# Patient Record
Sex: MALE | Race: WHITE | Employment: OTHER | URBAN - METROPOLITAN AREA
[De-identification: names, ages, dates, MRNs, and addresses within clinical notes are randomized per-mention and may not be internally consistent; named-entity substitution may affect disease eponyms.]

---

## 2017-03-07 ENCOUNTER — HOSPITAL ENCOUNTER (OUTPATIENT)
Dept: PHYSICAL THERAPY | Age: 67
Discharge: HOME | End: 2017-03-07
Payer: MEDICARE

## 2017-03-07 PROCEDURE — 97012 MECHANICAL TRACTION THERAPY: CPT

## 2017-03-07 PROCEDURE — 97162 PT EVAL MOD COMPLEX 30 MIN: CPT

## 2017-03-07 PROCEDURE — 97140 MANUAL THERAPY 1/> REGIONS: CPT

## 2017-03-07 PROCEDURE — G8984 CARRY CURRENT STATUS: HCPCS

## 2017-03-07 PROCEDURE — G8985 CARRY GOAL STATUS: HCPCS

## 2017-03-07 NOTE — PROGRESS NOTES
Ambulatory/Rehab Services H2 Model Falls Risk Assessment    Risk Factor Pts. ·   Confusion/Disorientation/Impulsivity  []    4 ·   Symptomatic Depression  []   2 ·   Altered Elimination  []   1 ·   Dizziness/Vertigo  []   1 ·   Gender (Male)  [x]   1 ·   Any administered antiepileptics (anticonvulsants):  []   2 ·   Any administered benzodiazepines:  []   1 ·   Visual Impairment (specify):  []   1 ·   Portable Oxygen Use  []   1 ·   Orthostatic ? BP  []   1 ·   History of Recent Falls (within 3 mos.)  []   5     Ability to Rise from Chair (choose one) Pts. ·   Ability to rise in a single movement  [x]   0 ·   Pushes up, successful in one attempt  []   1 ·   Multiple attempts, but successful  []   3 ·   Unable to rise without assistance  []   4   Total: (5 or greater = High Risk) 1     Falls Prevention Plan:   []                Physical Limitations to Exercise (specify):   []                Mobility Assistance Device (type):   []                Exercise/Equipment Adaptation (specify):    ©2010 [employer] Patent #[id number].  Federal Law prohibits the replication, distribution or use without written permission from [employer]

## 2017-03-07 NOTE — PROGRESS NOTES
Debby Powell  : 1950 [facility]  [address], [address] [address],[address], [facility] [address]  [phone]   [phone]           OUTPATIENT PHYSICAL THERAPY:Initial Assessment 3/7/2017    ICD-10: Treatment Diagnosis: Cervicalgia (M54.2); Impingement syndrome of right shoulder (M75.41)  Precautions/Allergies:   Levaquin [levofloxacin]   Fall Risk Score: 1 (? 5 = High Risk)  MD Orders: Evaluate and Treat MEDICAL/REFERRING DIAGNOSIS:  Cervicalgia [M54.2]  Impingement syndrome of right shoulder [M75.41]   DATE OF ONSET: 2016  REFERRING PHYSICIAN: Debbie Randle MD  RETURN PHYSICIAN APPOINTMENT: 17     INITIAL ASSESSMENT:  Mr. Harman Bowling presents with decreased cervical ROM, R UE strength and increased pain leading to decreased functional status. Pt would benefit from skilled physical therapy services to address the above deficits and help patient return to prior level of function. PROBLEM LIST (Impacting functional limitations):  1. Decreased Strength  2. Decreased ADL/Functional Activities  3. Increased Pain  4. Decreased Flexibility/Joint Mobility  5. Decreased Barber with Home Exercise Program INTERVENTIONS PLANNED:  1. Cold  2. Cryotherapy  3. Electrical Stimulation  4. Heat  5. Home Exercise Program (HEP)  6. Manual Therapy  7. Range of Motion (ROM)  8. Therapeutic Exercise/Strengthening  9. Ultrasound (US)   TREATMENT PLAN:  Effective Dates: 17 TO 17. Frequency/Duration: 2 times a week for 3 months  GOALS: (Goals have been discussed and agreed upon with patient.)  Short-Term Functional Goals: Time Frame: 4 weeks  1. Pt will increase cervical ROM flexion = 45 degrees, side bending R = 35 degrees, and rotation R = 65 degrees to assist with household and lawn activities  2. Pt will increase strength R shoulder 4/5 to 4+/5 to assist with household and lawn activities  Discharge Goals: Time Frame: 12 weeks  1.  Pt will increase strength R shoulder 5/5 to assist with household and lawn activities  2. Pt will be independent with HEP  3. Pt will perform 20 minutes lawn activities independently with min to no c/o neck or R UE pain  Rehabilitation Potential For Stated Goals: Good  Regarding Laisha Renner's therapy, I certify that the treatment plan above will be carried out by a therapist or under their direction. Thank you for this referral,  Radha Mclean PT     Referring Physician Signature: Jb Alexander MD              Date                    The information in this section was collected on 03-07-17 (except where otherwise noted). HISTORY:   History of Present Injury/Illness (Reason for Referral):  Pt reports insidious onset R shoulder pain that started approximately 12-03-16. He states he had been doing some push-ups which may have contributed to his R shoulder pain. He had some x-rays which revealed a compressed disc at C4-5. He was given a steroid dose pack which helped the pain. His MD sent him for 2 visits of PT for some neck and shoulder exercises. He states when he went off the steroid dose pack his pain returned. He saw MD for follow up and MD has referred him back for continued PT. Pt is retired. Pt has history of lumbar laminectomy by Dr Eddi Troy 08-22-16. He states his lower back is doing well overall-he states his back gets fatigued at times. Pt had an MRI of his neck this morning and sees MD for follow up 03-17-17 to get MRI results. Pt is currently getting pain in his R neck, upper trap, scapular region, shoulder and down to the elbow. He denies any UE numbness or tingling. Pt rates his current pain 2-3/10 sitting at rest, increasing to 6/10 at worst.  Aggravating factors include inactivity. Relieving factors include activity. He is taking Naproxen 500 mg as needed-he has been taking it twice daily. Pt is retired. Pt reports difficulties with sleeping at night secondary neck and shoulder pain.   Past Medical History/Comorbidities:   Mr. Brenda Spicer  has a past medical history of Arthritis; Chronic pain; GERD (gastroesophageal reflux disease); Hypercholesterolemia; Hypertension; Malignant neoplasm of prostate ([address] Stress incontinence, male; and Thyroid disease. Mr. Brenda Spicer  has a past surgical history that includes endoscopy, colon, diagnostic; urological (2007); urological (2008); orthopaedic (Right, 1998); and lumbar laminectomy (08/22/2016). Social History/Living Environment:     Pt lives with spouse who assists with ADL's as needed. He reports increased pain and difficulties with all household/lawn activities due to his neck and R shoulder pain. Prior Level of Function/Work/Activity:  Independent with all household and lawn activities  Dominant Side:         RIGHT  Other Clinical Tests:          X-rays, MRI (has not gotten results yet)  Previous Treatment Approaches:          Physical Therapy, Medications  Personal Factors:          Age:  78 y.o. Past/Current Experience:  Pt has had some prior physical therapy  Current Medications:       Current Outpatient Prescriptions:     levothyroxine (SYNTHROID) 137 mcg tablet, Take 137 mcg by mouth Daily (before breakfast). , Disp: 90 Tab, Rfl: 3    sildenafil citrate (VIAGRA) 25 mg tablet, Take 1 Tab by mouth as needed. , Disp: 18 Tab, Rfl: 3    simvastatin (ZOCOR) 40 mg tablet, Take 1 Tab by mouth nightly., Disp: 90 Tab, Rfl: 3    amLODIPine (NORVASC) 5 mg tablet, Take 1 Tab by mouth daily. , Disp: 90 Tab, Rfl: 3    telmisartan-hydrochlorothiazide (MICARDIS HCT) 80-12.5 mg per tablet, Take 1 Tab by mouth daily. , Disp: 90 Tab, Rfl: 3    fexofenadine (ALLEGRA) 180 mg tablet, Take 180 mg by mouth daily. , Disp: , Rfl:     esomeprazole (NEXIUM) 40 mg capsule, Take 1 Cap by mouth daily.  (Patient taking differently: Take 40 mg by mouth every morning.), Disp: 90 Cap, Rfl: 3    naproxen (NAPROSYN) 500 mg tablet, Take 1 Tab by mouth two (2) times daily (with meals). (Patient taking differently: Take 500 mg by mouth two (2) times daily (with meals). Last dose to be 8/1/2016), Disp: 180 Tab, Rfl: 3    omega-3 fatty acids-vitamin e (FISH OIL) 1,000 mg cap, Take 1 Cap by mouth. Holding for surgery, Disp: , Rfl:     multivitamin (ONE A DAY) tablet, Take 1 Tab by mouth daily. Last dose 8/1/2016, Disp: , Rfl:     clotrimazole-betamethasone (LOTRISONE) topical cream, Apply  to affected area two (2) times a day. (Patient taking differently: Apply  to affected area as needed.), Disp: 45 g, Rfl: 5    azelaic acid (FINACEA) 15 % topical gel, Apply  to affected area two (2) times a day. (Patient taking differently: Apply  to affected area as needed.), Disp: 50 g, Rfl: 3    aspirin delayed-release 81 mg tablet, Take 162 mg by mouth daily. Last dose to be 8/1/2016, Disp: , Rfl:    Date Last Reviewed:  03-07-17   Number of Personal Factors/Comorbidities that affect the Plan of Care: 1-2: MODERATE COMPLEXITY   EXAMINATION:   Observation/Orthostatic Postural Assessment:           Forward head, rounded shoulders  Palpation:          Tenderness R upper trap and levator scap  ROM:    Cervical flexion = 35 degrees  Cervical extension = 29 degrees (radiating pain into R scapular region)  Cervical side bending R = 25 degrees (pain in R cervical region)  Cervical side bending L = 35 degrees  Cervical rotation R = 55 degrees (pain in R cervical region)  Cervical rotation L = 65 degrees    Strength:    R shoulder flexion = 4-/5  R shoulder abduction = 4/5  R shoulder internal rotation = 4+/5  R shoulder external rotation = 4+/5  R elbow flexion = 5/5  R elbow extension = 5/5  R wrist flexion = 5/5  R wrist extension = 5/5    L shoulder flexion = 5/5  L shoulder abduction = 5/5  L shoulder internal rotation = 5/5  L shoulder external rotation = 5/5  L elbow flexion = 5/5  L elbow extension = 5/5  L wrist flexion = 5/5  L wrist extension = 5/5    Special Tests:          Cervical compression and distraction negative; Negative impingement sign R shoulder  Neurological Screen:        Myotomes:  Weakness R C5        Dermatomes:  Sensation intact to light touch bilateral UE's        Reflexes:  DTR's 2+ to bilateral brachioradialis and triceps, 2+ to L biceps, 1+ to R biceps  Functional Mobility:         Pt able to transition sit to supine and supine to sit independently       Body Structures Involved:  1. Nerves  2. Joints  3. Muscles Body Functions Affected:  1. Sensory/Pain  2. Neuromusculoskeletal Activities and Participation Affected:  1. Domestic Life   Number of elements (examined above) that affect the Plan of Care: 3: MODERATE COMPLEXITY   CLINICAL PRESENTATION:   Presentation: Evolving clinical presentation with changing clinical characteristics: MODERATE COMPLEXITY   CLINICAL DECISION MAKING:   Outcome Measure: Tool Used: Neck Disability Index (NDI)  Score:  Initial: 7/50  Most Recent: X/50 (Date: -- )   Interpretation of Score: The Neck Disability Index is a revised form of the Oswestry Low Back Pain Index and is designed to measure the activities of daily living in person's with neck pain. Each section is scored on a 0-5 scale, 5 representing the greatest disability. The scores of each section are added together for a total score of 50. Score 0 1-10 11-20 21-30 31-40 41-49 50   Modifier CH CI CJ CK CL CM CN     ? Carrying, Moving, and Handling Objects:     - CURRENT STATUS: CI - 1%-19% impaired, limited or restricted    - GOAL STATUS: CI - 1%-19% impaired, limited or restricted    - D/C STATUS:  ---------------To be determined---------------      Medical Necessity:   · Patient is expected to demonstrate progress in strength, range of motion and functional technique to increase independence with household/lawn activities. · Patient demonstrates good rehab potential due to higher previous functional level.   Reason for Services/Other Comments:  · Patient continues to require skilled intervention due to decreased cervical ROM, UE strength and increased pain leading to decreased functional status. Use of outcome tool(s) and clinical judgement create a POC that gives a: Questionable prediction of patient's progress: MODERATE COMPLEXITY            TREATMENT:   (In addition to Assessment/Re-Assessment sessions the following treatments were rendered)  Pre-treatment Symptoms/Complaints:  Neck and R shoulder pain  Pain: Initial:     2-3/10 Post Session:  2/10     THERAPEUTIC EXERCISE: (0 minutes):  Exercises per grid below to improve mobility and strength. Required minimal verbal cues to promote proper body alignment and promote proper body posture. Progressed resistance and repetitions as indicated. MANUAL THERAPY: (10 minutes): STM to bilateral cervical paraspinals, upper traps and levator scap R>L was utilized and necessary because of the patient's restricted motion of soft tissue. MECHANICAL TRACTION: (15 minutes): Traction was used due to the patient's cervical radiculopathy in order to relieve pain in or originating from his spine. Date:   Date:   Date:     Activity/Exercise Parameters Parameters Parameters                                                   Treatment/Session Assessment:    · Response to Treatment:  Pt tolerated evaluation and treatments well today with . He stated traction felt food. · Compliance with Program/Exercises: Will assess as treatment progresses. · Recommendations/Intent for next treatment session: \"Next visit will focus on cervical traction, manual therapy, begin and progress therex as tolerable\".   Total Treatment Duration:  25 minutes  PT Patient Time In/Time Out  Time In: 6955  Time Out: [address], PT

## 2017-03-09 ENCOUNTER — HOSPITAL ENCOUNTER (OUTPATIENT)
Dept: PHYSICAL THERAPY | Age: 67
Discharge: HOME | End: 2017-03-09
Payer: MEDICARE

## 2017-03-09 PROCEDURE — 97110 THERAPEUTIC EXERCISES: CPT

## 2017-03-09 PROCEDURE — 97012 MECHANICAL TRACTION THERAPY: CPT

## 2017-03-09 PROCEDURE — 97140 MANUAL THERAPY 1/> REGIONS: CPT

## 2017-03-09 NOTE — PROGRESS NOTES
Bertha Toussainterer  : 1950 [facility] [place] [address], [place] [address].  [phone]   [phone]           OUTPATIENT PHYSICAL THERAPY:Daily Note 3/9/2017    ICD-10: Treatment Diagnosis: Cervicalgia (M54.2); Impingement syndrome of right shoulder (M75.41)  Precautions/Allergies:   Levaquin [levofloxacin]   Fall Risk Score: 1 (? 5 = High Risk)  MD Orders: Evaluate and Treat MEDICAL/REFERRING DIAGNOSIS:  Cervicalgia [M54.2]  Impingement syndrome of right shoulder [M75.41]   DATE OF ONSET: 2016  REFERRING PHYSICIAN: Nam Nevarez MD  RETURN PHYSICIAN APPOINTMENT: 17     INITIAL ASSESSMENT:  Mr. Balbina Bedoya presents with decreased cervical ROM, R UE strength and increased pain leading to decreased functional status. Pt would benefit from skilled physical therapy services to address the above deficits and help patient return to prior level of function. PROBLEM LIST (Impacting functional limitations):  1. Decreased Strength  2. Decreased ADL/Functional Activities  3. Increased Pain  4. Decreased Flexibility/Joint Mobility  5. Decreased [place] with Home Exercise Program INTERVENTIONS PLANNED:  1. Cold  2. Cryotherapy  3. Electrical Stimulation  4. Heat  5. Home Exercise Program (HEP)  6. Manual Therapy  7. Range of Motion (ROM)  8. Therapeutic Exercise/Strengthening  9. Ultrasound (US)   TREATMENT PLAN:  Effective Dates: 17 TO 17. Frequency/Duration: 2 times a week for 3 months  GOALS: (Goals have been discussed and agreed upon with patient.)  Short-Term Functional Goals: Time Frame: 4 weeks  1. Pt will increase cervical ROM flexion = 45 degrees, side bending R = 35 degrees, and rotation R = 65 degrees to assist with household and lawn activities  2. Pt will increase strength R shoulder 4/5 to 4+/5 to assist with household and lawn activities  Discharge Goals: Time Frame: 12 weeks  1.  Pt will increase strength R shoulder 5/5 to assist with household and lawn activities  2. Pt will be independent with HEP  3. Pt will perform 20 minutes lawn activities independently with min to no c/o neck or R UE pain  Rehabilitation Potential For Stated Goals: Good  Regarding Audie Renner's therapy, I certify that the treatment plan above will be carried out by a therapist or under their direction. Thank you for this referral,  Luz Chao PTA     Referring Physician Signature: Brien Zhong MD              Date                    The information in this section was collected on 03-07-17 (except where otherwise noted). HISTORY:   Subjective: \"That traction was great, I think that is the key to it all\"    History of Present Injury/Illness (Reason for Referral):   Pt reports insidious onset R shoulder pain that started approximately 12-03-16. He states he had been doing some push-ups which may have contributed to his R shoulder pain. He had some x-rays which revealed a compressed disc at C4-5. He was given a steroid dose pack which helped the pain. His MD sent him for 2 visits of PT for some neck and shoulder exercises. He states when he went off the steroid dose pack his pain returned. He saw MD for follow up and MD has referred him back for continued PT. Pt is retired. Pt has history of lumbar laminectomy by Dr Blayne Bliss 08-22-16. He states his lower back is doing well overall-he states his back gets fatigued at times. Pt had an MRI of his neck this morning and sees MD for follow up 03-17-17 to get MRI results. Pt is currently getting pain in his R neck, upper trap, scapular region, shoulder and down to the elbow. He denies any UE numbness or tingling. Pt rates his current pain 2-3/10 sitting at rest, increasing to 6/10 at worst.  Aggravating factors include inactivity. Relieving factors include activity. He is taking Naproxen 500 mg as needed-he has been taking it twice daily. Pt is retired.   Pt reports difficulties with sleeping at night secondary neck and shoulder pain. Past Medical History/Comorbidities:   Mr. Valorie Sanders  has a past medical history of Arthritis; Chronic pain; GERD (gastroesophageal reflux disease); Hypercholesterolemia; Hypertension; Malignant neoplasm of prostate ([address] Stress incontinence, male; and Thyroid disease. Mr. Valorie Sanders  has a past surgical history that includes endoscopy, colon, diagnostic; urological (2007); urological (2008); orthopaedic (Right, 1998); and lumbar laminectomy (08/22/2016). Social History/Living Environment:     Pt lives with spouse who assists with ADL's as needed. He reports increased pain and difficulties with all household/lawn activities due to his neck and R shoulder pain. Prior Level of Function/Work/Activity:  Independent with all household and lawn activities  Dominant Side:         RIGHT  Other Clinical Tests:          X-rays, MRI (has not gotten results yet)  Previous Treatment Approaches:          Physical Therapy, Medications  Personal Factors:          Age:  78 y.o. Past/Current Experience:  Pt has had some prior physical therapy  Current Medications:       Current Outpatient Prescriptions:     levothyroxine (SYNTHROID) 137 mcg tablet, Take 137 mcg by mouth Daily (before breakfast). , Disp: 90 Tab, Rfl: 3    sildenafil citrate (VIAGRA) 25 mg tablet, Take 1 Tab by mouth as needed. , Disp: 18 Tab, Rfl: 3    simvastatin (ZOCOR) 40 mg tablet, Take 1 Tab by mouth nightly., Disp: 90 Tab, Rfl: 3    amLODIPine (NORVASC) 5 mg tablet, Take 1 Tab by mouth daily. , Disp: 90 Tab, Rfl: 3    telmisartan-hydrochlorothiazide (MICARDIS HCT) 80-12.5 mg per tablet, Take 1 Tab by mouth daily. , Disp: 90 Tab, Rfl: 3    fexofenadine (ALLEGRA) 180 mg tablet, Take 180 mg by mouth daily. , Disp: , Rfl:     esomeprazole (NEXIUM) 40 mg capsule, Take 1 Cap by mouth daily.  (Patient taking differently: Take 40 mg by mouth every morning.), Disp: 90 Cap, Rfl: 3    naproxen (NAPROSYN) 500 mg tablet, Take 1 Tab by mouth two (2) times daily (with meals). (Patient taking differently: Take 500 mg by mouth two (2) times daily (with meals). Last dose to be 8/1/2016), Disp: 180 Tab, Rfl: 3    omega-3 fatty acids-vitamin e (FISH OIL) 1,000 mg cap, Take 1 Cap by mouth. Holding for surgery, Disp: , Rfl:     multivitamin (ONE A DAY) tablet, Take 1 Tab by mouth daily. Last dose 8/1/2016, Disp: , Rfl:     clotrimazole-betamethasone (LOTRISONE) topical cream, Apply  to affected area two (2) times a day. (Patient taking differently: Apply  to affected area as needed.), Disp: 45 g, Rfl: 5    azelaic acid (FINACEA) 15 % topical gel, Apply  to affected area two (2) times a day. (Patient taking differently: Apply  to affected area as needed.), Disp: 50 g, Rfl: 3    aspirin delayed-release 81 mg tablet, Take 162 mg by mouth daily. Last dose to be 8/1/2016, Disp: , Rfl:    Date Last Reviewed:  03-07-17   Number of Personal Factors/Comorbidities that affect the Plan of Care: 1-2: MODERATE COMPLEXITY   EXAMINATION:   Observation/Orthostatic Postural Assessment:           Forward head, rounded shoulders  Palpation:          Tenderness R upper trap and levator scap  ROM:    Cervical flexion = 35 degrees  Cervical extension = 29 degrees (radiating pain into R scapular region)  Cervical side bending R = 25 degrees (pain in R cervical region)  Cervical side bending L = 35 degrees  Cervical rotation R = 55 degrees (pain in R cervical region)  Cervical rotation L = 65 degrees    Strength:    R shoulder flexion = 4-/5  R shoulder abduction = 4/5  R shoulder internal rotation = 4+/5  R shoulder external rotation = 4+/5  R elbow flexion = 5/5  R elbow extension = 5/5  R wrist flexion = 5/5  R wrist extension = 5/5    L shoulder flexion = 5/5  L shoulder abduction = 5/5  L shoulder internal rotation = 5/5  L shoulder external rotation = 5/5  L elbow flexion = 5/5  L elbow extension = 5/5  L wrist flexion = 5/5  L wrist extension = 5/5    Special Tests:          Cervical compression and distraction negative; Negative impingement sign R shoulder  Neurological Screen:        Myotomes:  Weakness R C5        Dermatomes:  Sensation intact to light touch bilateral UE's        Reflexes:  DTR's 2+ to bilateral brachioradialis and triceps, 2+ to L biceps, 1+ to R biceps  Functional Mobility:         Pt able to transition sit to supine and supine to sit independently       Body Structures Involved:  1. Nerves  2. Joints  3. Muscles Body Functions Affected:  1. Sensory/Pain  2. Neuromusculoskeletal Activities and Participation Affected:  1. Domestic Life   Number of elements (examined above) that affect the Plan of Care: 3: MODERATE COMPLEXITY   CLINICAL PRESENTATION:   Presentation: Evolving clinical presentation with changing clinical characteristics: MODERATE COMPLEXITY   CLINICAL DECISION MAKING:   Outcome Measure: Tool Used: Neck Disability Index (NDI)  Score:  Initial: 7/50  Most Recent: X/50 (Date: -- )   Interpretation of Score: The Neck Disability Index is a revised form of the Oswestry Low Back Pain Index and is designed to measure the activities of daily living in person's with neck pain. Each section is scored on a 0-5 scale, 5 representing the greatest disability. The scores of each section are added together for a total score of 50. Score 0 1-10 11-20 21-30 31-40 41-49 50   Modifier CH CI CJ CK CL CM CN     ? Carrying, Moving, and Handling Objects:     - CURRENT STATUS: CI - 1%-19% impaired, limited or restricted    - GOAL STATUS: CI - 1%-19% impaired, limited or restricted    - D/C STATUS:  ---------------To be determined---------------      Medical Necessity:   · Patient is expected to demonstrate progress in strength, range of motion and functional technique to increase independence with household/lawn activities. · Patient demonstrates good rehab potential due to higher previous functional level.   Reason for Services/Other Comments:  · Patient continues to require skilled intervention due to decreased cervical ROM, UE strength and increased pain leading to decreased functional status. Use of outcome tool(s) and clinical judgement create a POC that gives a: Questionable prediction of patient's progress: MODERATE COMPLEXITY            TREATMENT:   (In addition to Assessment/Re-Assessment sessions the following treatments were rendered)  Pre-treatment Symptoms/Complaints:  Neck and R shoulder pain  Pain: Initial:   Pain Intensity 1: 3 (\"Traction helped alot\")  Post Session:  2/10     THERAPEUTIC EXERCISE: (15 minutes):  Exercises per grid below to improve mobility and strength. Required minimal verbal cues to promote proper body alignment and promote proper body posture. Progressed resistance and repetitions as indicated. MANUAL THERAPY: (10 minutes): STM to bilateral cervical paraspinals, upper traps and levator scap R>L was utilized and necessary because of the patient's restricted motion of soft tissue. MECHANICAL TRACTION: (15 minutes): Traction was used due to the patient's cervical radiculopathy and 60 sec hold 20 sec off with 15# pull in order to relieve pain in or originating from his spine. Date:  3-9-17 Date:   Date:     Activity/Exercise Parameters Parameters Parameters   UBE Next visit     Tband Row-Extension Next visit     Chin Tucks X 10 reps 5 sec holds     Deep cervical flexion X 10 hold 5 seconds     Upper Trap -Levator stretch 3 x 10 sec holds bilaterally                       Treatment/Session Assessment:  Patient continue's to do his HEP. · Response to Treatment:  Patient responding well to traction, getting some relieve longer periods after traction. · Compliance with Program/Exercises: Will assess as treatment progresses. · Recommendations/Intent for next treatment session: \"Next visit will focus on cervical traction, manual therapy, begin and progress therex as tolerable\".   Total Treatment Duration: 40 minutes  PT Patient Time In/Time Out  Time In: 5565  Time Out: 700 [address], [facility]

## 2017-03-13 ENCOUNTER — HOSPITAL ENCOUNTER (OUTPATIENT)
Dept: PHYSICAL THERAPY | Age: 67
Discharge: HOME | End: 2017-03-13
Payer: MEDICARE

## 2017-03-13 PROCEDURE — 97012 MECHANICAL TRACTION THERAPY: CPT

## 2017-03-13 PROCEDURE — 97110 THERAPEUTIC EXERCISES: CPT

## 2017-03-13 PROCEDURE — 97140 MANUAL THERAPY 1/> REGIONS: CPT

## 2017-03-13 NOTE — PROGRESS NOTES
Yady Pacific  : 1950 [facility]  [address], [address] [place] [address] [facility]  [phone]   [phone]           OUTPATIENT PHYSICAL THERAPY:Daily Note 3/13/2017    ICD-10: Treatment Diagnosis: Cervicalgia (M54.2); Impingement syndrome of right shoulder (M75.41)  Precautions/Allergies:   Levaquin [levofloxacin]   Fall Risk Score: 1 (? 5 = High Risk)  MD Orders: Evaluate and Treat MEDICAL/REFERRING DIAGNOSIS:  Cervicalgia [M54.2]  Impingement syndrome of right shoulder [M75.41]   DATE OF ONSET: 2016  REFERRING PHYSICIAN: Eric Limon MD  RETURN PHYSICIAN APPOINTMENT: 17     INITIAL ASSESSMENT:  Mr. Nuno Treadwell presents with decreased cervical ROM, R UE strength and increased pain leading to decreased functional status. Pt would benefit from skilled physical therapy services to address the above deficits and help patient return to prior level of function. PROBLEM LIST (Impacting functional limitations):  1. Decreased Strength  2. Decreased ADL/Functional Activities  3. Increased Pain  4. Decreased Flexibility/Joint Mobility  5. Decreased [place] with Home Exercise Program INTERVENTIONS PLANNED:  1. Cold  2. Cryotherapy  3. Electrical Stimulation  4. Heat  5. Home Exercise Program (HEP)  6. Manual Therapy  7. Range of Motion (ROM)  8. Therapeutic Exercise/Strengthening  9. Ultrasound (US)   TREATMENT PLAN:  Effective Dates: 17 TO 17. Frequency/Duration: 2 times a week for 3 months  GOALS: (Goals have been discussed and agreed upon with patient.)  Short-Term Functional Goals: Time Frame: 4 weeks  1. Pt will increase cervical ROM flexion = 45 degrees, side bending R = 35 degrees, and rotation R = 65 degrees to assist with household and lawn activities  2. Pt will increase strength R shoulder 4/5 to 4+/5 to assist with household and lawn activities  Discharge Goals: Time Frame: 12 weeks  1.  Pt will increase strength R shoulder 5/5 to assist with household and lawn activities  2. Pt will be independent with HEP  3. Pt will perform 20 minutes lawn activities independently with min to no c/o neck or R UE pain  Rehabilitation Potential For Stated Goals: Good  Regarding Mane Renner's therapy, I certify that the treatment plan above will be carried out by a therapist or under their direction. Thank you for this referral,  Lorenzo Russ PT     Referring Physician Signature: Karen Bauer MD              Date                    The information in this section was collected on 03-07-17 (except where otherwise noted). HISTORY:   Subjective:   Pt reports continued improvements in his neck. He states the traction continues to help. History of Present Injury/Illness (Reason for Referral):   Pt reports insidious onset R shoulder pain that started approximately 12-03-16. He states he had been doing some push-ups which may have contributed to his R shoulder pain. He had some x-rays which revealed a compressed disc at C4-5. He was given a steroid dose pack which helped the pain. His MD sent him for 2 visits of PT for some neck and shoulder exercises. He states when he went off the steroid dose pack his pain returned. He saw MD for follow up and MD has referred him back for continued PT. Pt is retired. Pt has history of lumbar laminectomy by Dr Beny Ge 08-22-16. He states his lower back is doing well overall-he states his back gets fatigued at times. Pt had an MRI of his neck this morning and sees MD for follow up 03-17-17 to get MRI results. Pt is currently getting pain in his R neck, upper trap, scapular region, shoulder and down to the elbow. He denies any UE numbness or tingling. Pt rates his current pain 2-3/10 sitting at rest, increasing to 6/10 at worst.  Aggravating factors include inactivity. Relieving factors include activity. He is taking Naproxen 500 mg as needed-he has been taking it twice daily.   Pt is retired. Pt reports difficulties with sleeping at night secondary neck and shoulder pain. Past Medical History/Comorbidities:   Mr. Nuno Treadwell  has a past medical history of Arthritis; Chronic pain; GERD (gastroesophageal reflux disease); Hypercholesterolemia; Hypertension; Malignant neoplasm of prostate ([address] Stress incontinence, male; and Thyroid disease. Mr. Nuno Treadwell  has a past surgical history that includes endoscopy, colon, diagnostic; urological (2007); urological (2008); orthopaedic (Right, 1998); and lumbar laminectomy (08/22/2016). Social History/Living Environment:     Pt lives with spouse who assists with ADL's as needed. He reports increased pain and difficulties with all household/lawn activities due to his neck and R shoulder pain. Prior Level of Function/Work/Activity:  Independent with all household and lawn activities  Dominant Side:         RIGHT  Other Clinical Tests:          X-rays, MRI (has not gotten results yet)  Previous Treatment Approaches:          Physical Therapy, Medications  Personal Factors:          Age:  78 y.o. Past/Current Experience:  Pt has had some prior physical therapy  Current Medications:       Current Outpatient Prescriptions:     levothyroxine (SYNTHROID) 137 mcg tablet, Take 137 mcg by mouth Daily (before breakfast). , Disp: 90 Tab, Rfl: 3    sildenafil citrate (VIAGRA) 25 mg tablet, Take 1 Tab by mouth as needed. , Disp: 18 Tab, Rfl: 3    simvastatin (ZOCOR) 40 mg tablet, Take 1 Tab by mouth nightly., Disp: 90 Tab, Rfl: 3    amLODIPine (NORVASC) 5 mg tablet, Take 1 Tab by mouth daily. , Disp: 90 Tab, Rfl: 3    telmisartan-hydrochlorothiazide (MICARDIS HCT) 80-12.5 mg per tablet, Take 1 Tab by mouth daily. , Disp: 90 Tab, Rfl: 3    fexofenadine (ALLEGRA) 180 mg tablet, Take 180 mg by mouth daily. , Disp: , Rfl:     esomeprazole (NEXIUM) 40 mg capsule, Take 1 Cap by mouth daily.  (Patient taking differently: Take 40 mg by mouth every morning.), Disp: 90 Cap, Rfl: 3    naproxen (NAPROSYN) 500 mg tablet, Take 1 Tab by mouth two (2) times daily (with meals). (Patient taking differently: Take 500 mg by mouth two (2) times daily (with meals). Last dose to be 8/1/2016), Disp: 180 Tab, Rfl: 3    omega-3 fatty acids-vitamin e (FISH OIL) 1,000 mg cap, Take 1 Cap by mouth. Holding for surgery, Disp: , Rfl:     multivitamin (ONE A DAY) tablet, Take 1 Tab by mouth daily. Last dose 8/1/2016, Disp: , Rfl:     clotrimazole-betamethasone (LOTRISONE) topical cream, Apply  to affected area two (2) times a day. (Patient taking differently: Apply  to affected area as needed.), Disp: 45 g, Rfl: 5    azelaic acid (FINACEA) 15 % topical gel, Apply  to affected area two (2) times a day. (Patient taking differently: Apply  to affected area as needed.), Disp: 50 g, Rfl: 3    aspirin delayed-release 81 mg tablet, Take 162 mg by mouth daily. Last dose to be 8/1/2016, Disp: , Rfl:    Date Last Reviewed:  03-07-17   Number of Personal Factors/Comorbidities that affect the Plan of Care: 1-2: MODERATE COMPLEXITY   EXAMINATION:   Observation/Orthostatic Postural Assessment:           Forward head, rounded shoulders  Palpation:          Tenderness R upper trap and levator scap  ROM:    Cervical flexion = 35 degrees  Cervical extension = 29 degrees (radiating pain into R scapular region)  Cervical side bending R = 25 degrees (pain in R cervical region)  Cervical side bending L = 35 degrees  Cervical rotation R = 55 degrees (pain in R cervical region)  Cervical rotation L = 65 degrees    Strength:    R shoulder flexion = 4-/5  R shoulder abduction = 4/5  R shoulder internal rotation = 4+/5  R shoulder external rotation = 4+/5  R elbow flexion = 5/5  R elbow extension = 5/5  R wrist flexion = 5/5  R wrist extension = 5/5    L shoulder flexion = 5/5  L shoulder abduction = 5/5  L shoulder internal rotation = 5/5  L shoulder external rotation = 5/5  L elbow flexion = 5/5  L elbow extension = 5/5  L wrist flexion = 5/5  L wrist extension = 5/5    Special Tests:          Cervical compression and distraction negative; Negative impingement sign R shoulder  Neurological Screen:        Myotomes:  Weakness R C5        Dermatomes:  Sensation intact to light touch bilateral UE's        Reflexes:  DTR's 2+ to bilateral brachioradialis and triceps, 2+ to L biceps, 1+ to R biceps  Functional Mobility:         Pt able to transition sit to supine and supine to sit independently       Body Structures Involved:  1. Nerves  2. Joints  3. Muscles Body Functions Affected:  1. Sensory/Pain  2. Neuromusculoskeletal Activities and Participation Affected:  1. Domestic Life   Number of elements (examined above) that affect the Plan of Care: 3: MODERATE COMPLEXITY   CLINICAL PRESENTATION:   Presentation: Evolving clinical presentation with changing clinical characteristics: MODERATE COMPLEXITY   CLINICAL DECISION MAKING:   Outcome Measure: Tool Used: Neck Disability Index (NDI)  Score:  Initial: 7/50  Most Recent: X/50 (Date: -- )   Interpretation of Score: The Neck Disability Index is a revised form of the Oswestry Low Back Pain Index and is designed to measure the activities of daily living in person's with neck pain. Each section is scored on a 0-5 scale, 5 representing the greatest disability. The scores of each section are added together for a total score of 50. Score 0 1-10 11-20 21-30 31-40 41-49 50   Modifier CH CI CJ CK CL CM CN     ? Carrying, Moving, and Handling Objects:     - CURRENT STATUS: CI - 1%-19% impaired, limited or restricted    - GOAL STATUS: CI - 1%-19% impaired, limited or restricted    - D/C STATUS:  ---------------To be determined---------------      Medical Necessity:   · Patient is expected to demonstrate progress in strength, range of motion and functional technique to increase independence with household/lawn activities.   · Patient demonstrates good rehab potential due to higher previous functional level. Reason for Services/Other Comments:  · Patient continues to require skilled intervention due to decreased cervical ROM, UE strength and increased pain leading to decreased functional status. Use of outcome tool(s) and clinical judgement create a POC that gives a: Questionable prediction of patient's progress: MODERATE COMPLEXITY            TREATMENT:   (In addition to Assessment/Re-Assessment sessions the following treatments were rendered)  Pre-treatment Symptoms/Complaints:  Neck and R shoulder pain  Pain: Initial: 1/10     Post Session:  1/10     THERAPEUTIC EXERCISE: (10 minutes):  Exercises per grid below to improve mobility and strength. Required minimal verbal cues to promote proper body alignment and promote proper body posture. Progressed resistance and repetitions as indicated. MANUAL THERAPY: (15 minutes): STM to bilateral cervical paraspinals, upper traps and levator scap R>L was utilized and necessary because of the patient's restricted motion of soft tissue. MECHANICAL TRACTION: (15 minutes): Traction was used due to the patient's cervical radiculopathy and 60 sec hold 20 sec off with 18# pull in order to relieve pain in or originating from his spine. Date:  3-9-17 Date:  03-13-17 Date:     Activity/Exercise Parameters Parameters Parameters   UBE Next visit Manual L3  6 minutes    Tband Row-Extension Next visit Green  20 reps each    Chin Tucks X 10 reps 5 sec holds     Deep cervical flexion X 10 hold 5 seconds     Upper Trap -Levator stretch 3 x 10 sec holds bilaterally                       Treatment/Session Assessment:  Patient tolerated treatments well today with no c/o. He continues to have subjective improvement in symptoms. · Response to Treatment:  Patient responding well to traction, getting some relieve longer periods after traction. · Compliance with Program/Exercises: Will assess as treatment progresses.   · Recommendations/Intent for next treatment session: \"Next visit will focus on cervical traction, manual therapy, begin and progress therex as tolerable\".   Total Treatment Duration: 40 minutes  PT Patient Time In/Time Out  Time In: 6237  Time Out: [address] Kaylee Pantoja PT

## 2017-03-15 ENCOUNTER — HOSPITAL ENCOUNTER (OUTPATIENT)
Dept: PHYSICAL THERAPY | Age: 67
Discharge: HOME | End: 2017-03-15
Payer: MEDICARE

## 2017-03-15 PROCEDURE — 97012 MECHANICAL TRACTION THERAPY: CPT

## 2017-03-15 PROCEDURE — 97140 MANUAL THERAPY 1/> REGIONS: CPT

## 2017-03-15 PROCEDURE — 97110 THERAPEUTIC EXERCISES: CPT

## 2017-03-15 NOTE — PROGRESS NOTES
Jonathan Mazariegos  : 1950 [facility] at [address], [address] [place] [address],[address], [facility] [address].  [phone]   [phone]           OUTPATIENT PHYSICAL THERAPY:Daily Note 3/15/2017    ICD-10: Treatment Diagnosis: Cervicalgia (M54.2); Impingement syndrome of right shoulder (M75.41)  Precautions/Allergies:   Levaquin [levofloxacin]   Fall Risk Score: 1 (? 5 = High Risk)  MD Orders: Evaluate and Treat MEDICAL/REFERRING DIAGNOSIS:  Cervicalgia [M54.2]  Impingement syndrome of right shoulder [M75.41]   DATE OF ONSET: 2016  REFERRING PHYSICIAN: Katherine Gutierrez MD  RETURN PHYSICIAN APPOINTMENT: 17     INITIAL ASSESSMENT:  Mr. Annamaria Saleh presents with decreased cervical ROM, R UE strength and increased pain leading to decreased functional status. Pt would benefit from skilled physical therapy services to address the above deficits and help patient return to prior level of function. PROBLEM LIST (Impacting functional limitations):  1. Decreased Strength  2. Decreased ADL/Functional Activities  3. Increased Pain  4. Decreased Flexibility/Joint Mobility  5. Decreased [place] with Home Exercise Program INTERVENTIONS PLANNED:  1. Cold  2. Cryotherapy  3. Electrical Stimulation  4. Heat  5. Home Exercise Program (HEP)  6. Manual Therapy  7. Range of Motion (ROM)  8. Therapeutic Exercise/Strengthening  9. Ultrasound (US)   TREATMENT PLAN:  Effective Dates: 17 TO 17. Frequency/Duration: 2 times a week for 3 months  GOALS: (Goals have been discussed and agreed upon with patient.)  Short-Term Functional Goals: Time Frame: 4 weeks  1. Pt will increase cervical ROM flexion = 45 degrees, side bending R = 35 degrees, and rotation R = 65 degrees to assist with household and lawn activities  2. Pt will increase strength R shoulder 4/5 to 4+/5 to assist with household and lawn activities  Discharge Goals: Time Frame: 12 weeks  1.  Pt will increase strength R shoulder 5/5 to assist with household and lawn activities  2. Pt will be independent with HEP  3. Pt will perform 20 minutes lawn activities independently with min to no c/o neck or R UE pain  Rehabilitation Potential For Stated Goals: Good  Regarding Emely Renner's therapy, I certify that the treatment plan above will be carried out by a therapist or under their direction. Thank you for this referral,  Cecille Christopher PTA     Referring Physician Signature: Gil Rosas MD              Date                    The information in this section was collected on 03-07-17 (except where otherwise noted). HISTORY:   Subjective:  \"I am doing better\"    History of Present Injury/Illness (Reason for Referral):   Pt reports insidious onset R shoulder pain that started approximately 12-03-16. He states he had been doing some push-ups which may have contributed to his R shoulder pain. He had some x-rays which revealed a compressed disc at C4-5. He was given a steroid dose pack which helped the pain. His MD sent him for 2 visits of PT for some neck and shoulder exercises. He states when he went off the steroid dose pack his pain returned. He saw MD for follow up and MD has referred him back for continued PT. Pt is retired. Pt has history of lumbar laminectomy by Dr Jada Pierre 08-22-16. He states his lower back is doing well overall-he states his back gets fatigued at times. Pt had an MRI of his neck this morning and sees MD for follow up 03-17-17 to get MRI results. Pt is currently getting pain in his R neck, upper trap, scapular region, shoulder and down to the elbow. He denies any UE numbness or tingling. Pt rates his current pain 2-3/10 sitting at rest, increasing to 6/10 at worst.  Aggravating factors include inactivity. Relieving factors include activity. He is taking Naproxen 500 mg as needed-he has been taking it twice daily. Pt is retired.   Pt reports difficulties with sleeping at night secondary neck and shoulder pain. Past Medical History/Comorbidities:   Mr. Stephany Vargas  has a past medical history of Arthritis; Chronic pain; GERD (gastroesophageal reflux disease); Hypercholesterolemia; Hypertension; Malignant neoplasm of prostate ([address] Stress incontinence, male; and Thyroid disease. Mr. Stephany Vargas  has a past surgical history that includes endoscopy, colon, diagnostic; urological (2007); urological (2008); orthopaedic (Right, 1998); and lumbar laminectomy (08/22/2016). Social History/Living Environment:     Pt lives with spouse who assists with ADL's as needed. He reports increased pain and difficulties with all household/lawn activities due to his neck and R shoulder pain. Prior Level of Function/Work/Activity:  Independent with all household and lawn activities  Dominant Side:         RIGHT  Other Clinical Tests:          X-rays, MRI (has not gotten results yet)  Previous Treatment Approaches:          Physical Therapy, Medications  Personal Factors:          Age:  78 y.o. Past/Current Experience:  Pt has had some prior physical therapy  Current Medications:       Current Outpatient Prescriptions:     levothyroxine (SYNTHROID) 137 mcg tablet, Take 137 mcg by mouth Daily (before breakfast). , Disp: 90 Tab, Rfl: 3    sildenafil citrate (VIAGRA) 25 mg tablet, Take 1 Tab by mouth as needed. , Disp: 18 Tab, Rfl: 3    simvastatin (ZOCOR) 40 mg tablet, Take 1 Tab by mouth nightly., Disp: 90 Tab, Rfl: 3    amLODIPine (NORVASC) 5 mg tablet, Take 1 Tab by mouth daily. , Disp: 90 Tab, Rfl: 3    telmisartan-hydrochlorothiazide (MICARDIS HCT) 80-12.5 mg per tablet, Take 1 Tab by mouth daily. , Disp: 90 Tab, Rfl: 3    fexofenadine (ALLEGRA) 180 mg tablet, Take 180 mg by mouth daily. , Disp: , Rfl:     esomeprazole (NEXIUM) 40 mg capsule, Take 1 Cap by mouth daily.  (Patient taking differently: Take 40 mg by mouth every morning.), Disp: 90 Cap, Rfl: 3    naproxen (NAPROSYN) 500 mg tablet, Take 1 Tab by mouth two (2) times daily (with meals). (Patient taking differently: Take 500 mg by mouth two (2) times daily (with meals). Last dose to be 8/1/2016), Disp: 180 Tab, Rfl: 3    omega-3 fatty acids-vitamin e (FISH OIL) 1,000 mg cap, Take 1 Cap by mouth. Holding for surgery, Disp: , Rfl:     multivitamin (ONE A DAY) tablet, Take 1 Tab by mouth daily. Last dose 8/1/2016, Disp: , Rfl:     clotrimazole-betamethasone (LOTRISONE) topical cream, Apply  to affected area two (2) times a day. (Patient taking differently: Apply  to affected area as needed.), Disp: 45 g, Rfl: 5    azelaic acid (FINACEA) 15 % topical gel, Apply  to affected area two (2) times a day. (Patient taking differently: Apply  to affected area as needed.), Disp: 50 g, Rfl: 3    aspirin delayed-release 81 mg tablet, Take 162 mg by mouth daily. Last dose to be 8/1/2016, Disp: , Rfl:    Date Last Reviewed:  03-07-17   Number of Personal Factors/Comorbidities that affect the Plan of Care: 1-2: MODERATE COMPLEXITY   EXAMINATION:   Observation/Orthostatic Postural Assessment:           Forward head, rounded shoulders  Palpation:          Tenderness R upper trap and levator scap  ROM:    Cervical flexion = 35 degrees  Cervical extension = 29 degrees (radiating pain into R scapular region)  Cervical side bending R = 25 degrees (pain in R cervical region)  Cervical side bending L = 35 degrees  Cervical rotation R = 55 degrees (pain in R cervical region)  Cervical rotation L = 65 degrees    Strength:    R shoulder flexion = 4-/5  R shoulder abduction = 4/5  R shoulder internal rotation = 4+/5  R shoulder external rotation = 4+/5  R elbow flexion = 5/5  R elbow extension = 5/5  R wrist flexion = 5/5  R wrist extension = 5/5    L shoulder flexion = 5/5  L shoulder abduction = 5/5  L shoulder internal rotation = 5/5  L shoulder external rotation = 5/5  L elbow flexion = 5/5  L elbow extension = 5/5  L wrist flexion = 5/5  L wrist extension = 5/5    Special Tests: Cervical compression and distraction negative; Negative impingement sign R shoulder  Neurological Screen:        Myotomes:  Weakness R C5        Dermatomes:  Sensation intact to light touch bilateral UE's        Reflexes:  DTR's 2+ to bilateral brachioradialis and triceps, 2+ to L biceps, 1+ to R biceps  Functional Mobility:         Pt able to transition sit to supine and supine to sit independently       Body Structures Involved:  1. Nerves  2. Joints  3. Muscles Body Functions Affected:  1. Sensory/Pain  2. Neuromusculoskeletal Activities and Participation Affected:  1. Domestic Life   Number of elements (examined above) that affect the Plan of Care: 3: MODERATE COMPLEXITY   CLINICAL PRESENTATION:   Presentation: Evolving clinical presentation with changing clinical characteristics: MODERATE COMPLEXITY   CLINICAL DECISION MAKING:   Outcome Measure: Tool Used: Neck Disability Index (NDI)  Score:  Initial: 7/50  Most Recent: X/50 (Date: -- )   Interpretation of Score: The Neck Disability Index is a revised form of the Oswestry Low Back Pain Index and is designed to measure the activities of daily living in person's with neck pain. Each section is scored on a 0-5 scale, 5 representing the greatest disability. The scores of each section are added together for a total score of 50. Score 0 1-10 11-20 21-30 31-40 41-49 50   Modifier CH CI CJ CK CL CM CN     ? Carrying, Moving, and Handling Objects:     - CURRENT STATUS: CI - 1%-19% impaired, limited or restricted    - GOAL STATUS: CI - 1%-19% impaired, limited or restricted    - D/C STATUS:  ---------------To be determined---------------      Medical Necessity:   · Patient is expected to demonstrate progress in strength, range of motion and functional technique to increase independence with household/lawn activities. · Patient demonstrates good rehab potential due to higher previous functional level.   Reason for Services/Other Comments:  · Patient continues to require skilled intervention due to decreased cervical ROM, UE strength and increased pain leading to decreased functional status. Use of outcome tool(s) and clinical judgement create a POC that gives a: Questionable prediction of patient's progress: MODERATE COMPLEXITY            TREATMENT:   (In addition to Assessment/Re-Assessment sessions the following treatments were rendered)  Pre-treatment Symptoms/Complaints:  Neck and R shoulder pain  Pain: Initial: 1/10 Feeling better     Post Session:  1/10     THERAPEUTIC EXERCISE: (10 minutes):  Exercises per grid below to improve mobility and strength. Required minimal verbal cues to promote proper body alignment and promote proper body posture. Progressed resistance and repetitions as indicated. MANUAL THERAPY: (15 minutes): STM to bilateral cervical paraspinals, upper traps and levator scap R>L was utilized and necessary because of the patient's restricted motion of soft tissue. MECHANICAL TRACTION: (20 minutes): Traction was used due to the patient's cervical radiculopathy and 60 sec hold 20 sec off with 18# pull in order to relieve pain in or originating from his spine. Date:  3-9-17 Date:  03-13-17 Date:     Activity/Exercise Parameters Parameters Parameters   UBE Next visit Manual L3  8 minutes    Tband Row-Extension Next visit Green  20 reps each    Chin Tucks X 10 reps 5 sec holds X 10 reps hold 5 seconds    Deep cervical flexion X 10 hold 5 seconds HEP    Upper Trap -Levator stretch 3 x 10 sec holds bilaterally HEP                      Treatment/Session Assessment:  Patient tolerated treatments well today with no c/o. Patient improving overall with symptoms. · Response to Treatment:  Patient responding well to traction, getting some relieve longer periods after traction. · Compliance with Program/Exercises: Will assess as treatment progresses. · Recommendations/Intent for next treatment session:  \"Next visit will focus on cervical traction, manual therapy, begin and progress therex as tolerable\".   Total Treatment Duration: 45 minutes  PT Patient Time In/Time Out  Time In: 1300  Time Out: [place] 55, PTA

## 2017-03-20 ENCOUNTER — HOSPITAL ENCOUNTER (OUTPATIENT)
Dept: PHYSICAL THERAPY | Age: 67
Discharge: HOME | End: 2017-03-20
Payer: MEDICARE

## 2017-03-20 PROCEDURE — 97110 THERAPEUTIC EXERCISES: CPT

## 2017-03-20 PROCEDURE — 97012 MECHANICAL TRACTION THERAPY: CPT

## 2017-03-20 NOTE — PROGRESS NOTES
Issac Ferro  : 1950 [facility] at [place] [address],[address], [address] [facility]  [phone]   [phone]           OUTPATIENT PHYSICAL THERAPY:Daily Note 3/20/2017    ICD-10: Treatment Diagnosis: Cervicalgia (M54.2); Impingement syndrome of right shoulder (M75.41)  Precautions/Allergies:   Levaquin [levofloxacin]   Fall Risk Score: 1 (? 5 = High Risk)  MD Orders: Evaluate and Treat MEDICAL/REFERRING DIAGNOSIS:  Cervicalgia [M54.2]  Impingement syndrome of right shoulder [M75.41]   DATE OF ONSET: 2016  REFERRING PHYSICIAN: Lupillo Williamson MD  RETURN PHYSICIAN APPOINTMENT: 17     INITIAL ASSESSMENT:  Mr. Angel Luis Christine presents with decreased cervical ROM, R UE strength and increased pain leading to decreased functional status. Pt would benefit from skilled physical therapy services to address the above deficits and help patient return to prior level of function. PROBLEM LIST (Impacting functional limitations):  1. Decreased Strength  2. Decreased ADL/Functional Activities  3. Increased Pain  4. Decreased Flexibility/Joint Mobility  5. Decreased [place] with Home Exercise Program INTERVENTIONS PLANNED:  1. Cold  2. Cryotherapy  3. Electrical Stimulation  4. Heat  5. Home Exercise Program (HEP)  6. Manual Therapy  7. Range of Motion (ROM)  8. Therapeutic Exercise/Strengthening  9. Ultrasound (US)   TREATMENT PLAN:  Effective Dates: 17 TO 17. Frequency/Duration: 2 times a week for 3 months  GOALS: (Goals have been discussed and agreed upon with patient.)  Short-Term Functional Goals: Time Frame: 4 weeks  1. Pt will increase cervical ROM flexion = 45 degrees, side bending R = 35 degrees, and rotation R = 65 degrees to assist with household and lawn activities  2. Pt will increase strength R shoulder 4/5 to 4+/5 to assist with household and lawn activities  Discharge Goals: Time Frame: 12 weeks  1.  Pt will increase strength R shoulder 5/5 to assist with household and lawn activities  2. Pt will be independent with HEP  3. Pt will perform 20 minutes lawn activities independently with min to no c/o neck or R UE pain  Rehabilitation Potential For Stated Goals: Good  Regarding Mere Renner's therapy, I certify that the treatment plan above will be carried out by a therapist or under their direction. Thank you for this referral,  Danya Fuchs PTA     Referring Physician Signature: Katherine Gutierrez MD              Date                    The information in this section was collected on 03-07-17 (except where otherwise noted). HISTORY:   Subjective:  \"I am doing better\"    History of Present Injury/Illness (Reason for Referral):   Pt reports insidious onset R shoulder pain that started approximately 12-03-16. He states he had been doing some push-ups which may have contributed to his R shoulder pain. He had some x-rays which revealed a compressed disc at C4-5. He was given a steroid dose pack which helped the pain. His MD sent him for 2 visits of PT for some neck and shoulder exercises. He states when he went off the steroid dose pack his pain returned. He saw MD for follow up and MD has referred him back for continued PT. Pt is retired. Pt has history of lumbar laminectomy by Dr Mari Gutiérrez 08-22-16. He states his lower back is doing well overall-he states his back gets fatigued at times. Pt had an MRI of his neck this morning and sees MD for follow up 03-17-17 to get MRI results. Pt is currently getting pain in his R neck, upper trap, scapular region, shoulder and down to the elbow. He denies any UE numbness or tingling. Pt rates his current pain 2-3/10 sitting at rest, increasing to 6/10 at worst.  Aggravating factors include inactivity. Relieving factors include activity. He is taking Naproxen 500 mg as needed-he has been taking it twice daily. Pt is retired.   Pt reports difficulties with sleeping at night secondary neck and shoulder pain. Past Medical History/Comorbidities:   Mr. Galilea Manley  has a past medical history of Arthritis; Chronic pain; GERD (gastroesophageal reflux disease); Hypercholesterolemia; Hypertension; Malignant neoplasm of prostate ([address] Stress incontinence, male; and Thyroid disease. Mr. Galilea Manley  has a past surgical history that includes endoscopy, colon, diagnostic; urological (2007); urological (2008); orthopaedic (Right, 1998); and lumbar laminectomy (08/22/2016). Social History/Living Environment:     Pt lives with spouse who assists with ADL's as needed. He reports increased pain and difficulties with all household/lawn activities due to his neck and R shoulder pain. Prior Level of Function/Work/Activity:  Independent with all household and lawn activities  Dominant Side:         RIGHT  Other Clinical Tests:          X-rays, MRI (has not gotten results yet)  Previous Treatment Approaches:          Physical Therapy, Medications  Personal Factors:          Age:  78 y.o. Past/Current Experience:  Pt has had some prior physical therapy  Current Medications:       Current Outpatient Prescriptions:     levothyroxine (SYNTHROID) 137 mcg tablet, Take 137 mcg by mouth Daily (before breakfast). , Disp: 90 Tab, Rfl: 3    sildenafil citrate (VIAGRA) 25 mg tablet, Take 1 Tab by mouth as needed. , Disp: 18 Tab, Rfl: 3    simvastatin (ZOCOR) 40 mg tablet, Take 1 Tab by mouth nightly., Disp: 90 Tab, Rfl: 3    amLODIPine (NORVASC) 5 mg tablet, Take 1 Tab by mouth daily. , Disp: 90 Tab, Rfl: 3    telmisartan-hydrochlorothiazide (MICARDIS HCT) 80-12.5 mg per tablet, Take 1 Tab by mouth daily. , Disp: 90 Tab, Rfl: 3    fexofenadine (ALLEGRA) 180 mg tablet, Take 180 mg by mouth daily. , Disp: , Rfl:     esomeprazole (NEXIUM) 40 mg capsule, Take 1 Cap by mouth daily.  (Patient taking differently: Take 40 mg by mouth every morning.), Disp: 90 Cap, Rfl: 3    naproxen (NAPROSYN) 500 mg tablet, Take 1 Tab by mouth two (2) times daily (with meals). (Patient taking differently: Take 500 mg by mouth two (2) times daily (with meals). Last dose to be 8/1/2016), Disp: 180 Tab, Rfl: 3    omega-3 fatty acids-vitamin e (FISH OIL) 1,000 mg cap, Take 1 Cap by mouth. Holding for surgery, Disp: , Rfl:     multivitamin (ONE A DAY) tablet, Take 1 Tab by mouth daily. Last dose 8/1/2016, Disp: , Rfl:     clotrimazole-betamethasone (LOTRISONE) topical cream, Apply  to affected area two (2) times a day. (Patient taking differently: Apply  to affected area as needed.), Disp: 45 g, Rfl: 5    azelaic acid (FINACEA) 15 % topical gel, Apply  to affected area two (2) times a day. (Patient taking differently: Apply  to affected area as needed.), Disp: 50 g, Rfl: 3    aspirin delayed-release 81 mg tablet, Take 162 mg by mouth daily. Last dose to be 8/1/2016, Disp: , Rfl:    Date Last Reviewed:  03-07-17   Number of Personal Factors/Comorbidities that affect the Plan of Care: 1-2: MODERATE COMPLEXITY   EXAMINATION:   Observation/Orthostatic Postural Assessment:           Forward head, rounded shoulders  Palpation:          Tenderness R upper trap and levator scap  ROM:    Cervical flexion = 35 degrees  Cervical extension = 29 degrees (radiating pain into R scapular region)  Cervical side bending R = 25 degrees (pain in R cervical region)  Cervical side bending L = 35 degrees  Cervical rotation R = 55 degrees (pain in R cervical region)  Cervical rotation L = 65 degrees    Strength:    R shoulder flexion = 4-/5  R shoulder abduction = 4/5  R shoulder internal rotation = 4+/5  R shoulder external rotation = 4+/5  R elbow flexion = 5/5  R elbow extension = 5/5  R wrist flexion = 5/5  R wrist extension = 5/5    L shoulder flexion = 5/5  L shoulder abduction = 5/5  L shoulder internal rotation = 5/5  L shoulder external rotation = 5/5  L elbow flexion = 5/5  L elbow extension = 5/5  L wrist flexion = 5/5  L wrist extension = 5/5    Special Tests: Cervical compression and distraction negative; Negative impingement sign R shoulder  Neurological Screen:        Myotomes:  Weakness R C5        Dermatomes:  Sensation intact to light touch bilateral UE's        Reflexes:  DTR's 2+ to bilateral brachioradialis and triceps, 2+ to L biceps, 1+ to R biceps  Functional Mobility:         Pt able to transition sit to supine and supine to sit independently       Body Structures Involved:  1. Nerves  2. Joints  3. Muscles Body Functions Affected:  1. Sensory/Pain  2. Neuromusculoskeletal Activities and Participation Affected:  1. Domestic Life   Number of elements (examined above) that affect the Plan of Care: 3: MODERATE COMPLEXITY   CLINICAL PRESENTATION:   Presentation: Evolving clinical presentation with changing clinical characteristics: MODERATE COMPLEXITY   CLINICAL DECISION MAKING:   Outcome Measure: Tool Used: Neck Disability Index (NDI)  Score:  Initial: 7/50  Most Recent: X/50 (Date: -- )   Interpretation of Score: The Neck Disability Index is a revised form of the Oswestry Low Back Pain Index and is designed to measure the activities of daily living in person's with neck pain. Each section is scored on a 0-5 scale, 5 representing the greatest disability. The scores of each section are added together for a total score of 50. Score 0 1-10 11-20 21-30 31-40 41-49 50   Modifier CH CI CJ CK CL CM CN     ? Carrying, Moving, and Handling Objects:     - CURRENT STATUS: CI - 1%-19% impaired, limited or restricted    - GOAL STATUS: CI - 1%-19% impaired, limited or restricted    - D/C STATUS:  ---------------To be determined---------------      Medical Necessity:   · Patient is expected to demonstrate progress in strength, range of motion and functional technique to increase independence with household/lawn activities. · Patient demonstrates good rehab potential due to higher previous functional level.   Reason for Services/Other Comments:  · Patient continues to require skilled intervention due to decreased cervical ROM, UE strength and increased pain leading to decreased functional status. Use of outcome tool(s) and clinical judgement create a POC that gives a: Questionable prediction of patient's progress: MODERATE COMPLEXITY            TREATMENT:   (In addition to Assessment/Re-Assessment sessions the following treatments were rendered)  Pre-treatment Symptoms/Complaints:  Neck and R shoulder pain  Pain: Initial: 1/10 Feeling better     Post Session:  1/10     THERAPEUTIC EXERCISE: (25 minutes):  Exercises per grid below to improve mobility and strength. Required minimal visual and verbal cues to promote proper body alignment and promote proper body posture. Progressed resistance and repetitions as indicated. MANUAL THERAPY: (0 minutes): STM to bilateral cervical paraspinals, upper traps and levator scap R>L was utilized and necessary because of the patient's restricted motion of soft tissue. MECHANICAL TRACTION: (20 minutes): Traction was used due to the patient's cervical radiculopathy in order to relieve pain in or originating from his spine. Date:  3-9-17 Date:  03-20-17 Date:     Activity/Exercise Parameters Parameters Parameters   UBE Next visit Manual L3  8 minutes    Tband Row-Extension Next visit Green  20 reps each    Chin Tucks X 10 reps 5 sec holds X 10 reps hold 5 seconds    Deep cervical flexion X 10 hold 5 seconds X 10 hold 5 sec    Upper Trap -Levator stretch 3 x 10 sec holds bilaterally 3 x 10 sec holds bilaterally    Prone Flexion-Scaption                  Treatment/Session Assessment:  Patient tolerated treatments well today with no c/o. Patient improving overall with symptoms. · Response to Treatment:  Patient improving overall with symptoms. Patient mentioned getting an order from MD to get home traction unit, he will bring for next visit. · Compliance with Program/Exercises:  Will assess as treatment progresses. · Recommendations/Intent for next treatment session: \"Next visit will focus on cervical traction, manual therapy, begin and progress therex as tolerable\".   Total Treatment Duration: 45 minutes  PT Patient Time In/Time Out  Time In: 1345  Time Out: Ste[place] 12, PTA

## 2017-03-23 ENCOUNTER — HOSPITAL ENCOUNTER (OUTPATIENT)
Dept: PHYSICAL THERAPY | Age: 67
Discharge: HOME | End: 2017-03-23
Payer: MEDICARE

## 2017-03-23 PROCEDURE — 97012 MECHANICAL TRACTION THERAPY: CPT

## 2017-03-23 PROCEDURE — 97140 MANUAL THERAPY 1/> REGIONS: CPT

## 2017-03-23 PROCEDURE — 97110 THERAPEUTIC EXERCISES: CPT

## 2017-03-23 NOTE — PROGRESS NOTES
Debby Powell  : 1950 [facility] at [address], [address] [place] [address],[address], [facility] [address]  [phone]   [phone]           OUTPATIENT PHYSICAL THERAPY:Daily Note 3/23/2017    ICD-10: Treatment Diagnosis: Cervicalgia (M54.2); Impingement syndrome of right shoulder (M75.41)  Precautions/Allergies:   Levaquin [levofloxacin]   Fall Risk Score: 1 (? 5 = High Risk)  MD Orders: Evaluate and Treat MEDICAL/REFERRING DIAGNOSIS:  Cervicalgia [M54.2]  Impingement syndrome of right shoulder [M75.41]   DATE OF ONSET: 2016  REFERRING PHYSICIAN: Debbie Randle MD  RETURN PHYSICIAN APPOINTMENT: 17     INITIAL ASSESSMENT:  Mr. Harman Bowling presents with decreased cervical ROM, R UE strength and increased pain leading to decreased functional status. Pt would benefit from skilled physical therapy services to address the above deficits and help patient return to prior level of function. PROBLEM LIST (Impacting functional limitations):  1. Decreased Strength  2. Decreased ADL/Functional Activities  3. Increased Pain  4. Decreased Flexibility/Joint Mobility  5. Decreased [place] with Home Exercise Program INTERVENTIONS PLANNED:  1. Cold  2. Cryotherapy  3. Electrical Stimulation  4. Heat  5. Home Exercise Program (HEP)  6. Manual Therapy  7. Range of Motion (ROM)  8. Therapeutic Exercise/Strengthening  9. Ultrasound (US)   TREATMENT PLAN:  Effective Dates: 17 TO 17. Frequency/Duration: 2 times a week for 3 months  GOALS: (Goals have been discussed and agreed upon with patient.)  Short-Term Functional Goals: Time Frame: 4 weeks  1. Pt will increase cervical ROM flexion = 45 degrees, side bending R = 35 degrees, and rotation R = 65 degrees to assist with household and lawn activities  2. Pt will increase strength R shoulder 4/5 to 4+/5 to assist with household and lawn activities  Discharge Goals: Time Frame: 12 weeks  1.  Pt will increase strength R shoulder 5/5 to assist with household and lawn activities  2. Pt will be independent with HEP  3. Pt will perform 20 minutes lawn activities independently with min to no c/o neck or R UE pain  Rehabilitation Potential For Stated Goals: Good  Regarding Zahra Renner's therapy, I certify that the treatment plan above will be carried out by a therapist or under their direction. Thank you for this referral,  Rosendo Stevens PT     Referring Physician Signature: Donnie Laboy MD              Date                    The information in this section was collected on 03-07-17 (except where otherwise noted). HISTORY:   Subjective:  Pt states his neck is doing much better. He contacted MD for a script for a home cervical traction unit. History of Present Injury/Illness (Reason for Referral):   Pt reports insidious onset R shoulder pain that started approximately 12-03-16. He states he had been doing some push-ups which may have contributed to his R shoulder pain. He had some x-rays which revealed a compressed disc at C4-5. He was given a steroid dose pack which helped the pain. His MD sent him for 2 visits of PT for some neck and shoulder exercises. He states when he went off the steroid dose pack his pain returned. He saw MD for follow up and MD has referred him back for continued PT. Pt is retired. Pt has history of lumbar laminectomy by Dr Powell 08-22-16. He states his lower back is doing well overall-he states his back gets fatigued at times. Pt had an MRI of his neck this morning and sees MD for follow up 03-17-17 to get MRI results. Pt is currently getting pain in his R neck, upper trap, scapular region, shoulder and down to the elbow. He denies any UE numbness or tingling. Pt rates his current pain 2-3/10 sitting at rest, increasing to 6/10 at worst.  Aggravating factors include inactivity. Relieving factors include activity.   He is taking Naproxen 500 mg as needed-he has been taking it twice daily. Pt is retired. Pt reports difficulties with sleeping at night secondary neck and shoulder pain. Past Medical History/Comorbidities:   Mr. Robert Jack  has a past medical history of Arthritis; Chronic pain; GERD (gastroesophageal reflux disease); Hypercholesterolemia; Hypertension; Malignant neoplasm of prostate ([address] Stress incontinence, male; and Thyroid disease. Mr. Robert Jack  has a past surgical history that includes endoscopy, colon, diagnostic; urological (2007); urological (2008); orthopaedic (Right, 1998); and lumbar laminectomy (08/22/2016). Social History/Living Environment:     Pt lives with spouse who assists with ADL's as needed. He reports increased pain and difficulties with all household/lawn activities due to his neck and R shoulder pain. Prior Level of Function/Work/Activity:  Independent with all household and lawn activities  Dominant Side:         RIGHT  Other Clinical Tests:          X-rays, MRI (has not gotten results yet)  Previous Treatment Approaches:          Physical Therapy, Medications  Personal Factors:          Age:  78 y.o. Past/Current Experience:  Pt has had some prior physical therapy  Current Medications:       Current Outpatient Prescriptions:     levothyroxine (SYNTHROID) 137 mcg tablet, Take 137 mcg by mouth Daily (before breakfast). , Disp: 90 Tab, Rfl: 3    sildenafil citrate (VIAGRA) 25 mg tablet, Take 1 Tab by mouth as needed. , Disp: 18 Tab, Rfl: 3    simvastatin (ZOCOR) 40 mg tablet, Take 1 Tab by mouth nightly., Disp: 90 Tab, Rfl: 3    amLODIPine (NORVASC) 5 mg tablet, Take 1 Tab by mouth daily. , Disp: 90 Tab, Rfl: 3    telmisartan-hydrochlorothiazide (MICARDIS HCT) 80-12.5 mg per tablet, Take 1 Tab by mouth daily. , Disp: 90 Tab, Rfl: 3    fexofenadine (ALLEGRA) 180 mg tablet, Take 180 mg by mouth daily. , Disp: , Rfl:     esomeprazole (NEXIUM) 40 mg capsule, Take 1 Cap by mouth daily.  (Patient taking differently: Take 40 mg by mouth every morning.), Disp: 90 Cap, Rfl: 3    naproxen (NAPROSYN) 500 mg tablet, Take 1 Tab by mouth two (2) times daily (with meals). (Patient taking differently: Take 500 mg by mouth two (2) times daily (with meals). Last dose to be 8/1/2016), Disp: 180 Tab, Rfl: 3    omega-3 fatty acids-vitamin e (FISH OIL) 1,000 mg cap, Take 1 Cap by mouth. Holding for surgery, Disp: , Rfl:     multivitamin (ONE A DAY) tablet, Take 1 Tab by mouth daily. Last dose 8/1/2016, Disp: , Rfl:     clotrimazole-betamethasone (LOTRISONE) topical cream, Apply  to affected area two (2) times a day. (Patient taking differently: Apply  to affected area as needed.), Disp: 45 g, Rfl: 5    azelaic acid (FINACEA) 15 % topical gel, Apply  to affected area two (2) times a day. (Patient taking differently: Apply  to affected area as needed.), Disp: 50 g, Rfl: 3    aspirin delayed-release 81 mg tablet, Take 162 mg by mouth daily. Last dose to be 8/1/2016, Disp: , Rfl:    Date Last Reviewed:  03-07-17   Number of Personal Factors/Comorbidities that affect the Plan of Care: 1-2: MODERATE COMPLEXITY   EXAMINATION:   Observation/Orthostatic Postural Assessment:           Forward head, rounded shoulders  Palpation:          Tenderness R upper trap and levator scap  ROM:    Cervical flexion = 35 degrees  Cervical extension = 29 degrees (radiating pain into R scapular region)  Cervical side bending R = 25 degrees (pain in R cervical region)  Cervical side bending L = 35 degrees  Cervical rotation R = 55 degrees (pain in R cervical region)  Cervical rotation L = 65 degrees    Strength:    R shoulder flexion = 4-/5  R shoulder abduction = 4/5  R shoulder internal rotation = 4+/5  R shoulder external rotation = 4+/5  R elbow flexion = 5/5  R elbow extension = 5/5  R wrist flexion = 5/5  R wrist extension = 5/5    L shoulder flexion = 5/5  L shoulder abduction = 5/5  L shoulder internal rotation = 5/5  L shoulder external rotation = 5/5  L elbow flexion = 5/5  L elbow extension = 5/5  L wrist flexion = 5/5  L wrist extension = 5/5    Special Tests:          Cervical compression and distraction negative; Negative impingement sign R shoulder  Neurological Screen:        Myotomes:  Weakness R C5        Dermatomes:  Sensation intact to light touch bilateral UE's        Reflexes:  DTR's 2+ to bilateral brachioradialis and triceps, 2+ to L biceps, 1+ to R biceps  Functional Mobility:         Pt able to transition sit to supine and supine to sit independently       Body Structures Involved:  1. Nerves  2. Joints  3. Muscles Body Functions Affected:  1. Sensory/Pain  2. Neuromusculoskeletal Activities and Participation Affected:  1. Domestic Life   Number of elements (examined above) that affect the Plan of Care: 3: MODERATE COMPLEXITY   CLINICAL PRESENTATION:   Presentation: Evolving clinical presentation with changing clinical characteristics: MODERATE COMPLEXITY   CLINICAL DECISION MAKING:   Outcome Measure: Tool Used: Neck Disability Index (NDI)  Score:  Initial: 7/50  Most Recent: X/50 (Date: -- )   Interpretation of Score: The Neck Disability Index is a revised form of the Oswestry Low Back Pain Index and is designed to measure the activities of daily living in person's with neck pain. Each section is scored on a 0-5 scale, 5 representing the greatest disability. The scores of each section are added together for a total score of 50. Score 0 1-10 11-20 21-30 31-40 41-49 50   Modifier CH CI CJ CK CL CM CN     ? Carrying, Moving, and Handling Objects:     - CURRENT STATUS: CI - 1%-19% impaired, limited or restricted    - GOAL STATUS: CI - 1%-19% impaired, limited or restricted    - D/C STATUS:  ---------------To be determined---------------      Medical Necessity:   · Patient is expected to demonstrate progress in strength, range of motion and functional technique to increase independence with household/lawn activities.   · Patient demonstrates good rehab potential due to higher previous functional level. Reason for Services/Other Comments:  · Patient continues to require skilled intervention due to decreased cervical ROM, UE strength and increased pain leading to decreased functional status. Use of outcome tool(s) and clinical judgement create a POC that gives a: Questionable prediction of patient's progress: MODERATE COMPLEXITY            TREATMENT:   (In addition to Assessment/Re-Assessment sessions the following treatments were rendered)  Pre-treatment Symptoms/Complaints:  Neck and R shoulder pain  Pain: Initial: 1/10 Feeling better     Post Session:  1/10     THERAPEUTIC EXERCISE: (10 minutes):  Exercises per grid below to improve mobility and strength. Required minimal verbal cues to promote proper body alignment and promote proper body posture. Progressed resistance and repetitions as indicated. MANUAL THERAPY: (15 minutes): STM to bilateral cervical paraspinals, upper traps and levator scap R>L was utilized and necessary because of the patient's restricted motion of soft tissue. MECHANICAL TRACTION: (20 minutes): Traction was used due to the patient's cervical radiculopathy and 60 sec hold 20 sec off with 18# pull in order to relieve pain in or originating from his spine. Date:  3-9-17 Date:  03-13-17 Date:  03-23-17   Activity/Exercise Parameters Parameters Parameters   UBE Next visit Manual L3  8 minutes Manual L4  8 minutes   Tband Row-Extension Next visit Green  20 reps each Green  20 reps each   Chin Tucks X 10 reps 5 sec holds X 10 reps hold 5 seconds 10 reps  5 sec holds   Deep cervical flexion X 10 hold 5 seconds HEP    Upper Trap -Levator stretch 3 x 10 sec holds bilaterally HEP                      Treatment/Session Assessment:  Did not receive script for the traction unit here to clinic. I called and left message on Alyssa's voicemail at [facility] to get a script for [facility] [place] cervical traction unit. Pt will be out of town next 2 weeks in [place].   Will continue skilled rehab when he returns and give pt script for traction unit once we get it (pt states he is going to order the unit from a Medicare approved supply company himself.)  · Response to Treatment:  Patient responding well to traction, getting some relieve longer periods after traction. · Compliance with Program/Exercises: Will assess as treatment progresses. · Recommendations/Intent for next treatment session: \"Next visit will focus on cervical traction, manual therapy, begin and progress therex as tolerable\".   Total Treatment Duration: 45 minutes  PT Patient Time In/Time Out  Time In: 0900  Time Out: [address] [facility] Dago Sep, PT

## 2017-03-28 ENCOUNTER — APPOINTMENT (OUTPATIENT)
Dept: PHYSICAL THERAPY | Age: 67
End: 2017-03-28
Payer: MEDICARE

## 2017-03-30 ENCOUNTER — APPOINTMENT (OUTPATIENT)
Dept: PHYSICAL THERAPY | Age: 67
End: 2017-03-30
Payer: MEDICARE

## 2017-04-06 ENCOUNTER — HOSPITAL ENCOUNTER (OUTPATIENT)
Dept: PHYSICAL THERAPY | Age: 67
Discharge: HOME | End: 2017-04-06
Payer: MEDICARE

## 2017-04-06 PROCEDURE — 97012 MECHANICAL TRACTION THERAPY: CPT

## 2017-04-06 NOTE — PROGRESS NOTES
Bertha Ortega  : 1950 [facility] at [facility]  [address], [address], [employer] [facility] [address] [facility]  [phone]   [phone]           OUTPATIENT PHYSICAL THERAPY:Daily Note 2017    ICD-10: Treatment Diagnosis: Cervicalgia (M54.2); Impingement syndrome of right shoulder (M75.41)  Precautions/Allergies:   Levaquin [levofloxacin]   Fall Risk Score: 1 (? 5 = High Risk)  MD Orders: Evaluate and Treat MEDICAL/REFERRING DIAGNOSIS:  Cervicalgia [M54.2]  Impingement syndrome of right shoulder [M75.41]   DATE OF ONSET: 2016  REFERRING PHYSICIAN: Kimo Garcias MD  RETURN PHYSICIAN APPOINTMENT: 17     INITIAL ASSESSMENT:  Mr. Juan Rincon presents with decreased cervical ROM, R UE strength and increased pain leading to decreased functional status. Pt would benefit from skilled physical therapy services to address the above deficits and help patient return to prior level of function. PROBLEM LIST (Impacting functional limitations):  1. Decreased Strength  2. Decreased ADL/Functional Activities  3. Increased Pain  4. Decreased Flexibility/Joint Mobility  5. Decreased [place] with Home Exercise Program INTERVENTIONS PLANNED:  1. Cold  2. Cryotherapy  3. Electrical Stimulation  4. Heat  5. Home Exercise Program (HEP)  6. Manual Therapy  7. Range of Motion (ROM)  8. Therapeutic Exercise/Strengthening  9. Ultrasound (US)   TREATMENT PLAN:  Effective Dates: 17 TO 17. Frequency/Duration: 2 times a week for 3 months  GOALS: (Goals have been discussed and agreed upon with patient.)  Short-Term Functional Goals: Time Frame: 4 weeks  1. Pt will increase cervical ROM flexion = 45 degrees, side bending R = 35 degrees, and rotation R = 65 degrees to assist with household and lawn activities  2. Pt will increase strength R shoulder 4/5 to 4+/5 to assist with household and lawn activities  Discharge Goals: Time Frame: 12 weeks  1.  Pt will increase strength R shoulder 5/5 to assist with household and lawn activities  2. Pt will be independent with HEP  3. Pt will perform 20 minutes lawn activities independently with min to no c/o neck or R UE pain  Rehabilitation Potential For Stated Goals: Good  Regarding Zeke Renner's therapy, I certify that the treatment plan above will be carried out by a therapist or under their direction. Thank you for this referral,  Kalin Long PTA     Referring Physician Signature: Ana Santana MD              Date                    The information in this section was collected on 03-07-17 (except where otherwise noted). HISTORY:   Subjective:  Pt states his neck is doing much better. He contacted MD for a script for a home cervical traction unit. History of Present Injury/Illness (Reason for Referral):   Pt reports insidious onset R shoulder pain that started approximately 12-03-16. He states he had been doing some push-ups which may have contributed to his R shoulder pain. He had some x-rays which revealed a compressed disc at C4-5. He was given a steroid dose pack which helped the pain. His MD sent him for 2 visits of PT for some neck and shoulder exercises. He states when he went off the steroid dose pack his pain returned. He saw MD for follow up and MD has referred him back for continued PT. Pt is retired. Pt has history of lumbar laminectomy by Dr Shanti Ghotra 08-22-16. He states his lower back is doing well overall-he states his back gets fatigued at times. Pt had an MRI of his neck this morning and sees MD for follow up 03-17-17 to get MRI results. Pt is currently getting pain in his R neck, upper trap, scapular region, shoulder and down to the elbow. He denies any UE numbness or tingling. Pt rates his current pain 2-3/10 sitting at rest, increasing to 6/10 at worst.  Aggravating factors include inactivity. Relieving factors include activity.   He is taking Naproxen 500 mg as needed-he has been taking it twice daily. Pt is retired. Pt reports difficulties with sleeping at night secondary neck and shoulder pain. Past Medical History/Comorbidities:   Mr. Yan Carballo  has a past medical history of Arthritis; Chronic pain; GERD (gastroesophageal reflux disease); Hypercholesterolemia; Hypertension; Malignant neoplasm of prostate ([address] Stress incontinence, male; and Thyroid disease. Mr. Yan Carballo  has a past surgical history that includes endoscopy, colon, diagnostic; urological (2007); urological (2008); orthopaedic (Right, 1998); and lumbar laminectomy (08/22/2016). Social History/Living Environment:     Pt lives with spouse who assists with ADL's as needed. He reports increased pain and difficulties with all household/lawn activities due to his neck and R shoulder pain. Prior Level of Function/Work/Activity:  Independent with all household and lawn activities  Dominant Side:         RIGHT  Other Clinical Tests:          X-rays, MRI (has not gotten results yet)  Previous Treatment Approaches:          Physical Therapy, Medications  Personal Factors:          Age:  78 y.o. Past/Current Experience:  Pt has had some prior physical therapy  Current Medications:       Current Outpatient Prescriptions:     levothyroxine (SYNTHROID) 137 mcg tablet, Take 137 mcg by mouth Daily (before breakfast). , Disp: 90 Tab, Rfl: 3    sildenafil citrate (VIAGRA) 25 mg tablet, Take 1 Tab by mouth as needed. , Disp: 18 Tab, Rfl: 3    simvastatin (ZOCOR) 40 mg tablet, Take 1 Tab by mouth nightly., Disp: 90 Tab, Rfl: 3    amLODIPine (NORVASC) 5 mg tablet, Take 1 Tab by mouth daily. , Disp: 90 Tab, Rfl: 3    telmisartan-hydrochlorothiazide (MICARDIS HCT) 80-12.5 mg per tablet, Take 1 Tab by mouth daily. , Disp: 90 Tab, Rfl: 3    fexofenadine (ALLEGRA) 180 mg tablet, Take 180 mg by mouth daily. , Disp: , Rfl:     esomeprazole (NEXIUM) 40 mg capsule, Take 1 Cap by mouth daily.  (Patient taking differently: Take 40 mg by mouth every morning.), Disp: 90 Cap, Rfl: 3    naproxen (NAPROSYN) 500 mg tablet, Take 1 Tab by mouth two (2) times daily (with meals). (Patient taking differently: Take 500 mg by mouth two (2) times daily (with meals). Last dose to be 8/1/2016), Disp: 180 Tab, Rfl: 3    omega-3 fatty acids-vitamin e (FISH OIL) 1,000 mg cap, Take 1 Cap by mouth. Holding for surgery, Disp: , Rfl:     multivitamin (ONE A DAY) tablet, Take 1 Tab by mouth daily. Last dose 8/1/2016, Disp: , Rfl:     clotrimazole-betamethasone (LOTRISONE) topical cream, Apply  to affected area two (2) times a day. (Patient taking differently: Apply  to affected area as needed.), Disp: 45 g, Rfl: 5    azelaic acid (FINACEA) 15 % topical gel, Apply  to affected area two (2) times a day. (Patient taking differently: Apply  to affected area as needed.), Disp: 50 g, Rfl: 3    aspirin delayed-release 81 mg tablet, Take 162 mg by mouth daily. Last dose to be 8/1/2016, Disp: , Rfl:    Date Last Reviewed:  03-07-17   Number of Personal Factors/Comorbidities that affect the Plan of Care: 1-2: MODERATE COMPLEXITY   EXAMINATION:   Observation/Orthostatic Postural Assessment:           Forward head, rounded shoulders  Palpation:          Tenderness R upper trap and levator scap  ROM:    Cervical flexion = 35 degrees  Cervical extension = 29 degrees (radiating pain into R scapular region)  Cervical side bending R = 25 degrees (pain in R cervical region)  Cervical side bending L = 35 degrees  Cervical rotation R = 55 degrees (pain in R cervical region)  Cervical rotation L = 65 degrees    Strength:    R shoulder flexion = 4-/5  R shoulder abduction = 4/5  R shoulder internal rotation = 4+/5  R shoulder external rotation = 4+/5  R elbow flexion = 5/5  R elbow extension = 5/5  R wrist flexion = 5/5  R wrist extension = 5/5    L shoulder flexion = 5/5  L shoulder abduction = 5/5  L shoulder internal rotation = 5/5  L shoulder external rotation = 5/5  L elbow flexion = 5/5  L elbow extension = 5/5  L wrist flexion = 5/5  L wrist extension = 5/5    Special Tests:          Cervical compression and distraction negative; Negative impingement sign R shoulder  Neurological Screen:        Myotomes:  Weakness R C5        Dermatomes:  Sensation intact to light touch bilateral UE's        Reflexes:  DTR's 2+ to bilateral brachioradialis and triceps, 2+ to L biceps, 1+ to R biceps  Functional Mobility:         Pt able to transition sit to supine and supine to sit independently       Body Structures Involved:  1. Nerves  2. Joints  3. Muscles Body Functions Affected:  1. Sensory/Pain  2. Neuromusculoskeletal Activities and Participation Affected:  1. Domestic Life   Number of elements (examined above) that affect the Plan of Care: 3: MODERATE COMPLEXITY   CLINICAL PRESENTATION:   Presentation: Evolving clinical presentation with changing clinical characteristics: MODERATE COMPLEXITY   CLINICAL DECISION MAKING:   Outcome Measure: Tool Used: Neck Disability Index (NDI)  Score:  Initial: 7/50  Most Recent: X/50 (Date: -- )   Interpretation of Score: The Neck Disability Index is a revised form of the Oswestry Low Back Pain Index and is designed to measure the activities of daily living in person's with neck pain. Each section is scored on a 0-5 scale, 5 representing the greatest disability. The scores of each section are added together for a total score of 50. Score 0 1-10 11-20 21-30 31-40 41-49 50   Modifier CH CI CJ CK CL CM CN     ? Carrying, Moving, and Handling Objects:     - CURRENT STATUS: CI - 1%-19% impaired, limited or restricted    - GOAL STATUS: CI - 1%-19% impaired, limited or restricted    - D/C STATUS:  ---------------To be determined---------------      Medical Necessity:   · Patient is expected to demonstrate progress in strength, range of motion and functional technique to increase independence with household/lawn activities.   · Patient demonstrates good rehab potential due to higher previous functional level. Reason for Services/Other Comments:  · Patient continues to require skilled intervention due to decreased cervical ROM, UE strength and increased pain leading to decreased functional status. Use of outcome tool(s) and clinical judgement create a POC that gives a: Questionable prediction of patient's progress: MODERATE COMPLEXITY            TREATMENT:   (In addition to Assessment/Re-Assessment sessions the following treatments were rendered)  Pre-treatment Symptoms/Complaints: Actually my neck has been feeling good. Pain: Initial: 1/10 Feeling better     Post Session:  1/10     THERAPEUTIC EXERCISE: (0 minutes):  Exercises per grid below to improve mobility and strength. Required minimal verbal cues to promote proper body alignment and promote proper body posture. Progressed resistance and repetitions as indicated. MANUAL THERAPY: (0 minutes): STM to bilateral cervical paraspinals, upper traps and levator scap R>L was utilized and necessary because of the patient's restricted motion of soft tissue. MECHANICAL TRACTION: (25 minutes): Traction was used due to the patient's cervical radiculopathy and 60 sec hold 20 sec off with 18# pull in order to relieve pain in or originating from his spine. Date:  3-9-17 Date:  03-13-17 Date:  0   Activity/Exercise Parameters Parameters Parameters   UBE Next visit Manual L3  8 minutes Manual L4  8 minutes   Tband Row-Extension Next visit Green  20 reps each Green  20 reps each   Chin Tucks X 10 reps 5 sec holds X 10 reps hold 5 seconds 10 reps  5 sec holds   Deep cervical flexion X 10 hold 5 seconds HEP    Upper Trap -Levator stretch 3 x 10 sec holds bilaterally HEP                      · Treatment/Session Assessment:  Patient has order for Home traction unit, gave him copy of the order and he is going to modality place to order unit. Good progress with mechanical Traction.   · Response to Treatment:  Patient responding well to traction, decrease pain overall and more motion. · Compliance with Program/Exercises: Will assess as treatment progresses. · Recommendations/Intent for next treatment session: \"Next visit will focus on cervical traction, manual therapy, begin and progress therex as tolerable\".   Total Treatment Duration: 30 minutes  PT Patient Time In/Time Out  Time In: 1030  Time Out: Daphne Mullins [address] [place] [address]

## 2017-04-11 ENCOUNTER — HOSPITAL ENCOUNTER (OUTPATIENT)
Dept: PHYSICAL THERAPY | Age: 67
Discharge: HOME | End: 2017-04-11
Payer: MEDICARE

## 2017-04-11 PROCEDURE — 97012 MECHANICAL TRACTION THERAPY: CPT

## 2017-04-11 NOTE — PROGRESS NOTES
Bertha Ortega  : 1950 [facility] at [place] [address], [place] [address].  [phone]   [phone]           OUTPATIENT PHYSICAL THERAPY:Daily Note and Progress Report 2017    ICD-10: Treatment Diagnosis: Cervicalgia (M54.2); Impingement syndrome of right shoulder (M75.41)  Precautions/Allergies:   Levaquin [levofloxacin]   Fall Risk Score: 1 (? 5 = High Risk)  MD Orders: Evaluate and Treat MEDICAL/REFERRING DIAGNOSIS:  Cervicalgia [M54.2]  Impingement syndrome of right shoulder [M75.41]   DATE OF ONSET: 2016  REFERRING PHYSICIAN: Kimo Garcias MD  RETURN PHYSICIAN APPOINTMENT: 17     INITIAL ASSESSMENT:  Mr. Juan Rincon presents with decreased cervical ROM, R UE strength and increased pain leading to decreased functional status. Pt would benefit from skilled physical therapy services to address the above deficits and help patient return to prior level of function. PROBLEM LIST (Impacting functional limitations):  1. Decreased Strength  2. Decreased ADL/Functional Activities  3. Increased Pain  4. Decreased Flexibility/Joint Mobility  5. Decreased [place] with Home Exercise Program INTERVENTIONS PLANNED:  1. Cold  2. Cryotherapy  3. Electrical Stimulation  4. Heat  5. Home Exercise Program (HEP)  6. Manual Therapy  7. Range of Motion (ROM)  8. Therapeutic Exercise/Strengthening  9. Ultrasound (US)   TREATMENT PLAN:  Effective Dates: 17 TO 17. Frequency/Duration: 2 times a week for 3 months  GOALS: (Goals have been discussed and agreed upon with patient.)  Short-Term Functional Goals: Time Frame: 4 weeks  1. Pt will increase cervical ROM flexion = 45 degrees, side bending R = 35 degrees, and rotation R = 65 degrees to assist with household and lawn activities  2. Pt will increase strength R shoulder 4/5 to 4+/5 to assist with household and lawn activities  Discharge Goals: Time Frame: 12 weeks  1.  Pt will increase strength R shoulder 5/5 to assist with household and lawn activities  2. Pt will be independent with HEP  3. Pt will perform 20 minutes lawn activities independently with min to no c/o neck or R UE pain  Rehabilitation Potential For Stated Goals: Good  Regarding Yang Renner's therapy, I certify that the treatment plan above will be carried out by a therapist or under their direction. Thank you for this referral,  Bernard Arauz PT     Referring Physician Signature: Melyssa Estrada MD              Date                    The information in this section was collected on 03-07-17 (except where otherwise noted). HISTORY:   Subjective:  Pt denies neck pain. He states he has been busy but is going to order his cervical traction unit today. History of Present Injury/Illness (Reason for Referral):   Pt reports insidious onset R shoulder pain that started approximately 12-03-16. He states he had been doing some push-ups which may have contributed to his R shoulder pain. He had some x-rays which revealed a compressed disc at C4-5. He was given a steroid dose pack which helped the pain. His MD sent him for 2 visits of PT for some neck and shoulder exercises. He states when he went off the steroid dose pack his pain returned. He saw MD for follow up and MD has referred him back for continued PT. Pt is retired. Pt has history of lumbar laminectomy by Dr Sammi Fortune 08-22-16. He states his lower back is doing well overall-he states his back gets fatigued at times. Pt had an MRI of his neck this morning and sees MD for follow up 03-17-17 to get MRI results. Pt is currently getting pain in his R neck, upper trap, scapular region, shoulder and down to the elbow. He denies any UE numbness or tingling. Pt rates his current pain 2-3/10 sitting at rest, increasing to 6/10 at worst.  Aggravating factors include inactivity. Relieving factors include activity.   He is taking Naproxen 500 mg as needed-he has been taking it twice daily. Pt is retired. Pt reports difficulties with sleeping at night secondary neck and shoulder pain. Past Medical History/Comorbidities:   Mr. Noy Barnes  has a past medical history of Arthritis; Chronic pain; GERD (gastroesophageal reflux disease); Hypercholesterolemia; Hypertension; Malignant neoplasm of prostate ([address] Stress incontinence, male; and Thyroid disease. Mr. Noy Barnes  has a past surgical history that includes endoscopy, colon, diagnostic; urological (2007); urological (2008); orthopaedic (Right, 1998); and lumbar laminectomy (08/22/2016). Social History/Living Environment:     Pt lives with spouse who assists with ADL's as needed. He reports increased pain and difficulties with all household/lawn activities due to his neck and R shoulder pain. Prior Level of Function/Work/Activity:  Independent with all household and lawn activities  Dominant Side:         RIGHT  Other Clinical Tests:          X-rays, MRI (has not gotten results yet)  Previous Treatment Approaches:          Physical Therapy, Medications  Personal Factors:          Age:  78 y.o. Past/Current Experience:  Pt has had some prior physical therapy  Current Medications:       Current Outpatient Prescriptions:     levothyroxine (SYNTHROID) 137 mcg tablet, Take 137 mcg by mouth Daily (before breakfast). , Disp: 90 Tab, Rfl: 3    sildenafil citrate (VIAGRA) 25 mg tablet, Take 1 Tab by mouth as needed. , Disp: 18 Tab, Rfl: 3    simvastatin (ZOCOR) 40 mg tablet, Take 1 Tab by mouth nightly., Disp: 90 Tab, Rfl: 3    amLODIPine (NORVASC) 5 mg tablet, Take 1 Tab by mouth daily. , Disp: 90 Tab, Rfl: 3    telmisartan-hydrochlorothiazide (MICARDIS HCT) 80-12.5 mg per tablet, Take 1 Tab by mouth daily. , Disp: 90 Tab, Rfl: 3    fexofenadine (ALLEGRA) 180 mg tablet, Take 180 mg by mouth daily. , Disp: , Rfl:     esomeprazole (NEXIUM) 40 mg capsule, Take 1 Cap by mouth daily.  (Patient taking differently: Take 40 mg by mouth every morning.), Disp: 90 Cap, Rfl: 3    naproxen (NAPROSYN) 500 mg tablet, Take 1 Tab by mouth two (2) times daily (with meals). (Patient taking differently: Take 500 mg by mouth two (2) times daily (with meals). Last dose to be 8/1/2016), Disp: 180 Tab, Rfl: 3    omega-3 fatty acids-vitamin e (FISH OIL) 1,000 mg cap, Take 1 Cap by mouth. Holding for surgery, Disp: , Rfl:     multivitamin (ONE A DAY) tablet, Take 1 Tab by mouth daily. Last dose 8/1/2016, Disp: , Rfl:     clotrimazole-betamethasone (LOTRISONE) topical cream, Apply  to affected area two (2) times a day. (Patient taking differently: Apply  to affected area as needed.), Disp: 45 g, Rfl: 5    azelaic acid (FINACEA) 15 % topical gel, Apply  to affected area two (2) times a day. (Patient taking differently: Apply  to affected area as needed.), Disp: 50 g, Rfl: 3    aspirin delayed-release 81 mg tablet, Take 162 mg by mouth daily. Last dose to be 8/1/2016, Disp: , Rfl:    Date Last Reviewed:  03-07-17   Number of Personal Factors/Comorbidities that affect the Plan of Care: 1-2: MODERATE COMPLEXITY   EXAMINATION:   Observation/Orthostatic Postural Assessment:           Forward head, rounded shoulders  Palpation:          Tenderness R upper trap and levator scap  ROM:    Cervical flexion = 35 degrees  Cervical extension = 29 degrees (radiating pain into R scapular region)  Cervical side bending R = 25 degrees (pain in R cervical region)  Cervical side bending L = 35 degrees  Cervical rotation R = 55 degrees (pain in R cervical region)  Cervical rotation L = 65 degrees    Strength:    R shoulder flexion = 4-/5  R shoulder abduction = 4/5  R shoulder internal rotation = 4+/5  R shoulder external rotation = 4+/5  R elbow flexion = 5/5  R elbow extension = 5/5  R wrist flexion = 5/5  R wrist extension = 5/5    L shoulder flexion = 5/5  L shoulder abduction = 5/5  L shoulder internal rotation = 5/5  L shoulder external rotation = 5/5  L elbow flexion = 5/5  L elbow extension = 5/5  L wrist flexion = 5/5  L wrist extension = 5/5    Special Tests:          Cervical compression and distraction negative; Negative impingement sign R shoulder  Neurological Screen:        Myotomes:  Weakness R C5        Dermatomes:  Sensation intact to light touch bilateral UE's        Reflexes:  DTR's 2+ to bilateral brachioradialis and triceps, 2+ to L biceps, 1+ to R biceps  Functional Mobility:         Pt able to transition sit to supine and supine to sit independently       Body Structures Involved:  1. Nerves  2. Joints  3. Muscles Body Functions Affected:  1. Sensory/Pain  2. Neuromusculoskeletal Activities and Participation Affected:  1. Domestic Life   Number of elements (examined above) that affect the Plan of Care: 3: MODERATE COMPLEXITY   CLINICAL PRESENTATION:   Presentation: Evolving clinical presentation with changing clinical characteristics: MODERATE COMPLEXITY   CLINICAL DECISION MAKING:   Outcome Measure: Tool Used: Neck Disability Index (NDI)  Score:  Initial: 7/50  Most Recent: X/50 (Date: -- )   Interpretation of Score: The Neck Disability Index is a revised form of the Oswestry Low Back Pain Index and is designed to measure the activities of daily living in person's with neck pain. Each section is scored on a 0-5 scale, 5 representing the greatest disability. The scores of each section are added together for a total score of 50. Score 0 1-10 11-20 21-30 31-40 41-49 50   Modifier CH CI CJ CK CL CM CN     ? Carrying, Moving, and Handling Objects:     - CURRENT STATUS: CI - 1%-19% impaired, limited or restricted    - GOAL STATUS: CI - 1%-19% impaired, limited or restricted    - D/C STATUS:  ---------------To be determined---------------      Medical Necessity:   · Patient is expected to demonstrate progress in strength, range of motion and functional technique to increase independence with household/lawn activities.   · Patient demonstrates good rehab potential due to higher previous functional level. Reason for Services/Other Comments:  · Patient continues to require skilled intervention due to decreased cervical ROM, UE strength and increased pain leading to decreased functional status. Use of outcome tool(s) and clinical judgement create a POC that gives a: Questionable prediction of patient's progress: MODERATE COMPLEXITY            TREATMENT:   (In addition to Assessment/Re-Assessment sessions the following treatments were rendered)  Pre-treatment Symptoms/Complaints: Actually my neck has been feeling good. Pain: Initial: 1/10 Feeling better     Post Session:  1/10     MECHANICAL TRACTION: (20 minutes): Traction was used due to the patient's cervical radiculopathy and 60 sec hold 20 sec off with 18# pull, 20 minutes in order to relieve pain in or originating from his spine. Date:  3-9-17 Date:  03-13-17 Date:  0 Date     Activity/Exercise Parameters Parameters Parameters    UBE Next visit Manual L3  8 minutes Manual L4  8 minutes    Tband Row-Extension Next visit Green  20 reps each Green  20 reps each    Chin Tucks X 10 reps 5 sec holds X 10 reps hold 5 seconds 10 reps  5 sec holds    Deep cervical flexion X 10 hold 5 seconds HEP     Upper Trap -Levator stretch 3 x 10 sec holds bilaterally HEP                         · Treatment/Session Assessment:  Pt will be out of town next week. He will call once his home cervical traction unit comes in to come in for one more visit to show him how to use it. If his symptoms return in the mean time he will call back to schedule more appointments. · Response to Treatment:  Patient responding well to traction, decrease pain overall and more motion. · Compliance with Program/Exercises: Will assess as treatment progresses. · Recommendations/Intent for next treatment session: \"Next visit will focus on cervical traction, manual therapy, begin and progress therex as tolerable\".   Total Treatment Duration: 20 minutes  PT Patient Time In/Time Out  Time In: 1030  Time Out: [address], PT

## 2017-05-02 ENCOUNTER — HOSPITAL ENCOUNTER (OUTPATIENT)
Dept: PHYSICAL THERAPY | Age: 67
Discharge: HOME | End: 2017-05-02
Payer: MEDICARE

## 2017-05-02 PROCEDURE — 97012 MECHANICAL TRACTION THERAPY: CPT

## 2017-05-02 NOTE — PROGRESS NOTES
Watson Sender  : 1950 [facility] [place] [address],[address], [address] [facility]  [phone]   [phone]           OUTPATIENT PHYSICAL THERAPY:Daily Note 2017    ICD-10: Treatment Diagnosis: Cervicalgia (M54.2); Impingement syndrome of right shoulder (M75.41)  Precautions/Allergies:   Levaquin [levofloxacin]   Fall Risk Score: 1 (? 5 = High Risk)  MD Orders: Evaluate and Treat MEDICAL/REFERRING DIAGNOSIS:  Cervicalgia [M54.2]  Impingement syndrome of right shoulder [M75.41]   DATE OF ONSET: 2016  REFERRING PHYSICIAN: Manfred Paget, MD  RETURN PHYSICIAN APPOINTMENT: 17     INITIAL ASSESSMENT:  Mr. Yan Carballo presents with decreased cervical ROM, R UE strength and increased pain leading to decreased functional status. Pt would benefit from skilled physical therapy services to address the above deficits and help patient return to prior level of function. PROBLEM LIST (Impacting functional limitations):  1. Decreased Strength  2. Decreased ADL/Functional Activities  3. Increased Pain  4. Decreased Flexibility/Joint Mobility  5. Decreased [place] with Home Exercise Program INTERVENTIONS PLANNED:  1. Cold  2. Cryotherapy  3. Electrical Stimulation  4. Heat  5. Home Exercise Program (HEP)  6. Manual Therapy  7. Range of Motion (ROM)  8. Therapeutic Exercise/Strengthening  9. Ultrasound (US)   TREATMENT PLAN:  Effective Dates: 17 TO 17. Frequency/Duration: 2 times a week for 3 months  GOALS: (Goals have been discussed and agreed upon with patient.)  Short-Term Functional Goals: Time Frame: 4 weeks  1. Pt will increase cervical ROM flexion = 45 degrees, side bending R = 35 degrees, and rotation R = 65 degrees to assist with household and lawn activities  2. Pt will increase strength R shoulder 4/5 to 4+/5 to assist with household and lawn activities  Discharge Goals: Time Frame: 12 weeks  1.  Pt will increase strength R shoulder 5/5 to assist with household and lawn activities  2. Pt will be independent with HEP  3. Pt will perform 20 minutes lawn activities independently with min to no c/o neck or R UE pain  Rehabilitation Potential For Stated Goals: Good  Regarding Gibson Renner's therapy, I certify that the treatment plan above will be carried out by a therapist or under their direction. Thank you for this referral,  Denver Brothers, PT     Referring Physician Signature: Sharath Kim MD              Date                    The information in this section was collected on 03-07-17 (except where otherwise noted). HISTORY:   Subjective:  Pt denies neck pain. He states he has been busy but is going to order his cervical traction unit today. History of Present Injury/Illness (Reason for Referral):   Pt reports insidious onset R shoulder pain that started approximately 12-03-16. He states he had been doing some push-ups which may have contributed to his R shoulder pain. He had some x-rays which revealed a compressed disc at C4-5. He was given a steroid dose pack which helped the pain. His MD sent him for 2 visits of PT for some neck and shoulder exercises. He states when he went off the steroid dose pack his pain returned. He saw MD for follow up and MD has referred him back for continued PT. Pt is retired. Pt has history of lumbar laminectomy by Dr Omar Napoles 08-22-16. He states his lower back is doing well overall-he states his back gets fatigued at times. Pt had an MRI of his neck this morning and sees MD for follow up 03-17-17 to get MRI results. Pt is currently getting pain in his R neck, upper trap, scapular region, shoulder and down to the elbow. He denies any UE numbness or tingling. Pt rates his current pain 2-3/10 sitting at rest, increasing to 6/10 at worst.  Aggravating factors include inactivity. Relieving factors include activity. He is taking Naproxen 500 mg as needed-he has been taking it twice daily. Pt is retired. Pt reports difficulties with sleeping at night secondary neck and shoulder pain. Past Medical History/Comorbidities:   Mr. Rohit Bui  has a past medical history of Arthritis; Chronic pain; GERD (gastroesophageal reflux disease); Hypercholesterolemia; Hypertension; Malignant neoplasm of prostate ([address] Stress incontinence, male; and Thyroid disease. Mr. Rohit Bui  has a past surgical history that includes endoscopy, colon, diagnostic; urological (2007); urological (2008); orthopaedic (Right, 1998); and lumbar laminectomy (08/22/2016). Social History/Living Environment:     Pt lives with spouse who assists with ADL's as needed. He reports increased pain and difficulties with all household/lawn activities due to his neck and R shoulder pain. Prior Level of Function/Work/Activity:  Independent with all household and lawn activities  Dominant Side:         RIGHT  Other Clinical Tests:          X-rays, MRI (has not gotten results yet)  Previous Treatment Approaches:          Physical Therapy, Medications  Personal Factors:          Age:  78 y.o. Past/Current Experience:  Pt has had some prior physical therapy  Current Medications:       Current Outpatient Prescriptions:     levothyroxine (SYNTHROID) 137 mcg tablet, Take 137 mcg by mouth Daily (before breakfast). , Disp: 90 Tab, Rfl: 3    sildenafil citrate (VIAGRA) 25 mg tablet, Take 1 Tab by mouth as needed. , Disp: 18 Tab, Rfl: 3    simvastatin (ZOCOR) 40 mg tablet, Take 1 Tab by mouth nightly., Disp: 90 Tab, Rfl: 3    amLODIPine (NORVASC) 5 mg tablet, Take 1 Tab by mouth daily. , Disp: 90 Tab, Rfl: 3    telmisartan-hydrochlorothiazide (MICARDIS HCT) 80-12.5 mg per tablet, Take 1 Tab by mouth daily. , Disp: 90 Tab, Rfl: 3    fexofenadine (ALLEGRA) 180 mg tablet, Take 180 mg by mouth daily. , Disp: , Rfl:     esomeprazole (NEXIUM) 40 mg capsule, Take 1 Cap by mouth daily.  (Patient taking differently: Take 40 mg by mouth every morning.), Disp: 90 Cap, Rfl: 3    naproxen (NAPROSYN) 500 mg tablet, Take 1 Tab by mouth two (2) times daily (with meals). (Patient taking differently: Take 500 mg by mouth two (2) times daily (with meals). Last dose to be 8/1/2016), Disp: 180 Tab, Rfl: 3    omega-3 fatty acids-vitamin e (FISH OIL) 1,000 mg cap, Take 1 Cap by mouth. Holding for surgery, Disp: , Rfl:     multivitamin (ONE A DAY) tablet, Take 1 Tab by mouth daily. Last dose 8/1/2016, Disp: , Rfl:     clotrimazole-betamethasone (LOTRISONE) topical cream, Apply  to affected area two (2) times a day. (Patient taking differently: Apply  to affected area as needed.), Disp: 45 g, Rfl: 5    azelaic acid (FINACEA) 15 % topical gel, Apply  to affected area two (2) times a day. (Patient taking differently: Apply  to affected area as needed.), Disp: 50 g, Rfl: 3    aspirin delayed-release 81 mg tablet, Take 162 mg by mouth daily. Last dose to be 8/1/2016, Disp: , Rfl:    Date Last Reviewed:  03-07-17   Number of Personal Factors/Comorbidities that affect the Plan of Care: 1-2: MODERATE COMPLEXITY   EXAMINATION:   Observation/Orthostatic Postural Assessment:           Forward head, rounded shoulders  Palpation:          Tenderness R upper trap and levator scap  ROM:    Cervical flexion = 35 degrees  Cervical extension = 29 degrees (radiating pain into R scapular region)  Cervical side bending R = 25 degrees (pain in R cervical region)  Cervical side bending L = 35 degrees  Cervical rotation R = 55 degrees (pain in R cervical region)  Cervical rotation L = 65 degrees    Strength:    R shoulder flexion = 4-/5  R shoulder abduction = 4/5  R shoulder internal rotation = 4+/5  R shoulder external rotation = 4+/5  R elbow flexion = 5/5  R elbow extension = 5/5  R wrist flexion = 5/5  R wrist extension = 5/5    L shoulder flexion = 5/5  L shoulder abduction = 5/5  L shoulder internal rotation = 5/5  L shoulder external rotation = 5/5  L elbow flexion = 5/5  L elbow extension = 5/5  L wrist flexion = 5/5  L wrist extension = 5/5    Special Tests:          Cervical compression and distraction negative; Negative impingement sign R shoulder  Neurological Screen:        Myotomes:  Weakness R C5        Dermatomes:  Sensation intact to light touch bilateral UE's        Reflexes:  DTR's 2+ to bilateral brachioradialis and triceps, 2+ to L biceps, 1+ to R biceps  Functional Mobility:         Pt able to transition sit to supine and supine to sit independently       Body Structures Involved:  1. Nerves  2. Joints  3. Muscles Body Functions Affected:  1. Sensory/Pain  2. Neuromusculoskeletal Activities and Participation Affected:  1. Domestic Life   Number of elements (examined above) that affect the Plan of Care: 3: MODERATE COMPLEXITY   CLINICAL PRESENTATION:   Presentation: Evolving clinical presentation with changing clinical characteristics: MODERATE COMPLEXITY   CLINICAL DECISION MAKING:   Outcome Measure: Tool Used: Neck Disability Index (NDI)  Score:  Initial: 7/50  Most Recent: X/50 (Date: -- )   Interpretation of Score: The Neck Disability Index is a revised form of the Oswestry Low Back Pain Index and is designed to measure the activities of daily living in person's with neck pain. Each section is scored on a 0-5 scale, 5 representing the greatest disability. The scores of each section are added together for a total score of 50. Score 0 1-10 11-20 21-30 31-40 41-49 50   Modifier CH CI CJ CK CL CM CN     ? Carrying, Moving, and Handling Objects:     - CURRENT STATUS: CI - 1%-19% impaired, limited or restricted    - GOAL STATUS: CI - 1%-19% impaired, limited or restricted    - D/C STATUS:  ---------------To be determined---------------      Medical Necessity:   · Patient is expected to demonstrate progress in strength, range of motion and functional technique to increase independence with household/lawn activities.   · Patient demonstrates good rehab potential due to higher previous functional level. Reason for Services/Other Comments:  · Patient continues to require skilled intervention due to decreased cervical ROM, UE strength and increased pain leading to decreased functional status. Use of outcome tool(s) and clinical judgement create a POC that gives a: Questionable prediction of patient's progress: MODERATE COMPLEXITY            TREATMENT:   (In addition to Assessment/Re-Assessment sessions the following treatments were rendered)  Pre-treatment Symptoms/Complaints: Actually my neck has been feeling good. Pain: Initial: 1/10 Feeling better     Post Session:  1/10     THERAPEUTIC EXERCISE: (6 minutes):  Exercises per grid below to improve mobility and strength. Required minimal verbal cues to promote proper body alignment and promote proper body posture. Progressed resistance as indicated. MECHANICAL TRACTION: (20 minutes): Traction was used due to the patient's cervical radiculopathy and 60 sec hold 20 sec off with 18# pull, 20 minutes in order to relieve pain in or originating from his spine. Date:  3-9-17 Date:  03-13-17 Date:  0 Date  05-02-17   Activity/Exercise Parameters Parameters Parameters    UBE Next visit Manual L3  8 minutes Manual L4  8 minutes Manual L3  6 minutes   Tband Row-Extension Next visit Green  20 reps each Green  20 reps each    Chin Tucks X 10 reps 5 sec holds X 10 reps hold 5 seconds 10 reps  5 sec holds    Deep cervical flexion X 10 hold 5 seconds HEP     Upper Trap -Levator stretch 3 x 10 sec holds bilaterally HEP                         · Treatment/Session Assessment:  Pt tolerated treatments well today with no c/o. He has ordered his home traction unit, but he is still waiting for it to arrive so he can bring it in for us to show him how to use it. · Response to Treatment:  Patient responding well to traction, decrease pain overall and more motion. · Compliance with Program/Exercises:  Will assess as treatment progresses. · Recommendations/Intent for next treatment session: \"Next visit will focus on cervical traction, manual therapy, begin and progress therex as tolerable\".   Total Treatment Duration: 26 minutes  PT Patient Time In/Time Out  Time In: 1445  Time Out: Kamran Saul [id number], PT

## 2017-05-08 ENCOUNTER — HOSPITAL ENCOUNTER (OUTPATIENT)
Dept: PHYSICAL THERAPY | Age: 67
End: 2017-05-08
Payer: MEDICARE

## 2017-05-17 ENCOUNTER — APPOINTMENT (OUTPATIENT)
Dept: PHYSICAL THERAPY | Age: 67
End: 2017-05-17
Payer: MEDICARE

## 2017-05-22 PROBLEM — L71.9 ROSACEA: Status: ACTIVE | Noted: 2017-05-22

## 2017-05-22 PROBLEM — E78.5 HYPERLIPIDEMIA: Status: ACTIVE | Noted: 2017-05-22

## 2017-05-23 ENCOUNTER — HOSPITAL ENCOUNTER (OUTPATIENT)
Dept: PHYSICAL THERAPY | Age: 67
Discharge: HOME | End: 2017-05-23
Payer: MEDICARE

## 2017-05-23 PROCEDURE — G8986 CARRY D/C STATUS: HCPCS

## 2017-05-23 PROCEDURE — G8985 CARRY GOAL STATUS: HCPCS

## 2017-05-23 PROCEDURE — 97012 MECHANICAL TRACTION THERAPY: CPT

## 2017-05-23 NOTE — PROGRESS NOTES
Susana Henry  : 1950 [facility] [address], [address] [place] [address],[address], [facility] [address]  [phone]   [phone]           OUTPATIENT PHYSICAL THERAPY:Daily Note and Discharge 2017    ICD-10: Treatment Diagnosis: Cervicalgia (M54.2); Impingement syndrome of right shoulder (M75.41)  Precautions/Allergies:   Levaquin [levofloxacin]   Fall Risk Score: 1 (? 5 = High Risk)  MD Orders: Evaluate and Treat MEDICAL/REFERRING DIAGNOSIS:  Cervicalgia [M54.2]  Impingement syndrome of right shoulder [M75.41]   DATE OF ONSET: 2016  REFERRING PHYSICIAN: Megan Navas MD  RETURN PHYSICIAN APPOINTMENT: 17     INITIAL ASSESSMENT:  Mr. Lazara Macario presents with decreased cervical ROM, R UE strength and increased pain leading to decreased functional status. Pt would benefit from skilled physical therapy services to address the above deficits and help patient return to prior level of function. PROBLEM LIST (Impacting functional limitations):  1. Decreased Strength  2. Decreased ADL/Functional Activities  3. Increased Pain  4. Decreased Flexibility/Joint Mobility  5. Decreased [place] with Home Exercise Program INTERVENTIONS PLANNED:  1. Cold  2. Cryotherapy  3. Electrical Stimulation  4. Heat  5. Home Exercise Program (HEP)  6. Manual Therapy  7. Range of Motion (ROM)  8. Therapeutic Exercise/Strengthening  9. Ultrasound (US)   TREATMENT PLAN:  Effective Dates: 17 TO 17. Frequency/Duration: 2 times a week for 3 months  GOALS: (Goals have been discussed and agreed upon with patient.)  Short-Term Functional Goals: Time Frame: 4 weeks  1. Pt will increase cervical ROM flexion = 45 degrees, side bending R = 35 degrees, and rotation R = 65 degrees to assist with household and lawn activities (Goal Met)  2.  Pt will increase strength R shoulder 4/5 to 4+/5 to assist with household and lawn activities (Goal Met)  Discharge Goals: Time Frame: 12 weeks  1. Pt will increase strength R shoulder 5/5 to assist with household and lawn activities (Goal Met)  2. Pt will be independent with HEP (Goal Met)  3. Pt will perform 20 minutes lawn activities independently with min to no c/o neck or R UE pain (Goal Met)  Rehabilitation Potential For Stated Goals: Good  Regarding Ursula Renner's therapy, I certify that the treatment plan above will be carried out by a therapist or under their direction. Thank you for this referral,  Sundeep Patricia PT     Referring Physician Signature: Ama Resendez MD              Date                    The information in this section was collected on 03-07-17 (except where otherwise noted). HISTORY:   Subjective:  Pt denies neck pain. History of Present Injury/Illness (Reason for Referral):   Pt reports insidious onset R shoulder pain that started approximately 12-03-16. He states he had been doing some push-ups which may have contributed to his R shoulder pain. He had some x-rays which revealed a compressed disc at C4-5. He was given a steroid dose pack which helped the pain. His MD sent him for 2 visits of PT for some neck and shoulder exercises. He states when he went off the steroid dose pack his pain returned. He saw MD for follow up and MD has referred him back for continued PT. Pt is retired. Pt has history of lumbar laminectomy by Dr Ирина Crowe 08-22-16. He states his lower back is doing well overall-he states his back gets fatigued at times. Pt had an MRI of his neck this morning and sees MD for follow up 03-17-17 to get MRI results. Pt is currently getting pain in his R neck, upper trap, scapular region, shoulder and down to the elbow. He denies any UE numbness or tingling. Pt rates his current pain 2-3/10 sitting at rest, increasing to 6/10 at worst.  Aggravating factors include inactivity. Relieving factors include activity. He is taking Naproxen 500 mg as needed-he has been taking it twice daily.   Pt is retired. Pt reports difficulties with sleeping at night secondary neck and shoulder pain. Past Medical History/Comorbidities:   Mr. 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 Mr. Nathalia Becker  has a past surgical history that includes endoscopy, colon, diagnostic; urological (2008); orthopaedic (Right, 1998); lumbar laminectomy (08/22/2016); lumbar laminectomy (08/2016); and radical prostatectomy (2007). Social History/Living Environment:     Pt lives with spouse who assists with ADL's as needed. He reports increased pain and difficulties with all household/lawn activities due to his neck and R shoulder pain. Prior Level of Function/Work/Activity:  Independent with all household and lawn activities  Dominant Side:         RIGHT  Other Clinical Tests:          X-rays, MRI (has not gotten results yet)  Previous Treatment Approaches:          Physical Therapy, Medications  Personal Factors:          Age:  75 y.o. Past/Current Experience:  Pt has had some prior physical therapy  Current Medications:       Current Outpatient Prescriptions:     atorvastatin (LIPITOR) 20 mg tablet, Take 1 Tab by mouth daily. Replaces simvastatin, Disp: 90 Tab, Rfl: 3    pantoprazole (PROTONIX) 40 mg tablet, Take 1 Tab by mouth daily. Replaces esomeprazole, Disp: 90 Tab, Rfl: 3    levothyroxine (SYNTHROID) 137 mcg tablet, Take 137 mcg by mouth Daily (before breakfast). , Disp: 90 Tab, Rfl: 3    sildenafil citrate (VIAGRA) 25 mg tablet, Take 1 Tab by mouth as needed. , Disp: 18 Tab, Rfl: 3    amLODIPine (NORVASC) 5 mg tablet, Take 1 Tab by mouth daily. , Disp: 90 Tab, Rfl: 3    telmisartan-hydrochlorothiazide (MICARDIS HCT) 80-12.5 mg per tablet, Take 1 Tab by mouth daily. , Disp: 90 Tab, Rfl: 3    fexofenadine (ALLEGRA) 180 mg tablet, Take 180 mg by mouth daily. , Disp: , Rfl:     naproxen (NAPROSYN) 500 mg tablet, Take 1 Tab by mouth two (2) times daily (with meals). (Patient taking differently: Take 500 mg by mouth two (2) times daily (with meals). Last dose to be 8/1/2016), Disp: 180 Tab, Rfl: 3    omega-3 fatty acids-vitamin e (FISH OIL) 1,000 mg cap, Take 1 Cap by mouth. Holding for surgery, Disp: , Rfl:     multivitamin (ONE A DAY) tablet, Take 1 Tab by mouth daily. Last dose 8/1/2016, Disp: , Rfl:     clotrimazole-betamethasone (LOTRISONE) topical cream, Apply  to affected area two (2) times a day. (Patient taking differently: Apply  to affected area as needed.), Disp: 45 g, Rfl: 5    azelaic acid (FINACEA) 15 % topical gel, Apply  to affected area two (2) times a day. (Patient taking differently: Apply  to affected area as needed.), Disp: 50 g, Rfl: 3    aspirin delayed-release 81 mg tablet, Take 162 mg by mouth daily. Last dose to be 8/1/2016, Disp: , Rfl:    Date Last Reviewed:  03-07-17   Number of Personal Factors/Comorbidities that affect the Plan of Care: 1-2: MODERATE COMPLEXITY   EXAMINATION:   Observation/Orthostatic Postural Assessment:           Forward head, rounded shoulders  Palpation:         ROM:    Cervical flexion = 45 degrees  Cervical extension = 35 degrees  Cervical side bending R = 35 degrees   Cervical side bending L = 35 degrees  Cervical rotation R = 65 degrees   Cervical rotation L = 65 degrees    Strength:    R shoulder flexion = 5/5  R shoulder abduction = 5/5  R shoulder internal rotation = 5/5  R shoulder external rotation = 5/5  R elbow flexion = 5/5  R elbow extension = 5/5  R wrist flexion = 5/5  R wrist extension = 5/5    L shoulder flexion = 5/5  L shoulder abduction = 5/5  L shoulder internal rotation = 5/5  L shoulder external rotation = 5/5  L elbow flexion = 5/5  L elbow extension = 5/5  L wrist flexion = 5/5  L wrist extension = 5/5    Special Tests:          Cervical compression and distraction negative; Negative impingement sign R shoulder  Neurological Screen:        Myotomes:          Dermatomes:  Sensation intact to light touch bilateral UE's        Reflexes:  DTR's 2+ to bilateral brachioradialis and triceps, 2+ to L biceps, 1+ to R biceps  Functional Mobility:         Pt able to transition sit to supine and supine to sit independently       Body Structures Involved:  1. Nerves  2. Joints  3. Muscles Body Functions Affected:  1. Sensory/Pain  2. Neuromusculoskeletal Activities and Participation Affected:  1. Domestic Life   Number of elements (examined above) that affect the Plan of Care: 3: MODERATE COMPLEXITY   CLINICAL PRESENTATION:   Presentation: Evolving clinical presentation with changing clinical characteristics: MODERATE COMPLEXITY   CLINICAL DECISION MAKING:   Outcome Measure: Tool Used: Neck Disability Index (NDI)  Score:  Initial: 7/50  Most Recent: 0/50 (Date: 05-23-17)   Interpretation of Score: The Neck Disability Index is a revised form of the Oswestry Low Back Pain Index and is designed to measure the activities of daily living in person's with neck pain. Each section is scored on a 0-5 scale, 5 representing the greatest disability. The scores of each section are added together for a total score of 50. Score 0 1-10 11-20 21-30 31-40 41-49 50   Modifier CH CI CJ CK CL CM CN     ? Carrying, Moving, and Handling Objects:     - CURRENT STATUS: CH - 0% impaired, limited or restricted    - GOAL STATUS: CI - 1%-19% impaired, limited or restricted    - D/C STATUS:  [address] [place] [address] - 0% impaired, limited or restricted      Medical Necessity:   · Patient is expected to demonstrate progress in strength, range of motion and functional technique to increase independence with household/lawn activities. · Patient demonstrates good rehab potential due to higher previous functional level.   Reason for Services/Other Comments:  · Patient continues to require skilled intervention due to decreased cervical ROM, UE strength and increased pain leading to decreased functional status. Use of outcome tool(s) and clinical judgement create a POC that gives a: Questionable prediction of patient's progress: MODERATE COMPLEXITY            TREATMENT:   (In addition to Assessment/Re-Assessment sessions the following treatments were rendered)  Pre-treatment Symptoms/Complaints: Actually my neck has been feeling good. Pain: Initial: 1/10 Feeling better     Post Session:  1/10     MECHANICAL TRACTION: (20 minutes): Traction was used due to the patient's cervical radiculopathy and 60 sec hold 20 sec off with 18# pull, 20 minutes in order to relieve pain in or originating from his spine.-Pt education on [facility] Cervical Traction Unit   Date:  3-9-17 Date:  03-13-17 Date:  0 Date  05-02-17 Date  05-23-17   Activity/Exercise Parameters Parameters Parameters     UBE Next visit Manual L3  8 minutes Manual L4  8 minutes Manual L3  6 minutes    Tband Row-Extension Next visit Green  20 reps each Green  20 reps each     Chin Tucks X 10 reps 5 sec holds X 10 reps hold 5 seconds 10 reps  5 sec holds     Deep cervical flexion X 10 hold 5 seconds HEP      Upper Trap -Levator stretch 3 x 10 sec holds bilaterally HEP                            · Treatment/Session Assessment:  Pt tolerated treatments well today with no c/o. Pt received his home cervical traction unit. I educated him on proper use today. He demonstrated good understanding. He is to use 18 pounds, 60 sec hold time, 20 sec off time for 20 minutes twice a week. Discharge from PT to independent [facility]. · Response to Treatment:  Patient responding well to traction, decrease pain overall and more motion. · Compliance with Program/Exercises: Will assess as treatment progresses. · Recommendations/Intent for next treatment session:  \"Next visit will focus on cervical traction, manual therapy, begin and progress therex as tolerable\".   Total Treatment Duration: 20 minutes  PT Patient Time In/Time Out  Time In: 1600  Time Out: [address] Christopher Alatorre, JOSE

## 2017-08-03 ENCOUNTER — APPOINTMENT (RX ONLY)
Dept: URBAN - METROPOLITAN AREA CLINIC 349 | Facility: CLINIC | Age: 67
Setting detail: DERMATOLOGY
End: 2017-08-03

## 2017-08-03 DIAGNOSIS — L70.8 OTHER ACNE: ICD-10-CM

## 2017-08-03 DIAGNOSIS — L73.8 OTHER SPECIFIED FOLLICULAR DISORDERS: ICD-10-CM

## 2017-08-03 DIAGNOSIS — D18.0 HEMANGIOMA: ICD-10-CM

## 2017-08-03 DIAGNOSIS — H02.6 XANTHELASMA OF EYELID: ICD-10-CM

## 2017-08-03 DIAGNOSIS — D22 MELANOCYTIC NEVI: ICD-10-CM

## 2017-08-03 DIAGNOSIS — L82.1 OTHER SEBORRHEIC KERATOSIS: ICD-10-CM

## 2017-08-03 DIAGNOSIS — L81.2 FRECKLES: ICD-10-CM

## 2017-08-03 PROBLEM — H02.66 XANTHELASMA OF LEFT EYE, UNSPECIFIED EYELID: Status: ACTIVE | Noted: 2017-08-03

## 2017-08-03 PROBLEM — M12.9 ARTHROPATHY, UNSPECIFIED: Status: ACTIVE | Noted: 2017-08-03

## 2017-08-03 PROBLEM — D22.5 MELANOCYTIC NEVI OF TRUNK: Status: ACTIVE | Noted: 2017-08-03

## 2017-08-03 PROBLEM — I10 ESSENTIAL (PRIMARY) HYPERTENSION: Status: ACTIVE | Noted: 2017-08-03

## 2017-08-03 PROBLEM — L20.84 INTRINSIC (ALLERGIC) ECZEMA: Status: ACTIVE | Noted: 2017-08-03

## 2017-08-03 PROBLEM — K21.9 GASTRO-ESOPHAGEAL REFLUX DISEASE WITHOUT ESOPHAGITIS: Status: ACTIVE | Noted: 2017-08-03

## 2017-08-03 PROBLEM — D18.01 HEMANGIOMA OF SKIN AND SUBCUTANEOUS TISSUE: Status: ACTIVE | Noted: 2017-08-03

## 2017-08-03 PROBLEM — E78.5 HYPERLIPIDEMIA, UNSPECIFIED: Status: ACTIVE | Noted: 2017-08-03

## 2017-08-03 PROBLEM — Z85.46 PERSONAL HISTORY OF MALIGNANT NEOPLASM OF PROSTATE: Status: ACTIVE | Noted: 2017-08-03

## 2017-08-03 PROCEDURE — ? COUNSELING

## 2017-08-03 PROCEDURE — 99213 OFFICE O/P EST LOW 20 MIN: CPT

## 2017-08-03 ASSESSMENT — LOCATION SIMPLE DESCRIPTION DERM
LOCATION SIMPLE: LEFT SHOULDER
LOCATION SIMPLE: RIGHT UPPER BACK
LOCATION SIMPLE: LEFT CHEEK
LOCATION SIMPLE: RIGHT TEMPLE
LOCATION SIMPLE: CHEST
LOCATION SIMPLE: LEFT CALF
LOCATION SIMPLE: ABDOMEN
LOCATION SIMPLE: RIGHT SHOULDER
LOCATION SIMPLE: LEFT EYELID
LOCATION SIMPLE: RIGHT FOREHEAD
LOCATION SIMPLE: LEFT UPPER BACK

## 2017-08-03 ASSESSMENT — LOCATION DETAILED DESCRIPTION DERM
LOCATION DETAILED: RIGHT INFERIOR MEDIAL UPPER BACK
LOCATION DETAILED: RIGHT INFERIOR FOREHEAD
LOCATION DETAILED: EPIGASTRIC SKIN
LOCATION DETAILED: LEFT DISTAL CALF
LOCATION DETAILED: RIGHT SUPERIOR MEDIAL UPPER BACK
LOCATION DETAILED: RIGHT SUPERIOR UPPER BACK
LOCATION DETAILED: STERNUM
LOCATION DETAILED: RIGHT LATERAL UPPER BACK
LOCATION DETAILED: LEFT INFERIOR CENTRAL MALAR CHEEK
LOCATION DETAILED: LEFT MEDIAL CANTHUS
LOCATION DETAILED: LEFT MID-UPPER BACK
LOCATION DETAILED: LEFT SUPERIOR LATERAL UPPER BACK
LOCATION DETAILED: RIGHT CENTRAL TEMPLE
LOCATION DETAILED: RIGHT POSTERIOR SHOULDER
LOCATION DETAILED: LEFT POSTERIOR SHOULDER

## 2017-08-03 ASSESSMENT — LOCATION ZONE DERM
LOCATION ZONE: FACE
LOCATION ZONE: EYELID
LOCATION ZONE: ARM
LOCATION ZONE: TRUNK
LOCATION ZONE: LEG

## 2017-12-04 ENCOUNTER — HOSPITAL ENCOUNTER (OUTPATIENT)
Dept: MAMMOGRAPHY | Age: 67
Discharge: HOME | End: 2017-12-04
Attending: INTERNAL MEDICINE
Payer: MEDICARE

## 2017-12-04 DIAGNOSIS — M89.9 DISORDER OF BONE: ICD-10-CM

## 2017-12-04 DIAGNOSIS — Z13.820 SCREENING FOR OSTEOPOROSIS: ICD-10-CM

## 2017-12-04 DIAGNOSIS — Z85.46 H/O PROSTATE CANCER: ICD-10-CM

## 2017-12-04 PROCEDURE — 77080 DXA BONE DENSITY AXIAL: CPT

## 2017-12-11 PROBLEM — M47.812 SPONDYLOSIS OF CERVICAL REGION WITHOUT MYELOPATHY OR RADICULOPATHY: Status: ACTIVE | Noted: 2017-12-11

## 2017-12-21 ENCOUNTER — HOSPITAL ENCOUNTER (OUTPATIENT)
Dept: PHYSICAL THERAPY | Age: 67
Discharge: HOME | End: 2017-12-21
Payer: MEDICARE

## 2017-12-21 PROCEDURE — G8979 MOBILITY GOAL STATUS: HCPCS

## 2017-12-21 PROCEDURE — 97110 THERAPEUTIC EXERCISES: CPT

## 2017-12-21 PROCEDURE — G8978 MOBILITY CURRENT STATUS: HCPCS

## 2017-12-21 PROCEDURE — 97162 PT EVAL MOD COMPLEX 30 MIN: CPT

## 2017-12-21 NOTE — PROGRESS NOTES
Ambulatory/Rehab Services H2 Model Falls Risk Assessment    Risk Factor Pts. ·   Confusion/Disorientation/Impulsivity  []    4 ·   Symptomatic Depression  []   2 ·   Altered Elimination  []   1 ·   Dizziness/Vertigo  []   1 ·   Gender (Male)  [x]   1 ·   Any administered antiepileptics (anticonvulsants):  []   2 ·   Any administered benzodiazepines:  []   1 ·   Visual Impairment (specify):  []   1 ·   Portable Oxygen Use  []   1 ·   Orthostatic ? BP  []   1 ·   History of Recent Falls (within 3 mos.)  []   5     Ability to Rise from Chair (choose one) Pts. ·   Ability to rise in a single movement  []   0 ·   Pushes up, successful in one attempt  [x]   1 ·   Multiple attempts, but successful  []   3 ·   Unable to rise without assistance  []   4   Total: (5 or greater = High Risk) 2     Falls Prevention Plan:   []                Physical Limitations to Exercise (specify):   []                Mobility Assistance Device (type):   []                Exercise/Equipment Adaptation (specify):    ©2010 [facility] Patent #[id number].  Federal Law prohibits the replication, distribution or use without written permission from [facility]

## 2017-12-21 NOTE — THERAPY EVALUATION
Leopoldo Moose  : 1950  [place]  Secondary: [address] [facility]  [address] [facility], [address] [place] [address],[address], [address]  [phone]   [phone]           OUTPATIENT PHYSICAL THERAPY:Initial Assessment 2017    ICD-10: Treatment Diagnosis: Low back pain (M54.5)  Precautions/Allergies:   Levaquin [levofloxacin]   Fall Risk Score: 2 (? 5 = High Risk)  MD Orders: Evaluate and Treat MEDICAL/REFERRING DIAGNOSIS:  back   DATE OF ONSET: Chronic LBP  REFERRING PHYSICIAN: Ramiro Palafox MD  RETURN PHYSICIAN APPOINTMENT: Pt has no return appt with Dr Celi Allison at this time     INITIAL ASSESSMENT:  Mr. Gilbert Cavazos presents with decreased lumbar ROM, LE strength/flexibility and increased pain leading to decreased functional status. Pt would benefit from skilled physical therapy services to address the above deficits and help patient return to prior level of function. PROBLEM LIST (Impacting functional limitations):  1. Decreased Strength  2. Decreased ADL/Functional Activities  3. Increased Pain  4. Decreased Flexibility/Joint Mobility  5. Decreased [place] with Home Exercise Program INTERVENTIONS PLANNED:  1. Cold  2. Cryotherapy  3. Electrical Stimulation  4. Heat  5. Home Exercise Program (HEP)  6. Manual Therapy  7. Range of Motion (ROM)  8. Therapeutic Exercise/Strengthening  9. Ultrasound (US)   TREATMENT PLAN:  Effective Dates: 17 TO 18. Frequency/Duration: 2 times a week for 2 months  GOALS: (Goals have been discussed and agreed upon with patient.)  Short-Term Functional Goals: Time Frame: 4 weeks  1. Pt will increase lumbar ROM flexion = 60 degrees, side bending = 35 degrees bilaterally to assist with household ADL's  2. Pt will increase SLR 50 degrees bilaterally to assist with household ADL's  Discharge Goals: Time Frame: 8 weeks  1.  Pt will increase strength bilateral LE's 5/5 to assist with household ADL's  2. Pt will increase SLR 60 degrees bilaterally to assist with household ADL's  3. Pt will be independent with HEP  4. Pt will perform 20 minutes household cleaning activities independently with min to no c/o LBP  Rehabilitation Potential For Stated Goals: Good  Regarding Lindsey Renner's therapy, I certify that the treatment plan above will be carried out by a therapist or under their direction. Thank you for this referral,  Darwin Holland, PT     Referring Physician Signature: Carolyn Delatorre MD              Date                    The information in this section was collected on 12-21-17 (except where otherwise noted). HISTORY:   History of Present Injury/Illness (Reason for Referral):  Pt reports insidious onset LBP of greater than 20 years duration. He had lumbar surgery August 2016 for laminectomy. He was diagnosed with DDD and stenosis. He states it helped with the constant pain that he was getting, but he still gets pain with activity. He has had chiropractics as well as 2 injections with temporary relief of pain. He states he gets no pain at night. He had an ablation which gave him temporary relief of pain also. He states the pain diminishes with sitting and lying down and is worse with activity. He states MD feels his pain is muscular. He states he feels the muscles in his back and core getting weaker and weaker. Pt rates his current low back pain 0-1/10, increasing to 7/10 at worst (L>R low back). Aggravating factors include vacuuming and all household ADL's. Relieving factors include lying down and sleeping. He is taking Naprosyn prn. He has a back brace which he states he uses prn. Past Medical History/Comorbidities:   Mr. Galilea Manley  has a past medical history of Arthritis; Chronic pain; Compression deformity of vertebra (02/2016); GERD (gastroesophageal reflux disease); Hypercholesterolemia; Hyperlipidemia (5/22/2017);  Hypertension; Malignant neoplasm of prostate ([facility] [address] Stress incontinence, male; and Thyroid disease. Mr. Camden Bojorquez  has a past surgical history that includes endoscopy, colon, diagnostic; hx urological (2008); hx orthopaedic (Right, 1998); hx lumbar laminectomy (08/22/2016); hx lumbar laminectomy (08/2016); and hx radical prostatectomy (2007). Social History/Living Environment:     Pt lives in a one story house with 4 steps to enter. He has occasional ascending/descending stairs at times due to his back pain, especially when carrying items. He lives with spouse who assists with ADL's as needed  Prior Level of Function/Work/Activity:  Pt is retired. Dominant Side:         RIGHT  Other Clinical Tests:          None recently  Personal Factors:          Sex:  male        Age:  75 y.o. Profession:  Pt is retired  Current Medications:       Current Outpatient Prescriptions:     amoxicillin-clavulanate (AUGMENTIN) 875-125 mg per tablet, Take 1 Tab by mouth every twelve (12) hours. , Disp: 20 Tab, Rfl: 0    naproxen (NAPROSYN) 500 mg tablet, Take 1 Tab by mouth two (2) times daily (with meals). As needed, Disp: 180 Tab, Rfl: 3    psyllium (METAMUCIL) powd, Take  by mouth., Disp: , Rfl:     hyoscyamine SL (LEVSIN/SL) 0.125 mg SL tablet, 1 Tab by SubLINGual route every six (6) hours as needed for Cramping., Disp: 90 Tab, Rfl: 1    B.infantis-B.ani-B.long-B.bifi (PROBIOTIC 4X) 10-15 mg TbEC, Take  by mouth., Disp: , Rfl:     hydrocortisone (ANUSOL-HC) 25 mg supp, Insert 25 mg into rectum every twelve (12) hours. , Disp: , Rfl:     levothyroxine (SYNTHROID) 150 mcg tablet, Take 1 Tab by mouth Daily (before breakfast). , Disp: 90 Tab, Rfl: 3    amLODIPine (NORVASC) 5 mg tablet, Take 1 Tab by mouth daily. , Disp: 90 Tab, Rfl: 3    telmisartan-hydroCHLOROthiazide (MICARDIS HCT) 80-12.5 mg per tablet, Take 1 Tab by mouth daily. , Disp: 90 Tab, Rfl: 3    atorvastatin (LIPITOR) 20 mg tablet, Take 1 Tab by mouth daily.  Replaces simvastatin, Disp: 90 Tab, Rfl: 3   pantoprazole (PROTONIX) 40 mg tablet, Take 1 Tab by mouth daily. Replaces esomeprazole, Disp: 90 Tab, Rfl: 3    sildenafil citrate (VIAGRA) 25 mg tablet, Take 1 Tab by mouth as needed. , Disp: 18 Tab, Rfl: 3    fexofenadine (ALLEGRA) 180 mg tablet, Take 180 mg by mouth daily. , Disp: , Rfl:     omega-3 fatty acids-vitamin e (FISH OIL) 1,000 mg cap, Take 1 Cap by mouth. Holding for surgery, Disp: , Rfl:     multivitamin (ONE A DAY) tablet, Take 1 Tab by mouth daily. Last dose 8/1/2016, Disp: , Rfl:     clotrimazole-betamethasone (LOTRISONE) topical cream, Apply  to affected area two (2) times a day. (Patient taking differently: Apply  to affected area as needed.), Disp: 45 g, Rfl: 5    aspirin delayed-release 81 mg tablet, Take 162 mg by mouth daily. Last dose to be 8/1/2016, Disp: , Rfl:    Date Last Reviewed:  12-21-17   Number of Personal Factors/Comorbidities that affect the Plan of Care: 1-2: MODERATE COMPLEXITY   EXAMINATION:   Observation/Orthostatic Postural Assessment:           Forward head, rounded shoulders  Palpation:          No tenderness noted to palpation  ROM:    Lumbar flexion = 45 degrees (pulling in bilateral hamstrings)  Lumbar extension = 10 degrees  Lumbar side bending R = 25 degrees (pulling in L low back)  Lumbar side bending L = 25 degrees    Strength:    R hip flexion = 5/5  R hip abduction = 5/5  R hip adduction = 5/5  R knee extension = 5/5  R knee flexion = 5/5  R ankle dorsiflexion = 5/5  R ankle plantarflexion = 5/5    L hip flexion = 4/5  L hip abduction = 4/5  L hip adduction = 4+/5  L knee extension = 5/5  L knee flexion = 5/5  L ankle dorsiflexion = 5/5  L ankle plantarflexion = 5/5    Special Tests:          SLR 40 degrees wit hamstring tightness noted bilaterally  Neurological Screen:        Myotomes:  Weakness L hip        Dermatomes:  Intact to light touch bilateral LE's        Reflexes:  DTR's 1+ to bilateral achilles, 2+ to bilateral patellar  Functional Mobility: Gait/Ambulation:  No significant deficits noted        Transfers:  Pt able to transition sit to supine and supine to sit independently    Body Structures Involved:  1. Bones  2. Joints  3. Muscles Body Functions Affected:  1. Sensory/Pain  2. Neuromusculoskeletal Activities and Participation Affected:  1. Mobility  2. Self Care  3. Domestic Life   Number of elements (examined above) that affect the Plan of Care: 3: MODERATE COMPLEXITY   CLINICAL PRESENTATION:   Presentation: Evolving clinical presentation with changing clinical characteristics: MODERATE COMPLEXITY   CLINICAL DECISION MAKING:   Outcome Measure: Tool Used: Modified Oswestry Low Back Pain Questionnaire  Score:  Initial: 17/50  Most Recent: X/50 (Date: -- )   Interpretation of Score: Each section is scored on a 0-5 scale, 5 representing the greatest disability. The scores of each section are added together for a total score of 50. Score 0 1-10 11-20 21-30 31-40 41-49 50   Modifier CH CI CJ CK CL CM CN     ? Mobility - Walking and Moving Around:     - CURRENT STATUS: CJ - 20%-39% impaired, limited or restricted    - GOAL STATUS: CI - 1%-19% impaired, limited or restricted    - D/C STATUS:  ---------------To be determined---------------    Medical Necessity:   · Patient is expected to demonstrate progress in strength, range of motion and functional technique to increase independence with household ADL's. · Patient demonstrates good rehab potential due to higher previous functional level. Reason for Services/Other Comments:  · Patient continues to require skilled intervention due to decreased lumbar ROM, LE strength/flexibility and increased pain leading to decreased functional status.    Use of outcome tool(s) and clinical judgement create a POC that gives a: Questionable prediction of patient's progress: MODERATE COMPLEXITY            TREATMENT:   (In addition to Assessment/Re-Assessment sessions the following treatments were rendered)  Pre-treatment Symptoms/Complaints:  Low Back Pain  Pain: Initial:     0-1/10 Post Session:  0-1/10     THERAPEUTIC EXERCISE: (15 minutes):  Exercises per grid below to improve mobility and strength. Required minimal verbal cues to promote proper body alignment and promote proper body posture. Progressed resistance and repetitions as indicated. Date:  12-21-17 Date:   Date:     Activity/Exercise Parameters Parameters Parameters   SKTC 3 reps  15 sec holds     Active Hamstring Stretch 3 reps  15 sec holds     Piriformis Stretch 3 reps  15 sec holds     Pelvic Tilts 10 reps  5 sec holds     Patient Education-Sleeping Postures, Lifting Mechanics, Body Mechanics for Vacuuming 5 minutes                     [employer] Portal  Treatment/Session Assessment:    · Response to Treatment:  Pt tolerated evaluation and treatments well today with no c/o increased pain. · Compliance with Program/Exercises: Will assess as treatment progresses. · Recommendations/Intent for next treatment session: \"Next visit will focus on advancements to more challenging activities\".   Total Treatment Duration:  15 minutes  PT Patient Time In/Time Out  Time In: 1430  Time Out: [address] Nik Rivera PT

## 2017-12-28 ENCOUNTER — HOSPITAL ENCOUNTER (OUTPATIENT)
Dept: PHYSICAL THERAPY | Age: 67
Discharge: HOME | End: 2017-12-28
Payer: MEDICARE

## 2017-12-28 PROCEDURE — 97110 THERAPEUTIC EXERCISES: CPT

## 2017-12-28 PROCEDURE — 97140 MANUAL THERAPY 1/> REGIONS: CPT

## 2017-12-28 NOTE — PROGRESS NOTES
Rudolph Fong  : 1950  [place]  Secondary: [facility] [address] [facility] [address],[address], [address] [facility]  [phone]   [phone]           OUTPATIENT PHYSICAL THERAPY:Daily Note 2017    ICD-10: Treatment Diagnosis: Low back pain (M54.5)  Precautions/Allergies:   Levaquin [levofloxacin]   Fall Risk Score: 2 (? 5 = High Risk)  MD Orders: Evaluate and Treat MEDICAL/REFERRING DIAGNOSIS:  back   DATE OF ONSET: Chronic LBP  REFERRING PHYSICIAN: Olamide Faustin MD  RETURN PHYSICIAN APPOINTMENT: Pt has no return appt with Dr Fiona Fuentes at this time     INITIAL ASSESSMENT:  Mr. Avery Salmon presents with decreased lumbar ROM, LE strength/flexibility and increased pain leading to decreased functional status. Pt would benefit from skilled physical therapy services to address the above deficits and help patient return to prior level of function. PROBLEM LIST (Impacting functional limitations):  1. Decreased Strength  2. Decreased ADL/Functional Activities  3. Increased Pain  4. Decreased Flexibility/Joint Mobility  5. Decreased [place] with Home Exercise Program INTERVENTIONS PLANNED:  1. Cold  2. Cryotherapy  3. Electrical Stimulation  4. Heat  5. Home Exercise Program (HEP)  6. Manual Therapy  7. Range of Motion (ROM)  8. Therapeutic Exercise/Strengthening  9. Ultrasound (US)   TREATMENT PLAN:  Effective Dates: 17 TO 18. Frequency/Duration: 2 times a week for 2 months  GOALS: (Goals have been discussed and agreed upon with patient.)  Short-Term Functional Goals: Time Frame: 4 weeks  1. Pt will increase lumbar ROM flexion = 60 degrees, side bending = 35 degrees bilaterally to assist with household ADL's  2. Pt will increase SLR 50 degrees bilaterally to assist with household ADL's  Discharge Goals: Time Frame: 8 weeks  1.  Pt will increase strength bilateral LE's 5/5 to assist with household ADL's  2. Pt will increase SLR 60 degrees bilaterally to assist with household ADL's  3. Pt will be independent with HEP  4. Pt will perform 20 minutes household cleaning activities independently with min to no c/o LBP  Rehabilitation Potential For Stated Goals: Good  Regarding Michael Renner's therapy, I certify that the treatment plan above will be carried out by a therapist or under their direction. Thank you for this referral,  Freddie Downs, PT     Referring Physician Signature: Jean Paul Vasques MD              Date                    The information in this section was collected on 12-21-17 (except where otherwise noted). HISTORY:   History of Present Injury/Illness (Reason for Referral):  Pt reports insidious onset LBP of greater than 20 years duration. He had lumbar surgery August 2016 for laminectomy. He was diagnosed with DDD and stenosis. He states it helped with the constant pain that he was getting, but he still gets pain with activity. He has had chiropractics as well as 2 injections with temporary relief of pain. He states he gets no pain at night. He had an ablation which gave him temporary relief of pain also. He states the pain diminishes with sitting and lying down and is worse with activity. He states MD feels his pain is muscular. He states he feels the muscles in his back and core getting weaker and weaker. Pt rates his current low back pain 0-1/10, increasing to 7/10 at worst (L>R low back). Aggravating factors include vacuuming and all household ADL's. Relieving factors include lying down and sleeping. He is taking Naprosyn prn. He has a back brace which he states he uses prn. Past Medical History/Comorbidities:   Mr. Yvette Williamson  has a past medical history of Arthritis; Chronic pain; Compression deformity of vertebra (02/2016); GERD (gastroesophageal reflux disease); Hypercholesterolemia; Hyperlipidemia (5/22/2017);  Hypertension; Malignant neoplasm of prostate ([facility] [address] Stress incontinence, male; and Thyroid disease. Mr. Steve Vee  has a past surgical history that includes endoscopy, colon, diagnostic; hx urological (2008); hx orthopaedic (Right, 1998); hx lumbar laminectomy (08/22/2016); hx lumbar laminectomy (08/2016); and hx radical prostatectomy (2007). Social History/Living Environment:     Pt lives in a one story house with 4 steps to enter. He has occasional ascending/descending stairs at times due to his back pain, especially when carrying items. He lives with spouse who assists with ADL's as needed  Prior Level of Function/Work/Activity:  Pt is retired. Dominant Side:         RIGHT  Other Clinical Tests:          None recently  Personal Factors:          Sex:  male        Age:  75 y.o. Profession:  Pt is retired  Current Medications:       Current Outpatient Prescriptions:     amoxicillin-clavulanate (AUGMENTIN) 875-125 mg per tablet, Take 1 Tab by mouth every twelve (12) hours. , Disp: 20 Tab, Rfl: 0    naproxen (NAPROSYN) 500 mg tablet, Take 1 Tab by mouth two (2) times daily (with meals). As needed, Disp: 180 Tab, Rfl: 3    psyllium (METAMUCIL) powd, Take  by mouth., Disp: , Rfl:     hyoscyamine SL (LEVSIN/SL) 0.125 mg SL tablet, 1 Tab by SubLINGual route every six (6) hours as needed for Cramping., Disp: 90 Tab, Rfl: 1    B.infantis-B.ani-B.long-B.bifi (PROBIOTIC 4X) 10-15 mg TbEC, Take  by mouth., Disp: , Rfl:     hydrocortisone (ANUSOL-HC) 25 mg supp, Insert 25 mg into rectum every twelve (12) hours. , Disp: , Rfl:     levothyroxine (SYNTHROID) 150 mcg tablet, Take 1 Tab by mouth Daily (before breakfast). , Disp: 90 Tab, Rfl: 3    amLODIPine (NORVASC) 5 mg tablet, Take 1 Tab by mouth daily. , Disp: 90 Tab, Rfl: 3    telmisartan-hydroCHLOROthiazide (MICARDIS HCT) 80-12.5 mg per tablet, Take 1 Tab by mouth daily. , Disp: 90 Tab, Rfl: 3    atorvastatin (LIPITOR) 20 mg tablet, Take 1 Tab by mouth daily.  Replaces simvastatin, Disp: 90 Tab, Rfl: 3   pantoprazole (PROTONIX) 40 mg tablet, Take 1 Tab by mouth daily. Replaces esomeprazole, Disp: 90 Tab, Rfl: 3    sildenafil citrate (VIAGRA) 25 mg tablet, Take 1 Tab by mouth as needed. , Disp: 18 Tab, Rfl: 3    fexofenadine (ALLEGRA) 180 mg tablet, Take 180 mg by mouth daily. , Disp: , Rfl:     omega-3 fatty acids-vitamin e (FISH OIL) 1,000 mg cap, Take 1 Cap by mouth. Holding for surgery, Disp: , Rfl:     multivitamin (ONE A DAY) tablet, Take 1 Tab by mouth daily. Last dose 8/1/2016, Disp: , Rfl:     clotrimazole-betamethasone (LOTRISONE) topical cream, Apply  to affected area two (2) times a day. (Patient taking differently: Apply  to affected area as needed.), Disp: 45 g, Rfl: 5    aspirin delayed-release 81 mg tablet, Take 162 mg by mouth daily. Last dose to be 8/1/2016, Disp: , Rfl:    Date Last Reviewed:  12-21-17   Number of Personal Factors/Comorbidities that affect the Plan of Care: 1-2: MODERATE COMPLEXITY   EXAMINATION:   Observation/Orthostatic Postural Assessment:           Forward head, rounded shoulders  Palpation:          No tenderness noted to palpation  ROM:    Lumbar flexion = 45 degrees (pulling in bilateral hamstrings)  Lumbar extension = 10 degrees  Lumbar side bending R = 25 degrees (pulling in L low back)  Lumbar side bending L = 25 degrees    Strength:    R hip flexion = 5/5  R hip abduction = 5/5  R hip adduction = 5/5  R knee extension = 5/5  R knee flexion = 5/5  R ankle dorsiflexion = 5/5  R ankle plantarflexion = 5/5    L hip flexion = 4/5  L hip abduction = 4/5  L hip adduction = 4+/5  L knee extension = 5/5  L knee flexion = 5/5  L ankle dorsiflexion = 5/5  L ankle plantarflexion = 5/5    Special Tests:          SLR 40 degrees wit hamstring tightness noted bilaterally  Neurological Screen:        Myotomes:  Weakness L hip        Dermatomes:  Intact to light touch bilateral LE's        Reflexes:  DTR's 1+ to bilateral achilles, 2+ to bilateral patellar  Functional Mobility: Gait/Ambulation:  No significant deficits noted        Transfers:  Pt able to transition sit to supine and supine to sit independently    Body Structures Involved:  1. Bones  2. Joints  3. Muscles Body Functions Affected:  1. Sensory/Pain  2. Neuromusculoskeletal Activities and Participation Affected:  1. Mobility  2. Self Care  3. Domestic Life   Number of elements (examined above) that affect the Plan of Care: 3: MODERATE COMPLEXITY   CLINICAL PRESENTATION:   Presentation: Evolving clinical presentation with changing clinical characteristics: MODERATE COMPLEXITY   CLINICAL DECISION MAKING:   Outcome Measure: Tool Used: Modified Oswestry Low Back Pain Questionnaire  Score:  Initial: 17/50  Most Recent: X/50 (Date: -- )   Interpretation of Score: Each section is scored on a 0-5 scale, 5 representing the greatest disability. The scores of each section are added together for a total score of 50. Score 0 1-10 11-20 21-30 31-40 41-49 50   Modifier CH CI CJ CK CL CM CN     ? Mobility - Walking and Moving Around:     - CURRENT STATUS: CJ - 20%-39% impaired, limited or restricted    - GOAL STATUS: CI - 1%-19% impaired, limited or restricted    - D/C STATUS:  ---------------To be determined---------------    Medical Necessity:   · Patient is expected to demonstrate progress in strength, range of motion and functional technique to increase independence with household ADL's. · Patient demonstrates good rehab potential due to higher previous functional level. Reason for Services/Other Comments:  · Patient continues to require skilled intervention due to decreased lumbar ROM, LE strength/flexibility and increased pain leading to decreased functional status.    Use of outcome tool(s) and clinical judgement create a POC that gives a: Questionable prediction of patient's progress: MODERATE COMPLEXITY            TREATMENT:   (In addition to Assessment/Re-Assessment sessions the following treatments were rendered)  Pre-treatment Symptoms/Complaints:  Pt states he has been busy and has not been able to do his exercises much, but he is feeling a little better. Pain: Initial:     5/10 at worst Post Session:  4/10/10     THERAPEUTIC EXERCISE: (25 minutes):  Exercises per grid below to improve mobility and strength. Required minimal verbal cues to promote proper body alignment and promote proper body posture. Progressed resistance and repetitions as indicated. MANUAL THERAPY: (15 minutes): STM to bilateral lumbar paraspinals was utilized and necessary because of the patient's restricted motion of soft tissue. MODALITIES: (10 minutes): Moist heat to low back in sitting x10 minutes to decrease pain and stiffness. Skin clear afterwards. Date:  12-21-17 Date:  12-28-17 Date:     Activity/Exercise Parameters Parameters Parameters   SKTC 3 reps  15 sec holds 3 reps  15 sec holds    Active Hamstring Stretch 3 reps  15 sec holds 3 reps  15 sec holds    Piriformis Stretch 3 reps  15 sec holds 3 reps  15 sec holds    Pelvic Tilts 10 reps  5 sec holds 15 reps  5 sec holds    Patient Education-Sleeping Postures, Lifting Mechanics, Body Mechanics for Vacuuming 5 minutes     NuStep  Level 3  5 minutes    Isometric Unilateral Hip Flexion  5 reps  10 sec holds    SLR Flexion with TA  10 reps  5 sec holds    T-band Hip Abduction  Blue  20 reps        MedBridge Portal  Treatment/Session Assessment:    · Response to Treatment:  Pt tolerated new exercises well today. Progressing well. · Compliance with Program/Exercises: Will assess as treatment progresses. · Recommendations/Intent for next treatment session: \"Next visit will focus on advancements to more challenging activities\".   Total Treatment Duration:  50 minutes  PT Patient Time In/Time Out  Time In: 1430  Time Out: 1061 David Pacheco, PT

## 2018-01-02 ENCOUNTER — HOSPITAL ENCOUNTER (OUTPATIENT)
Dept: PHYSICAL THERAPY | Age: 68
Discharge: HOME | End: 2018-01-02
Payer: MEDICARE

## 2018-01-02 PROCEDURE — 97140 MANUAL THERAPY 1/> REGIONS: CPT

## 2018-01-02 PROCEDURE — 97110 THERAPEUTIC EXERCISES: CPT

## 2018-01-02 NOTE — PROGRESS NOTES
Mercedes Mae  : 1950  [place]  Secondary: [address] [facility]  [address] [address],[address]  [phone]   [phone]           OUTPATIENT PHYSICAL THERAPY:Daily Note 2018    ICD-10: Treatment Diagnosis: Low back pain (M54.5)  Precautions/Allergies:   Levaquin [levofloxacin]   Fall Risk Score: 2 (? 5 = High Risk)  MD Orders: Evaluate and Treat MEDICAL/REFERRING DIAGNOSIS:  back   DATE OF ONSET: Chronic LBP  REFERRING PHYSICIAN: Gwen Colon MD  RETURN PHYSICIAN APPOINTMENT: Pt has no return appt with Dr Jagdeep Thacker at this time     INITIAL ASSESSMENT:  Mr. Cory Walker presents with decreased lumbar ROM, LE strength/flexibility and increased pain leading to decreased functional status. Pt would benefit from skilled physical therapy services to address the above deficits and help patient return to prior level of function. PROBLEM LIST (Impacting functional limitations):  1. Decreased Strength  2. Decreased ADL/Functional Activities  3. Increased Pain  4. Decreased Flexibility/Joint Mobility  5. Decreased [place] with Home Exercise Program INTERVENTIONS PLANNED:  1. Cold  2. Cryotherapy  3. Electrical Stimulation  4. Heat  5. Home Exercise Program (HEP)  6. Manual Therapy  7. Range of Motion (ROM)  8. Therapeutic Exercise/Strengthening  9. Ultrasound (US)   TREATMENT PLAN:  Effective Dates: 17 TO 18. Frequency/Duration: 2 times a week for 2 months  GOALS: (Goals have been discussed and agreed upon with patient.)  Short-Term Functional Goals: Time Frame: 4 weeks  1. Pt will increase lumbar ROM flexion = 60 degrees, side bending = 35 degrees bilaterally to assist with household ADL's  2. Pt will increase SLR 50 degrees bilaterally to assist with household ADL's  Discharge Goals: Time Frame: 8 weeks  1.  Pt will increase strength bilateral LE's 5/5 to assist with household ADL's  2. Pt will increase SLR 60 degrees bilaterally to assist with household ADL's  3. Pt will be independent with HEP  4. Pt will perform 20 minutes household cleaning activities independently with min to no c/o LBP  Rehabilitation Potential For Stated Goals: Good  Regarding Rosario Renner's therapy, I certify that the treatment plan above will be carried out by a therapist or under their direction. Thank you for this referral,  Rony Tabares PT     Referring Physician Signature: Gisela Estrella MD              Date                    The information in this section was collected on 12-21-17 (except where otherwise noted). HISTORY:   History of Present Injury/Illness (Reason for Referral):  Pt reports insidious onset LBP of greater than 20 years duration. He had lumbar surgery August 2016 for laminectomy. He was diagnosed with DDD and stenosis. He states it helped with the constant pain that he was getting, but he still gets pain with activity. He has had chiropractics as well as 2 injections with temporary relief of pain. He states he gets no pain at night. He had an ablation which gave him temporary relief of pain also. He states the pain diminishes with sitting and lying down and is worse with activity. He states MD feels his pain is muscular. He states he feels the muscles in his back and core getting weaker and weaker. Pt rates his current low back pain 0-1/10, increasing to 7/10 at worst (L>R low back). Aggravating factors include vacuuming and all household ADL's. Relieving factors include lying down and sleeping. He is taking Naprosyn prn. He has a back brace which he states he uses prn. Past Medical History/Comorbidities:   Mr. Juan C Dean  has a past medical history of Arthritis; Chronic pain; Compression deformity of vertebra (02/2016); GERD (gastroesophageal reflux disease); Hypercholesterolemia; Hyperlipidemia (5/22/2017);  Hypertension; Malignant neoplasm of prostate ([address] Stress incontinence, male; and Thyroid disease. Mr. Dominga Angel  has a past surgical history that includes endoscopy, colon, diagnostic; hx urological (2008); hx orthopaedic (Right, 1998); hx lumbar laminectomy (08/22/2016); hx lumbar laminectomy (08/2016); and hx radical prostatectomy (2007). Social History/Living Environment:     Pt lives in a one story house with 4 steps to enter. He has occasional ascending/descending stairs at times due to his back pain, especially when carrying items. He lives with spouse who assists with ADL's as needed  Prior Level of Function/Work/Activity:  Pt is retired. Dominant Side:         RIGHT  Other Clinical Tests:          None recently  Personal Factors:          Sex:  male        Age:  75 y.o. Profession:  Pt is retired  Current Medications:       Current Outpatient Prescriptions:     amoxicillin-clavulanate (AUGMENTIN) 875-125 mg per tablet, Take 1 Tab by mouth every twelve (12) hours. , Disp: 20 Tab, Rfl: 0    naproxen (NAPROSYN) 500 mg tablet, Take 1 Tab by mouth two (2) times daily (with meals). As needed, Disp: 180 Tab, Rfl: 3    psyllium (METAMUCIL) powd, Take  by mouth., Disp: , Rfl:     hyoscyamine SL (LEVSIN/SL) 0.125 mg SL tablet, 1 Tab by SubLINGual route every six (6) hours as needed for Cramping., Disp: 90 Tab, Rfl: 1    B.infantis-B.ani-B.long-B.bifi (PROBIOTIC 4X) 10-15 mg TbEC, Take  by mouth., Disp: , Rfl:     hydrocortisone (ANUSOL-HC) 25 mg supp, Insert 25 mg into rectum every twelve (12) hours. , Disp: , Rfl:     levothyroxine (SYNTHROID) 150 mcg tablet, Take 1 Tab by mouth Daily (before breakfast). , Disp: 90 Tab, Rfl: 3    amLODIPine (NORVASC) 5 mg tablet, Take 1 Tab by mouth daily. , Disp: 90 Tab, Rfl: 3    telmisartan-hydroCHLOROthiazide (MICARDIS HCT) 80-12.5 mg per tablet, Take 1 Tab by mouth daily. , Disp: 90 Tab, Rfl: 3    atorvastatin (LIPITOR) 20 mg tablet, Take 1 Tab by mouth daily.  Replaces simvastatin, Disp: 90 Tab, Rfl: 3   pantoprazole (PROTONIX) 40 mg tablet, Take 1 Tab by mouth daily. Replaces esomeprazole, Disp: 90 Tab, Rfl: 3    sildenafil citrate (VIAGRA) 25 mg tablet, Take 1 Tab by mouth as needed. , Disp: 18 Tab, Rfl: 3    fexofenadine (ALLEGRA) 180 mg tablet, Take 180 mg by mouth daily. , Disp: , Rfl:     omega-3 fatty acids-vitamin e (FISH OIL) 1,000 mg cap, Take 1 Cap by mouth. Holding for surgery, Disp: , Rfl:     multivitamin (ONE A DAY) tablet, Take 1 Tab by mouth daily. Last dose 8/1/2016, Disp: , Rfl:     clotrimazole-betamethasone (LOTRISONE) topical cream, Apply  to affected area two (2) times a day. (Patient taking differently: Apply  to affected area as needed.), Disp: 45 g, Rfl: 5    aspirin delayed-release 81 mg tablet, Take 162 mg by mouth daily. Last dose to be 8/1/2016, Disp: , Rfl:    Date Last Reviewed:  12-21-17   Number of Personal Factors/Comorbidities that affect the Plan of Care: 1-2: MODERATE COMPLEXITY   EXAMINATION:   Observation/Orthostatic Postural Assessment:           Forward head, rounded shoulders  Palpation:          No tenderness noted to palpation  ROM:    Lumbar flexion = 45 degrees (pulling in bilateral hamstrings)  Lumbar extension = 10 degrees  Lumbar side bending R = 25 degrees (pulling in L low back)  Lumbar side bending L = 25 degrees    Strength:    R hip flexion = 5/5  R hip abduction = 5/5  R hip adduction = 5/5  R knee extension = 5/5  R knee flexion = 5/5  R ankle dorsiflexion = 5/5  R ankle plantarflexion = 5/5    L hip flexion = 4/5  L hip abduction = 4/5  L hip adduction = 4+/5  L knee extension = 5/5  L knee flexion = 5/5  L ankle dorsiflexion = 5/5  L ankle plantarflexion = 5/5    Special Tests:          SLR 40 degrees wit hamstring tightness noted bilaterally  Neurological Screen:        Myotomes:  Weakness L hip        Dermatomes:  Intact to light touch bilateral LE's        Reflexes:  DTR's 1+ to bilateral achilles, 2+ to bilateral patellar  Functional Mobility: Gait/Ambulation:  No significant deficits noted        Transfers:  Pt able to transition sit to supine and supine to sit independently    Body Structures Involved:  1. Bones  2. Joints  3. Muscles Body Functions Affected:  1. Sensory/Pain  2. Neuromusculoskeletal Activities and Participation Affected:  1. Mobility  2. Self Care  3. Domestic Life   Number of elements (examined above) that affect the Plan of Care: 3: MODERATE COMPLEXITY   CLINICAL PRESENTATION:   Presentation: Evolving clinical presentation with changing clinical characteristics: MODERATE COMPLEXITY   CLINICAL DECISION MAKING:   Outcome Measure: Tool Used: Modified Oswestry Low Back Pain Questionnaire  Score:  Initial: 17/50  Most Recent: X/50 (Date: -- )   Interpretation of Score: Each section is scored on a 0-5 scale, 5 representing the greatest disability. The scores of each section are added together for a total score of 50. Score 0 1-10 11-20 21-30 31-40 41-49 50   Modifier CH CI CJ CK CL CM CN     ? Mobility - Walking and Moving Around:     - CURRENT STATUS: CJ - 20%-39% impaired, limited or restricted    - GOAL STATUS: CI - 1%-19% impaired, limited or restricted    - D/C STATUS:  ---------------To be determined---------------    Medical Necessity:   · Patient is expected to demonstrate progress in strength, range of motion and functional technique to increase independence with household ADL's. · Patient demonstrates good rehab potential due to higher previous functional level. Reason for Services/Other Comments:  · Patient continues to require skilled intervention due to decreased lumbar ROM, LE strength/flexibility and increased pain leading to decreased functional status.    Use of outcome tool(s) and clinical judgement create a POC that gives a: Questionable prediction of patient's progress: MODERATE COMPLEXITY            TREATMENT:   (In addition to Assessment/Re-Assessment sessions the following treatments were rendered)  Pre-treatment Symptoms/Complaints:  Pt states he has been performing his HEP as directed and is feeling a little better overall. He reports continued pain with unloading the  and other household ADL's. Pain: Initial:     3/10 Post Session:  2/10     THERAPEUTIC EXERCISE: (25 minutes):  Exercises per grid below to improve mobility and strength. Required minimal verbal cues to promote proper body alignment and promote proper body posture. Progressed resistance and repetitions as indicated. MANUAL THERAPY: (15 minutes): STM to bilateral lumbar paraspinals was utilized and necessary because of the patient's restricted motion of soft tissue. MODALITIES: (10 minutes): Moist heat to low back in sitting x10 minutes to decrease pain and stiffness. Skin clear afterwards. Date:  12-21-17 Date:  12-28-17 Date:  01-02-18   Activity/Exercise Parameters Parameters Parameters   SKTC 3 reps  15 sec holds 3 reps  15 sec holds 3 reps  15 sec holds   Active Hamstring Stretch 3 reps  15 sec holds 3 reps  15 sec holds 3 reps  15 sec holds   Piriformis Stretch 3 reps  15 sec holds 3 reps  15 sec holds 3 reps  15 sec holds   Pelvic Tilts 10 reps  5 sec holds 15 reps  5 sec holds 15 reps  5 sec holds   Patient Education-Sleeping Postures, Lifting Mechanics, Body Mechanics for Vacuuming 5 minutes     NuStep  Level 3  5 minutes Level 4  6 minutes   Isometric Unilateral Hip Flexion  5 reps  10 sec holds 5 reps  10 sec holds   SLR Flexion with TA  10 reps  5 sec holds 10 reps  5 sec holds   T-band Hip Abduction  Blue  20 reps Black  20 reps   Supine Marching   20 reps   T-band Straight Arm Pulldowns   Blue  20 reps       MedBridge Portal  Treatment/Session Assessment:    · Response to Treatment:  Pt tolerated new exercises well today. Pain improving overall. · Compliance with Program/Exercises: Will assess as treatment progresses. · Recommendations/Intent for next treatment session:  \"Next visit will focus on advancements to more challenging activities\".   Total Treatment Duration:  50 minutes  PT Patient Time In/Time Out  Time In: 1530  Time Out: C/ [address] Molly Ruffino, PT

## 2018-01-04 ENCOUNTER — HOSPITAL ENCOUNTER (OUTPATIENT)
Dept: PHYSICAL THERAPY | Age: 68
Discharge: HOME | End: 2018-01-04
Payer: MEDICARE

## 2018-01-08 ENCOUNTER — HOSPITAL ENCOUNTER (OUTPATIENT)
Dept: CT IMAGING | Age: 68
Discharge: HOME | End: 2018-01-08
Attending: INTERNAL MEDICINE
Payer: MEDICARE

## 2018-01-08 VITALS — HEIGHT: 68 IN | WEIGHT: 193 LBS | BODY MASS INDEX: 29.25 KG/M2

## 2018-01-08 DIAGNOSIS — R31.9 URINARY TRACT INFECTION WITH HEMATURIA, SITE UNSPECIFIED: ICD-10-CM

## 2018-01-08 DIAGNOSIS — N39.0 URINARY TRACT INFECTION WITH HEMATURIA, SITE UNSPECIFIED: ICD-10-CM

## 2018-01-08 DIAGNOSIS — R82.90 BAD ODOR OF URINE: ICD-10-CM

## 2018-01-08 DIAGNOSIS — R82.90 ABNORMAL URINALYSIS: ICD-10-CM

## 2018-01-08 DIAGNOSIS — T83.9XXS: ICD-10-CM

## 2018-01-08 DIAGNOSIS — R50.9 FEVER, UNSPECIFIED FEVER CAUSE: ICD-10-CM

## 2018-01-08 PROCEDURE — 74011000258 HC RX REV CODE- 258: Performed by: INTERNAL MEDICINE

## 2018-01-08 PROCEDURE — 74011636320 HC RX REV CODE- 636/320: Performed by: INTERNAL MEDICINE

## 2018-01-08 PROCEDURE — 74178 CT ABD&PLV WO CNTR FLWD CNTR: CPT

## 2018-01-08 RX ORDER — SODIUM CHLORIDE 0.9 % (FLUSH) 0.9 %
10 SYRINGE (ML) INJECTION
Status: COMPLETED | OUTPATIENT
Start: 2018-01-08 | End: 2018-01-08

## 2018-01-08 RX ADMIN — Medication 10 ML: at 14:52

## 2018-01-08 RX ADMIN — IOPAMIDOL 100 ML: 755 INJECTION, SOLUTION INTRAVENOUS at 14:52

## 2018-01-08 RX ADMIN — SODIUM CHLORIDE 100 ML: 900 INJECTION, SOLUTION INTRAVENOUS at 14:52

## 2018-01-09 ENCOUNTER — HOSPITAL ENCOUNTER (OUTPATIENT)
Dept: PHYSICAL THERAPY | Age: 68
Discharge: HOME | End: 2018-01-09
Payer: MEDICARE

## 2018-01-09 PROCEDURE — 97140 MANUAL THERAPY 1/> REGIONS: CPT

## 2018-01-09 PROCEDURE — 97110 THERAPEUTIC EXERCISES: CPT

## 2018-01-09 NOTE — PROGRESS NOTES
Merle Barba  : 1950  [place]  Secondary: [address] [facility]  [address] [facility], [address] [place] [address],[address], [address] [facility]  [phone]   [phone]           OUTPATIENT PHYSICAL THERAPY:Daily Note 2018    ICD-10: Treatment Diagnosis: Low back pain (M54.5)  Precautions/Allergies:   Levaquin [levofloxacin]   Fall Risk Score: 2 (? 5 = High Risk)  MD Orders: Evaluate and Treat MEDICAL/REFERRING DIAGNOSIS:  back   DATE OF ONSET: Chronic LBP  REFERRING PHYSICIAN: Tiesha Eduardo MD  RETURN PHYSICIAN APPOINTMENT: Pt has no return appt with Dr Smuan Snyder at this time     INITIAL ASSESSMENT:  Mr. Dominga Angel presents with decreased lumbar ROM, LE strength/flexibility and increased pain leading to decreased functional status. Pt would benefit from skilled physical therapy services to address the above deficits and help patient return to prior level of function. PROBLEM LIST (Impacting functional limitations):  1. Decreased Strength  2. Decreased ADL/Functional Activities  3. Increased Pain  4. Decreased Flexibility/Joint Mobility  5. Decreased [place] with Home Exercise Program INTERVENTIONS PLANNED:  1. Cold  2. Cryotherapy  3. Electrical Stimulation  4. Heat  5. Home Exercise Program (HEP)  6. Manual Therapy  7. Range of Motion (ROM)  8. Therapeutic Exercise/Strengthening  9. Ultrasound (US)   TREATMENT PLAN:  Effective Dates: 17 TO 18. Frequency/Duration: 2 times a week for 2 months  GOALS: (Goals have been discussed and agreed upon with patient.)  Short-Term Functional Goals: Time Frame: 4 weeks  1. Pt will increase lumbar ROM flexion = 60 degrees, side bending = 35 degrees bilaterally to assist with household ADL's  2. Pt will increase SLR 50 degrees bilaterally to assist with household ADL's  Discharge Goals: Time Frame: 8 weeks  1.  Pt will increase strength bilateral LE's 5/5 to assist with household ADL's  2. Pt will increase SLR 60 degrees bilaterally to assist with household ADL's  3. Pt will be independent with HEP  4. Pt will perform 20 minutes household cleaning activities independently with min to no c/o LBP  Rehabilitation Potential For Stated Goals: Good  Regarding Kelly Renner's therapy, I certify that the treatment plan above will be carried out by a therapist or under their direction. Thank you for this referral,  Fabiano Crandall PTA     Referring Physician Signature: Gwen Colon MD              Date                    The information in this section was collected on 12-21-17 (except where otherwise noted). HISTORY:   History of Present Injury/Illness (Reason for Referral):  Pt reports insidious onset LBP of greater than 20 years duration. He had lumbar surgery August 2016 for laminectomy. He was diagnosed with DDD and stenosis. He states it helped with the constant pain that he was getting, but he still gets pain with activity. He has had chiropractics as well as 2 injections with temporary relief of pain. He states he gets no pain at night. He had an ablation which gave him temporary relief of pain also. He states the pain diminishes with sitting and lying down and is worse with activity. He states MD feels his pain is muscular. He states he feels the muscles in his back and core getting weaker and weaker. Pt rates his current low back pain 0-1/10, increasing to 7/10 at worst (L>R low back). Aggravating factors include vacuuming and all household ADL's. Relieving factors include lying down and sleeping. He is taking Naprosyn prn. He has a back brace which he states he uses prn. Past Medical History/Comorbidities:   Mr. Cory Walker  has a past medical history of Arthritis; Chronic pain; Compression deformity of vertebra (02/2016); GERD (gastroesophageal reflux disease); Hypercholesterolemia; Hyperlipidemia (5/22/2017);  Hypertension; Malignant neoplasm of prostate ([facility] [address] Stress incontinence, male; and Thyroid disease. Mr. Alfonso Alberts  has a past surgical history that includes endoscopy, colon, diagnostic; hx urological (2008); hx orthopaedic (Right, 1998); hx lumbar laminectomy (08/22/2016); hx lumbar laminectomy (08/2016); and hx radical prostatectomy (2007). Social History/Living Environment:     Pt lives in a one story house with 4 steps to enter. He has occasional ascending/descending stairs at times due to his back pain, especially when carrying items. He lives with spouse who assists with ADL's as needed  Prior Level of Function/Work/Activity:  Pt is retired. Dominant Side:         RIGHT  Other Clinical Tests:          None recently  Personal Factors:          Sex:  male        Age:  75 y.o. Profession:  Pt is retired  Current Medications:       Current Outpatient Prescriptions:     DIGESTIVE ENZYMES, PLANT, (FIBERZYME CONCENTRATE-HP PO), Take  by mouth., Disp: , Rfl:     nitrofurantoin, macrocrystal-monohydrate, (MACROBID) 100 mg capsule, Take 1 Cap by mouth two (2) times a day., Disp: 20 Cap, Rfl: 0    naproxen (NAPROSYN) 500 mg tablet, Take 1 Tab by mouth two (2) times daily (with meals). As needed, Disp: 180 Tab, Rfl: 3    psyllium (METAMUCIL) powd, Take  by mouth., Disp: , Rfl:     hyoscyamine SL (LEVSIN/SL) 0.125 mg SL tablet, 1 Tab by SubLINGual route every six (6) hours as needed for Cramping., Disp: 90 Tab, Rfl: 1    B.infantis-B.ani-B.long-B.bifi (PROBIOTIC 4X) 10-15 mg TbEC, Take  by mouth., Disp: , Rfl:     levothyroxine (SYNTHROID) 150 mcg tablet, Take 1 Tab by mouth Daily (before breakfast). , Disp: 90 Tab, Rfl: 3    amLODIPine (NORVASC) 5 mg tablet, Take 1 Tab by mouth daily. , Disp: 90 Tab, Rfl: 3    telmisartan-hydroCHLOROthiazide (MICARDIS HCT) 80-12.5 mg per tablet, Take 1 Tab by mouth daily. , Disp: 90 Tab, Rfl: 3    atorvastatin (LIPITOR) 20 mg tablet, Take 1 Tab by mouth daily.  Replaces simvastatin, Disp: 90 Tab, Rfl: 3    pantoprazole (PROTONIX) 40 mg tablet, Take 1 Tab by mouth daily. Replaces esomeprazole, Disp: 90 Tab, Rfl: 3    fexofenadine (ALLEGRA) 180 mg tablet, Take 180 mg by mouth daily. , Disp: , Rfl:     omega-3 fatty acids-vitamin e (FISH OIL) 1,000 mg cap, Take 1 Cap by mouth. Holding for surgery, Disp: , Rfl:     multivitamin (ONE A DAY) tablet, Take 1 Tab by mouth daily. Last dose 8/1/2016, Disp: , Rfl:     clotrimazole-betamethasone (LOTRISONE) topical cream, Apply  to affected area two (2) times a day. (Patient taking differently: Apply  to affected area as needed.), Disp: 45 g, Rfl: 5    aspirin delayed-release 81 mg tablet, Take 162 mg by mouth daily. Last dose to be 8/1/2016, Disp: , Rfl:    Date Last Reviewed:  12-21-17   Number of Personal Factors/Comorbidities that affect the Plan of Care: 1-2: MODERATE COMPLEXITY   EXAMINATION:   Observation/Orthostatic Postural Assessment:           Forward head, rounded shoulders  Palpation:          No tenderness noted to palpation  ROM:    Lumbar flexion = 45 degrees (pulling in bilateral hamstrings)  Lumbar extension = 10 degrees  Lumbar side bending R = 25 degrees (pulling in L low back)  Lumbar side bending L = 25 degrees    Strength:    R hip flexion = 5/5  R hip abduction = 5/5  R hip adduction = 5/5  R knee extension = 5/5  R knee flexion = 5/5  R ankle dorsiflexion = 5/5  R ankle plantarflexion = 5/5    L hip flexion = 4/5  L hip abduction = 4/5  L hip adduction = 4+/5  L knee extension = 5/5  L knee flexion = 5/5  L ankle dorsiflexion = 5/5  L ankle plantarflexion = 5/5    Special Tests:          SLR 40 degrees wit hamstring tightness noted bilaterally  Neurological Screen:        Myotomes:  Weakness L hip        Dermatomes:  Intact to light touch bilateral LE's        Reflexes:  DTR's 1+ to bilateral achilles, 2+ to bilateral patellar  Functional Mobility:         Gait/Ambulation:  No significant deficits noted        Transfers:  Pt able to transition sit to supine and supine to sit independently    Body Structures Involved:  1. Bones  2. Joints  3. Muscles Body Functions Affected:  1. Sensory/Pain  2. Neuromusculoskeletal Activities and Participation Affected:  1. Mobility  2. Self Care  3. Domestic Life   Number of elements (examined above) that affect the Plan of Care: 3: MODERATE COMPLEXITY   CLINICAL PRESENTATION:   Presentation: Evolving clinical presentation with changing clinical characteristics: MODERATE COMPLEXITY   CLINICAL DECISION MAKING:   Outcome Measure: Tool Used: Modified Oswestry Low Back Pain Questionnaire  Score:  Initial: 17/50  Most Recent: X/50 (Date: -- )   Interpretation of Score: Each section is scored on a 0-5 scale, 5 representing the greatest disability. The scores of each section are added together for a total score of 50. Score 0 1-10 11-20 21-30 31-40 41-49 50   Modifier CH CI CJ CK CL CM CN     ? Mobility - Walking and Moving Around:     - CURRENT STATUS: CJ - 20%-39% impaired, limited or restricted    - GOAL STATUS: CI - 1%-19% impaired, limited or restricted    - D/C STATUS:  ---------------To be determined---------------    Medical Necessity:   · Patient is expected to demonstrate progress in strength, range of motion and functional technique to increase independence with household ADL's. · Patient demonstrates good rehab potential due to higher previous functional level. Reason for Services/Other Comments:  · Patient continues to require skilled intervention due to decreased lumbar ROM, LE strength/flexibility and increased pain leading to decreased functional status. Use of outcome tool(s) and clinical judgement create a POC that gives a: Questionable prediction of patient's progress: MODERATE COMPLEXITY            TREATMENT:   (In addition to Assessment/Re-Assessment sessions the following treatments were rendered)  Pre-treatment Symptoms/Complaints: I am doing ok.   Pain: Initial:     3/10 Post Session:  2/10 THERAPEUTIC EXERCISE: (25 minutes):  Exercises per grid below to improve mobility and strength. Required minimal verbal cues to promote proper body alignment and promote proper body posture. Progressed resistance and repetitions as indicated. MANUAL THERAPY: (15 minutes): STM to bilateral lumbar paraspinals was utilized and necessary because of the patient's restricted motion of soft tissue. MODALITIES: (10 minutes): Moist heat to low back in sitting x10 minutes to decrease pain and stiffness. Skin clear afterwards. Date:  01-9-18   Activity/Exercise Parameters   SKTC 3 reps  15 sec holds   Active Hamstring Stretch 3 reps  15 sec holds   Piriformis Stretch 3 reps  15 sec holds   Pelvic Tilts 15 reps  5 sec holds   Patient Education-Sleeping Postures, Lifting Mechanics, Body Mechanics for Vacuuming    NuStep Level 5  6 minutes   Isometric Unilateral Hip Flexion 5 reps  10 sec holds   SLR Flexion with TA 10 reps  5 sec holds   T-band Hip Abduction Black  20 reps   Supine Marching 20 reps   T-band Straight Arm Pulldowns Blue  20 reps        MedBridge Portal  Treatment/Session Assessment:    · Response to Treatment:  Pt tolerated new exercises well today. More on the Left then Right. Tightness through Left side paraspinals. Grabs him in the left low back at time's. Pain improving overall. Exercises helping per patient. · Compliance with Program/Exercises: Compliant  · Recommendations/Intent for next treatment session: \"Next visit will focus on advancements to more challenging activities\".   Total Treatment Duration:  50 minutes  PT Patient Time In/Time Out  Time In: 1600  Time Out: [address], [facility]

## 2018-01-11 ENCOUNTER — HOSPITAL ENCOUNTER (OUTPATIENT)
Dept: PHYSICAL THERAPY | Age: 68
Discharge: HOME | End: 2018-01-11
Payer: MEDICARE

## 2018-01-11 PROCEDURE — 97140 MANUAL THERAPY 1/> REGIONS: CPT

## 2018-01-11 PROCEDURE — 97110 THERAPEUTIC EXERCISES: CPT

## 2018-01-11 NOTE — PROGRESS NOTES
Sharon Miller  : 1950  [place]  Secondary: [facility] at [address] [facility], [address] [address], [facility] [address].  [phone]   [phone]           OUTPATIENT PHYSICAL THERAPY:Daily Note 2018    ICD-10: Treatment Diagnosis: Low back pain (M54.5)  Precautions/Allergies:   Levaquin [levofloxacin]   Fall Risk Score: 2 (? 5 = High Risk)  MD Orders: Evaluate and Treat MEDICAL/REFERRING DIAGNOSIS:  back   DATE OF ONSET: Chronic LBP  REFERRING PHYSICIAN: Vanessa Leyva MD  RETURN PHYSICIAN APPOINTMENT: Pt has no return appt with Dr Cinyd Arroyo at this time     INITIAL ASSESSMENT:  Mr. Bubba Bhakta presents with decreased lumbar ROM, LE strength/flexibility and increased pain leading to decreased functional status. Pt would benefit from skilled physical therapy services to address the above deficits and help patient return to prior level of function. PROBLEM LIST (Impacting functional limitations):  1. Decreased Strength  2. Decreased ADL/Functional Activities  3. Increased Pain  4. Decreased Flexibility/Joint Mobility  5. Decreased [place] with Home Exercise Program INTERVENTIONS PLANNED:  1. Cold  2. Cryotherapy  3. Electrical Stimulation  4. Heat  5. Home Exercise Program (HEP)  6. Manual Therapy  7. Range of Motion (ROM)  8. Therapeutic Exercise/Strengthening  9. Ultrasound (US)   TREATMENT PLAN:  Effective Dates: 17 TO 18. Frequency/Duration: 2 times a week for 2 months  GOALS: (Goals have been discussed and agreed upon with patient.)  Short-Term Functional Goals: Time Frame: 4 weeks  1. Pt will increase lumbar ROM flexion = 60 degrees, side bending = 35 degrees bilaterally to assist with household ADL's  2. Pt will increase SLR 50 degrees bilaterally to assist with household ADL's  Discharge Goals: Time Frame: 8 weeks  1.  Pt will increase strength bilateral LE's 5/5 to assist with household ADL's  2. Pt will increase SLR 60 degrees bilaterally to assist with household ADL's  3. Pt will be independent with HEP  4. Pt will perform 20 minutes household cleaning activities independently with min to no c/o LBP  Rehabilitation Potential For Stated Goals: Good  Regarding Rosario Renner's therapy, I certify that the treatment plan above will be carried out by a therapist or under their direction. Thank you for this referral,  Farooq Vergara PTA     Referring Physician Signature: Gisela Estrella MD              Date                    The information in this section was collected on 12-21-17 (except where otherwise noted). HISTORY:   History of Present Injury/Illness (Reason for Referral):  Pt reports insidious onset LBP of greater than 20 years duration. He had lumbar surgery August 2016 for laminectomy. He was diagnosed with DDD and stenosis. He states it helped with the constant pain that he was getting, but he still gets pain with activity. He has had chiropractics as well as 2 injections with temporary relief of pain. He states he gets no pain at night. He had an ablation which gave him temporary relief of pain also. He states the pain diminishes with sitting and lying down and is worse with activity. He states MD feels his pain is muscular. He states he feels the muscles in his back and core getting weaker and weaker. Pt rates his current low back pain 0-1/10, increasing to 7/10 at worst (L>R low back). Aggravating factors include vacuuming and all household ADL's. Relieving factors include lying down and sleeping. He is taking Naprosyn prn. He has a back brace which he states he uses prn. Past Medical History/Comorbidities:   Mr. Juan C Dean  has a past medical history of Arthritis; Chronic pain; Compression deformity of vertebra (02/2016); GERD (gastroesophageal reflux disease); Hypercholesterolemia; Hyperlipidemia (5/22/2017);  Hypertension; Malignant neoplasm of prostate ([address] Stress incontinence, male; and Thyroid disease. Mr. Alfonso Alberts  has a past surgical history that includes endoscopy, colon, diagnostic; hx urological (2008); hx orthopaedic (Right, 1998); hx lumbar laminectomy (08/22/2016); hx lumbar laminectomy (08/2016); and hx radical prostatectomy (2007). Social History/Living Environment:     Pt lives in a one story house with 4 steps to enter. He has occasional ascending/descending stairs at times due to his back pain, especially when carrying items. He lives with spouse who assists with ADL's as needed  Prior Level of Function/Work/Activity:  Pt is retired. Dominant Side:         RIGHT  Other Clinical Tests:          None recently  Personal Factors:          Sex:  male        Age:  75 y.o. Profession:  Pt is retired  Current Medications:       Current Outpatient Prescriptions:     DIGESTIVE ENZYMES, PLANT, (FIBERZYME CONCENTRATE-HP PO), Take  by mouth., Disp: , Rfl:     nitrofurantoin, macrocrystal-monohydrate, (MACROBID) 100 mg capsule, Take 1 Cap by mouth two (2) times a day., Disp: 20 Cap, Rfl: 0    naproxen (NAPROSYN) 500 mg tablet, Take 1 Tab by mouth two (2) times daily (with meals). As needed, Disp: 180 Tab, Rfl: 3    psyllium (METAMUCIL) powd, Take  by mouth., Disp: , Rfl:     hyoscyamine SL (LEVSIN/SL) 0.125 mg SL tablet, 1 Tab by SubLINGual route every six (6) hours as needed for Cramping., Disp: 90 Tab, Rfl: 1    B.infantis-B.ani-B.long-B.bifi (PROBIOTIC 4X) 10-15 mg TbEC, Take  by mouth., Disp: , Rfl:     levothyroxine (SYNTHROID) 150 mcg tablet, Take 1 Tab by mouth Daily (before breakfast). , Disp: 90 Tab, Rfl: 3    amLODIPine (NORVASC) 5 mg tablet, Take 1 Tab by mouth daily. , Disp: 90 Tab, Rfl: 3    telmisartan-hydroCHLOROthiazide (MICARDIS HCT) 80-12.5 mg per tablet, Take 1 Tab by mouth daily. , Disp: 90 Tab, Rfl: 3    atorvastatin (LIPITOR) 20 mg tablet, Take 1 Tab by mouth daily.  Replaces simvastatin, Disp: 90 Tab, Rfl: 3    pantoprazole (PROTONIX) 40 mg tablet, Take 1 Tab by mouth daily. Replaces esomeprazole, Disp: 90 Tab, Rfl: 3    fexofenadine (ALLEGRA) 180 mg tablet, Take 180 mg by mouth daily. , Disp: , Rfl:     omega-3 fatty acids-vitamin e (FISH OIL) 1,000 mg cap, Take 1 Cap by mouth. Holding for surgery, Disp: , Rfl:     multivitamin (ONE A DAY) tablet, Take 1 Tab by mouth daily. Last dose 8/1/2016, Disp: , Rfl:     clotrimazole-betamethasone (LOTRISONE) topical cream, Apply  to affected area two (2) times a day. (Patient taking differently: Apply  to affected area as needed.), Disp: 45 g, Rfl: 5    aspirin delayed-release 81 mg tablet, Take 162 mg by mouth daily. Last dose to be 8/1/2016, Disp: , Rfl:    Date Last Reviewed:  12-21-17   Number of Personal Factors/Comorbidities that affect the Plan of Care: 1-2: MODERATE COMPLEXITY   EXAMINATION:   Observation/Orthostatic Postural Assessment:           Forward head, rounded shoulders  Palpation:          No tenderness noted to palpation  ROM:    Lumbar flexion = 45 degrees (pulling in bilateral hamstrings)  Lumbar extension = 10 degrees  Lumbar side bending R = 25 degrees (pulling in L low back)  Lumbar side bending L = 25 degrees    Strength:    R hip flexion = 5/5  R hip abduction = 5/5  R hip adduction = 5/5  R knee extension = 5/5  R knee flexion = 5/5  R ankle dorsiflexion = 5/5  R ankle plantarflexion = 5/5    L hip flexion = 4/5  L hip abduction = 4/5  L hip adduction = 4+/5  L knee extension = 5/5  L knee flexion = 5/5  L ankle dorsiflexion = 5/5  L ankle plantarflexion = 5/5    Special Tests:          SLR 40 degrees wit hamstring tightness noted bilaterally  Neurological Screen:        Myotomes:  Weakness L hip        Dermatomes:  Intact to light touch bilateral LE's        Reflexes:  DTR's 1+ to bilateral achilles, 2+ to bilateral patellar  Functional Mobility:         Gait/Ambulation:  No significant deficits noted        Transfers:  Pt able to transition sit to supine and supine to sit independently    Body Structures Involved:  1. Bones  2. Joints  3. Muscles Body Functions Affected:  1. Sensory/Pain  2. Neuromusculoskeletal Activities and Participation Affected:  1. Mobility  2. Self Care  3. Domestic Life   Number of elements (examined above) that affect the Plan of Care: 3: MODERATE COMPLEXITY   CLINICAL PRESENTATION:   Presentation: Evolving clinical presentation with changing clinical characteristics: MODERATE COMPLEXITY   CLINICAL DECISION MAKING:   Outcome Measure: Tool Used: Modified Oswestry Low Back Pain Questionnaire  Score:  Initial: 17/50  Most Recent: X/50 (Date: -- )   Interpretation of Score: Each section is scored on a 0-5 scale, 5 representing the greatest disability. The scores of each section are added together for a total score of 50. Score 0 1-10 11-20 21-30 31-40 41-49 50   Modifier CH CI CJ CK CL CM CN     ? Mobility - Walking and Moving Around:     - CURRENT STATUS: CJ - 20%-39% impaired, limited or restricted    - GOAL STATUS: CI - 1%-19% impaired, limited or restricted    - D/C STATUS:  ---------------To be determined---------------    Medical Necessity:   · Patient is expected to demonstrate progress in strength, range of motion and functional technique to increase independence with household ADL's. · Patient demonstrates good rehab potential due to higher previous functional level. Reason for Services/Other Comments:  · Patient continues to require skilled intervention due to decreased lumbar ROM, LE strength/flexibility and increased pain leading to decreased functional status.    Use of outcome tool(s) and clinical judgement create a POC that gives a: Questionable prediction of patient's progress: MODERATE COMPLEXITY            TREATMENT:   (In addition to Assessment/Re-Assessment sessions the following treatments were rendered)  Pre-treatment Symptoms/Complaints: I am doing ok- it can get to an 8 when I am having to do stuff around the house  Pain: Initial:     3/10 Post Session:  2/10     THERAPEUTIC EXERCISE: (25 minutes):  Exercises per grid below to improve mobility and strength. Required minimal verbal cues to promote proper body alignment and promote proper body posture. Progressed resistance and repetitions as indicated. MANUAL THERAPY: (15 minutes): STM to bilateral lumbar paraspinals was utilized and necessary because of the patient's restricted motion of soft tissue. MODALITIES: (10 minutes): Moist heat to low back in sitting x10 minutes to decrease pain and stiffness. Skin clear afterwards. Date:  01-11-18   Activity/Exercise Parameters   SKTC 3 reps  15 sec holds   Active Hamstring Stretch 3 reps  15 sec holds   Piriformis Stretch 3 reps  15 sec holds   Pelvic Tilts 15 reps  5 sec holds   D1 D2 Blue x 10 reps   NuStep Level 5  6 minutes   Isometric Unilateral Hip Flexion 5 reps  10 sec holds   SLR Flexion with TA 10 reps  5 sec holds   T-band Hip Abduction Black  20 reps   Supine Marching 20 reps   T-band Straight Arm Pulldowns Blue  20 reps        MedBridge Portal  Treatment/Session Assessment:    · Response to Treatment:  Patient continue's to have issue's with back. Work more manual next visit. · Compliance with Program/Exercises: Compliant  · Recommendations/Intent for next treatment session: \"Next visit will focus on advancements to more challenging activities\".   Total Treatment Duration:  50 minutes  PT Patient Time In/Time Out  Time In: 0120  Time Out: [address] [facility]

## 2018-01-15 ENCOUNTER — HOSPITAL ENCOUNTER (OUTPATIENT)
Dept: PHYSICAL THERAPY | Age: 68
Discharge: HOME | End: 2018-01-15
Payer: MEDICARE

## 2018-01-15 PROCEDURE — 97140 MANUAL THERAPY 1/> REGIONS: CPT

## 2018-01-15 PROCEDURE — 97110 THERAPEUTIC EXERCISES: CPT

## 2018-01-15 NOTE — PROGRESS NOTES
Sara Miranda  : 1950  [place]  Secondary: [address] [facility]  [address] [facility], [address] [place] [address],[address], [facility] [address]  [phone]   [phone]           OUTPATIENT PHYSICAL THERAPY:Daily Note 1/15/2018    ICD-10: Treatment Diagnosis: Low back pain (M54.5)  Precautions/Allergies:   Levaquin [levofloxacin]   Fall Risk Score: 2 (? 5 = High Risk)  MD Orders: Evaluate and Treat MEDICAL/REFERRING DIAGNOSIS:  back   DATE OF ONSET: Chronic LBP  REFERRING PHYSICIAN: Fernandez Cobb MD  RETURN PHYSICIAN APPOINTMENT: Pt has no return appt with Dr Lamberto Huynh at this time     INITIAL ASSESSMENT:  Mr. Sandy Bentley presents with decreased lumbar ROM, LE strength/flexibility and increased pain leading to decreased functional status. Pt would benefit from skilled physical therapy services to address the above deficits and help patient return to prior level of function. PROBLEM LIST (Impacting functional limitations):  1. Decreased Strength  2. Decreased ADL/Functional Activities  3. Increased Pain  4. Decreased Flexibility/Joint Mobility  5. Decreased [place] with Home Exercise Program INTERVENTIONS PLANNED:  1. Cold  2. Cryotherapy  3. Electrical Stimulation  4. Heat  5. Home Exercise Program (HEP)  6. Manual Therapy  7. Range of Motion (ROM)  8. Therapeutic Exercise/Strengthening  9. Ultrasound (US)   TREATMENT PLAN:  Effective Dates: 17 TO 18. Frequency/Duration: 2 times a week for 2 months  GOALS: (Goals have been discussed and agreed upon with patient.)  Short-Term Functional Goals: Time Frame: 4 weeks  1. Pt will increase lumbar ROM flexion = 60 degrees, side bending = 35 degrees bilaterally to assist with household ADL's  2. Pt will increase SLR 50 degrees bilaterally to assist with household ADL's  Discharge Goals: Time Frame: 8 weeks  1.  Pt will increase strength bilateral LE's 5/5 to assist with household ADL's  2. Pt will increase SLR 60 degrees bilaterally to assist with household ADL's  3. Pt will be independent with HEP  MET   4. Pt will perform 20 minutes household cleaning activities independently with min to no c/o LBP  Rehabilitation Potential For Stated Goals: Good  Regarding Garth Siemens Harrell's therapy, I certify that the treatment plan above will be carried out by a therapist or under their direction. Thank you for this referral,  Jazmín Mercado PTA     Referring Physician Signature: Danay Cintron MD              Date                    The information in this section was collected on 12-21-17 (except where otherwise noted). HISTORY:   History of Present Injury/Illness (Reason for Referral):  Pt reports insidious onset LBP of greater than 20 years duration. He had lumbar surgery August 2016 for laminectomy. He was diagnosed with DDD and stenosis. He states it helped with the constant pain that he was getting, but he still gets pain with activity. He has had chiropractics as well as 2 injections with temporary relief of pain. He states he gets no pain at night. He had an ablation which gave him temporary relief of pain also. He states the pain diminishes with sitting and lying down and is worse with activity. He states MD feels his pain is muscular. He states he feels the muscles in his back and core getting weaker and weaker. Pt rates his current low back pain 0-1/10, increasing to 7/10 at worst (L>R low back). Aggravating factors include vacuuming and all household ADL's. Relieving factors include lying down and sleeping. He is taking Naprosyn prn. He has a back brace which he states he uses prn. Past Medical History/Comorbidities:   Mr. Nishi Shankar  has a past medical history of Arthritis; Chronic pain; Compression deformity of vertebra (02/2016); GERD (gastroesophageal reflux disease); Hypercholesterolemia; Hyperlipidemia (5/22/2017);  Hypertension; Malignant neoplasm of prostate ([address] Stress incontinence, male; and Thyroid disease. Mr. Ami Magana  has a past surgical history that includes endoscopy, colon, diagnostic; hx urological (2008); hx orthopaedic (Right, 1998); hx lumbar laminectomy (08/22/2016); hx lumbar laminectomy (08/2016); and hx radical prostatectomy (2007). Social History/Living Environment:     Pt lives in a one story house with 4 steps to enter. He has occasional ascending/descending stairs at times due to his back pain, especially when carrying items. He lives with spouse who assists with ADL's as needed  Prior Level of Function/Work/Activity:  Pt is retired. Dominant Side:         RIGHT  Other Clinical Tests:          None recently  Personal Factors:          Sex:  male        Age:  75 y.o. Profession:  Pt is retired  Current Medications:       Current Outpatient Prescriptions:     DIGESTIVE ENZYMES, PLANT, (FIBERZYME CONCENTRATE-HP PO), Take  by mouth., Disp: , Rfl:     nitrofurantoin, macrocrystal-monohydrate, (MACROBID) 100 mg capsule, Take 1 Cap by mouth two (2) times a day., Disp: 20 Cap, Rfl: 0    naproxen (NAPROSYN) 500 mg tablet, Take 1 Tab by mouth two (2) times daily (with meals). As needed, Disp: 180 Tab, Rfl: 3    psyllium (METAMUCIL) powd, Take  by mouth., Disp: , Rfl:     hyoscyamine SL (LEVSIN/SL) 0.125 mg SL tablet, 1 Tab by SubLINGual route every six (6) hours as needed for Cramping., Disp: 90 Tab, Rfl: 1    B.infantis-B.ani-B.long-B.bifi (PROBIOTIC 4X) 10-15 mg TbEC, Take  by mouth., Disp: , Rfl:     levothyroxine (SYNTHROID) 150 mcg tablet, Take 1 Tab by mouth Daily (before breakfast). , Disp: 90 Tab, Rfl: 3    amLODIPine (NORVASC) 5 mg tablet, Take 1 Tab by mouth daily. , Disp: 90 Tab, Rfl: 3    telmisartan-hydroCHLOROthiazide (MICARDIS HCT) 80-12.5 mg per tablet, Take 1 Tab by mouth daily. , Disp: 90 Tab, Rfl: 3    atorvastatin (LIPITOR) 20 mg tablet, Take 1 Tab by mouth daily.  Replaces simvastatin, Disp: 90 Tab, Rfl: 3   pantoprazole (PROTONIX) 40 mg tablet, Take 1 Tab by mouth daily. Replaces esomeprazole, Disp: 90 Tab, Rfl: 3    fexofenadine (ALLEGRA) 180 mg tablet, Take 180 mg by mouth daily. , Disp: , Rfl:     omega-3 fatty acids-vitamin e (FISH OIL) 1,000 mg cap, Take 1 Cap by mouth. Holding for surgery, Disp: , Rfl:     multivitamin (ONE A DAY) tablet, Take 1 Tab by mouth daily. Last dose 8/1/2016, Disp: , Rfl:     clotrimazole-betamethasone (LOTRISONE) topical cream, Apply  to affected area two (2) times a day. (Patient taking differently: Apply  to affected area as needed.), Disp: 45 g, Rfl: 5    aspirin delayed-release 81 mg tablet, Take 162 mg by mouth daily. Last dose to be 8/1/2016, Disp: , Rfl:    Date Last Reviewed:  12-21-17   Number of Personal Factors/Comorbidities that affect the Plan of Care: 1-2: MODERATE COMPLEXITY   EXAMINATION:   Observation/Orthostatic Postural Assessment:           Forward head, rounded shoulders  Palpation:          No tenderness noted to palpation  ROM:    Lumbar flexion = 45 degrees (pulling in bilateral hamstrings)  Lumbar extension = 10 degrees  Lumbar side bending R = 25 degrees (pulling in L low back)  Lumbar side bending L = 25 degrees    Strength:    R hip flexion = 5/5  R hip abduction = 5/5  R hip adduction = 5/5  R knee extension = 5/5  R knee flexion = 5/5  R ankle dorsiflexion = 5/5  R ankle plantarflexion = 5/5    L hip flexion = 4/5  L hip abduction = 4/5  L hip adduction = 4+/5  L knee extension = 5/5  L knee flexion = 5/5  L ankle dorsiflexion = 5/5  L ankle plantarflexion = 5/5    Special Tests:          SLR 40 degrees wit hamstring tightness noted bilaterally  Neurological Screen:        Myotomes:  Weakness L hip        Dermatomes:  Intact to light touch bilateral LE's        Reflexes:  DTR's 1+ to bilateral achilles, 2+ to bilateral patellar  Functional Mobility:         Gait/Ambulation:  No significant deficits noted        Transfers:  Pt able to transition sit to supine and supine to sit independently    Body Structures Involved:  1. Bones  2. Joints  3. Muscles Body Functions Affected:  1. Sensory/Pain  2. Neuromusculoskeletal Activities and Participation Affected:  1. Mobility  2. Self Care  3. Domestic Life   Number of elements (examined above) that affect the Plan of Care: 3: MODERATE COMPLEXITY   CLINICAL PRESENTATION:   Presentation: Evolving clinical presentation with changing clinical characteristics: MODERATE COMPLEXITY   CLINICAL DECISION MAKING:   Outcome Measure: Tool Used: Modified Oswestry Low Back Pain Questionnaire  Score:  Initial: 17/50  Most Recent: X/50 (Date: -- )   Interpretation of Score: Each section is scored on a 0-5 scale, 5 representing the greatest disability. The scores of each section are added together for a total score of 50. Score 0 1-10 11-20 21-30 31-40 41-49 50   Modifier CH CI CJ CK CL CM CN     ? Mobility - Walking and Moving Around:     - CURRENT STATUS: CJ - 20%-39% impaired, limited or restricted    - GOAL STATUS: CI - 1%-19% impaired, limited or restricted    - D/C STATUS:  ---------------To be determined---------------    Medical Necessity:   · Patient is expected to demonstrate progress in strength, range of motion and functional technique to increase independence with household ADL's. · Patient demonstrates good rehab potential due to higher previous functional level. Reason for Services/Other Comments:  · Patient continues to require skilled intervention due to decreased lumbar ROM, LE strength/flexibility and increased pain leading to decreased functional status.    Use of outcome tool(s) and clinical judgement create a POC that gives a: Questionable prediction of patient's progress: MODERATE COMPLEXITY            TREATMENT:   (In addition to Assessment/Re-Assessment sessions the following treatments were rendered)  Pre-treatment Symptoms/Complaints: I am doing ok- it can get to an 8 when I am having to do stuff around the house  Pain: Initial:     3/10 Post Session:  2/10     THERAPEUTIC EXERCISE: (10 minutes):  Exercises per grid below to improve mobility and strength. Required minimal verbal cues to promote proper body alignment and promote proper body posture. Progressed resistance and repetitions as indicated. MANUAL THERAPY: (25 minutes): STM to bilateral lumbar paraspinals-greater left side tightness was utilized and necessary because of the patient's restricted motion of soft tissue. MODALITIES: (10 minutes): Moist heat to low back in sitting x10 minutes to decrease pain and stiffness. Skin clear afterwards. Date:  01-15-18   Activity/Exercise Parameters   SKTC 3 reps  15 sec holds   Active Hamstring Stretch    Piriformis Stretch 3 reps  15 sec holds   Pelvic Tilts    D1 D2 Blue x 10 reps   NuStep Level 5  6 minutes   Isometric Unilateral Hip Flexion    SLR Flexion with TA    T-band Hip Abduction    Supine Marching    T-band Straight Arm Pulldowns Blue  20 reps        MedBridge Portal  Treatment/Session Assessment:    · Response to Treatment:  Patient continue's to have issue's with back, decreased tightness after manual.  Patient continue's to do his exercises at home with good results. Patient going to get a roller for his back for use at home. Continue plan of care. · Compliance with Program/Exercises: Compliant  · Recommendations/Intent for next treatment session: \"Next visit will focus on advancements to more challenging activities\".   Total Treatment Duration:  45 minutes  PT Patient Time In/Time Out  Time In: 1430  Time Out: Mallorie 141, PTA

## 2018-01-16 ENCOUNTER — APPOINTMENT (RX ONLY)
Dept: URBAN - METROPOLITAN AREA CLINIC 349 | Facility: CLINIC | Age: 68
Setting detail: DERMATOLOGY
End: 2018-01-16

## 2018-01-16 DIAGNOSIS — H02.6 XANTHELASMA OF EYELID: ICD-10-CM

## 2018-01-16 PROBLEM — H02.60 XANTHELASMA OF UNSPECIFIED EYE, UNSPECIFIED EYELID: Status: ACTIVE | Noted: 2018-01-16

## 2018-01-16 PROCEDURE — ? BENIGN DESTRUCTION

## 2018-01-16 PROCEDURE — 17110 DESTRUCTION B9 LES UP TO 14: CPT

## 2018-01-16 PROCEDURE — ? SHAVE REMOVAL (SELF PAY)

## 2018-01-16 PROCEDURE — ? COUNSELING

## 2018-01-16 ASSESSMENT — LOCATION DETAILED DESCRIPTION DERM
LOCATION DETAILED: LEFT MEDIAL SUPERIOR EYELID
LOCATION DETAILED: LEFT MEDIAL EYEBROW

## 2018-01-16 ASSESSMENT — LOCATION SIMPLE DESCRIPTION DERM
LOCATION SIMPLE: LEFT SUPERIOR EYELID
LOCATION SIMPLE: LEFT EYEBROW

## 2018-01-16 ASSESSMENT — LOCATION ZONE DERM
LOCATION ZONE: FACE
LOCATION ZONE: EYELID

## 2018-01-16 NOTE — PROCEDURE: SHAVE REMOVAL (SELF PAY)
X Size Of Lesion In Cm (Optional): 0
Billing Type: Third-Party Bill
Detail Level: Detailed
Anesthesia Type: 1% lidocaine with epinephrine
Medical Necessity Clause: This procedure was medically necessary because the lesion that was treated was:
Size Of Lesion In Cm (Required): 1
Hemostasis: Drysol
Size Of Margin In Cm (Margins Are Not Added To Billing Dimensions): -
Consent was obtained from the patient. The risks and benefits to therapy were discussed in detail. Specifically, the risks of infection, scarring, bleeding, prolonged wound healing, incomplete removal, allergy to anesthesia, nerve injury and recurrence were addressed. Prior to the procedure, the treatment site was clearly identified and confirmed by the patient. All components of Universal Protocol/PAUSE Rule completed.
Price (Use Numbers Only, No Special Characters Or $): 100
Biopsy Method: curette
Post-Care Instructions: I reviewed with the patient in detail post-care instructions. Patient is to keep the biopsy site dry overnight, and then apply bacitracin twice daily until healed. Patient may apply hydrogen peroxide soaks to remove any crusting.
Notification Instructions: Patient will be notified of biopsy results. However, patient instructed to call the office if not contacted within 2 weeks.
Render Post-Care Instructions In Note?: no
Wound Care: Petrolatum
Path Notes (To The Dermatopathologist): Please check margins.

## 2018-01-16 NOTE — PROCEDURE: BENIGN DESTRUCTION
Add 52 Modifier (Optional): no
Render Post-Care Instructions In Note?: yes
Detail Level: Detailed
Anesthesia Volume In Cc: 0.3
Consent: The patient's consent was obtained including but not limited to risks of crusting, scabbing, blistering, scarring, darker or lighter pigmentary change, recurrence, incomplete removal and infection.  After the procedure, the patient was observed for 5-10 minutes and was oriented to,person, place and time and denied feeling dizzy, queasy and stated that they were not going to faint
Treatment Number (Will Not Render If 0): 0
Post-Care Instructions: I reviewed with the patient in detail post-care instructions. Patient is to wear sunprotection, and avoid picking at any of the treated lesions. Pt may apply Vaseline to crusted or scabbing areas.  After the procedure, the patient was observed for 5-10 minutes and was oriented to,person, place and time and denied feeling dizzy, queasy and stated that they were not going to faint.   After the procedure, the patient was observed for 5-10 minutes and was oriented to person, place, and time and denied feeling dizzy, queasy and stated that they were not going to faint.  , place,
Medical Necessity Information: It is in your best interest to select a reason for this procedure from the list below. All of these items fulfill various CMS LCD requirements except the new and changing color options.
Medical Necessity Clause: This procedure was medically necessary because the lesions that were treated were:  irritated by daily duties
Anesthesia Type: 1% lidocaine without epinephrine

## 2018-01-17 ENCOUNTER — HOSPITAL ENCOUNTER (OUTPATIENT)
Dept: PHYSICAL THERAPY | Age: 68
End: 2018-01-17
Payer: MEDICARE

## 2018-01-18 ENCOUNTER — HOSPITAL ENCOUNTER (OUTPATIENT)
Dept: PHYSICAL THERAPY | Age: 68
Discharge: HOME | End: 2018-01-18
Payer: MEDICARE

## 2018-01-18 PROCEDURE — 97140 MANUAL THERAPY 1/> REGIONS: CPT

## 2018-01-18 PROCEDURE — 97110 THERAPEUTIC EXERCISES: CPT

## 2018-01-18 NOTE — PROGRESS NOTES
Natali Bauer  : 1950  [place]  Secondary: [address] [facility] [address], [address] [place] [address],[address], [address] [facility]  [phone]   [phone]           OUTPATIENT PHYSICAL THERAPY:Daily Note 2018    ICD-10: Treatment Diagnosis: Low back pain (M54.5)  Precautions/Allergies:   Levaquin [levofloxacin]   Fall Risk Score: 2 (? 5 = High Risk)  MD Orders: Evaluate and Treat MEDICAL/REFERRING DIAGNOSIS:  back   DATE OF ONSET: Chronic LBP  REFERRING PHYSICIAN: Shyla Rocha MD  RETURN PHYSICIAN APPOINTMENT: Pt has no return appt with Dr Jared Quijano at this time     INITIAL ASSESSMENT:  Mr. Adis Carrera presents with decreased lumbar ROM, LE strength/flexibility and increased pain leading to decreased functional status. Pt would benefit from skilled physical therapy services to address the above deficits and help patient return to prior level of function. PROBLEM LIST (Impacting functional limitations):  1. Decreased Strength  2. Decreased ADL/Functional Activities  3. Increased Pain  4. Decreased Flexibility/Joint Mobility  5. Decreased Cadet with Home Exercise Program INTERVENTIONS PLANNED:  1. Cold  2. Cryotherapy  3. Electrical Stimulation  4. Heat  5. Home Exercise Program (HEP)  6. Manual Therapy  7. Range of Motion (ROM)  8. Therapeutic Exercise/Strengthening  9. Ultrasound (US)   TREATMENT PLAN:  Effective Dates: 17 TO 18. Frequency/Duration: 2 times a week for 2 months  GOALS: (Goals have been discussed and agreed upon with patient.)  Short-Term Functional Goals: Time Frame: 4 weeks  1. Pt will increase lumbar ROM flexion = 60 degrees, side bending = 35 degrees bilaterally to assist with household ADL's  2. Pt will increase SLR 50 degrees bilaterally to assist with household ADL's  Discharge Goals: Time Frame: 8 weeks  1.  Pt will increase strength bilateral LE's 5/5 to assist with household ADL's  2. Pt will increase SLR 60 degrees bilaterally to assist with household ADL's  3. Pt will be independent with HEP  MET   4. Pt will perform 20 minutes household cleaning activities independently with min to no c/o LBP  Rehabilitation Potential For Stated Goals: Good  Regarding Claudette Farrow Harrell's therapy, I certify that the treatment plan above will be carried out by a therapist or under their direction. Thank you for this referral,  Rogelio Kumar PTA     Referring Physician Signature: Socorro Jj MD              Date                    The information in this section was collected on 12-21-17 (except where otherwise noted). HISTORY:   History of Present Injury/Illness (Reason for Referral):  Pt reports insidious onset LBP of greater than 20 years duration. He had lumbar surgery August 2016 for laminectomy. He was diagnosed with DDD and stenosis. He states it helped with the constant pain that he was getting, but he still gets pain with activity. He has had chiropractics as well as 2 injections with temporary relief of pain. He states he gets no pain at night. He had an ablation which gave him temporary relief of pain also. He states the pain diminishes with sitting and lying down and is worse with activity. He states MD feels his pain is muscular. He states he feels the muscles in his back and core getting weaker and weaker. Pt rates his current low back pain 0-1/10, increasing to 7/10 at worst (L>R low back). Aggravating factors include vacuuming and all household ADL's. Relieving factors include lying down and sleeping. He is taking Naprosyn prn. He has a back brace which he states he uses prn. Past Medical History/Comorbidities:   Mr. Vielka Clark  has a past medical history of Arthritis; Chronic pain; Compression deformity of vertebra (02/2016); GERD (gastroesophageal reflux disease); Hypercholesterolemia; Hyperlipidemia (5/22/2017);  Hypertension; Malignant neoplasm of prostate ([facility] [address] Stress incontinence, male; and Thyroid disease. Mr. Hannah Cardoza  has a past surgical history that includes endoscopy, colon, diagnostic; hx urological (2008); hx orthopaedic (Right, 1998); hx lumbar laminectomy (08/22/2016); hx lumbar laminectomy (08/2016); and hx radical prostatectomy (2007). Social History/Living Environment:     Pt lives in a one story house with 4 steps to enter. He has occasional ascending/descending stairs at times due to his back pain, especially when carrying items. He lives with spouse who assists with ADL's as needed  Prior Level of Function/Work/Activity:  Pt is retired. Dominant Side:         RIGHT  Other Clinical Tests:          None recently  Personal Factors:          Sex:  male        Age:  75 y.o. Profession:  Pt is retired  Current Medications:       Current Outpatient Prescriptions:     DIGESTIVE ENZYMES, PLANT, (FIBERZYME CONCENTRATE-HP PO), Take  by mouth., Disp: , Rfl:     nitrofurantoin, macrocrystal-monohydrate, (MACROBID) 100 mg capsule, Take 1 Cap by mouth two (2) times a day., Disp: 20 Cap, Rfl: 0    naproxen (NAPROSYN) 500 mg tablet, Take 1 Tab by mouth two (2) times daily (with meals). As needed, Disp: 180 Tab, Rfl: 3    psyllium (METAMUCIL) powd, Take  by mouth., Disp: , Rfl:     hyoscyamine SL (LEVSIN/SL) 0.125 mg SL tablet, 1 Tab by SubLINGual route every six (6) hours as needed for Cramping., Disp: 90 Tab, Rfl: 1    B.infantis-B.ani-B.long-B.bifi (PROBIOTIC 4X) 10-15 mg TbEC, Take  by mouth., Disp: , Rfl:     levothyroxine (SYNTHROID) 150 mcg tablet, Take 1 Tab by mouth Daily (before breakfast). , Disp: 90 Tab, Rfl: 3    amLODIPine (NORVASC) 5 mg tablet, Take 1 Tab by mouth daily. , Disp: 90 Tab, Rfl: 3    telmisartan-hydroCHLOROthiazide (MICARDIS HCT) 80-12.5 mg per tablet, Take 1 Tab by mouth daily. , Disp: 90 Tab, Rfl: 3    atorvastatin (LIPITOR) 20 mg tablet, Take 1 Tab by mouth daily.  Replaces simvastatin, Disp: 90 Tab, Rfl: 3   pantoprazole (PROTONIX) 40 mg tablet, Take 1 Tab by mouth daily. Replaces esomeprazole, Disp: 90 Tab, Rfl: 3    fexofenadine (ALLEGRA) 180 mg tablet, Take 180 mg by mouth daily. , Disp: , Rfl:     omega-3 fatty acids-vitamin e (FISH OIL) 1,000 mg cap, Take 1 Cap by mouth. Holding for surgery, Disp: , Rfl:     multivitamin (ONE A DAY) tablet, Take 1 Tab by mouth daily. Last dose 8/1/2016, Disp: , Rfl:     clotrimazole-betamethasone (LOTRISONE) topical cream, Apply  to affected area two (2) times a day. (Patient taking differently: Apply  to affected area as needed.), Disp: 45 g, Rfl: 5    aspirin delayed-release 81 mg tablet, Take 162 mg by mouth daily. Last dose to be 8/1/2016, Disp: , Rfl:    Date Last Reviewed:  12-21-17   Number of Personal Factors/Comorbidities that affect the Plan of Care: 1-2: MODERATE COMPLEXITY   EXAMINATION:   Observation/Orthostatic Postural Assessment:           Forward head, rounded shoulders  Palpation:          No tenderness noted to palpation  ROM:    Lumbar flexion = 45 degrees (pulling in bilateral hamstrings)  Lumbar extension = 10 degrees  Lumbar side bending R = 25 degrees (pulling in L low back)  Lumbar side bending L = 25 degrees    Strength:    R hip flexion = 5/5  R hip abduction = 5/5  R hip adduction = 5/5  R knee extension = 5/5  R knee flexion = 5/5  R ankle dorsiflexion = 5/5  R ankle plantarflexion = 5/5    L hip flexion = 4/5  L hip abduction = 4/5  L hip adduction = 4+/5  L knee extension = 5/5  L knee flexion = 5/5  L ankle dorsiflexion = 5/5  L ankle plantarflexion = 5/5    Special Tests:          SLR 40 degrees wit hamstring tightness noted bilaterally  Neurological Screen:        Myotomes:  Weakness L hip        Dermatomes:  Intact to light touch bilateral LE's        Reflexes:  DTR's 1+ to bilateral achilles, 2+ to bilateral patellar  Functional Mobility:         Gait/Ambulation:  No significant deficits noted        Transfers:  Pt able to transition sit to supine and supine to sit independently    Body Structures Involved:  1. Bones  2. Joints  3. Muscles Body Functions Affected:  1. Sensory/Pain  2. Neuromusculoskeletal Activities and Participation Affected:  1. Mobility  2. Self Care  3. Domestic Life   Number of elements (examined above) that affect the Plan of Care: 3: MODERATE COMPLEXITY   CLINICAL PRESENTATION:   Presentation: Evolving clinical presentation with changing clinical characteristics: MODERATE COMPLEXITY   CLINICAL DECISION MAKING:   Outcome Measure: Tool Used: Modified Oswestry Low Back Pain Questionnaire  Score:  Initial: 17/50  Most Recent: X/50 (Date: -- )   Interpretation of Score: Each section is scored on a 0-5 scale, 5 representing the greatest disability. The scores of each section are added together for a total score of 50. Score 0 1-10 11-20 21-30 31-40 41-49 50   Modifier CH CI CJ CK CL CM CN     ? Mobility - Walking and Moving Around:     - CURRENT STATUS: CJ - 20%-39% impaired, limited or restricted    - GOAL STATUS: CI - 1%-19% impaired, limited or restricted    - D/C STATUS:  ---------------To be determined---------------    Medical Necessity:   · Patient is expected to demonstrate progress in strength, range of motion and functional technique to increase independence with household ADL's. · Patient demonstrates good rehab potential due to higher previous functional level. Reason for Services/Other Comments:  · Patient continues to require skilled intervention due to decreased lumbar ROM, LE strength/flexibility and increased pain leading to decreased functional status.    Use of outcome tool(s) and clinical judgement create a POC that gives a: Questionable prediction of patient's progress: MODERATE COMPLEXITY            TREATMENT:   (In addition to Assessment/Re-Assessment sessions the following treatments were rendered)  Pre-treatment Symptoms/Complaints: I am doing ok- it come's and goes-  Pain: Initial: 3/10 Post Session:  2/10     THERAPEUTIC EXERCISE: (10 minutes):  Exercises per grid below to improve mobility and strength. Required minimal verbal cues to promote proper body alignment and promote proper body posture. Progressed resistance and repetitions as indicated. MANUAL THERAPY: (30 minutes): STM to bilateral lumbar paraspinals-greater left side tightness was utilized and necessary because of the patient's restricted motion of soft tissue. MODALITIES: (10 minutes): Moist heat to low back in sitting x10 minutes to decrease pain and stiffness. Skin clear afterwards. Date:  01-18-18   Activity/Exercise Parameters   SKTC 3 reps  15 sec holds   Active Hamstring Stretch    Piriformis Stretch 3 reps  15 sec holds   Pelvic Tilts    D1 D2 Blue x 10 reps   NuStep Level 5  6 minutes   Isometric Unilateral Hip Flexion x   SLR Flexion with TA x   T-band Hip Abduction x   Supine Marching x   T-band Straight Arm Pulldowns Blue  20 reps        Med[employer] Portal  Treatment/Session Assessment:    · Response to Treatment:  Patient continue's to have issue's with back, decreased tightness after manual.  Patient continue's to do his exercises at home with good results. Patient going to get a roller for his back for use at home. Patient finding relieve after manual.   Continue plan of care. · Compliance with Program/Exercises: Compliant  · Recommendations/Intent for next treatment session: \"Next visit will focus on advancements to more challenging activities\".   Total Treatment Duration: 50 minutes  PT Patient Time In/Time Out  Time In: 9211  Time Out: SAMIA Kendall

## 2018-01-22 ENCOUNTER — HOSPITAL ENCOUNTER (OUTPATIENT)
Dept: PHYSICAL THERAPY | Age: 68
Discharge: HOME | End: 2018-01-22
Payer: MEDICARE

## 2018-01-22 PROCEDURE — 97014 ELECTRIC STIMULATION THERAPY: CPT

## 2018-01-22 NOTE — PROGRESS NOTES
Pretty Arroyo  : 1950  [place]  Secondary: [address] at [facility]  [address] [facility], [address] [place] [address],[address]  [phone]   [phone]           OUTPATIENT PHYSICAL THERAPY:Daily Note 2018    ICD-10: Treatment Diagnosis: Low back pain (M54.5)  Precautions/Allergies:   Levaquin [levofloxacin]   Fall Risk Score: 2 (? 5 = High Risk)  MD Orders: Evaluate and Treat MEDICAL/REFERRING DIAGNOSIS:  back   DATE OF ONSET: Chronic LBP  REFERRING PHYSICIAN: Didier Chacon MD  RETURN PHYSICIAN APPOINTMENT: Pt has no return appt with Dr Madelyn Rob at this time     INITIAL ASSESSMENT:  Mr. Dorota Nino presents with decreased lumbar ROM, LE strength/flexibility and increased pain leading to decreased functional status. Pt would benefit from skilled physical therapy services to address the above deficits and help patient return to prior level of function. PROBLEM LIST (Impacting functional limitations):  1. Decreased Strength  2. Decreased ADL/Functional Activities  3. Increased Pain  4. Decreased Flexibility/Joint Mobility  5. Decreased [place] with Home Exercise Program INTERVENTIONS PLANNED:  1. Cold  2. Cryotherapy  3. Electrical Stimulation  4. Heat  5. Home Exercise Program (HEP)  6. Manual Therapy  7. Range of Motion (ROM)  8. Therapeutic Exercise/Strengthening  9. Ultrasound (US)   TREATMENT PLAN:  Effective Dates: 17 TO 18. Frequency/Duration: 2 times a week for 2 months  GOALS: (Goals have been discussed and agreed upon with patient.)  Short-Term Functional Goals: Time Frame: 4 weeks  1. Pt will increase lumbar ROM flexion = 60 degrees, side bending = 35 degrees bilaterally to assist with household ADL's  2. Pt will increase SLR 50 degrees bilaterally to assist with household ADL's  Discharge Goals: Time Frame: 8 weeks  1.  Pt will increase strength bilateral LE's 5/5 to assist with household ADL's  2. Pt will increase SLR 60 degrees bilaterally to assist with household ADL's  3. Pt will be independent with HEP  MET   4. Pt will perform 20 minutes household cleaning activities independently with min to no c/o LBP  Rehabilitation Potential For Stated Goals: Good  Regarding Estefani Renner's therapy, I certify that the treatment plan above will be carried out by a therapist or under their direction. Thank you for this referral,  Leta Leavitt PTA     Referring Physician Signature: Eldon Painting MD              Date                    The information in this section was collected on 12-21-17 (except where otherwise noted). HISTORY:   History of Present Injury/Illness (Reason for Referral):  Pt reports insidious onset LBP of greater than 20 years duration. He had lumbar surgery August 2016 for laminectomy. He was diagnosed with DDD and stenosis. He states it helped with the constant pain that he was getting, but he still gets pain with activity. He has had chiropractics as well as 2 injections with temporary relief of pain. He states he gets no pain at night. He had an ablation which gave him temporary relief of pain also. He states the pain diminishes with sitting and lying down and is worse with activity. He states MD feels his pain is muscular. He states he feels the muscles in his back and core getting weaker and weaker. Pt rates his current low back pain 0-1/10, increasing to 7/10 at worst (L>R low back). Aggravating factors include vacuuming and all household ADL's. Relieving factors include lying down and sleeping. He is taking Naprosyn prn. He has a back brace which he states he uses prn. Past Medical History/Comorbidities:   Mr. Parker Castelan  has a past medical history of Arthritis; Chronic pain; Compression deformity of vertebra (02/2016); GERD (gastroesophageal reflux disease); Hypercholesterolemia; Hyperlipidemia (5/22/2017);  Hypertension; Malignant neoplasm of prostate ([facility] [address] Stress incontinence, male; and Thyroid disease. Mr. Palomo Nguyen  has a past surgical history that includes endoscopy, colon, diagnostic; hx urological (2008); hx orthopaedic (Right, 1998); hx lumbar laminectomy (08/22/2016); hx lumbar laminectomy (08/2016); and hx radical prostatectomy (2007). Social History/Living Environment:     Pt lives in a one story house with 4 steps to enter. He has occasional ascending/descending stairs at times due to his back pain, especially when carrying items. He lives with spouse who assists with ADL's as needed  Prior Level of Function/Work/Activity:  Pt is retired. Dominant Side:         RIGHT  Other Clinical Tests:          None recently  Personal Factors:          Sex:  male        Age:  75 y.o. Profession:  Pt is retired  Current Medications:       Current Outpatient Prescriptions:     DIGESTIVE ENZYMES, PLANT, (FIBERZYME CONCENTRATE-HP PO), Take  by mouth., Disp: , Rfl:     nitrofurantoin, macrocrystal-monohydrate, (MACROBID) 100 mg capsule, Take 1 Cap by mouth two (2) times a day., Disp: 20 Cap, Rfl: 0    naproxen (NAPROSYN) 500 mg tablet, Take 1 Tab by mouth two (2) times daily (with meals). As needed, Disp: 180 Tab, Rfl: 3    psyllium (METAMUCIL) powd, Take  by mouth., Disp: , Rfl:     hyoscyamine SL (LEVSIN/SL) 0.125 mg SL tablet, 1 Tab by SubLINGual route every six (6) hours as needed for Cramping., Disp: 90 Tab, Rfl: 1    B.infantis-B.ani-B.long-B.bifi (PROBIOTIC 4X) 10-15 mg TbEC, Take  by mouth., Disp: , Rfl:     levothyroxine (SYNTHROID) 150 mcg tablet, Take 1 Tab by mouth Daily (before breakfast). , Disp: 90 Tab, Rfl: 3    amLODIPine (NORVASC) 5 mg tablet, Take 1 Tab by mouth daily. , Disp: 90 Tab, Rfl: 3    telmisartan-hydroCHLOROthiazide (MICARDIS HCT) 80-12.5 mg per tablet, Take 1 Tab by mouth daily. , Disp: 90 Tab, Rfl: 3    atorvastatin (LIPITOR) 20 mg tablet, Take 1 Tab by mouth daily.  Replaces simvastatin, Disp: 90 Tab, Rfl: 3   pantoprazole (PROTONIX) 40 mg tablet, Take 1 Tab by mouth daily. Replaces esomeprazole, Disp: 90 Tab, Rfl: 3    fexofenadine (ALLEGRA) 180 mg tablet, Take 180 mg by mouth daily. , Disp: , Rfl:     omega-3 fatty acids-vitamin e (FISH OIL) 1,000 mg cap, Take 1 Cap by mouth. Holding for surgery, Disp: , Rfl:     multivitamin (ONE A DAY) tablet, Take 1 Tab by mouth daily. Last dose 8/1/2016, Disp: , Rfl:     clotrimazole-betamethasone (LOTRISONE) topical cream, Apply  to affected area two (2) times a day. (Patient taking differently: Apply  to affected area as needed.), Disp: 45 g, Rfl: 5    aspirin delayed-release 81 mg tablet, Take 162 mg by mouth daily. Last dose to be 8/1/2016, Disp: , Rfl:    Date Last Reviewed:  12-21-17   Number of Personal Factors/Comorbidities that affect the Plan of Care: 1-2: MODERATE COMPLEXITY   EXAMINATION:   Observation/Orthostatic Postural Assessment:           Forward head, rounded shoulders  Palpation:          No tenderness noted to palpation  ROM:    Lumbar flexion = 45 degrees (pulling in bilateral hamstrings)  Lumbar extension = 10 degrees  Lumbar side bending R = 25 degrees (pulling in L low back)  Lumbar side bending L = 25 degrees    Strength:    R hip flexion = 5/5  R hip abduction = 5/5  R hip adduction = 5/5  R knee extension = 5/5  R knee flexion = 5/5  R ankle dorsiflexion = 5/5  R ankle plantarflexion = 5/5    L hip flexion = 4/5  L hip abduction = 4/5  L hip adduction = 4+/5  L knee extension = 5/5  L knee flexion = 5/5  L ankle dorsiflexion = 5/5  L ankle plantarflexion = 5/5    Special Tests:          SLR 40 degrees wit hamstring tightness noted bilaterally  Neurological Screen:        Myotomes:  Weakness L hip        Dermatomes:  Intact to light touch bilateral LE's        Reflexes:  DTR's 1+ to bilateral achilles, 2+ to bilateral patellar  Functional Mobility:         Gait/Ambulation:  No significant deficits noted        Transfers:  Pt able to transition sit to supine and supine to sit independently    Body Structures Involved:  1. Bones  2. Joints  3. Muscles Body Functions Affected:  1. Sensory/Pain  2. Neuromusculoskeletal Activities and Participation Affected:  1. Mobility  2. Self Care  3. Domestic Life   Number of elements (examined above) that affect the Plan of Care: 3: MODERATE COMPLEXITY   CLINICAL PRESENTATION:   Presentation: Evolving clinical presentation with changing clinical characteristics: MODERATE COMPLEXITY   CLINICAL DECISION MAKING:   Outcome Measure: Tool Used: Modified Oswestry Low Back Pain Questionnaire  Score:  Initial: 17/50  Most Recent: X/50 (Date: -- )   Interpretation of Score: Each section is scored on a 0-5 scale, 5 representing the greatest disability. The scores of each section are added together for a total score of 50. Score 0 1-10 11-20 21-30 31-40 41-49 50   Modifier CH CI CJ CK CL CM CN     ? Mobility - Walking and Moving Around:     - CURRENT STATUS: CJ - 20%-39% impaired, limited or restricted    - GOAL STATUS: CI - 1%-19% impaired, limited or restricted    - D/C STATUS:  ---------------To be determined---------------    Medical Necessity:   · Patient is expected to demonstrate progress in strength, range of motion and functional technique to increase independence with household ADL's. · Patient demonstrates good rehab potential due to higher previous functional level. Reason for Services/Other Comments:  · Patient continues to require skilled intervention due to decreased lumbar ROM, LE strength/flexibility and increased pain leading to decreased functional status.    Use of outcome tool(s) and clinical judgement create a POC that gives a: Questionable prediction of patient's progress: MODERATE COMPLEXITY            TREATMENT:   (In addition to Assessment/Re-Assessment sessions the following treatments were rendered)  Pre-treatment Symptoms/Complaints:  Patient called this morning, having extreme spasm in back, difficulty being upright-going to call Dr. Brooklynn Murdock  Pain: Initial:     6/10 Post Session:  6/10     THERAPEUTIC EXERCISE: (0 minutes):  Exercises per grid below to improve mobility and strength. Required minimal verbal cues to promote proper body alignment and promote proper body posture. Progressed resistance and repetitions as indicated. MANUAL THERAPY: (0 minutes): STM to bilateral lumbar paraspinals-greater left side tightness was utilized and necessary because of the patient's restricted motion of soft tissue. MODALITIES: (20 minutes): *  Electrical Stimulation Therapy (20) was provided with intensity adjusted throughout treatment to patient tolerance. for spasm and increased pain today       Moist heat to low back in sitting x10 minutes to decrease pain and stiffness. Skin clear afterwards. Date:  01-22-18   Activity/Exercise Parameters   SKTC 3 reps  15 sec holds   Active Hamstring Stretch    Piriformis Stretch 3 reps  15 sec holds   Pelvic Tilts    D1 D2 Blue x 10 reps   NuStep Level 5  6 minutes   Isometric Unilateral Hip Flexion x   SLR Flexion with TA x   T-band Hip Abduction x   Supine Marching x   T-band Straight Arm Pulldowns Blue  20 reps        [employer] Portal  Treatment/Session Assessment:    · Response to Treatment:  Used modalities for pain control secondary to increased spasm today. He did follow up with phone call to Dr Brooklynn Murdock and he subscribed muscle relaxer for him. Told him to gently do exercises at home but listen to the pain. Patient has been using hot pack at home-noted that he has some redness on the right side from sitting with it on too long at home. Moist heat was layered xtra today. · Compliance with Program/Exercises: Compliant  · Recommendations/Intent for next treatment session: \"Next visit will focus on advancements to more challenging activities\".   Total Treatment Duration: 30 minutes  PT Patient Time In/Time Out  Time In: 1530  Time Out: 1176    KIMBERLY MUHMAMAD [facility] Nicole Diss, PTA

## 2018-01-23 ENCOUNTER — HOSPITAL ENCOUNTER (OUTPATIENT)
Dept: PHYSICAL THERAPY | Age: 68
Discharge: HOME | End: 2018-01-23
Payer: MEDICARE

## 2018-01-23 PROCEDURE — 97014 ELECTRIC STIMULATION THERAPY: CPT

## 2018-01-23 PROCEDURE — 97140 MANUAL THERAPY 1/> REGIONS: CPT

## 2018-01-23 NOTE — PROGRESS NOTES
Polly Boxer  : 1950  [place]  Secondary: [address] at [facility]  [address] [facility], [address] [place] [address],[address]  [phone]   [phone]           OUTPATIENT PHYSICAL THERAPY:Daily Note and Progress Report 2018    ICD-10: Treatment Diagnosis: Low back pain (M54.5)  Precautions/Allergies:   Levaquin [levofloxacin]   Fall Risk Score: 2 (? 5 = High Risk)  MD Orders: Evaluate and Treat MEDICAL/REFERRING DIAGNOSIS:  back   DATE OF ONSET: Chronic LBP  REFERRING PHYSICIAN: Alexis Garcia MD  RETURN PHYSICIAN APPOINTMENT: Pt has no return appt with Dr Brooklynn Murdock at this time     INITIAL ASSESSMENT:  Mr. Graciela Hodges presents with decreased lumbar ROM, LE strength/flexibility and increased pain leading to decreased functional status. Pt would benefit from skilled physical therapy services to address the above deficits and help patient return to prior level of function. PROBLEM LIST (Impacting functional limitations):  1. Decreased Strength  2. Decreased ADL/Functional Activities  3. Increased Pain  4. Decreased Flexibility/Joint Mobility  5. Decreased [place] with Home Exercise Program INTERVENTIONS PLANNED:  1. Cold  2. Cryotherapy  3. Electrical Stimulation  4. Heat  5. Home Exercise Program (HEP)  6. Manual Therapy  7. Range of Motion (ROM)  8. Therapeutic Exercise/Strengthening  9. Ultrasound (US)   TREATMENT PLAN:  Effective Dates: 17 TO 18. Frequency/Duration: 2 times a week for 2 months  GOALS: (Goals have been discussed and agreed upon with patient.)  Short-Term Functional Goals: Time Frame: 4 weeks  1. Pt will increase lumbar ROM flexion = 60 degrees, side bending = 35 degrees bilaterally to assist with household ADL's  2. Pt will increase SLR 50 degrees bilaterally to assist with household ADL's  Discharge Goals: Time Frame: 8 weeks  1.  Pt will increase strength bilateral LE's 5/5 to assist with household ADL's  2. Pt will increase SLR 60 degrees bilaterally to assist with household ADL's  3. Pt will be independent with HEP  MET   4. Pt will perform 20 minutes household cleaning activities independently with min to no c/o LBP  Rehabilitation Potential For Stated Goals: Good  Regarding Ian Renner's therapy, I certify that the treatment plan above will be carried out by a therapist or under their direction. Thank you for this referral,  Latoya Rain, PT     Referring Physician Signature: Adiel Fernandes MD              Date                    The information in this section was collected on 12-21-17 (except where otherwise noted). HISTORY:   History of Present Injury/Illness (Reason for Referral):  Pt reports insidious onset LBP of greater than 20 years duration. He had lumbar surgery August 2016 for laminectomy. He was diagnosed with DDD and stenosis. He states it helped with the constant pain that he was getting, but he still gets pain with activity. He has had chiropractics as well as 2 injections with temporary relief of pain. He states he gets no pain at night. He had an ablation which gave him temporary relief of pain also. He states the pain diminishes with sitting and lying down and is worse with activity. He states MD feels his pain is muscular. He states he feels the muscles in his back and core getting weaker and weaker. Pt rates his current low back pain 0-1/10, increasing to 7/10 at worst (L>R low back). Aggravating factors include vacuuming and all household ADL's. Relieving factors include lying down and sleeping. He is taking Naprosyn prn. He has a back brace which he states he uses prn. Past Medical History/Comorbidities:   Mr. Koki Bowers  has a past medical history of Arthritis; Chronic pain; Compression deformity of vertebra (02/2016); GERD (gastroesophageal reflux disease); Hypercholesterolemia; Hyperlipidemia (5/22/2017);  Hypertension; Malignant neoplasm of prostate ([facility] [address] Stress incontinence, male; and Thyroid disease. Mr. Steve Vee  has a past surgical history that includes endoscopy, colon, diagnostic; hx urological (2008); hx orthopaedic (Right, 1998); hx lumbar laminectomy (08/22/2016); hx lumbar laminectomy (08/2016); and hx radical prostatectomy (2007). Social History/Living Environment:     Pt lives in a one story house with 4 steps to enter. He has occasional ascending/descending stairs at times due to his back pain, especially when carrying items. He lives with spouse who assists with ADL's as needed  Prior Level of Function/Work/Activity:  Pt is retired. Dominant Side:         RIGHT  Other Clinical Tests:          None recently  Personal Factors:          Sex:  male        Age:  75 y.o. Profession:  Pt is retired  Current Medications:       Current Outpatient Prescriptions:     DIGESTIVE ENZYMES, PLANT, (FIBERZYME CONCENTRATE-HP PO), Take  by mouth., Disp: , Rfl:     nitrofurantoin, macrocrystal-monohydrate, (MACROBID) 100 mg capsule, Take 1 Cap by mouth two (2) times a day., Disp: 20 Cap, Rfl: 0    naproxen (NAPROSYN) 500 mg tablet, Take 1 Tab by mouth two (2) times daily (with meals). As needed, Disp: 180 Tab, Rfl: 3    psyllium (METAMUCIL) powd, Take  by mouth., Disp: , Rfl:     hyoscyamine SL (LEVSIN/SL) 0.125 mg SL tablet, 1 Tab by SubLINGual route every six (6) hours as needed for Cramping., Disp: 90 Tab, Rfl: 1    B.infantis-B.ani-B.long-B.bifi (PROBIOTIC 4X) 10-15 mg TbEC, Take  by mouth., Disp: , Rfl:     levothyroxine (SYNTHROID) 150 mcg tablet, Take 1 Tab by mouth Daily (before breakfast). , Disp: 90 Tab, Rfl: 3    amLODIPine (NORVASC) 5 mg tablet, Take 1 Tab by mouth daily. , Disp: 90 Tab, Rfl: 3    telmisartan-hydroCHLOROthiazide (MICARDIS HCT) 80-12.5 mg per tablet, Take 1 Tab by mouth daily. , Disp: 90 Tab, Rfl: 3    atorvastatin (LIPITOR) 20 mg tablet, Take 1 Tab by mouth daily.  Replaces simvastatin, Disp: 90 Tab, Rfl: 3   pantoprazole (PROTONIX) 40 mg tablet, Take 1 Tab by mouth daily. Replaces esomeprazole, Disp: 90 Tab, Rfl: 3    fexofenadine (ALLEGRA) 180 mg tablet, Take 180 mg by mouth daily. , Disp: , Rfl:     omega-3 fatty acids-vitamin e (FISH OIL) 1,000 mg cap, Take 1 Cap by mouth. Holding for surgery, Disp: , Rfl:     multivitamin (ONE A DAY) tablet, Take 1 Tab by mouth daily. Last dose 8/1/2016, Disp: , Rfl:     clotrimazole-betamethasone (LOTRISONE) topical cream, Apply  to affected area two (2) times a day. (Patient taking differently: Apply  to affected area as needed.), Disp: 45 g, Rfl: 5    aspirin delayed-release 81 mg tablet, Take 162 mg by mouth daily. Last dose to be 8/1/2016, Disp: , Rfl:    Date Last Reviewed:  12-21-17   Number of Personal Factors/Comorbidities that affect the Plan of Care: 1-2: MODERATE COMPLEXITY   EXAMINATION:   Observation/Orthostatic Postural Assessment:           Forward head, rounded shoulders  Palpation:          No tenderness noted to palpation  ROM:    Lumbar flexion = 45 degrees (pulling in bilateral hamstrings)  Lumbar extension = 10 degrees  Lumbar side bending R = 25 degrees (pulling in L low back)  Lumbar side bending L = 25 degrees    Strength:    R hip flexion = 5/5  R hip abduction = 5/5  R hip adduction = 5/5  R knee extension = 5/5  R knee flexion = 5/5  R ankle dorsiflexion = 5/5  R ankle plantarflexion = 5/5    L hip flexion = 4/5  L hip abduction = 4/5  L hip adduction = 4+/5  L knee extension = 5/5  L knee flexion = 5/5  L ankle dorsiflexion = 5/5  L ankle plantarflexion = 5/5    Special Tests:          SLR 40 degrees wit hamstring tightness noted bilaterally  Neurological Screen:        Myotomes:  Weakness L hip        Dermatomes:  Intact to light touch bilateral LE's        Reflexes:  DTR's 1+ to bilateral achilles, 2+ to bilateral patellar  Functional Mobility:         Gait/Ambulation:  No significant deficits noted        Transfers:  Pt able to transition sit to supine and supine to sit independently    Body Structures Involved:  1. Bones  2. Joints  3. Muscles Body Functions Affected:  1. Sensory/Pain  2. Neuromusculoskeletal Activities and Participation Affected:  1. Mobility  2. Self Care  3. Domestic Life   Number of elements (examined above) that affect the Plan of Care: 3: MODERATE COMPLEXITY   CLINICAL PRESENTATION:   Presentation: Evolving clinical presentation with changing clinical characteristics: MODERATE COMPLEXITY   CLINICAL DECISION MAKING:   Outcome Measure: Tool Used: Modified Oswestry Low Back Pain Questionnaire  Score:  Initial: 17/50  Most Recent: X/50 (Date: -- )   Interpretation of Score: Each section is scored on a 0-5 scale, 5 representing the greatest disability. The scores of each section are added together for a total score of 50. Score 0 1-10 11-20 21-30 31-40 41-49 50   Modifier CH CI CJ CK CL CM CN     ? Mobility - Walking and Moving Around:     - CURRENT STATUS: CJ - 20%-39% impaired, limited or restricted    - GOAL STATUS: CI - 1%-19% impaired, limited or restricted    - D/C STATUS:  ---------------To be determined---------------    Medical Necessity:   · Patient is expected to demonstrate progress in strength, range of motion and functional technique to increase independence with household ADL's. · Patient demonstrates good rehab potential due to higher previous functional level. Reason for Services/Other Comments:  · Patient continues to require skilled intervention due to decreased lumbar ROM, LE strength/flexibility and increased pain leading to decreased functional status. Use of outcome tool(s) and clinical judgement create a POC that gives a: Questionable prediction of patient's progress: MODERATE COMPLEXITY            TREATMENT:   (In addition to Assessment/Re-Assessment sessions the following treatments were rendered)  Pre-treatment Symptoms/Complaints:  Pt states he is feeling 40% better than yesterday.   He leaves on Saturday for a cruise. Pain: Initial:     6/10 Post Session:  6/10     THERAPEUTIC EXERCISE: (0 minutes):  Exercises per grid below to improve mobility and strength. Required minimal verbal cues to promote proper body alignment and promote proper body posture. Progressed resistance and repetitions as indicated. MANUAL THERAPY: (15 minutes): STM to bilateral lumbar paraspinals-greater left side tightness was utilized and necessary because of the patient's restricted motion of soft tissue. MODALITIES: (15 minutes): *  Electrical Stimulation Therapy (15) was provided with intensity adjusted throughout treatment to patient tolerance. to decrease pain and spasms       with moist heat. Skin clear afterwards. in prone   Date:  01-22-18 Date:     Activity/Exercise Parameters    SKTC 3 reps  15 sec holds    Active Hamstring Stretch     Piriformis Stretch 3 reps  15 sec holds    Pelvic Tilts     D1 D2 Blue x 10 reps    NuStep Level 5  6 minutes    Isometric Unilateral Hip Flexion x    SLR Flexion with TA x    T-band Hip Abduction x    Supine Marching x    T-band Straight Arm Pulldowns Blue  20 reps         [employer] Portal  Treatment/Session Assessment:    · Response to Treatment:  Pt tolerated treatments well. Decreased pain noted today. Pt will be leaving on Saturday for a cruise. · Compliance with Program/Exercises: Compliant  · Recommendations/Intent for next treatment session: \"Next visit will focus on advancements to more challenging activities\".   Total Treatment Duration: 30 minutes  PT Patient Time In/Time Out  Time In: 1300  Time Out: [address] [facility] Nora Garcia PT

## 2018-01-25 ENCOUNTER — HOSPITAL ENCOUNTER (OUTPATIENT)
Dept: PHYSICAL THERAPY | Age: 68
Discharge: HOME | End: 2018-01-25
Payer: MEDICARE

## 2018-01-25 PROCEDURE — 97014 ELECTRIC STIMULATION THERAPY: CPT

## 2018-01-25 PROCEDURE — 97140 MANUAL THERAPY 1/> REGIONS: CPT

## 2018-01-25 NOTE — PROGRESS NOTES
Radha Stefano  : 1950  [place]  Secondary: [address] at [facility]  [address] [facility], [address] [place] [address] [address], [address] [facility]  [phone]   [phone]           OUTPATIENT PHYSICAL THERAPY:Daily Note 2018    ICD-10: Treatment Diagnosis: Low back pain (M54.5)  Precautions/Allergies:   Levaquin [levofloxacin]   Fall Risk Score: 2 (? 5 = High Risk)  MD Orders: Evaluate and Treat MEDICAL/REFERRING DIAGNOSIS:  back   DATE OF ONSET: Chronic LBP  REFERRING PHYSICIAN: Gisela Estrella MD  RETURN PHYSICIAN APPOINTMENT: Pt has no return appt with Dr Syd Fontaine at this time     INITIAL ASSESSMENT:  Mr. Juan C Dean presents with decreased lumbar ROM, LE strength/flexibility and increased pain leading to decreased functional status. Pt would benefit from skilled physical therapy services to address the above deficits and help patient return to prior level of function. PROBLEM LIST (Impacting functional limitations):  1. Decreased Strength  2. Decreased ADL/Functional Activities  3. Increased Pain  4. Decreased Flexibility/Joint Mobility  5. Decreased [place] with Home Exercise Program INTERVENTIONS PLANNED:  1. Cold  2. Cryotherapy  3. Electrical Stimulation  4. Heat  5. Home Exercise Program (HEP)  6. Manual Therapy  7. Range of Motion (ROM)  8. Therapeutic Exercise/Strengthening  9. Ultrasound (US)   TREATMENT PLAN:  Effective Dates: 17 TO 18. Frequency/Duration: 2 times a week for 2 months  GOALS: (Goals have been discussed and agreed upon with patient.)  Short-Term Functional Goals: Time Frame: 4 weeks  1. Pt will increase lumbar ROM flexion = 60 degrees, side bending = 35 degrees bilaterally to assist with household ADL's  2. Pt will increase SLR 50 degrees bilaterally to assist with household ADL's  Discharge Goals: Time Frame: 8 weeks  1.  Pt will increase strength bilateral LE's 5/5 to assist with household ADL's  2. Pt will increase SLR 60 degrees bilaterally to assist with household ADL's  3. Pt will be independent with HEP  MET   4. Pt will perform 20 minutes household cleaning activities independently with min to no c/o LBP  Rehabilitation Potential For Stated Goals: Good  Regarding Lukas Renner's therapy, I certify that the treatment plan above will be carried out by a therapist or under their direction. Thank you for this referral,  Jeanette Arroyo PTA     Referring Physician Signature: Kayli Terrell MD              Date                    The information in this section was collected on 12-21-17 (except where otherwise noted). HISTORY:   History of Present Injury/Illness (Reason for Referral):  Pt reports insidious onset LBP of greater than 20 years duration. He had lumbar surgery August 2016 for laminectomy. He was diagnosed with DDD and stenosis. He states it helped with the constant pain that he was getting, but he still gets pain with activity. He has had chiropractics as well as 2 injections with temporary relief of pain. He states he gets no pain at night. He had an ablation which gave him temporary relief of pain also. He states the pain diminishes with sitting and lying down and is worse with activity. He states MD feels his pain is muscular. He states he feels the muscles in his back and core getting weaker and weaker. Pt rates his current low back pain 0-1/10, increasing to 7/10 at worst (L>R low back). Aggravating factors include vacuuming and all household ADL's. Relieving factors include lying down and sleeping. He is taking Naprosyn prn. He has a back brace which he states he uses prn. Past Medical History/Comorbidities:   Mr. Haskell Lombard  has a past medical history of Arthritis; Chronic pain; Compression deformity of vertebra (02/2016); GERD (gastroesophageal reflux disease); Hypercholesterolemia; Hyperlipidemia (5/22/2017);  Hypertension; Malignant neoplasm of prostate ([facility] [address] Stress incontinence, male; and Thyroid disease. Mr. Dominga Angel  has a past surgical history that includes endoscopy, colon, diagnostic; hx urological (2008); hx orthopaedic (Right, 1998); hx lumbar laminectomy (08/22/2016); hx lumbar laminectomy (08/2016); and hx radical prostatectomy (2007). Social History/Living Environment:     Pt lives in a one story house with 4 steps to enter. He has occasional ascending/descending stairs at times due to his back pain, especially when carrying items. He lives with spouse who assists with ADL's as needed  Prior Level of Function/Work/Activity:  Pt is retired. Dominant Side:         RIGHT  Other Clinical Tests:          None recently  Personal Factors:          Sex:  male        Age:  75 y.o. Profession:  Pt is retired  Current Medications:       Current Outpatient Prescriptions:     nitrofurantoin, macrocrystal-monohydrate, (MACROBID) 100 mg capsule, Take 1 Cap by mouth two (2) times a day for 7 days. , Disp: 14 Cap, Rfl: 0    DIGESTIVE ENZYMES, PLANT, (FIBERZYME CONCENTRATE-HP PO), Take  by mouth., Disp: , Rfl:     hyoscyamine SL (LEVSIN/SL) 0.125 mg SL tablet, 1 Tab by SubLINGual route every six (6) hours as needed for Cramping., Disp: 90 Tab, Rfl: 1    B.infantis-B.ani-B.long-B.bifi (PROBIOTIC 4X) 10-15 mg TbEC, Take  by mouth., Disp: , Rfl:     levothyroxine (SYNTHROID) 150 mcg tablet, Take 1 Tab by mouth Daily (before breakfast). , Disp: 90 Tab, Rfl: 3    amLODIPine (NORVASC) 5 mg tablet, Take 1 Tab by mouth daily. , Disp: 90 Tab, Rfl: 3    telmisartan-hydroCHLOROthiazide (MICARDIS HCT) 80-12.5 mg per tablet, Take 1 Tab by mouth daily. , Disp: 90 Tab, Rfl: 3    atorvastatin (LIPITOR) 20 mg tablet, Take 1 Tab by mouth daily. Replaces simvastatin, Disp: 90 Tab, Rfl: 3    pantoprazole (PROTONIX) 40 mg tablet, Take 1 Tab by mouth daily. Replaces esomeprazole, Disp: 90 Tab, Rfl: 3    fexofenadine (ALLEGRA) 180 mg tablet, Take 180 mg by mouth daily. , Disp: , Rfl:     omega-3 fatty acids-vitamin e (FISH OIL) 1,000 mg cap, Take 1 Cap by mouth. Holding for surgery, Disp: , Rfl:     multivitamin (ONE A DAY) tablet, Take 1 Tab by mouth daily. Last dose 8/1/2016, Disp: , Rfl:     clotrimazole-betamethasone (LOTRISONE) topical cream, Apply  to affected area two (2) times a day. (Patient taking differently: Apply  to affected area as needed.), Disp: 45 g, Rfl: 5    aspirin delayed-release 81 mg tablet, Take 162 mg by mouth daily. Last dose to be 8/1/2016, Disp: , Rfl:    Date Last Reviewed:  12-21-17   Number of Personal Factors/Comorbidities that affect the Plan of Care: 1-2: MODERATE COMPLEXITY   EXAMINATION:   Observation/Orthostatic Postural Assessment: Forward head, rounded shoulders  Palpation:          No tenderness noted to palpation  ROM:    Lumbar flexion = 45 degrees (pulling in bilateral hamstrings)  Lumbar extension = 10 degrees  Lumbar side bending R = 25 degrees (pulling in L low back)  Lumbar side bending L = 25 degrees    Strength:    R hip flexion = 5/5  R hip abduction = 5/5  R hip adduction = 5/5  R knee extension = 5/5  R knee flexion = 5/5  R ankle dorsiflexion = 5/5  R ankle plantarflexion = 5/5    L hip flexion = 4/5  L hip abduction = 4/5  L hip adduction = 4+/5  L knee extension = 5/5  L knee flexion = 5/5  L ankle dorsiflexion = 5/5  L ankle plantarflexion = 5/5    Special Tests:          SLR 40 degrees wit hamstring tightness noted bilaterally  Neurological Screen:        Myotomes:  Weakness L hip        Dermatomes:  Intact to light touch bilateral LE's        Reflexes:  DTR's 1+ to bilateral achilles, 2+ to bilateral patellar  Functional Mobility:         Gait/Ambulation:  No significant deficits noted        Transfers:  Pt able to transition sit to supine and supine to sit independently    Body Structures Involved:  1. Bones  2. Joints  3. Muscles Body Functions Affected:  1. Sensory/Pain  2.  Neuromusculoskeletal Activities and Participation Affected:  1. Mobility  2. Self Care  3. Domestic Life   Number of elements (examined above) that affect the Plan of Care: 3: MODERATE COMPLEXITY   CLINICAL PRESENTATION:   Presentation: Evolving clinical presentation with changing clinical characteristics: MODERATE COMPLEXITY   CLINICAL DECISION MAKING:   Outcome Measure: Tool Used: Modified Oswestry Low Back Pain Questionnaire  Score:  Initial: 17/50  Most Recent: X/50 (Date: -- )   Interpretation of Score: Each section is scored on a 0-5 scale, 5 representing the greatest disability. The scores of each section are added together for a total score of 50. Score 0 1-10 11-20 21-30 31-40 41-49 50   Modifier CH CI CJ CK CL CM CN     ? Mobility - Walking and Moving Around:     - CURRENT STATUS: CJ - 20%-39% impaired, limited or restricted    - GOAL STATUS: CI - 1%-19% impaired, limited or restricted    - D/C STATUS:  ---------------To be determined---------------    Medical Necessity:   · Patient is expected to demonstrate progress in strength, range of motion and functional technique to increase independence with household ADL's. · Patient demonstrates good rehab potential due to higher previous functional level. Reason for Services/Other Comments:  · Patient continues to require skilled intervention due to decreased lumbar ROM, LE strength/flexibility and increased pain leading to decreased functional status. Use of outcome tool(s) and clinical judgement create a POC that gives a: Questionable prediction of patient's progress: MODERATE COMPLEXITY            TREATMENT:   (In addition to Assessment/Re-Assessment sessions the following treatments were rendered)  Pre-treatment Symptoms/Complaints:   Pain: Initial:     6/10 Post Session:  6/10     THERAPEUTIC EXERCISE: (0 minutes):  Exercises per grid below to improve mobility and strength.   Required minimal verbal cues to promote proper body alignment and promote proper body posture. Progressed resistance and repetitions as indicated. MANUAL THERAPY: (15 minutes): STM to bilateral lumbar paraspinals-greater left side tightness was utilized and necessary because of the patient's restricted motion of soft tissue. MODALITIES: (15 minutes): *  Electrical Stimulation Therapy (15) was provided with intensity adjusted throughout treatment to patient tolerance. to decrease pain and spasms       with moist heat. Skin clear afterwards. in prone   Date:  01-25-18 Date:     Activity/Exercise Parameters    SKTC 3 reps  15 sec holds    Active Hamstring Stretch     Piriformis Stretch 3 reps  15 sec holds    Pelvic Tilts     D1 D2 Blue x 10 reps    NuStep Level 5  6 minutes    Isometric Unilateral Hip Flexion x    SLR Flexion with TA x    T-band Hip Abduction x    Supine Marching x    T-band Straight Arm Pulldowns Blue  20 reps         [employer] Portal  Treatment/Session Assessment:    · Response to Treatment:  Pt tolerated treatments well. Decreased pain noted today. Leaving Saturday for a cruise. · Compliance with Program/Exercises: Compliant  · Recommendations/Intent for next treatment session: \"Next visit will focus on advancements to more challenging activities\".   Total Treatment Duration: 30 minutes  PT Patient Time In/Time Out  Time In: 0330  Time Out: Yue Blackman 43, PTA

## 2018-02-06 ENCOUNTER — APPOINTMENT (RX ONLY)
Dept: URBAN - METROPOLITAN AREA CLINIC 349 | Facility: CLINIC | Age: 68
Setting detail: DERMATOLOGY
End: 2018-02-06

## 2018-02-06 DIAGNOSIS — H02.6 XANTHELASMA OF EYELID: ICD-10-CM | Status: RESOLVING

## 2018-02-06 DIAGNOSIS — L71.8 OTHER ROSACEA: ICD-10-CM

## 2018-02-06 PROBLEM — E78.5 HYPERLIPIDEMIA, UNSPECIFIED: Status: ACTIVE | Noted: 2018-02-06

## 2018-02-06 PROBLEM — Z85.46 PERSONAL HISTORY OF MALIGNANT NEOPLASM OF PROSTATE: Status: ACTIVE | Noted: 2018-02-06

## 2018-02-06 PROBLEM — K21.9 GASTRO-ESOPHAGEAL REFLUX DISEASE WITHOUT ESOPHAGITIS: Status: ACTIVE | Noted: 2018-02-06

## 2018-02-06 PROBLEM — H02.60 XANTHELASMA OF UNSPECIFIED EYE, UNSPECIFIED EYELID: Status: ACTIVE | Noted: 2018-02-06

## 2018-02-06 PROBLEM — I10 ESSENTIAL (PRIMARY) HYPERTENSION: Status: ACTIVE | Noted: 2018-02-06

## 2018-02-06 PROBLEM — H02.66 XANTHELASMA OF LEFT EYE, UNSPECIFIED EYELID: Status: ACTIVE | Noted: 2018-02-06

## 2018-02-06 PROBLEM — L20.84 INTRINSIC (ALLERGIC) ECZEMA: Status: ACTIVE | Noted: 2018-02-06

## 2018-02-06 PROCEDURE — ? OTHER

## 2018-02-06 PROCEDURE — 99213 OFFICE O/P EST LOW 20 MIN: CPT

## 2018-02-06 PROCEDURE — ? COUNSELING

## 2018-02-06 PROCEDURE — ? PHOTO-DOCUMENTATION

## 2018-02-06 ASSESSMENT — LOCATION SIMPLE DESCRIPTION DERM
LOCATION SIMPLE: NOSE
LOCATION SIMPLE: LEFT CHEEK
LOCATION SIMPLE: RIGHT CHEEK
LOCATION SIMPLE: LEFT EYEBROW
LOCATION SIMPLE: LEFT EYELID

## 2018-02-06 ASSESSMENT — LOCATION ZONE DERM
LOCATION ZONE: NOSE
LOCATION ZONE: FACE
LOCATION ZONE: EYELID

## 2018-02-06 ASSESSMENT — LOCATION DETAILED DESCRIPTION DERM
LOCATION DETAILED: LEFT SUPERIOR CENTRAL MALAR CHEEK
LOCATION DETAILED: NASAL SUPRATIP
LOCATION DETAILED: RIGHT CENTRAL MALAR CHEEK
LOCATION DETAILED: RIGHT INFERIOR CENTRAL MALAR CHEEK
LOCATION DETAILED: LEFT CENTRAL MALAR CHEEK
LOCATION DETAILED: LEFT MEDIAL EYEBROW
LOCATION DETAILED: LEFT MEDIAL CANTHUS

## 2018-02-06 NOTE — PROCEDURE: PHOTO-DOCUMENTATION
Detail Level: Zone
Photo Preface (Leave Blank If You Do Not Want): Photographs were obtained today of the left eyelid
Photo Preface (Leave Blank If You Do Not Want): Photographs were obtained today of the face

## 2018-02-06 NOTE — PROCEDURE: OTHER
Detail Level: Zone
Note Text (......Xxx Chief Complaint.): Photographs taken with pt permission
Other (Free Text): Patient to call us with name of medication that is on his formulary

## 2018-02-07 ENCOUNTER — RX ONLY (OUTPATIENT)
Age: 68
Setting detail: RX ONLY
End: 2018-02-07

## 2018-02-07 RX ORDER — METRONIDAZOLE 7.5 MG/G
CREAM TOPICAL
Qty: 3 | Refills: 3

## 2018-03-22 NOTE — PROGRESS NOTES
Cristy Mosley  : 1950  [place]  Secondary: [address] [facility]  [address] [facility] [address]  [phone]   [phone]           OUTPATIENT PHYSICAL [facility] 2018    ICD-10: Treatment Diagnosis: Low back pain (M54.5)  Precautions/Allergies:   Levaquin [levofloxacin]   Fall Risk Score: 2 (? 5 = High Risk)  MD Orders: Evaluate and Treat MEDICAL/REFERRING DIAGNOSIS:  back   DATE OF ONSET: Chronic LBP  REFERRING PHYSICIAN: Yu Russo MD  RETURN PHYSICIAN APPOINTMENT: Pt has no return appt with Dr Lavena Saint at this time     INITIAL ASSESSMENT:  Mr. Hannah Cardoza was last seen for PT on 18. He did not return for any further PT after that date. Discharge from PT to independent HEP. PROBLEM LIST (Impacting functional limitations):  1. Decreased Strength  2. Decreased ADL/Functional Activities  3. Increased Pain  4. Decreased Flexibility/Joint Mobility  5. Decreased [place] with Home Exercise Program INTERVENTIONS PLANNED:  1. Cold  2. Cryotherapy  3. Electrical Stimulation  4. Heat  5. Home Exercise Program (HEP)  6. Manual Therapy  7. Range of Motion (ROM)  8. Therapeutic Exercise/Strengthening  9. Ultrasound (US)   TREATMENT PLAN:  Effective Dates: 17 TO 18. Frequency/Duration: 2 times a week for 2 months  GOALS: (Goals have been discussed and agreed upon with patient.)  Short-Term Functional Goals: Time Frame: 4 weeks  1. Pt will increase lumbar ROM flexion = 60 degrees, side bending = 35 degrees bilaterally to assist with household ADL's  2. Pt will increase SLR 50 degrees bilaterally to assist with household ADL's  Discharge Goals: Time Frame: 8 weeks  1. Pt will increase strength bilateral LE's 5/5 to assist with household ADL's  2. Pt will increase SLR 60 degrees bilaterally to assist with household ADL's  3. Pt will be independent with HEP  MET   4.  Pt will perform 20 minutes household cleaning activities independently with min to no c/o LBP  Rehabilitation Potential For Stated Goals: Good  Regarding Ray Renner's therapy, I certify that the treatment plan above will be carried out by a therapist or under their direction. Thank you for this referral,  Gilford Glasgow, PT     Referring Physician Signature: Michaela Barnard MD              Date                    The information in this section was collected on 12-21-17 (except where otherwise noted). HISTORY:   History of Present Injury/Illness (Reason for Referral):  Pt reports insidious onset LBP of greater than 20 years duration. He had lumbar surgery August 2016 for laminectomy. He was diagnosed with DDD and stenosis. He states it helped with the constant pain that he was getting, but he still gets pain with activity. He has had chiropractics as well as 2 injections with temporary relief of pain. He states he gets no pain at night. He had an ablation which gave him temporary relief of pain also. He states the pain diminishes with sitting and lying down and is worse with activity. He states MD feels his pain is muscular. He states he feels the muscles in his back and core getting weaker and weaker. Pt rates his current low back pain 0-1/10, increasing to 7/10 at worst (L>R low back). Aggravating factors include vacuuming and all household ADL's. Relieving factors include lying down and sleeping. He is taking Naprosyn prn. He has a back brace which he states he uses prn. Past Medical History/Comorbidities:   Mr. Clementina Pfeiffer  has a past medical history of Arthritis; Chronic pain; Compression deformity of vertebra (02/2016); GERD (gastroesophageal reflux disease); Hypercholesterolemia; Hyperlipidemia (5/22/2017); Hypertension; Malignant neoplasm of prostate ([facility] [address] Stress incontinence, male; and Thyroid disease.   Mr. Clementina Pfeiffer  has a past surgical history that includes endoscopy, colon, diagnostic; hx urological (2008); hx orthopaedic (Right, 1998); hx lumbar laminectomy (08/22/2016); hx lumbar laminectomy (08/2016); and hx radical prostatectomy (2007). Social History/Living Environment:     Pt lives in a one story house with 4 steps to enter. He has occasional ascending/descending stairs at times due to his back pain, especially when carrying items. He lives with spouse who assists with ADL's as needed  Prior Level of Function/Work/Activity:  Pt is retired. Dominant Side:         RIGHT  Other Clinical Tests:          None recently  Personal Factors:          Sex:  male        Age:  75 y.o. Profession:  Pt is retired  Current Medications:       Current Outpatient Prescriptions:     trimethoprim-sulfamethoxazole (BACTRIM DS) 160-800 mg per tablet, Take 1 Tab by mouth two (2) times a day for 21 days. , Disp: 42 Tab, Rfl: 0    amLODIPine (NORVASC) 5 mg tablet, Take 1 Tab by mouth daily. , Disp: 90 Tab, Rfl: 3    pantoprazole (PROTONIX) 40 mg tablet, Take 1 Tab by mouth daily. Replaces esomeprazole, Disp: 90 Tab, Rfl: 3    atorvastatin (LIPITOR) 20 mg tablet, Take 1 Tab by mouth daily. Replaces simvastatin, Disp: 90 Tab, Rfl: 3    telmisartan-hydroCHLOROthiazide (MICARDIS HCT) 80-12.5 mg per tablet, Take 1 Tab by mouth daily. , Disp: 90 Tab, Rfl: 3    levothyroxine (SYNTHROID) 150 mcg tablet, Take 1 Tab by mouth Daily (before breakfast). , Disp: 90 Tab, Rfl: 3    naproxen (NAPROSYN) 500 mg tablet, Take 1 Tab by mouth two (2) times daily (with meals). As needed, Disp: 180 Tab, Rfl: 3    DIGESTIVE ENZYMES, PLANT, (FIBERZYME CONCENTRATE-HP PO), Take  by mouth., Disp: , Rfl:     B.infantis-B.ani-B.long-B.bifi (PROBIOTIC 4X) 10-15 mg TbEC, Take  by mouth., Disp: , Rfl:     fexofenadine (ALLEGRA) 180 mg tablet, Take 180 mg by mouth daily. , Disp: , Rfl:     omega-3 fatty acids-vitamin e (FISH OIL) 1,000 mg cap, Take 1 Cap by mouth.  Holding for surgery, Disp: , Rfl:     multivitamin (ONE A DAY) tablet, Take 1 Tab by mouth daily. Last dose 8/1/2016, Disp: , Rfl:     aspirin delayed-release 81 mg tablet, Take 162 mg by mouth daily. Last dose to be 8/1/2016, Disp: , Rfl:    Date Last Reviewed:  12-21-17   Number of Personal Factors/Comorbidities that affect the Plan of Care: 1-2: MODERATE COMPLEXITY   EXAMINATION:   Observation/Orthostatic Postural Assessment: Forward head, rounded shoulders  Palpation:          No tenderness noted to palpation  ROM:    Lumbar flexion = 45 degrees (pulling in bilateral hamstrings)  Lumbar extension = 10 degrees  Lumbar side bending R = 25 degrees (pulling in L low back)  Lumbar side bending L = 25 degrees    Strength:    R hip flexion = 5/5  R hip abduction = 5/5  R hip adduction = 5/5  R knee extension = 5/5  R knee flexion = 5/5  R ankle dorsiflexion = 5/5  R ankle plantarflexion = 5/5    L hip flexion = 4/5  L hip abduction = 4/5  L hip adduction = 4+/5  L knee extension = 5/5  L knee flexion = 5/5  L ankle dorsiflexion = 5/5  L ankle plantarflexion = 5/5    Special Tests:          SLR 40 degrees wit hamstring tightness noted bilaterally  Neurological Screen:        Myotomes:  Weakness L hip        Dermatomes:  Intact to light touch bilateral LE's        Reflexes:  DTR's 1+ to bilateral achilles, 2+ to bilateral patellar  Functional Mobility:         Gait/Ambulation:  No significant deficits noted        Transfers:  Pt able to transition sit to supine and supine to sit independently    Body Structures Involved:  1. Bones  2. Joints  3. Muscles Body Functions Affected:  1. Sensory/Pain  2. Neuromusculoskeletal Activities and Participation Affected:  1. Mobility  2. Self Care  3. Domestic Life   Number of elements (examined above) that affect the Plan of Care: 3: MODERATE COMPLEXITY   CLINICAL PRESENTATION:   Presentation: Evolving clinical presentation with changing clinical characteristics: MODERATE COMPLEXITY   CLINICAL DECISION MAKING:   Outcome Measure:    Tool Used: Modified Oswestry Low Back Pain Questionnaire  Score:  Initial: 17/50  Most Recent: X/50 (Date: -- )   Interpretation of Score: Each section is scored on a 0-5 scale, 5 representing the greatest disability. The scores of each section are added together for a total score of 50. Score 0 1-10 11-20 21-30 31-40 41-49 50   Modifier CH CI CJ CK CL CM CN     ? Mobility - Walking and Moving Around:     - CURRENT STATUS: CJ - 20%-39% impaired, limited or restricted    - GOAL STATUS: CI - 1%-19% impaired, limited or restricted    - D/C STATUS:  ---------------To be determined---------------    Medical Necessity:   · Patient is expected to demonstrate progress in strength, range of motion and functional technique to increase independence with household ADL's. · Patient demonstrates good rehab potential due to higher previous functional level. Reason for Services/Other Comments:  · Patient continues to require skilled intervention due to decreased lumbar ROM, LE strength/flexibility and increased pain leading to decreased functional status.    Use of outcome tool(s) and clinical judgement create a POC that gives a: Questionable prediction of patient's progress: Dilcia, PT

## 2018-08-02 ENCOUNTER — HOSPITAL ENCOUNTER (OUTPATIENT)
Dept: PHYSICAL THERAPY | Age: 68
Discharge: HOME | End: 2018-08-02
Attending: INTERNAL MEDICINE
Payer: MEDICARE

## 2018-08-09 ENCOUNTER — HOSPITAL ENCOUNTER (OUTPATIENT)
Dept: PHYSICAL THERAPY | Age: 68
Discharge: HOME | End: 2018-08-09
Attending: INTERNAL MEDICINE
Payer: MEDICARE

## 2018-08-09 PROCEDURE — G8987 SELF CARE CURRENT STATUS: HCPCS

## 2018-08-09 PROCEDURE — 97161 PT EVAL LOW COMPLEX 20 MIN: CPT

## 2018-08-09 PROCEDURE — G8988 SELF CARE GOAL STATUS: HCPCS

## 2018-08-09 PROCEDURE — 97110 THERAPEUTIC EXERCISES: CPT

## 2018-08-09 NOTE — THERAPY EVALUATION
Bertha Landers  : 1950  [place]  Secondary: [address] [facility]  [facility], [address] [address], [address]  [phone]   [phone]          OUTPATIENT PHYSICAL THERAPY:Initial Assessment 2018    ICD-10: Treatment Diagnosis: Muscle weakness (generalized) (M62.81),   Stress incontinence (female) (male  Precautions/Allergies:   Levaquin [levofloxacin]   Fall Risk Score: 1 (? 5 = High Risk)  MD Orders: Eval and treat MEDICAL/REFERRING DIAGNOSIS:  Full incontinence of feces [R15.9]   DATE OF ONSET: one year  REFERRING PHYSICIAN: Sanjuana Gaines MD  RETURN PHYSICIAN APPOINTMENT: TBD     INITIAL ASSESSMENT:  Mr. Mortensen Memory presents with decreased strength of external sphincter resulting in incontinence of feces and stress incontinence of urine. His external anal sphincter strength is  3/5 and his endurance = 8 seconds. He would benefit from therapy to address his strength and endurance to improve holding feces and gas in order to return to previous level of activity. He was educated on use of fiber, squatty potty, and kegels today in the clinic. He had good understanding. Pt is an excellent candidate for skilled PT. PROBLEM LIST (Impacting functional limitations):  1. Decreased strength of pelvic floor which limits bladder control and fecal control INTERVENTIONS PLANNED:  1. Neuromuscular re-education  2. Biofeedback as needed  3. HEP  4. Bladder retraining  5. Bladder education  6. Electrical Stimulation  7. Home Exercise Program (HEP)  8. Manual Therapy  9. Neuromuscular Re-education/Strengthening  10. Range of Motion (ROM)  11. Therapeutic Activites  12. Therapeutic Exercise/Strengthening     TREATMENT PLAN:  Effective Dates: 2018 TO 2018 (90 days).   Frequency/Duration: 1 time a week for 90 Days  GOALS: (Goals have been discussed and agreed upon with patient.)  Short-Term Functional Goals: Time Frame: in 3 weeks  1. Patient will demonstrate independence with home exercise program.  2. Patient will demonstrated good coordination of pelvic floor muscles to improve continence of urine and feces  Discharge Goals: Time Frame: in 8 weeks  1. Pt will report type 4 bowel movements 1 -3 x per day. 2. Pt will report 75% decreased in stress urinary incontinence  3. Pt will report 75% improvement in fecal incontinence   Rehabilitation Potential For Stated Goals: Good  Regarding David Renner's therapy, I certify that the treatment plan above will be carried out by a therapist or under their direction. Thank you for this referral,  Dorota Pond PT     Referring Physician Signature: Sanjuana Gaines MD              Date                    The information in this section was collected on 8/9/2018 (except where otherwise noted). HISTORY:   History of Present Injury/Illness (Reason for Referral): Had prostate CA 12 years ago. Had small sphincter and had an artificial urinary sphincter. Uses one to two pads per day. In the last year has had a number of UTI's. Did find bacteria in the urine. Was on a month of cipro, Had odor and burning in the bladder which made note an infection. Is currently on Bactrim. Had back surgery. Had laminectomy on L1,2, 3 a 2 years ago. Started doing therapy and it did help. Does have some low back pain with activities. October of 2017 started noticing fecal issues. Is now bulking stool and using a squatty potty. Will have one bowel movement in the am and then one in the afternoon. Uses butterfly pads when has need them. When he feels the need to evacuate he is unable to hold fecal matter back. Still having to wipe much more. No real dietary restrictions. Voiding Patterns: Patient voids every 2 hours during the day and 1 times during the night. Patient reports that he empties bladder. Patient uses pads for bladder protection; he changes pads 1-2 times per day.   Fluid Intake: Patient drinks 1-2 bottles of water per day. He consumes 1 cups of caffeinated beverages per day. Patient not limit fluid intake to control bladder. Bowel Habits: Patient= bowel habits 2 x a day. Mobility / Self Care: I   Personal / Social History:  · Sexually active? 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 Gay Sawant  has a past medical history of Arthritis; Chronic pain; Compression deformity of vertebra (02/2016); GERD (gastroesophageal reflux disease); Hypercholesterolemia; Hyperlipidemia (5/22/2017); Hypertension; Malignant neoplasm of prostate ([facility] [address] Stress incontinence, male; and Thyroid disease. Mr. aGy Sawant  has a past surgical history that includes endoscopy, colon, diagnostic; hx urological (2008); hx orthopaedic (Right, 1998); hx lumbar laminectomy (08/22/2016); hx lumbar laminectomy (08/2016); and hx radical prostatectomy (2007). Social History/Living Environment:    Lives with wife   Prior Level of Function/Work/Activity:I  Current Medications:    Current Outpatient Prescriptions:     trimethoprim-sulfamethoxazole (BACTRIM DS) 160-800 mg per tablet, Take 1 Tab by mouth two (2) times a day., Disp: 14 Tab, Rfl: 0    amLODIPine (NORVASC) 5 mg tablet, Take 1 Tab by mouth daily. , Disp: 90 Tab, Rfl: 3    pantoprazole (PROTONIX) 40 mg tablet, Take 1 Tab by mouth daily. Replaces esomeprazole, Disp: 90 Tab, Rfl: 3    atorvastatin (LIPITOR) 20 mg tablet, Take 1 Tab by mouth daily. Replaces simvastatin, Disp: 90 Tab, Rfl: 3    telmisartan-hydroCHLOROthiazide (MICARDIS HCT) 80-12.5 mg per tablet, Take 1 Tab by mouth daily. , Disp: 90 Tab, Rfl: 3    levothyroxine (SYNTHROID) 150 mcg tablet, Take 1 Tab by mouth Daily (before breakfast). , Disp: 90 Tab, Rfl: 3    naproxen (NAPROSYN) 500 mg tablet, Take 1 Tab by mouth two (2) times daily (with meals).  As needed, Disp: 180 Tab, Rfl: 3    DIGESTIVE ENZYMES, PLANT, (FIBERZYME CONCENTRATE-HP PO), Take  by mouth., Disp: , Rfl:     B.infantis-B.ani-B.long-B.bifi (PROBIOTIC 4X) 10-15 mg TbEC, Take  by mouth., Disp: , Rfl:     fexofenadine (ALLEGRA) 180 mg tablet, Take 180 mg by mouth daily. , Disp: , Rfl:     omega-3 fatty acids-vitamin e (FISH OIL) 1,000 mg cap, Take 1 Cap by mouth. Holding for surgery, Disp: , Rfl:     multivitamin (ONE A DAY) tablet, Take 1 Tab by mouth daily. Last dose 8/1/2016, Disp: , Rfl:     aspirin delayed-release 81 mg tablet, Take 162 mg by mouth daily. Last dose to be 8/1/2016, Disp: , Rfl:    Date Last Reviewed:  8/9/2018      Number of Personal Factors/Comorbidities that affect the Plan of Care: 3+: HIGH COMPLEXITY   EXAMINATION:   Internal Assessment via anal canal:   PERF = 3/8/8/31     Body Structures Involved:  1. Muscles Body Functions Affected:  1. Genitourinary  2. Neuromusculoskeletal Activities and Participation Affected:  1. Self Care  2. Community, Social and [place]   Number of elements that affect the Plan of Care: 3: MODERATE COMPLEXITY   CLINICAL PRESENTATION:   Presentation: Stable and uncomplicated: LOW COMPLEXITY   CLINICAL DECISION MAKING:   Outcome Measure: Tool Used: Pelvic Floor Impact Questionnaire--short form 7 (PFIQ-7)   Score:  Initial:   · Bladder or Urine: 4  · Bowel or Rectum: 4  · Vagina or Pelvis: 0 Most Recent: X (Date: -- )  · Bladder or Urine: X  · Bowel or Rectum: X  · Vagina or Pelvis: X   Interpretation of Score: Each of the 7 sections is scored on a scale from 0-3; 0 representing \"Not at all\", 3 representing \"Quite a bit\". The mean value is taken from all the answered items, then multiplied by 100 to obtain the scale score, ranging from 0-100. This process is repeated for each column representing bowel, bladder, and pelvic pain. ?  Self Care:     - CURRENT STATUS: CI - 1%-19% impaired, limited or restricted    - GOAL STATUS: CH - 0% impaired, limited or restricted    - D/C STATUS:  ---------------To be determined---------------     Medical Necessity:   · Patient is expected to demonstrate progress in strength and coordination to decrease assistance required with Kegels. · Patient demonstrates good rehab potential due to higher previous functional level. Reason for Services/Other Comments:  · Patient continues to require skilled intervention due to urinary and fecal incotinece. Use of outcome tool(s) and clinical judgement create a POC that gives a: Clear prediction of patient's progress: LOW COMPLEXITY   TREATMENT:   (In addition to Assessment/Re-Assessment sessions the following treatments were rendered)  Pre-treatment Symptoms/Complaints:  See above    Pain: Initial:     0 Post Session:  0     THERAPEUTIC EXERCISE: (15 minutes):  Exercises per grid below to improve strength and coordination. Required moderate verbal and tactile cues to promote proper body alignment, promote proper body posture and promote proper body breathing techniques. Progressed repetitions and complexity of movement as indicated. Exercises:  Patient instructed in pelvic floor exercises listed below:   Date:  8/10/2018   Activity/Exercise Parameters   Pt education  10 min , squatty potty, fiber, CALM,    Kegel in supine with tactile feedback      8on 10 off x 10  1on 1 off x 10                                [employer] Portal    The following educational topics were reviewed with patient:    Treatment/Session Assessment:    · Response to Treatment:   Pt reported good understanding of plan of care as well as exercises. All questions were answered and pt. Invited to call with any further questions or issues. · Compliance with Program/Exercises: Will assess as treatment progresses. · Recommendations/Intent for next treatment session: \"Next visit will focus on advancements to more challenging activities\".   Total Treatment Duration:     Total treatment time 60 min    Dorota Pond, PT

## 2018-08-10 NOTE — PROGRESS NOTES
Ambulatory/Rehab Services H2 Model Falls Risk Assessment    Risk Factor Pts. ·   Confusion/Disorientation/Impulsivity  []    4 ·   Symptomatic Depression  []   2 ·   Altered Elimination  []   1 ·   Dizziness/Vertigo  []   1 ·   Gender (Male)  [x]   1 ·   Any administered antiepileptics (anticonvulsants):  []   2 ·   Any administered benzodiazepines:  []   1 ·   Visual Impairment (specify):  []   1 ·   Portable Oxygen Use  []   1 ·   Orthostatic ? BP  []   1 ·   History of Recent Falls (within 3 mos.)  []   5     Ability to Rise from Chair (choose one) Pts. ·   Ability to rise in a single movement  [x]   0 ·   Pushes up, successful in one attempt  []   1 ·   Multiple attempts, but successful  []   3 ·   Unable to rise without assistance  []   4   Total: (5 or greater = High Risk) 1     Falls Prevention Plan:   []                Physical Limitations to Exercise (specify):   []                Mobility Assistance Device (type):   []                Exercise/Equipment Adaptation (specify):    ©2010 [facility] [address] Patent #[id number].  Federal Law prohibits the replication, distribution or use without written permission from [facility] 9+

## 2018-08-16 ENCOUNTER — HOSPITAL ENCOUNTER (OUTPATIENT)
Dept: PHYSICAL THERAPY | Age: 68
Discharge: HOME | End: 2018-08-16
Attending: INTERNAL MEDICINE
Payer: MEDICARE

## 2018-08-16 PROCEDURE — 97110 THERAPEUTIC EXERCISES: CPT

## 2018-08-16 NOTE — PROGRESS NOTES
Azalea Roque  : 1950  [place]  Secondary: [address] [facility]  [address] [facility], [address] [place] [address],[address], [address] [facility]  [phone]   [phone]          OUTPATIENT PHYSICAL THERAPY:Daily Note 2018    ICD-10: Treatment Diagnosis: Muscle weakness (generalized) (M62.81),   Stress incontinence (female) (male  Precautions/Allergies:   Levaquin [levofloxacin]   Fall Risk Score: 1 (? 5 = High Risk)  MD Orders: Eval and treat MEDICAL/REFERRING DIAGNOSIS:  Full incontinence of feces [R15.9]   DATE OF ONSET: one year  REFERRING PHYSICIAN: Edvin Limon MD  RETURN PHYSICIAN APPOINTMENT: TBD     INITIAL ASSESSMENT:  Mr. Crain Cousin presents with decreased strength of external sphincter resulting in incontinence of feces and stress incontinence of urine. His external anal sphincter strength is  3/5 and his endurance = 8 seconds. He would benefit from therapy to address his strength and endurance to improve holding feces and gas in order to return to previous level of activity. He was educated on use of fiber, squatty potty, and kegels today in the clinic. He had good understanding. Pt is an excellent candidate for skilled PT. PROBLEM LIST (Impacting functional limitations):  1. Decreased strength of pelvic floor which limits bladder control and fecal control INTERVENTIONS PLANNED:  1. Neuromuscular re-education  2. Biofeedback as needed  3. HEP  4. Bladder retraining  5. Bladder education  6. Electrical Stimulation  7. Home Exercise Program (HEP)  8. Manual Therapy  9. Neuromuscular Re-education/Strengthening  10. Range of Motion (ROM)  11. Therapeutic Activites  12. Therapeutic Exercise/Strengthening     TREATMENT PLAN:  Effective Dates: 2018 TO 2018 (90 days).   Frequency/Duration: 1 time a week for 90 Days  GOALS: (Goals have been discussed and agreed upon with patient.)  Short-Term Functional Goals: Time Frame: in 3 weeks  1. Patient will demonstrate independence with home exercise program.  2. Patient will demonstrated good coordination of pelvic floor muscles to improve continence of urine and feces  Discharge Goals: Time Frame: in 8 weeks  1. Pt will report type 4 bowel movements 1 -3 x per day. 2. Pt will report 75% decreased in stress urinary incontinence  3. Pt will report 75% improvement in fecal incontinence   Rehabilitation Potential For Stated Goals: Good  Regarding Daylin Renner's therapy, I certify that the treatment plan above will be carried out by a therapist or under their direction. Thank you for this referral,  Jose Maria Clark PT     Referring Physician Signature: Darryl Lagunas MD              Date                    The information in this section was collected on 8/9/2018 (except where otherwise noted). HISTORY:   History of Present Injury/Illness (Reason for Referral): Had prostate CA 12 years ago. Had small sphincter and had an artificial urinary sphincter. Uses one to two pads per day. In the last year has had a number of UTI's. Did find bacteria in the urine. Was on a month of cipro, Had odor and burning in the bladder which made note an infection. Is currently on Bactrim. Had back surgery. Had laminectomy on L1,2, 3 a 2 years ago. Started doing therapy and it did help. Does have some low back pain with activities. October of 2017 started noticing fecal issues. Is now bulking stool and using a squatty potty. Will have one bowel movement in the am and then one in the afternoon. Uses butterfly pads when has need them. When he feels the need to evacuate he is unable to hold fecal matter back. Still having to wipe much more. No real dietary restrictions. Voiding Patterns: Patient voids every 2 hours during the day and 1 times during the night. Patient reports that he empties bladder. Patient uses pads for bladder protection; he changes pads 1-2 times per day.   Fluid Intake: Patient drinks 1-2 bottles of water per day. He consumes 1 cups of caffeinated beverages per day. Patient not limit fluid intake to control bladder. Bowel Habits: Patient= bowel habits 2 x a day. Mobility / Self Care: I   Personal / Social History:  · Sexually active? 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 Stephany Vargas  has a past medical history of Arthritis; Chronic pain; Compression deformity of vertebra (02/2016); GERD (gastroesophageal reflux disease); Hypercholesterolemia; Hyperlipidemia (5/22/2017); Hypertension; Malignant neoplasm of prostate ([facility] [address] Stress incontinence, male; and Thyroid disease. Mr. Stephany Vargas  has a past surgical history that includes endoscopy, colon, diagnostic; hx urological (2008); hx orthopaedic (Right, 1998); hx lumbar laminectomy (08/22/2016); hx lumbar laminectomy (08/2016); and hx radical prostatectomy (2007). Social History/Living Environment:    Lives with wife   Prior Level of Function/Work/Activity:I  Current Medications:    Current Outpatient Prescriptions:     trimethoprim-sulfamethoxazole (BACTRIM DS) 160-800 mg per tablet, Take 1 Tab by mouth two (2) times a day., Disp: 14 Tab, Rfl: 0    amLODIPine (NORVASC) 5 mg tablet, Take 1 Tab by mouth daily. , Disp: 90 Tab, Rfl: 3    pantoprazole (PROTONIX) 40 mg tablet, Take 1 Tab by mouth daily. Replaces esomeprazole, Disp: 90 Tab, Rfl: 3    atorvastatin (LIPITOR) 20 mg tablet, Take 1 Tab by mouth daily. Replaces simvastatin, Disp: 90 Tab, Rfl: 3    telmisartan-hydroCHLOROthiazide (MICARDIS HCT) 80-12.5 mg per tablet, Take 1 Tab by mouth daily. , Disp: 90 Tab, Rfl: 3    levothyroxine (SYNTHROID) 150 mcg tablet, Take 1 Tab by mouth Daily (before breakfast). , Disp: 90 Tab, Rfl: 3    naproxen (NAPROSYN) 500 mg tablet, Take 1 Tab by mouth two (2) times daily (with meals).  As needed, Disp: 180 Tab, Rfl: 3    DIGESTIVE ENZYMES, PLANT, (FIBERZYME CONCENTRATE-HP PO), Take  by mouth., Disp: , Rfl:     B.infantis-B.ani-B.long-B.bifi (PROBIOTIC 4X) 10-15 mg TbEC, Take  by mouth., Disp: , Rfl:     fexofenadine (ALLEGRA) 180 mg tablet, Take 180 mg by mouth daily. , Disp: , Rfl:     omega-3 fatty acids-vitamin e (FISH OIL) 1,000 mg cap, Take 1 Cap by mouth. Holding for surgery, Disp: , Rfl:     multivitamin (ONE A DAY) tablet, Take 1 Tab by mouth daily. Last dose 8/1/2016, Disp: , Rfl:     aspirin delayed-release 81 mg tablet, Take 162 mg by mouth daily. Last dose to be 8/1/2016, Disp: , Rfl:    Date Last Reviewed:  8/16/2018      Number of Personal Factors/Comorbidities that affect the Plan of Care: 3+: HIGH COMPLEXITY   EXAMINATION:   Internal Assessment via anal canal:   PERF = 3/8/8/31     Body Structures Involved:  1. Muscles Body Functions Affected:  1. Genitourinary  2. Neuromusculoskeletal Activities and Participation Affected:  1. Self Care  2. Community, Social and [place]   Number of elements that affect the Plan of Care: 3: MODERATE COMPLEXITY   CLINICAL PRESENTATION:   Presentation: Stable and uncomplicated: LOW COMPLEXITY   CLINICAL DECISION MAKING:   Outcome Measure: Tool Used: Pelvic Floor Impact Questionnaire--short form 7 (PFIQ-7)   Score:  Initial:   · Bladder or Urine: 4  · Bowel or Rectum: 4  · Vagina or Pelvis: 0 Most Recent: X (Date: -- )  · Bladder or Urine: X  · Bowel or Rectum: X  · Vagina or Pelvis: X   Interpretation of Score: Each of the 7 sections is scored on a scale from 0-3; 0 representing \"Not at all\", 3 representing \"Quite a bit\". The mean value is taken from all the answered items, then multiplied by 100 to obtain the scale score, ranging from 0-100. This process is repeated for each column representing bowel, bladder, and pelvic pain. ?  Self Care:     - CURRENT STATUS: CI - 1%-19% impaired, limited or restricted    - GOAL STATUS: CH - 0% impaired, limited or restricted    - D/C STATUS:  ---------------To be determined---------------     Medical Necessity:   · Patient is expected to demonstrate progress in strength and coordination to decrease assistance required with Kegels. · Patient demonstrates good rehab potential due to higher previous functional level. Reason for Services/Other Comments:  · Patient continues to require skilled intervention due to urinary and fecal incotinece. Use of outcome tool(s) and clinical judgement create a POC that gives a: Clear prediction of patient's progress: LOW COMPLEXITY   TREATMENT:   (In addition to Assessment/Re-Assessment sessions the following treatments were rendered)  Pre-treatment Symptoms/Complaints:  Pt states that he helped his daughter move and had some fecal and urinary incontinence. Feels much better now that his is on the right routine. Pain: Initial:   Pain Intensity 1: 0 0 Post Session:  0     THERAPEUTIC EXERCISE: (38 minutes):  Exercises per grid below to improve strength and coordination. Required moderate verbal and tactile cues to promote proper body alignment, promote proper body posture and promote proper body breathing techniques. Progressed repetitions and complexity of movement as indicated. Exercises:  Patient instructed in pelvic floor exercises listed below:   Date:  8/10/2018   Activity/Exercise Parameters   Pt education  10 min , squatty potty, fiber, CALM,    Kegel in supine with internal rectal sensor  feedback      8on 10 off x 10  1on 1 off x 10   10 min focus on resting tone     NMES with Kegel with internal rectal sensor 10 min 10 on 10 off 12 Hz                         [employer] Portal    The following educational topics were reviewed with patient:    Treatment/Session Assessment:    · Response to Treatment:   Pt did excellent with PT.will continue as tolerated. with NMES and Kegels. Encouraged to focus on resting tone. · Compliance with Program/Exercises:  Will assess as treatment progresses. · Recommendations/Intent for next treatment session: \"Next visit will focus on advancements to more challenging activities\".   Total Treatment Duration:  PT Patient Time In/Time Out  Time In: 1400  Time Out: 1450     Yvette House PT

## 2018-08-23 ENCOUNTER — HOSPITAL ENCOUNTER (OUTPATIENT)
Dept: PHYSICAL THERAPY | Age: 68
Discharge: HOME | End: 2018-08-23
Attending: INTERNAL MEDICINE
Payer: MEDICARE

## 2018-08-23 PROCEDURE — 97110 THERAPEUTIC EXERCISES: CPT

## 2018-08-23 NOTE — PROGRESS NOTES
Gilberto Mead  : 1950  [place]  Secondary: [address] [facility]  [address] [facility], [address] [place] [address],[address], [address] [facility]  [phone]   [phone]          OUTPATIENT PHYSICAL THERAPY:Daily Note 2018    ICD-10: Treatment Diagnosis: Muscle weakness (generalized) (M62.81),   Stress incontinence (female) (male  Precautions/Allergies:   Levaquin [levofloxacin]   Fall Risk Score: 1 (? 5 = High Risk)  MD Orders: Eval and treat MEDICAL/REFERRING DIAGNOSIS:  Full incontinence of feces [R15.9]   DATE OF ONSET: one year  REFERRING PHYSICIAN: Yani Mac MD  RETURN PHYSICIAN APPOINTMENT: TBD     INITIAL ASSESSMENT:  Mr. Galilea Manley presents with decreased strength of external sphincter resulting in incontinence of feces and stress incontinence of urine. His external anal sphincter strength is  3/5 and his endurance = 8 seconds. He would benefit from therapy to address his strength and endurance to improve holding feces and gas in order to return to previous level of activity. He was educated on use of fiber, squatty potty, and kegels today in the clinic. He had good understanding. Pt is an excellent candidate for skilled PT. PROBLEM LIST (Impacting functional limitations):  1. Decreased strength of pelvic floor which limits bladder control and fecal control INTERVENTIONS PLANNED:  1. Neuromuscular re-education  2. Biofeedback as needed  3. HEP  4. Bladder retraining  5. Bladder education  6. Electrical Stimulation  7. Home Exercise Program (HEP)  8. Manual Therapy  9. Neuromuscular Re-education/Strengthening  10. Range of Motion (ROM)  11. Therapeutic Activites  12. Therapeutic Exercise/Strengthening     TREATMENT PLAN:  Effective Dates: 2018 TO 2018 (90 days).   Frequency/Duration: 1 time a week for 90 Days  GOALS: (Goals have been discussed and agreed upon with patient.)  Short-Term Functional Goals: Time Frame: in 3 weeks  1. Patient will demonstrate independence with home exercise program.  2. Patient will demonstrated good coordination of pelvic floor muscles to improve continence of urine and feces  Discharge Goals: Time Frame: in 8 weeks  1. Pt will report type 4 bowel movements 1 -3 x per day. 2. Pt will report 75% decreased in stress urinary incontinence  3. Pt will report 75% improvement in fecal incontinence   Rehabilitation Potential For Stated Goals: Good  Regarding Alee Renner's therapy, I certify that the treatment plan above will be carried out by a therapist or under their direction. Thank you for this referral,  Rosa Maria Berman PT     Referring Physician Signature: Shanta Unger MD              Date                    The information in this section was collected on 8/9/2018 (except where otherwise noted). HISTORY:   History of Present Injury/Illness (Reason for Referral): Had prostate CA 12 years ago. Had small sphincter and had an artificial urinary sphincter. Uses one to two pads per day. In the last year has had a number of UTI's. Did find bacteria in the urine. Was on a month of cipro, Had odor and burning in the bladder which made note an infection. Is currently on Bactrim. Had back surgery. Had laminectomy on L1,2, 3 a 2 years ago. Started doing therapy and it did help. Does have some low back pain with activities. October of 2017 started noticing fecal issues. Is now bulking stool and using a squatty potty. Will have one bowel movement in the am and then one in the afternoon. Uses butterfly pads when has need them. When he feels the need to evacuate he is unable to hold fecal matter back. Still having to wipe much more. No real dietary restrictions. Voiding Patterns: Patient voids every 2 hours during the day and 1 times during the night. Patient reports that he empties bladder. Patient uses pads for bladder protection; he changes pads 1-2 times per day.   Fluid Intake: Patient drinks 1-2 bottles of water per day. He consumes 1 cups of caffeinated beverages per day. Patient not limit fluid intake to control bladder. Bowel Habits: Patient= bowel habits 2 x a day. Mobility / Self Care: I   Personal / Social History:  · Sexually active? 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 Camden Bojorquez  has a past medical history of Arthritis; Chronic pain; Compression deformity of vertebra (02/2016); GERD (gastroesophageal reflux disease); Hypercholesterolemia; Hyperlipidemia (5/22/2017); Hypertension; Malignant neoplasm of prostate ([facility] [address] Stress incontinence, male; and Thyroid disease. Mr. Camden Bojorquez  has a past surgical history that includes endoscopy, colon, diagnostic; hx urological (2008); hx orthopaedic (Right, 1998); hx lumbar laminectomy (08/22/2016); hx lumbar laminectomy (08/2016); and hx radical prostatectomy (2007). Social History/Living Environment:    Lives with wife   Prior Level of Function/Work/Activity:I  Current Medications:    Current Outpatient Prescriptions:     trimethoprim-sulfamethoxazole (BACTRIM DS) 160-800 mg per tablet, Take 1 Tab by mouth two (2) times a day., Disp: 14 Tab, Rfl: 0    amLODIPine (NORVASC) 5 mg tablet, Take 1 Tab by mouth daily. , Disp: 90 Tab, Rfl: 3    pantoprazole (PROTONIX) 40 mg tablet, Take 1 Tab by mouth daily. Replaces esomeprazole, Disp: 90 Tab, Rfl: 3    atorvastatin (LIPITOR) 20 mg tablet, Take 1 Tab by mouth daily. Replaces simvastatin, Disp: 90 Tab, Rfl: 3    telmisartan-hydroCHLOROthiazide (MICARDIS HCT) 80-12.5 mg per tablet, Take 1 Tab by mouth daily. , Disp: 90 Tab, Rfl: 3    levothyroxine (SYNTHROID) 150 mcg tablet, Take 1 Tab by mouth Daily (before breakfast). , Disp: 90 Tab, Rfl: 3    naproxen (NAPROSYN) 500 mg tablet, Take 1 Tab by mouth two (2) times daily (with meals).  As needed, Disp: 180 Tab, Rfl: 3    DIGESTIVE ENZYMES, PLANT, (FIBERZYME CONCENTRATE-HP PO), Take  by mouth., Disp: , Rfl:     B.infantis-B.ani-B.long-B.bifi (PROBIOTIC 4X) 10-15 mg TbEC, Take  by mouth., Disp: , Rfl:     fexofenadine (ALLEGRA) 180 mg tablet, Take 180 mg by mouth daily. , Disp: , Rfl:     omega-3 fatty acids-vitamin e (FISH OIL) 1,000 mg cap, Take 1 Cap by mouth. Holding for surgery, Disp: , Rfl:     multivitamin (ONE A DAY) tablet, Take 1 Tab by mouth daily. Last dose 8/1/2016, Disp: , Rfl:     aspirin delayed-release 81 mg tablet, Take 162 mg by mouth daily. Last dose to be 8/1/2016, Disp: , Rfl:    Date Last Reviewed:  8/23/2018      Number of Personal Factors/Comorbidities that affect the Plan of Care: 3+: HIGH COMPLEXITY   EXAMINATION:   Internal Assessment via anal canal:   PERF = 3/8/8/31  SEMG evaluation:   Date: 8/23/2018   Resting Tone: 5 to 3 uV   Quality of Resting Tone:    5 Second Hold: 20 to 15 uV   10 Second Hold:  15 to 8 uV   Quality of Recruitment: Fair    Quality of Relaxation: Fair    Quality of Holding: Fair    Stability of Hold: Fair    Stability of Rest: Fair      Body Structures Involved:  1. Muscles Body Functions Affected:  1. Genitourinary  2. Neuromusculoskeletal Activities and Participation Affected:  1. Self Care  2. Community, Social and [place]   Number of elements that affect the Plan of Care: 3: MODERATE COMPLEXITY   CLINICAL PRESENTATION:   Presentation: Stable and uncomplicated: LOW COMPLEXITY   CLINICAL DECISION MAKING:   Outcome Measure: Tool Used: Pelvic Floor Impact Questionnaire--short form 7 (PFIQ-7)   Score:  Initial:   · Bladder or Urine: 4  · Bowel or Rectum: 4  · Vagina or Pelvis: 0 Most Recent: X (Date: -- )  · Bladder or Urine: X  · Bowel or Rectum: X  · Vagina or Pelvis: X   Interpretation of Score: Each of the 7 sections is scored on a scale from 0-3; 0 representing \"Not at all\", 3 representing \"Quite a bit\".  The mean value is taken from all the answered items, then multiplied by 100 to obtain the scale score, ranging from 0-100. This process is repeated for each column representing bowel, bladder, and pelvic pain. ? Self Care:     - CURRENT STATUS: CI - 1%-19% impaired, limited or restricted    - GOAL STATUS: CH - 0% impaired, limited or restricted    - D/C STATUS:  ---------------To be determined---------------     Medical Necessity:   · Patient is expected to demonstrate progress in strength and coordination to decrease assistance required with Kegels. · Patient demonstrates good rehab potential due to higher previous functional level. Reason for Services/Other Comments:  · Patient continues to require skilled intervention due to urinary and fecal incotinece. Use of outcome tool(s) and clinical judgement create a POC that gives a: Clear prediction of patient's progress: LOW COMPLEXITY   TREATMENT:   (In addition to Assessment/Re-Assessment sessions the following treatments were rendered)  Pre-treatment Symptoms/Complaints:  Had one bad day. Everything was looser. Had gone 4X that day and did have to wear butterfly. Does have more stress incontinence. Pain: Initial:   Pain Intensity 1: 0 0 Post Session:  0     THERAPEUTIC EXERCISE: (38 minutes):  Exercises per grid below to improve strength and coordination. Required moderate verbal and tactile cues to promote proper body alignment, promote proper body posture and promote proper body breathing techniques. Progressed repetitions and complexity of movement as indicated.     Exercises:  Patient instructed in pelvic floor exercises listed below:   Date:  8/10/2018   Activity/Exercise Parameters   Pt education  10 min  FODMAP diet   Kegel in supine with internal rectal sensor  feedback      8on 10 off x 10  1on 1 off x 10   10 min focus on resting toneb     NMES with Kegel with internal rectal sensor 10 min 10 on 10 off 12 Hz                         [employer] Portal    The following educational topics were reviewed with patient: Treatment/Session Assessment:    · Response to Treatment:   Pt had improved starting resting tone which was 5 uV. It did come down to 3 uV. Pt did well with all exercises and has improved resting tone. · Compliance with Program/Exercises: Will assess as treatment progresses. · Recommendations/Intent for next treatment session: \"Next visit will focus on advancements to more challenging activities\".   Total Treatment Duration:  PT Patient Time In/Time Out  Time In: 1355  Time Out: 9727 [address],

## 2018-08-30 ENCOUNTER — HOSPITAL ENCOUNTER (OUTPATIENT)
Dept: PHYSICAL THERAPY | Age: 68
Discharge: HOME | End: 2018-08-30
Attending: INTERNAL MEDICINE
Payer: MEDICARE

## 2018-08-30 PROCEDURE — 97110 THERAPEUTIC EXERCISES: CPT

## 2018-08-30 NOTE — PROGRESS NOTES
Debby Powell  : 1950  [place]  Secondary: [address] [facility]  [address] [facility], [address] [place] [address],[address], [address] [facility]  [phone]   [phone]          OUTPATIENT PHYSICAL THERAPY:Daily Note 2018    ICD-10: Treatment Diagnosis: Muscle weakness (generalized) (M62.81),   Stress incontinence (female) (male  Precautions/Allergies:   Levaquin [levofloxacin]   Fall Risk Score: 1 (? 5 = High Risk)  MD Orders: Eval and treat MEDICAL/REFERRING DIAGNOSIS:  Full incontinence of feces [R15.9]   DATE OF ONSET: one year  REFERRING PHYSICIAN: Juan José Duran MD  RETURN PHYSICIAN APPOINTMENT: TBD     INITIAL ASSESSMENT:  Mr. Harman Bowling presents with decreased strength of external sphincter resulting in incontinence of feces and stress incontinence of urine. His external anal sphincter strength is  3/5 and his endurance = 8 seconds. He would benefit from therapy to address his strength and endurance to improve holding feces and gas in order to return to previous level of activity. He was educated on use of fiber, squatty potty, and kegels today in the clinic. He had good understanding. Pt is an excellent candidate for skilled PT. PROBLEM LIST (Impacting functional limitations):  1. Decreased strength of pelvic floor which limits bladder control and fecal control INTERVENTIONS PLANNED:  1. Neuromuscular re-education  2. Biofeedback as needed  3. HEP  4. Bladder retraining  5. Bladder education  6. Electrical Stimulation  7. Home Exercise Program (HEP)  8. Manual Therapy  9. Neuromuscular Re-education/Strengthening  10. Range of Motion (ROM)  11. Therapeutic Activites  12. Therapeutic Exercise/Strengthening     TREATMENT PLAN:  Effective Dates: 2018 TO 2018 (90 days).   Frequency/Duration: 1 time a week for 90 Days  GOALS: (Goals have been discussed and agreed upon with patient.)  Short-Term Functional Goals: Time Frame: in 3 weeks  1. Patient will demonstrate independence with home exercise program.  2. Patient will demonstrated good coordination of pelvic floor muscles to improve continence of urine and feces  Discharge Goals: Time Frame: in 8 weeks  1. Pt will report type 4 bowel movements 1 -3 x per day. 2. Pt will report 75% decreased in stress urinary incontinence  3. Pt will report 75% improvement in fecal incontinence   Rehabilitation Potential For Stated Goals: Good  Regarding Lavern Renner's therapy, I certify that the treatment plan above will be carried out by a therapist or under their direction. Thank you for this referral,  Miguel Galindo PT     Referring Physician Signature: Crystal Moody MD              Date                    The information in this section was collected on 8/9/2018 (except where otherwise noted). HISTORY:   History of Present Injury/Illness (Reason for Referral): Had prostate CA 12 years ago. Had small sphincter and had an artificial urinary sphincter. Uses one to two pads per day. In the last year has had a number of UTI's. Did find bacteria in the urine. Was on a month of cipro, Had odor and burning in the bladder which made note an infection. Is currently on Bactrim. Had back surgery. Had laminectomy on L1,2, 3 a 2 years ago. Started doing therapy and it did help. Does have some low back pain with activities. October of 2017 started noticing fecal issues. Is now bulking stool and using a squatty potty. Will have one bowel movement in the am and then one in the afternoon. Uses butterfly pads when has need them. When he feels the need to evacuate he is unable to hold fecal matter back. Still having to wipe much more. No real dietary restrictions. Voiding Patterns: Patient voids every 2 hours during the day and 1 times during the night. Patient reports that he empties bladder. Patient uses pads for bladder protection; he changes pads 1-2 times per day.   Fluid Intake: Patient drinks 1-2 bottles of water per day. He consumes 1 cups of caffeinated beverages per day. Patient not limit fluid intake to control bladder. Bowel Habits: Patient= bowel habits 2 x a day. Mobility / Self Care: I   Personal / Social History:  · Sexually active? 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 Tricia Cabrera  has a past medical history of Arthritis; Chronic pain; Compression deformity of vertebra (02/2016); GERD (gastroesophageal reflux disease); Hypercholesterolemia; Hyperlipidemia (5/22/2017); Hypertension; Malignant neoplasm of prostate ([facility] [address] Stress incontinence, male; and Thyroid disease. Mr. Tricia Cabrera  has a past surgical history that includes endoscopy, colon, diagnostic; hx urological (2008); hx orthopaedic (Right, 1998); hx lumbar laminectomy (08/22/2016); hx lumbar laminectomy (08/2016); and hx radical prostatectomy (2007). Social History/Living Environment:    Lives with wife   Prior Level of Function/Work/Activity:I  Current Medications:    Current Outpatient Prescriptions:     trimethoprim-sulfamethoxazole (BACTRIM DS) 160-800 mg per tablet, Take 1 Tab by mouth two (2) times a day., Disp: 14 Tab, Rfl: 0    amLODIPine (NORVASC) 5 mg tablet, Take 1 Tab by mouth daily. , Disp: 90 Tab, Rfl: 3    pantoprazole (PROTONIX) 40 mg tablet, Take 1 Tab by mouth daily. Replaces esomeprazole, Disp: 90 Tab, Rfl: 3    atorvastatin (LIPITOR) 20 mg tablet, Take 1 Tab by mouth daily. Replaces simvastatin, Disp: 90 Tab, Rfl: 3    telmisartan-hydroCHLOROthiazide (MICARDIS HCT) 80-12.5 mg per tablet, Take 1 Tab by mouth daily. , Disp: 90 Tab, Rfl: 3    levothyroxine (SYNTHROID) 150 mcg tablet, Take 1 Tab by mouth Daily (before breakfast). , Disp: 90 Tab, Rfl: 3    naproxen (NAPROSYN) 500 mg tablet, Take 1 Tab by mouth two (2) times daily (with meals).  As needed, Disp: 180 Tab, Rfl: 3    DIGESTIVE ENZYMES, PLANT, (FIBERZYME CONCENTRATE-HP PO), Take  by mouth., Disp: , Rfl:     B.infantis-B.ani-B.long-B.bifi (PROBIOTIC 4X) 10-15 mg TbEC, Take  by mouth., Disp: , Rfl:     fexofenadine (ALLEGRA) 180 mg tablet, Take 180 mg by mouth daily. , Disp: , Rfl:     omega-3 fatty acids-vitamin e (FISH OIL) 1,000 mg cap, Take 1 Cap by mouth. Holding for surgery, Disp: , Rfl:     multivitamin (ONE A DAY) tablet, Take 1 Tab by mouth daily. Last dose 8/1/2016, Disp: , Rfl:     aspirin delayed-release 81 mg tablet, Take 162 mg by mouth daily. Last dose to be 8/1/2016, Disp: , Rfl:    Date Last Reviewed:  8/30/2018      Number of Personal Factors/Comorbidities that affect the Plan of Care: 3+: HIGH COMPLEXITY   EXAMINATION:   Internal Assessment via anal canal:   PERF = 3/8/8/31  SEMG evaluation:   Date: 8/23/2018   Resting Tone: 5 to 3 uV   Quality of Resting Tone:    5 Second Hold: 20 to 15 uV   10 Second Hold:  15 to 8 uV   Quality of Recruitment: Fair    Quality of Relaxation: Fair    Quality of Holding: Fair    Stability of Hold: Fair    Stability of Rest: Fair      Body Structures Involved:  1. Muscles Body Functions Affected:  1. Genitourinary  2. Neuromusculoskeletal Activities and Participation Affected:  1. Self Care  2. Community, Social and [place]   Number of elements that affect the Plan of Care: 3: MODERATE COMPLEXITY   CLINICAL PRESENTATION:   Presentation: Stable and uncomplicated: LOW COMPLEXITY   CLINICAL DECISION MAKING:   Outcome Measure: Tool Used: Pelvic Floor Impact Questionnaire--short form 7 (PFIQ-7)   Score:  Initial:   · Bladder or Urine: 4  · Bowel or Rectum: 4  · Vagina or Pelvis: 0 Most Recent: X (Date: -- )  · Bladder or Urine: X  · Bowel or Rectum: X  · Vagina or Pelvis: X   Interpretation of Score: Each of the 7 sections is scored on a scale from 0-3; 0 representing \"Not at all\", 3 representing \"Quite a bit\".  The mean value is taken from all the answered items, then multiplied by 100 to obtain the scale score, ranging from 0-100. This process is repeated for each column representing bowel, bladder, and pelvic pain. ? Self Care:     - CURRENT STATUS: CI - 1%-19% impaired, limited or restricted    - GOAL STATUS: CH - 0% impaired, limited or restricted    - D/C STATUS:  ---------------To be determined---------------     Medical Necessity:   · Patient is expected to demonstrate progress in strength and coordination to decrease assistance required with Kegels. · Patient demonstrates good rehab potential due to higher previous functional level. Reason for Services/Other Comments:  · Patient continues to require skilled intervention due to urinary and fecal incotinece. Use of outcome tool(s) and clinical judgement create a POC that gives a: Clear prediction of patient's progress: LOW COMPLEXITY   TREATMENT:   (In addition to Assessment/Re-Assessment sessions the following treatments were rendered)  Pre-treatment Symptoms/Complaints:  Had been having a few very bad days of FI. 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 Pain: Initial:   Pain Intensity 1: 0 0 Post Session:  0     THERAPEUTIC EXERCISE: (15 minutes):  Exercises per grid below to improve strength and coordination. Required moderate verbal and tactile cues to promote proper body alignment, promote proper body posture and promote proper body breathing techniques. Progressed repetitions and complexity of movement as indicated. Exercises:  Patient instructed in pelvic floor exercises listed below:   Date:  8/10/2018   Activity/Exercise Parameters   Pt education  15 min Fecal incontinence handout   Kegel in supine with internal rectal sensor  feedback         NMES with Kegel with internal rectal sensor                          [employer] Portal    The following educational topics were reviewed with patient:    Treatment/Session Assessment:    · Response to Treatment:   Pt was counseled on FI today.   He was educated on nutrition , stress, and bowel control. Will try NMES and biofeedback next visit when pt. Feels comfortable. · Compliance with Program/Exercises: Will assess as treatment progresses. · Recommendations/Intent for next treatment session: \"Next visit will focus on advancements to more challenging activities\".   Total Treatment Duration:  PT Patient Time In/Time Out  Time In: 1100  Time Out: 1120     Keith Leone, PT

## 2018-09-06 ENCOUNTER — HOSPITAL ENCOUNTER (OUTPATIENT)
Dept: PHYSICAL THERAPY | Age: 68
Discharge: HOME | End: 2018-09-06
Attending: INTERNAL MEDICINE
Payer: MEDICARE

## 2018-09-06 PROCEDURE — G8988 SELF CARE GOAL STATUS: HCPCS

## 2018-09-06 PROCEDURE — 97110 THERAPEUTIC EXERCISES: CPT

## 2018-09-06 PROCEDURE — G8989 SELF CARE D/C STATUS: HCPCS

## 2018-09-06 NOTE — THERAPY DISCHARGE
Karine Ríos  : 1950  [place]  Secondary: [facility]  [address] [facility] [address]  [phone]   [phone]          OUTPATIENT PHYSICAL THERAPY:Daily Note and Discharge 2018    ICD-10: Treatment Diagnosis: Muscle weakness (generalized) (M62.81),   Stress incontinence (female) (male  Precautions/Allergies:   Levaquin [levofloxacin]   Fall Risk Score: 1 (? 5 = High Risk)  MD Orders: Eval and treat MEDICAL/REFERRING DIAGNOSIS:  Full incontinence of feces [R15.9]   DATE OF ONSET: one year  REFERRING PHYSICIAN: Brit Jason MD  RETURN PHYSICIAN APPOINTMENT: TBD     DISCHARGE ASSESSMENT:  Mr. Camden Bojorquez has done excellent with physical therapy. He is not having any fecal incontinence. He has been able to determine particular foods that increase loose stools. He is aware of bulking his stools up. He reports that he is able to make it to the bathroom with less urgency. His external anal sphincter strength has made great improvements with both strength and endurance. He has met all of his short and long term goals. At this time will be D/C from PT. .    INITIAL ASSESSMENT:  Mr. Camden Bojorquez presents with decreased strength of external sphincter resulting in incontinence of feces and stress incontinence of urine. His external anal sphincter strength is  3/5 and his endurance = 8 seconds. He would benefit from therapy to address his strength and endurance to improve holding feces and gas in order to return to previous level of activity. He was educated on use of fiber, squatty potty, and kegels today in the clinic. He had good understanding. Pt is an excellent candidate for skilled PT. PROBLEM LIST (Impacting functional limitations):  1. Decreased strength of pelvic floor which limits bladder control and fecal control INTERVENTIONS PLANNED:  1. Neuromuscular re-education  2.  Biofeedback as needed  3. HEP  4. Bladder retraining  5. Bladder education  6. Electrical Stimulation  7. Home Exercise Program (HEP)  8. Manual Therapy  9. Neuromuscular Re-education/Strengthening  10. Range of Motion (ROM)  11. Therapeutic Activites  12. Therapeutic Exercise/Strengthening     TREATMENT PLAN:  Effective Dates: 8/9/2018 TO 11/8/2018 (90 days). Frequency/Duration: 1 time a week for 90 Days  GOALS: (Goals have been discussed and agreed upon with patient.)  Short-Term Functional Goals: Time Frame: in 3 weeks  1. Patient will demonstrate independence with home exercise program. (met)  2. Patient will demonstrated good coordination of pelvic floor muscles to improve continence of urine and feces (met)  Discharge Goals: Time Frame: in 8 weeks  1. Pt will report type 4 bowel movements 1 -3 x per day. (met)  2. Pt will report 75% decreased in stress urinary incontinence (met)  3. Pt will report 75% improvement in fecal incontinence  (met)  Rehabilitation Potential For Stated Goals: Good  Regarding Surya Therese Renner's therapy, I certify that the treatment plan above will be carried out by a therapist or under their direction. Thank you for this referral,  Madeleine Lawson, PT     Referring Physician Signature: Nicholas Fierro MD              Date                    The information in this section was collected on 8/9/2018 (except where otherwise noted). HISTORY:   History of Present Injury/Illness (Reason for Referral): Had prostate CA 12 years ago. Had small sphincter and had an artificial urinary sphincter. Uses one to two pads per day. In the last year has had a number of UTI's. Did find bacteria in the urine. Was on a month of cipro, Had odor and burning in the bladder which made note an infection. Is currently on Bactrim. Had back surgery. Had laminectomy on L1,2, 3 a 2 years ago. Started doing therapy and it did help. Does have some low back pain with activities. October of 2017 started noticing fecal issues. Is now bulking stool and using a squatty potty. Will have one bowel movement in the am and then one in the afternoon. Uses butterfly pads when has need them. When he feels the need to evacuate he is unable to hold fecal matter back. Still having to wipe much more. No real dietary restrictions. Voiding Patterns: Patient voids every 2 hours during the day and 1 times during the night. Patient reports that he empties bladder. Patient uses pads for bladder protection; he changes pads 1-2 times per day. Fluid Intake: Patient drinks 1-2 bottles of water per day. He consumes 1 cups of caffeinated beverages per day. Patient not limit fluid intake to control bladder. Bowel Habits: Patient= bowel habits 2 x a day. Mobility / Self Care: I   Personal / Social History:  · Sexually active? 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 Juan Carlos Art  has a past medical history of Arthritis; Chronic pain; Compression deformity of vertebra (02/2016); GERD (gastroesophageal reflux disease); Hypercholesterolemia; Hyperlipidemia (5/22/2017); Hypertension; [facility] Stress incontinence, male; and Thyroid disease. Mr. Juan Carlos Art  has a past surgical history that includes endoscopy, colon, diagnostic; hx urological (2008); hx orthopaedic (Right, 1998); hx lumbar laminectomy (08/22/2016); hx lumbar laminectomy (08/2016); and hx radical prostatectomy (2007). Social History/Living Environment:    Lives with wife   Prior Level of Function/Work/Activity:I  Current Medications:    Current Outpatient Prescriptions:     trimethoprim-sulfamethoxazole (BACTRIM DS) 160-800 mg per tablet, Take 1 Tab by mouth two (2) times a day., Disp: 14 Tab, Rfl: 0    amLODIPine (NORVASC) 5 mg tablet, Take 1 Tab by mouth daily. , Disp: 90 Tab, Rfl: 3    pantoprazole (PROTONIX) 40 mg tablet, Take 1 Tab by mouth daily.  Replaces esomeprazole, Disp: 90 Tab, Rfl: 3    atorvastatin (LIPITOR) 20 mg tablet, Take 1 Tab by mouth daily. Replaces simvastatin, Disp: 90 Tab, Rfl: 3    telmisartan-hydroCHLOROthiazide (MICARDIS HCT) 80-12.5 mg per tablet, Take 1 Tab by mouth daily. , Disp: 90 Tab, Rfl: 3    levothyroxine (SYNTHROID) 150 mcg tablet, Take 1 Tab by mouth Daily (before breakfast). , Disp: 90 Tab, Rfl: 3    naproxen (NAPROSYN) 500 mg tablet, Take 1 Tab by mouth two (2) times daily (with meals). As needed, Disp: 180 Tab, Rfl: 3    DIGESTIVE ENZYMES, PLANT, (FIBERZYME CONCENTRATE-HP PO), Take  by mouth., Disp: , Rfl:     B.infantis-B.ani-B.long-B.bifi (PROBIOTIC 4X) 10-15 mg TbEC, Take  by mouth., Disp: , Rfl:     fexofenadine (ALLEGRA) 180 mg tablet, Take 180 mg by mouth daily. , Disp: , Rfl:     omega-3 fatty acids-vitamin e (FISH OIL) 1,000 mg cap, Take 1 Cap by mouth. Holding for surgery, Disp: , Rfl:     multivitamin (ONE A DAY) tablet, Take 1 Tab by mouth daily. Last dose 8/1/2016, Disp: , Rfl:     aspirin delayed-release 81 mg tablet, Take 162 mg by mouth daily. Last dose to be 8/1/2016, Disp: , Rfl:    Date Last Reviewed:  9/7/2018      Number of Personal Factors/Comorbidities that affect the Plan of Care: 3+: HIGH COMPLEXITY   EXAMINATION:   Internal Assessment via anal canal:   PERF = 3/8/8/31    9/6/2018  PERF = 4/10/10/32      SEMG evaluation:   Date: 9/6/2018   Resting Tone: 3 uV   Quality of Resting Tone:    5 Second Hold: 20 to 15 uV   10 Second Hold:  15 to 8 uV   Quality of Recruitment: Fair    Quality of Relaxation: Fair    Quality of Holding: Fair    Stability of Hold: Fair    Stability of Rest: Fair      Body Structures Involved:  1. Muscles Body Functions Affected:  1. Genitourinary  2. Neuromusculoskeletal Activities and Participation Affected:  1. Self Care  2.  Community, Social and [facility]   Number of elements that affect the Plan of Care: 3: MODERATE COMPLEXITY   CLINICAL PRESENTATION:   Presentation: Stable and uncomplicated: LOW COMPLEXITY   CLINICAL DECISION MAKING:   Outcome Measure: Tool Used: Pelvic Floor Impact Questionnaire--short form 7 (PFIQ-7)   Score:  Initial:   · Bladder or Urine: 4  · Bowel or Rectum: 4  · Vagina or Pelvis: 0 Most Recent: X (Date: -- )  · Bladder or Urine: 0  · Bowel or Rectum: 6  · Vagina or Pelvis: 0   Interpretation of Score: Each of the 7 sections is scored on a scale from 0-3; 0 representing \"Not at all\", 3 representing \"Quite a bit\". The mean value is taken from all the answered items, then multiplied by 100 to obtain the scale score, ranging from 0-100. This process is repeated for each column representing bowel, bladder, and pelvic pain. ? Self Care:     - CURRENT STATUS: CI - 1%-19% impaired, limited or restricted    - GOAL STATUS: CH - 0% impaired, limited or restricted    - D/C STATUS:  CI - 1%-19% impaired, limited or restricted     Medical Necessity:   · Patient is expected to demonstrate progress in strength and coordination to decrease assistance required with Kegels. · Patient demonstrates good rehab potential due to higher previous functional level. Reason for Services/Other Comments:  · Patient continues to require skilled intervention due to urinary and fecal incotinece. Use of outcome tool(s) and clinical judgement create a POC that gives a: Clear prediction of patient's progress: LOW COMPLEXITY   TREATMENT:   (In addition to Assessment/Re-Assessment sessions the following treatments were rendered)  Pre-treatment Symptoms/Complaints: Pt has made some dietary changed and has excellent improvements. He has not needed any butterflies this week. Pain: Initial:   Pain Intensity 1: 0  Post Session:  0     THERAPEUTIC EXERCISE: (55 minutes):  Exercises per grid below to improve strength and coordination. Required moderate verbal and tactile cues to promote proper body alignment, promote proper body posture and promote proper body breathing techniques.   Progressed repetitions and complexity of movement as indicated. Exercises:  Patient instructed in pelvic floor exercises listed below:   Date:  8/10/2018   Activity/Exercise Parameters   Pt education  15 min Fecal incontinence handout   Kegel in supine with internal rectal sensor  feedback      15on 5 off x 10  1on 1 off x 10      NMES with Kegel with internal rectal sensor 10 on 10 off 10 min 12 HZ                         ReferralMD Portal    The following educational topics were reviewed with patient:    Treatment/Session Assessment:    · Response to Treatment:  Pt strength and endurnace continues to improve. He did excellent on biofeedback today. Is appropriate for DC  · Compliance with Program/Exercises: Will assess as treatment progresses. · Recommendations/Intent for next treatment session: \"Next visit will focus on advancements to more challenging activities\".   Total Treatment Duration:  PT Patient Time In/Time Out  Time In: 1300  Time Out: 1400     Moises Dwyer, PT

## 2019-03-01 PROBLEM — J30.9 ALLERGIC RHINITIS: Status: ACTIVE | Noted: 2019-03-01

## 2019-04-04 ENCOUNTER — APPOINTMENT (RX ONLY)
Dept: URBAN - METROPOLITAN AREA CLINIC 349 | Facility: CLINIC | Age: 69
Setting detail: DERMATOLOGY
End: 2019-04-04

## 2019-04-04 DIAGNOSIS — L663 OTHER SPECIFIED DISEASES OF HAIR AND HAIR FOLLICLES: ICD-10-CM

## 2019-04-04 DIAGNOSIS — L73.9 FOLLICULAR DISORDER, UNSPECIFIED: ICD-10-CM

## 2019-04-04 DIAGNOSIS — L82.0 INFLAMED SEBORRHEIC KERATOSIS: ICD-10-CM

## 2019-04-04 DIAGNOSIS — L82.1 OTHER SEBORRHEIC KERATOSIS: ICD-10-CM

## 2019-04-04 DIAGNOSIS — L738 OTHER SPECIFIED DISEASES OF HAIR AND HAIR FOLLICLES: ICD-10-CM

## 2019-04-04 PROBLEM — L30.9 DERMATITIS, UNSPECIFIED: Status: ACTIVE | Noted: 2019-04-04

## 2019-04-04 PROCEDURE — ? TREATMENT REGIMEN

## 2019-04-04 PROCEDURE — ? PRESCRIPTION

## 2019-04-04 PROCEDURE — 17110 DESTRUCTION B9 LES UP TO 14: CPT

## 2019-04-04 PROCEDURE — ? DEFER

## 2019-04-04 PROCEDURE — ? COUNSELING

## 2019-04-04 PROCEDURE — 99213 OFFICE O/P EST LOW 20 MIN: CPT | Mod: 25

## 2019-04-04 PROCEDURE — ? LIQUID NITROGEN

## 2019-04-04 RX ORDER — CLINDAMYCIN PHOSPHATE 10 MG/ML
SOLUTION TOPICAL
Qty: 1 | Refills: 3 | Status: ERX

## 2019-04-04 ASSESSMENT — LOCATION SIMPLE DESCRIPTION DERM
LOCATION SIMPLE: SCALP
LOCATION SIMPLE: LEFT PRETIBIAL REGION
LOCATION SIMPLE: LEFT HAND

## 2019-04-04 ASSESSMENT — LOCATION ZONE DERM
LOCATION ZONE: SCALP
LOCATION ZONE: HAND
LOCATION ZONE: LEG

## 2019-04-04 ASSESSMENT — LOCATION DETAILED DESCRIPTION DERM
LOCATION DETAILED: LEFT SUPERIOR PARIETAL SCALP
LOCATION DETAILED: LEFT ULNAR DORSAL HAND
LOCATION DETAILED: LEFT PROXIMAL PRETIBIAL REGION

## 2019-04-04 NOTE — PROCEDURE: DEFER
Detail Level: Zone
Other Procedure: LN2
Introduction Text (Please End With A Colon): The following procedure was deferred:

## 2019-04-04 NOTE — PROCEDURE: TREATMENT REGIMEN
Detail Level: Zone
Initiate Treatment: Apply clindamycin topical solution to scalp twice daily as needed for flares

## 2019-04-04 NOTE — HPI: PIMPLES (ACNE)
How Severe Is Your Acne?: mild
Is This A New Presentation, Or A Follow-Up?: Acne
Additional Comments (Use Complete Sentences): Pt stated that he has Rosacea and think he has pimples in his scalp caused by the Rosacea. Pt stated that lesions come and go. Some are tender and pt stated that white contents have come out of the lesions when he has picked at them.

## 2019-04-16 ENCOUNTER — HOSPITAL ENCOUNTER (OUTPATIENT)
Dept: ULTRASOUND IMAGING | Age: 69
Discharge: HOME | End: 2019-04-16
Attending: INTERNAL MEDICINE

## 2019-04-16 DIAGNOSIS — Z13.6 SCREENING FOR AAA (ABDOMINAL AORTIC ANEURYSM): ICD-10-CM

## 2019-07-03 ENCOUNTER — HOSPITAL ENCOUNTER (OUTPATIENT)
Dept: LAB | Age: 69
Discharge: HOME | End: 2019-07-03

## 2019-07-03 PROCEDURE — 88305 TISSUE EXAM BY PATHOLOGIST: CPT

## 2019-10-09 PROBLEM — R15.9 FECAL INCONTINENCE: Status: ACTIVE | Noted: 2019-10-09

## 2019-10-11 PROBLEM — K63.5 COLON POLYPS: Status: ACTIVE | Noted: 2019-07-01

## 2019-12-10 PROBLEM — M47.816 SPONDYLOSIS OF LUMBAR REGION WITHOUT MYELOPATHY OR RADICULOPATHY: Status: ACTIVE | Noted: 2019-12-10

## 2020-01-27 ENCOUNTER — HOSPITAL ENCOUNTER (OUTPATIENT)
Dept: INTERVENTIONAL RADIOLOGY/VASCULAR | Age: 70
Discharge: HOME | End: 2020-01-27
Attending: ORTHOPAEDIC SURGERY
Payer: MEDICARE

## 2020-01-27 ENCOUNTER — HOSPITAL ENCOUNTER (OUTPATIENT)
Dept: CT IMAGING | Age: 70
Discharge: HOME | End: 2020-01-27
Attending: ORTHOPAEDIC SURGERY
Payer: MEDICARE

## 2020-01-27 VITALS
RESPIRATION RATE: 18 BRPM | TEMPERATURE: 97.7 F | SYSTOLIC BLOOD PRESSURE: 156 MMHG | WEIGHT: 197 LBS | BODY MASS INDEX: 29.86 KG/M2 | HEIGHT: 68 IN | HEART RATE: 68 BPM | DIASTOLIC BLOOD PRESSURE: 83 MMHG | OXYGEN SATURATION: 100 %

## 2020-01-27 DIAGNOSIS — M54.50 LOW BACK PAIN: ICD-10-CM

## 2020-01-27 PROCEDURE — 72132 CT LUMBAR SPINE W/DYE: CPT

## 2020-01-27 PROCEDURE — 74011000250 HC RX REV CODE- 250: Performed by: RADIOLOGY

## 2020-01-27 PROCEDURE — 77030014143 HC TY PUNC LUMBR BD -A

## 2020-01-27 PROCEDURE — 77030003666 HC NDL SPINAL BD -A

## 2020-01-27 PROCEDURE — 74011636320 HC RX REV CODE- 636/320: Performed by: RADIOLOGY

## 2020-01-27 PROCEDURE — 62304 MYELOGRAPHY LUMBAR INJECTION: CPT

## 2020-01-27 RX ORDER — LIDOCAINE HYDROCHLORIDE 20 MG/ML
20-200 INJECTION, SOLUTION INFILTRATION; PERINEURAL ONCE
Status: COMPLETED | OUTPATIENT
Start: 2020-01-27 | End: 2020-01-27

## 2020-01-27 RX ADMIN — LIDOCAINE HYDROCHLORIDE 40 MG: 20 INJECTION, SOLUTION INFILTRATION; PERINEURAL at 10:04

## 2020-01-27 RX ADMIN — IOPAMIDOL 15 ML: 408 INJECTION, SOLUTION INTRATHECAL at 10:06

## 2020-01-27 NOTE — DISCHARGE INSTRUCTIONS
[facility]  Department of Interventional Radiology  [facility]  [phone] Office  [phone] Fax  POST LUMBAR PUNCTURE/MYELOGRAM/INTRATHECAL CHEMOTHERAPY DISCHARGE INSTRUCTIONS  General Information:  Lumbar Puncture: A LP is done to help diagnose several disorders, like pseudo tumor, migraines, meningitis, and multiple sclerosis. It involves a puncture (usually in the lower spine) into the sac that protects the spinal column. A sample of the fluid in that space is removed and tested in the lab. Myelogram:   A Myelogram involves a lumbar puncture, and instead of removing fluid, contrast will be injected into the sac surrounding the spinal column. It is done to visualize the spinal column, nerve roots, spinal canal, vertebral discs and disc space. It is usually done to diagnose back pain with unknown cause or in preparation for surgery. After the injection, a CT scan will be done, usually within two hours of the injection. Intrathecal Chemotherapy:   Chemotherapy can be given in many forms. Intrathecal chemo involves a lumbar puncture, and instead of removing fluid, the chemo will be injected into the space. After any of these procedures, you will be asked to lie flat on your back for 4-6 hours to prevent complications. You should also rest for 24 hours after you go home, and force fluids. If you have a headache, you should take Tylenol or acetaminophen. Call If:   You should call your Physician and/or the Radiology Nurse if you develop a headache that is not relieved by Tylenol, and worsens when you stand and eases when you lie down, you need to call. You may have developed what is referred to as a spinal headache. Our physicians will probably advise you to be on strict bed rest for 24 hours, to drink lots of fluids and caffeine. If this does not help the head pain, call again the next day.  You should call if you have bleeding other than a small spot on your bandage. You should call if you have any numbness, tingling, weakness, fever, chills, urinary retention, severe itching, rash, welts, swelling, or confusion. Follow-up Instructions: See the doctor who ordered your procedure as he/she has instructed. If you had a Lumbar Puncture or Myelogram, your results should be available to your ordering doctor in 3-5 business days. You can remove your dressing in 24 hours and shower regularly. Do not bathe or swim for 72 hours. To Reach Us: If you have any questions about your procedure, please call the Interventional Radiology department at [phone]. After business hours (5pm) and weekends, call the answering service at [phone] and ask for the Radiologist on call to be paged. Si tiene Preguntas acerca del procedimiento, por favor llame al departamento de Radiología Intervencional al [phone]. Después de horas de oficina (5 pm) y los fines de [place], llamar al [facility] al [phone] y pregunte por el Radiologo de [facility].      Patient Signature:  Date: 1/27/2020  Discharging Nurse: Wilner Cody RN

## 2020-01-27 NOTE — ROUTINE PROCESS
TRANSFER - OUT REPORT:    Verbal report given to Jesse ROSENTHAL RN on Karine Ríos  being transferred to IR recovery for routine post - op       Report consisted of patients Situation, Background, Assessment and   Recommendations(SBAR). Information from the following report(s) Procedure Summary was reviewed with the receiving nurse. Opportunity for questions and clarification was provided.       Patient transported with:   Registered Nurse

## 2020-01-27 NOTE — PROGRESS NOTES
IR Nurse Pre-Procedure Checklist Part 2      Patient in IR suite  Consent signed: Yes    H&P complete:  Not applicable    Antibiotics: Not applicable    Airway/Mallampati Done: Not applicable    Shaved: Yes    Pregnancy Form:Not applicable    Patient Position: Yes    MD Side: Yes     Biopsy Worksheet: Not applicable    Specimen Medium: Not applicable

## 2020-12-15 PROBLEM — K22.70 BARRETT'S ESOPHAGUS WITHOUT DYSPLASIA: Status: ACTIVE | Noted: 2020-12-15

## 2021-07-21 ENCOUNTER — HOSPITAL ENCOUNTER (OUTPATIENT)
Dept: LAB | Age: 71
Discharge: HOME | End: 2021-07-21

## 2021-07-21 PROCEDURE — 88305 TISSUE EXAM BY PATHOLOGIST: CPT

## 2021-09-03 ENCOUNTER — HOSPITAL ENCOUNTER (OUTPATIENT)
Dept: ULTRASOUND IMAGING | Age: 71
Discharge: HOME | End: 2021-09-03
Attending: NURSE PRACTITIONER

## 2021-09-03 DIAGNOSIS — R79.89 ELEVATED SERUM CREATININE: ICD-10-CM

## 2021-09-27 ENCOUNTER — HOSPITAL ENCOUNTER (OUTPATIENT)
Dept: ULTRASOUND IMAGING | Age: 71
Discharge: HOME | End: 2021-09-27
Attending: INTERNAL MEDICINE
Payer: MEDICARE

## 2021-09-27 DIAGNOSIS — M79.89 LEFT LEG SWELLING: ICD-10-CM

## 2021-09-27 PROCEDURE — 93971 EXTREMITY STUDY: CPT

## 2022-01-24 ENCOUNTER — HOSPITAL ENCOUNTER (OUTPATIENT)
Dept: LAB | Age: 72
Discharge: HOME | End: 2022-01-24
Payer: MEDICARE

## 2022-01-24 DIAGNOSIS — E87.6 HYPOKALEMIA: ICD-10-CM

## 2022-01-24 LAB — POTASSIUM SERPL-SCNC: 3.7 MMOL/L (ref 3.5–5.1)

## 2022-01-24 PROCEDURE — 84132 ASSAY OF SERUM POTASSIUM: CPT

## 2022-01-24 PROCEDURE — 36415 COLL VENOUS BLD VENIPUNCTURE: CPT

## 2022-03-18 PROBLEM — M47.812 SPONDYLOSIS OF CERVICAL REGION WITHOUT MYELOPATHY OR RADICULOPATHY: Status: ACTIVE | Noted: 2017-12-11

## 2022-03-18 PROBLEM — K22.70 BARRETT'S ESOPHAGUS WITHOUT DYSPLASIA: Status: ACTIVE | Noted: 2020-12-15

## 2022-03-19 PROBLEM — R15.9 FECAL INCONTINENCE: Status: ACTIVE | Noted: 2019-10-09

## 2022-03-19 PROBLEM — E78.5 HYPERLIPIDEMIA: Status: ACTIVE | Noted: 2017-05-22

## 2022-03-19 PROBLEM — K63.5 COLON POLYPS: Status: ACTIVE | Noted: 2019-07-01

## 2022-03-19 PROBLEM — J30.9 ALLERGIC RHINITIS: Status: ACTIVE | Noted: 2019-03-01

## 2022-03-19 PROBLEM — L71.9 ROSACEA: Status: ACTIVE | Noted: 2017-05-22

## 2022-03-20 PROBLEM — M47.816 SPONDYLOSIS OF LUMBAR REGION WITHOUT MYELOPATHY OR RADICULOPATHY: Status: ACTIVE | Noted: 2019-12-10

## 2022-04-14 PROBLEM — M18.11 PRIMARY OSTEOARTHRITIS OF FIRST CARPOMETACARPAL JOINT OF RIGHT HAND: Status: ACTIVE | Noted: 2022-04-14

## 2022-04-14 PROBLEM — M79.641 RIGHT HAND PAIN: Status: ACTIVE | Noted: 2022-04-14

## 2022-06-06 SDOH — HEALTH STABILITY: PHYSICAL HEALTH: ON AVERAGE, HOW MANY DAYS PER WEEK DO YOU ENGAGE IN MODERATE TO STRENUOUS EXERCISE (LIKE A BRISK WALK)?: 3 DAYS

## 2022-06-06 SDOH — HEALTH STABILITY: PHYSICAL HEALTH: ON AVERAGE, HOW MANY MINUTES DO YOU ENGAGE IN EXERCISE AT THIS LEVEL?: 40 MIN

## 2022-06-06 ASSESSMENT — LIFESTYLE VARIABLES
HOW MANY STANDARD DRINKS CONTAINING ALCOHOL DO YOU HAVE ON A TYPICAL DAY: 2
HOW OFTEN DURING THE LAST YEAR HAVE YOU FOUND THAT YOU WERE NOT ABLE TO STOP DRINKING ONCE YOU HAD STARTED: 0
HOW OFTEN DURING THE LAST YEAR HAVE YOU BEEN UNABLE TO REMEMBER WHAT HAPPENED THE NIGHT BEFORE BECAUSE YOU HAD BEEN DRINKING: NEVER
HOW OFTEN DO YOU HAVE A DRINK CONTAINING ALCOHOL: 4 OR MORE TIMES A WEEK
HAVE YOU OR SOMEONE ELSE BEEN INJURED AS A RESULT OF YOUR DRINKING: 0
HAS A RELATIVE, FRIEND, DOCTOR, OR ANOTHER HEALTH PROFESSIONAL EXPRESSED CONCERN ABOUT YOUR DRINKING OR SUGGESTED YOU CUT DOWN: NO
HOW OFTEN DURING THE LAST YEAR HAVE YOU FAILED TO DO WHAT WAS NORMALLY EXPECTED FROM YOU BECAUSE OF DRINKING: 0
HOW OFTEN DO YOU HAVE A DRINK CONTAINING ALCOHOL: 5
HOW MANY STANDARD DRINKS CONTAINING ALCOHOL DO YOU HAVE ON A TYPICAL DAY: 3 OR 4
HOW OFTEN DURING THE LAST YEAR HAVE YOU FOUND THAT YOU WERE NOT ABLE TO STOP DRINKING ONCE YOU HAD STARTED: NEVER
HOW OFTEN DO YOU HAVE SIX OR MORE DRINKS ON ONE OCCASION: 1
HOW OFTEN DURING THE LAST YEAR HAVE YOU FAILED TO DO WHAT WAS NORMALLY EXPECTED FROM YOU BECAUSE OF DRINKING: NEVER
HOW OFTEN DURING THE LAST YEAR HAVE YOU NEEDED AN ALCOHOLIC DRINK FIRST THING IN THE MORNING TO GET YOURSELF GOING AFTER A NIGHT OF HEAVY DRINKING: NEVER
HAVE YOU OR SOMEONE ELSE BEEN INJURED AS A RESULT OF YOUR DRINKING: NO
HAS A RELATIVE, FRIEND, DOCTOR, OR ANOTHER HEALTH PROFESSIONAL EXPRESSED CONCERN ABOUT YOUR DRINKING OR SUGGESTED YOU CUT DOWN: 0
HOW OFTEN DURING THE LAST YEAR HAVE YOU BEEN UNABLE TO REMEMBER WHAT HAPPENED THE NIGHT BEFORE BECAUSE YOU HAD BEEN DRINKING: 0
HOW OFTEN DURING THE LAST YEAR HAVE YOU NEEDED AN ALCOHOLIC DRINK FIRST THING IN THE MORNING TO GET YOURSELF GOING AFTER A NIGHT OF HEAVY DRINKING: 0
HOW OFTEN DURING THE LAST YEAR HAVE YOU HAD A FEELING OF GUILT OR REMORSE AFTER DRINKING: 0
HOW OFTEN DURING THE LAST YEAR HAVE YOU HAD A FEELING OF GUILT OR REMORSE AFTER DRINKING: NEVER

## 2022-06-06 ASSESSMENT — PATIENT HEALTH QUESTIONNAIRE - PHQ9
SUM OF ALL RESPONSES TO PHQ QUESTIONS 1-9: 1
2. FEELING DOWN, DEPRESSED OR HOPELESS: 1
1. LITTLE INTEREST OR PLEASURE IN DOING THINGS: 0
SUM OF ALL RESPONSES TO PHQ QUESTIONS 1-9: 1
SUM OF ALL RESPONSES TO PHQ9 QUESTIONS 1 & 2: 1
SUM OF ALL RESPONSES TO PHQ QUESTIONS 1-9: 1
SUM OF ALL RESPONSES TO PHQ QUESTIONS 1-9: 1

## 2022-06-13 ENCOUNTER — OFFICE VISIT (OUTPATIENT)
Dept: ORTHOPEDIC SURGERY | Age: 72
End: 2022-06-13
Payer: MEDICARE

## 2022-06-13 VITALS — WEIGHT: 205.6 LBS | HEIGHT: 68 IN | BODY MASS INDEX: 31.16 KG/M2

## 2022-06-13 DIAGNOSIS — M54.2 NECK PAIN: Primary | ICD-10-CM

## 2022-06-13 DIAGNOSIS — M47.816 LUMBAR FACET ARTHROPATHY: ICD-10-CM

## 2022-06-13 DIAGNOSIS — M50.30 DEGENERATIVE DISC DISEASE, CERVICAL: ICD-10-CM

## 2022-06-13 PROCEDURE — 1123F ACP DISCUSS/DSCN MKR DOCD: CPT | Performed by: NURSE PRACTITIONER

## 2022-06-13 PROCEDURE — 1036F TOBACCO NON-USER: CPT | Performed by: NURSE PRACTITIONER

## 2022-06-13 PROCEDURE — 3017F COLORECTAL CA SCREEN DOC REV: CPT | Performed by: NURSE PRACTITIONER

## 2022-06-13 PROCEDURE — G8427 DOCREV CUR MEDS BY ELIG CLIN: HCPCS | Performed by: NURSE PRACTITIONER

## 2022-06-13 PROCEDURE — 99214 OFFICE O/P EST MOD 30 MIN: CPT | Performed by: NURSE PRACTITIONER

## 2022-06-13 PROCEDURE — G8417 CALC BMI ABV UP PARAM F/U: HCPCS | Performed by: NURSE PRACTITIONER

## 2022-06-13 RX ORDER — SILDENAFIL 100 MG/1
100 TABLET, FILM COATED ORAL PRN
COMMUNITY
End: 2022-09-20 | Stop reason: ALTCHOICE

## 2022-06-13 RX ORDER — NICOTINE POLACRILEX 2 MG
2500 GUM BUCCAL DAILY
COMMUNITY
End: 2022-06-15 | Stop reason: ALTCHOICE

## 2022-06-13 RX ORDER — NAPROXEN 500 MG/1
500 TABLET ORAL 2 TIMES DAILY PRN
COMMUNITY
End: 2022-06-15 | Stop reason: ALTCHOICE

## 2022-06-13 RX ORDER — METHYLPREDNISOLONE 4 MG/1
TABLET ORAL
Qty: 1 KIT | Refills: 1 | Status: SHIPPED | OUTPATIENT
Start: 2022-06-13 | End: 2022-06-20 | Stop reason: ALTCHOICE

## 2022-06-13 NOTE — PROGRESS NOTES
Name: Flako Fernandez  YOB: 1950  Gender: male  MRN: [id number]    Chief complaint: Left-sided neck pain    History of present illness: This is a very pleasant 67 y.o. old male who presents with 2-month history of left-sided neck pain. I previously treated patient in the past for lumbar facet arthropathy and referred him to Dr. Thomas Welch for facet injections. He had underwent a multilevel lumbar laminectomy by  [facility]. I residual back pain was treated very well with facet injections. He does have some returning complaints of low back pain. But his primary concern today is left-sided neck pain. He denies arm pain. There have not been associated changes in fine motor skills such as handwriting and buttoning buttons. There have not been changes in gait since the onset of the symptoms. The patient has not had cervical surgery in the past.    Thus far, efforts to address the pain have included tylenol, physician directed home exercise program and massage therapy. Patient is unable to undergo MRIs due to a neurostimulator. AMB PAIN ASSESSMENT 6/13/2022   Location of Pain Neck   Severity of Pain 8   Frequency of Pain Intermittent   Limiting Behavior Yes   Result of Injury No   Work-Related Injury No   Are there other pain locations you wish to document? No          ROS/Meds/PSH/PMH/FH/SH: I personally reviewed the patient's collected intake data. Below are the pertinents:    Allergies   Allergen Reactions    Levofloxacin Rash         Current Outpatient Medications:     Calcium Polycarbophil (FIBERCON PO), Take by mouth, Disp: , Rfl:     ZINC PO, Take by mouth, Disp: , Rfl:     VITAMIN D PO, Take by mouth Takes as liquid 5 drops daily, Disp: , Rfl:     methylPREDNISolone (MEDROL DOSEPACK) 4 MG tablet, Take by mouth as directed.  Start in morning, Disp: 1 kit, Rfl: 1    amLODIPine (NORVASC) 10 MG tablet, Take 10 mg by mouth daily, Disp: , Rfl:     ascorbic acid (VITAMIN C) 250 MG tablet, Take 250 mg by mouth 2 times daily, Disp: , Rfl:     aspirin 81 MG EC tablet, Take 162 mg by mouth daily, Disp: , Rfl:     atorvastatin (LIPITOR) 80 MG tablet, Take 80 mg by mouth daily, Disp: , Rfl:     Biotin 2.5 MG CAPS, Take 2,500 mcg by mouth daily, Disp: , Rfl:     fexofenadine (ALLEGRA) 180 MG tablet, Take 180 mg by mouth daily, Disp: , Rfl:     fluticasone (FLONASE) 50 MCG/ACT nasal spray, 2 sprays by Nasal route daily, Disp: , Rfl:     levothyroxine (SYNTHROID) 150 MCG tablet, Take 150 mcg by mouth every morning (before breakfast), Disp: , Rfl:     pantoprazole (PROTONIX) 40 MG tablet, Take 40 mg by mouth daily, Disp: , Rfl:     telmisartan-hydroCHLOROthiazide (MICARDIS HCT) 80-25 MG per tablet, Take 1 tablet by mouth daily, Disp: , Rfl:     torsemide (DEMADEX) 10 MG tablet, Take 10 mg by mouth daily as needed, Disp: , Rfl:     naproxen (NAPROSYN) 500 MG tablet, Take 500 mg by mouth 2 times daily as needed, Disp: , Rfl:     sildenafil (VIAGRA) 100 MG tablet, Take 100 mg by mouth as needed, Disp: , Rfl:     Biotin 1 MG CAPS, Take 2,500 mcg by mouth, Disp: , Rfl:     Probiotic Product (PROBIOTIC-10 PO), Take 1 capsule by mouth daily, Disp: , Rfl:     meloxicam (MOBIC) 15 MG tablet, Take 15 mg by mouth daily, Disp: , Rfl:     potassium chloride (KLOR-CON) 10 MEQ extended release tablet, Take 10 mEq by mouth daily (Patient not taking: Reported on 6/13/2022), Disp: , Rfl:     Past Medical History:   Diagnosis Date    Arthritis     Chronic pain     back Left>right buttock    Colon polyps 07/2019 7-2019 pathology report tubulovillous adenoma    Compression deformity of vertebra 02/2016    Compression C5/6    Encounter for screening colonoscopy 07/03/2019    Fecal incontinence 10/9/2019    After prostate surgery - followed by colorectal surgery Dr. Yasir Sofia GERD (gastroesophageal reflux disease)     controlled by nexium    Hypercholesterolemia     Hyperlipidemia 5/22/2017    Hypertension     [facility] ([facility]     Spondylosis of lumbar region without myelopathy or radiculopathy 12/10/2019    S/p surgery 1-2016 with Dr. Leela guzman, male     now with artificial sphinctor    Thyroid disease     hypothyroidism       Tobacco:  reports that he quit smoking about 24 years ago. He smoked 1.00 pack per day. He has never used smokeless tobacco.  Alcohol:   Social History     Substance and Sexual Activity   Alcohol Use Yes    Alcohol/week: 21.0 standard drinks        Physical Exam:     GENERAL:  Adult in no acute distress, well developed, well nourished . Patient is appropriately conversant  CERVICAL SPINE:  Inspection of the neck reveals no evidence of rash or skin lesion. Examination of the cervical spine reveals decreased ROM and palpable tenderness  Spurling's sign painful but negative side for reproduction of radicular symptoms. Gait does not suggest myelopathy. Sensory testing reveals intact sensation to light touch in the distribution of the C5-T1 dermatomes bilaterally. Reflexes     Right Left   Biceps (C5) 2 2   Brachio radialis (C6) 2 2    Triceps (C7) 2 2     Martinez's is negative    Ankle jerk is negative   Finger escape test is negative  Inverted radial reflex is negative    Tinel's and Robbi testing over the cubital and carpal tunnels do not reproduce the symptoms. Shoulder examination is not consistent with adhesive capsulitis or acute rotator cuff tendinitis. The patient does not have difficulty with rapid alternating hand movements. Strength testing in the upper extremity reveals the following based on the 5 point grading scale:     Delt(C5) Bicep(C6) WE(C6) Tricep (C7) WF(C7) (C8) Int (T1)   Right 5 5 5 5 5 5 5   Left 5 5 5 5 5 5 5     Pulses are palpable over bilateral radial arteries.     Radiographic Studies:    Xrays including AP and lateral of the cervical spine independently reviewed:   Patient has moderate to space narrowing from C5-C7. No fractures or lesions noted. Impression: Cervical degenerative disc disease        Assessment/Plan:      ICD-10-CM    1. Neck pain  M54.2 XR CERVICAL SPINE (2-3 VIEWS)     Amb External Referral To Physical Therapy   2. Degenerative disc disease, cervical  M50.30 Amb External Referral To Physical Therapy   3. Lumbar facet arthropathy  M47.816 Amb External Referral To Physical Therapy        This patient's clinical history and physical exam is consistent with spondylotic neck pain. Patient has multilevel disc degeneration but no radiculopathy. I would recommend physical therapy and oral steroids. If he does not improve then we will look at proceeding with a CT myelogram of the cervical spine. While we are prescribing steroids we will hold off on any back injections at the current time. I discussed the natural history of this condition with the patient in that often these patients will experience diminishing of their symptoms within several weeks of conservative care. We also discussed that the typical conservative treatments available include rest, NSAIDs, muscle relaxants, and physical therapy as symptoms allow. Oral and/or injectable steroids are other options to consider. Some consideration could be given to a soft collar for a short period of time. However, I would avoid using this for extended periods because of further deconditioning of the paracervical musculature. -PT:  A course of physical therapy was prescribed in an attempt to reduce pain and inflammation, improve postural awareness, and increase cervical range of motion. Modalities such as dry needling, ultrasound, moist heat, TENS, and ice may be helpful for pain control. Soft tissue mobilization is often helpful for spasm. Other modalities such as cervical traction may be helpful in reducing radicular symptoms.  I have recommended 2-3 times per week for the next 4-6 weeks     -Steroids: A short oral

## 2022-06-15 ENCOUNTER — OFFICE VISIT (OUTPATIENT)
Dept: INTERNAL MEDICINE CLINIC | Facility: CLINIC | Age: 72
End: 2022-06-15
Payer: MEDICARE

## 2022-06-15 VITALS
WEIGHT: 203 LBS | DIASTOLIC BLOOD PRESSURE: 91 MMHG | SYSTOLIC BLOOD PRESSURE: 154 MMHG | HEIGHT: 68 IN | BODY MASS INDEX: 30.77 KG/M2

## 2022-06-15 DIAGNOSIS — Z00.00 MEDICARE ANNUAL WELLNESS VISIT, SUBSEQUENT: Primary | ICD-10-CM

## 2022-06-15 DIAGNOSIS — E78.5 HYPERLIPIDEMIA, UNSPECIFIED HYPERLIPIDEMIA TYPE: ICD-10-CM

## 2022-06-15 DIAGNOSIS — E03.9 HYPOTHYROIDISM, UNSPECIFIED TYPE: ICD-10-CM

## 2022-06-15 DIAGNOSIS — M47.812 SPONDYLOSIS OF CERVICAL REGION WITHOUT MYELOPATHY OR RADICULOPATHY: ICD-10-CM

## 2022-06-15 DIAGNOSIS — I10 ESSENTIAL HYPERTENSION: ICD-10-CM

## 2022-06-15 DIAGNOSIS — Z11.59 NEED FOR HEPATITIS C SCREENING TEST: ICD-10-CM

## 2022-06-15 DIAGNOSIS — Z72.89 OTHER PROBLEMS RELATED TO LIFESTYLE: ICD-10-CM

## 2022-06-15 LAB
ALBUMIN SERPL-MCNC: 4.8 G/DL (ref 3.2–4.6)
ALBUMIN/GLOB SERPL: 1.7 {RATIO} (ref 1.2–3.5)
ALP SERPL-CCNC: 68 U/L (ref 50–136)
ALT SERPL-CCNC: 29 U/L (ref 12–65)
ANION GAP SERPL CALC-SCNC: 11 MMOL/L (ref 7–16)
AST SERPL-CCNC: 26 U/L (ref 15–37)
BASOPHILS # BLD: 0 K/UL (ref 0–0.2)
BASOPHILS NFR BLD: 0 % (ref 0–2)
BILIRUB SERPL-MCNC: 1.8 MG/DL (ref 0.2–1.1)
BUN SERPL-MCNC: 26 MG/DL (ref 8–23)
CALCIUM SERPL-MCNC: 9.6 MG/DL (ref 8.3–10.4)
CHLORIDE SERPL-SCNC: 104 MMOL/L (ref 98–107)
CHOLEST SERPL-MCNC: 193 MG/DL
CO2 SERPL-SCNC: 25 MMOL/L (ref 21–32)
CREAT SERPL-MCNC: 1 MG/DL (ref 0.8–1.5)
DIFFERENTIAL METHOD BLD: ABNORMAL
EOSINOPHIL # BLD: 0 K/UL (ref 0–0.8)
EOSINOPHIL NFR BLD: 0 % (ref 0.5–7.8)
ERYTHROCYTE [DISTWIDTH] IN BLOOD BY AUTOMATED COUNT: 15.8 % (ref 11.9–14.6)
GLOBULIN SER CALC-MCNC: 2.9 G/DL (ref 2.3–3.5)
GLUCOSE SERPL-MCNC: 100 MG/DL (ref 65–100)
HCT VFR BLD AUTO: 43.4 % (ref 41.1–50.3)
HDLC SERPL-MCNC: 79 MG/DL (ref 40–60)
HDLC SERPL: 2.4 {RATIO}
HGB BLD-MCNC: 14.5 G/DL (ref 13.6–17.2)
IMM GRANULOCYTES # BLD AUTO: 0 K/UL (ref 0–0.5)
IMM GRANULOCYTES NFR BLD AUTO: 1 % (ref 0–5)
LDLC SERPL CALC-MCNC: 90.4 MG/DL
LYMPHOCYTES # BLD: 0.6 K/UL (ref 0.5–4.6)
LYMPHOCYTES NFR BLD: 11 % (ref 13–44)
MCH RBC QN AUTO: 30.8 PG (ref 26.1–32.9)
MCHC RBC AUTO-ENTMCNC: 33.4 G/DL (ref 31.4–35)
MCV RBC AUTO: 92.1 FL (ref 79.6–97.8)
MONOCYTES # BLD: 0.2 K/UL (ref 0.1–1.3)
MONOCYTES NFR BLD: 4 % (ref 4–12)
NEUTS SEG # BLD: 5.2 K/UL (ref 1.7–8.2)
NEUTS SEG NFR BLD: 84 % (ref 43–78)
NRBC # BLD: 0 K/UL (ref 0–0.2)
PLATELET # BLD AUTO: 214 K/UL (ref 150–450)
PMV BLD AUTO: 10.9 FL (ref 9.4–12.3)
POTASSIUM SERPL-SCNC: 4 MMOL/L (ref 3.5–5.1)
PROT SERPL-MCNC: 7.7 G/DL (ref 6.3–8.2)
RBC # BLD AUTO: 4.71 M/UL (ref 4.23–5.6)
SODIUM SERPL-SCNC: 140 MMOL/L (ref 136–145)
TRIGL SERPL-MCNC: 118 MG/DL (ref 35–150)
TSH, 3RD GENERATION: 0.26 UIU/ML (ref 0.36–3.74)
VLDLC SERPL CALC-MCNC: 23.6 MG/DL (ref 6–23)
WBC # BLD AUTO: 6.1 K/UL (ref 4.3–11.1)

## 2022-06-15 PROCEDURE — 3017F COLORECTAL CA SCREEN DOC REV: CPT | Performed by: INTERNAL MEDICINE

## 2022-06-15 PROCEDURE — 1123F ACP DISCUSS/DSCN MKR DOCD: CPT | Performed by: INTERNAL MEDICINE

## 2022-06-15 PROCEDURE — G0439 PPPS, SUBSEQ VISIT: HCPCS | Performed by: INTERNAL MEDICINE

## 2022-06-15 PROCEDURE — G8427 DOCREV CUR MEDS BY ELIG CLIN: HCPCS | Performed by: INTERNAL MEDICINE

## 2022-06-15 PROCEDURE — 1036F TOBACCO NON-USER: CPT | Performed by: INTERNAL MEDICINE

## 2022-06-15 PROCEDURE — 99213 OFFICE O/P EST LOW 20 MIN: CPT | Performed by: INTERNAL MEDICINE

## 2022-06-15 PROCEDURE — G8417 CALC BMI ABV UP PARAM F/U: HCPCS | Performed by: INTERNAL MEDICINE

## 2022-06-15 RX ORDER — LANOLIN ALCOHOL/MO/W.PET/CERES
3 CREAM (GRAM) TOPICAL NIGHTLY
COMMUNITY

## 2022-06-15 RX ORDER — HYDROCHLOROTHIAZIDE 12.5 MG/1
12.5 CAPSULE, GELATIN COATED ORAL EVERY MORNING
Qty: 90 CAPSULE | Refills: 3 | Status: SHIPPED | OUTPATIENT
Start: 2022-06-15 | End: 2022-06-20 | Stop reason: ALTCHOICE

## 2022-06-15 NOTE — PATIENT INSTRUCTIONS
Personalized Preventive Plan for Vikki Lopez - 6/15/2022  Medicare offers a range of preventive health benefits. Some of the tests and screenings are paid in full while other may be subject to a deductible, co-insurance, and/or copay. Some of these benefits include a comprehensive review of your medical history including lifestyle, illnesses that may run in your family, and various assessments and screenings as appropriate. After reviewing your medical record and screening and assessments performed today your provider may have ordered immunizations, labs, imaging, and/or referrals for you. A list of these orders (if applicable) as well as your Preventive Care list are included within your After Visit Summary for your review. Other Preventive Recommendations:    · A preventive eye exam performed by an eye specialist is recommended every 1-2 years to screen for glaucoma; cataracts, macular degeneration, and other eye disorders. · A preventive dental visit is recommended every 6 months. · Try to get at least 150 minutes of exercise per week or 10,000 steps per day on a pedometer . · Order or download the FREE \"Exercise & Physical Activity: Your Everyday Guide\" from [employer]. Call 0-546.352.8972 or search [employer] online. · You need 4133-6767 mg of calcium and 7389-6900 IU of vitamin D per day. It is possible to meet your calcium requirement with diet alone, but a vitamin D supplement is usually necessary to meet this goal.  · When exposed to the sun, use a sunscreen that protects against both UVA and UVB radiation with an SPF of 30 or greater. Reapply every 2 to 3 hours or after sweating, drying off with a towel, or swimming. · Always wear a seat belt when traveling in a car. Always wear a helmet when riding a bicycle or motorcycle.

## 2022-06-15 NOTE — PROGRESS NOTES
SUBJECTIVE:   Teofilo Herrera is a 67 y.o. male seen for a visit regarding   Chief Complaint   Patient presents with    Medicare [employer]     pt is fasting. Hypertension  This is a chronic problem. The current episode started more than 1 year ago. The problem has been gradually worsening since onset. The problem is uncontrolled. Associated symptoms include neck pain and peripheral edema (at times-uses demadex about 2 x per week if edema). Pertinent negatives include no chest pain or headaches. Past Medical History, Past Surgical History, Family history, Social History, and Medications were all reviewed with the patient today and updated as necessary. Current Outpatient Medications   Medication Sig Dispense Refill    IVERMECTIN PO Take 1 capsule by mouth daily      melatonin 3 MG TABS tablet Take 3 mg by mouth at bedtime      hydroCHLOROthiazide (MICROZIDE) 12.5 MG capsule Take 1 capsule by mouth every morning 90 capsule 3    Calcium Polycarbophil (FIBERCON PO) Take 1 tablet by mouth in the morning and at bedtime       ZINC PO Take 50 mg by mouth daily       VITAMIN D PO Take by mouth Takes as liquid 5 drops daily      sildenafil (VIAGRA) 100 MG tablet Take 100 mg by mouth as needed      Probiotic Product (PROBIOTIC-10 PO) Take 1 capsule by mouth daily      methylPREDNISolone (MEDROL DOSEPACK) 4 MG tablet Take by mouth as directed.  Start in morning 1 kit 1    amLODIPine (NORVASC) 10 MG tablet Take 10 mg by mouth daily      ascorbic acid (VITAMIN C) 250 MG tablet Take 250 mg by mouth 2 times daily      aspirin 81 MG EC tablet Take 162 mg by mouth daily      atorvastatin (LIPITOR) 80 MG tablet Take 80 mg by mouth daily      Biotin 2.5 MG CAPS Take 2,500 mcg by mouth daily      fexofenadine (ALLEGRA) 180 MG tablet Take 180 mg by mouth daily      fluticasone (FLONASE) 50 MCG/ACT nasal spray 2 sprays by Nasal route daily      levothyroxine (SYNTHROID) 150 MCG tablet Take 150 mcg by mouth every morning (before breakfast)      pantoprazole (PROTONIX) 40 MG tablet Take 40 mg by mouth daily      telmisartan-hydroCHLOROthiazide (MICARDIS HCT) 80-25 MG per tablet Take 1 tablet by mouth daily      torsemide (DEMADEX) 10 MG tablet Take 10 mg by mouth daily as needed      meloxicam (MOBIC) 15 MG tablet Take 15 mg by mouth daily       No current facility-administered medications for this visit. 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    Essential hypertension    Spondylosis of cervical region without myelopathy or radiculopathy    Reflux esophagitis    Fecal incontinence    Allergic rhinitis    Colon polyps    Rosacea    Hypothyroidism    Hyperlipidemia    H/O prostate cancer    Spondylosis of lumbar region without myelopathy or radiculopathy    Right hand pain    Primary osteoarthritis of first carpometacarpal joint of right hand     Social History     Tobacco Use    Smoking status: Former Smoker     Packs/day: 1.00     Quit date: 1998     Years since quittin.1    Smokeless tobacco: Never Used   Substance Use Topics    Alcohol use: Yes     Alcohol/week: 21.0 standard drinks          Review of Systems   Cardiovascular: Negative for chest pain. Endocrine: Negative for cold intolerance and heat intolerance. TSH normal 2021   Musculoskeletal: Positive for neck pain. Neurological: Negative for dizziness and headaches. OBJECTIVE:  BP (!) 154/91   Ht 5' 8\" (1.727 m)   Wt 203 lb (92.1 kg)   BMI 30.87 kg/m²      Physical Exam  Constitutional:       General: He is not in acute distress. Appearance: Normal appearance. Cardiovascular:      Rate and Rhythm: Normal rate and regular rhythm. Heart sounds: No murmur heard. Pulmonary:      Breath sounds: No wheezing or rales.    Musculoskeletal:         General: Swelling (about 1 plus) present. Medical problems and test results were reviewed with the patient today. ASSESSMENT and PLAN     1. Medicare annual wellness visit, subsequent  2. Spondylosis of cervical region without myelopathy or radiculopathy  3. Hyperlipidemia, unspecified hyperlipidemia type  -     CBC with Auto Differential; Future  -     Lipid Panel; Future  -     Comprehensive Metabolic Panel; Future  4. Essential hypertension  -     hydroCHLOROthiazide (MICROZIDE) 12.5 MG capsule; Take 1 capsule by mouth every morning, Disp-90 capsule, R-3Normal  5. Hypothyroidism, unspecified type  -     TSH; Future  6. Need for hepatitis C screening test  -     Hepatitis C Antibody; Future  7. Other problems related to lifestyle  -     Hepatitis C Antibody; Future  add small dose HCTZ  12.5 to current --check 6 mo--has wrist monitor-can check lipid and thyroid also- possible BP higher with the medrol-one dose though , it helped the neck pain     Current treatment plan is effective. no changes in therapy  lab results and schedule for future lab studies reviewed with patient  reviewed medications and side effects in detail     Return in 6 months (on 12/15/2022) for Medicare Annual Wellness Visit in 1 year, For BP recheck.

## 2022-06-15 NOTE — PROGRESS NOTES
Medicare Annual Wellness Visit    Radha Bob is here for Medicare AWV (pt is fasting.)    Assessment & Plan   Medicare annual wellness visit, subsequent  Spondylosis of cervical region without myelopathy or radiculopathy  Hyperlipidemia, unspecified hyperlipidemia type  -     CBC with Auto Differential; Future  -     Lipid Panel; Future  -     Comprehensive Metabolic Panel; Future  Essential hypertension  -     hydroCHLOROthiazide (MICROZIDE) 12.5 MG capsule; Take 1 capsule by mouth every morning, Disp-90 capsule, R-3Normal  Hypothyroidism, unspecified type  -     TSH; Future  Need for hepatitis C screening test  -     Hepatitis C Antibody; Future  Other problems related to lifestyle  -     Hepatitis C Antibody; Future         colon for 2029; psa checked by urology in Jan -not detected -hx cancer surgery 2007-neck improved on the medrol -check Hep C Ab -vaccines done       Recommendations for Preventive Services Due: see orders and patient instructions/AVS.  Recommended screening schedule for the next 5-10 years is provided to the patient in written form: see Patient Instructions/AVS.     Return in 6 months (on 12/15/2022) for Medicare Annual Wellness Visit in 1 year, For BP recheck. Subjective   The following acute and/or chronic problems were also addressed today:  Hypertension ; thyroid    Patient's complete Health Risk Assessment and screening values have been reviewed and are found in Flowsheets. The following problems were reviewed today and where indicated follow up appointments were made and/or referrals ordered.     Positive Risk Factor Screenings with Interventions:    Fall Risk:  Do you feel unsteady or are you worried about falling? : (!) yes  2 or more falls in past year?: no  Fall with injury in past year?: no     Fall Risk Interventions:    · no falls as an issue       Alcohol Screening:  AUDIT-C:   Alcohol Use: Heavy Drinker    Frequency of Alcohol Consumption: 4 or more times a week  Average Number of Drinks: 3 or 4    Frequency of Binge Drinking: Never     AUDIT Total Score: 5  A total score of 8 or more is associated with harmful or hazardous drinking. A score of 13 or more in women, and 15 or more in men, is likely to indicate alcohol dependence.     Substance Use - Alcohol Interventions:  no issue-has 3-4 spread out over hours-discussed cuuting back          General Health and ACP:  General  In general, how would you say your health is?: Good  In the past 7 days, have you experienced any of the following: New or Increased Pain, New or Increased Fatigue, Loneliness, Social Isolation, Stress or Anger?: (!) Yes  Select all that apply: (!) New or Increased Pain  Do you get the social and emotional support that you need?: Yes  Do you have a Living Will?: Yes    Advance Directives     Power of  Living Will ACP-Advance Directive ACP-Power of Familia Hinojosa on 10/03/18 Not on File Not on File Filed      General Health Risk Interventions:  · pain in neck -saw ortho; on medrol    Health Habits/Nutrition:     Physical Activity: Insufficiently Active    Days of Exercise per Week: 3 days    Minutes of Exercise per Session: 40 min     Have you lost any weight without trying in the past 3 months?: No  Body mass index: (!) 30.86  Have you seen the dentist within the past year?: Yes    Health Habits/Nutrition Interventions:  · no issue-is active     Safety:  Do you have working smoke detectors?: Yes  Do you have any tripping hazards - loose or unsecured carpets or rugs?: (!) Yes  Do you have any tripping hazards - clutter in doorways, halls, or stairs?: No  Do you have either shower bars, grab bars, non-slip mats or non-slip surfaces in your shower or bathtub?: (!) No  Do all of your stairways have a railing or banister?: Not Applicable  Do you always fasten your seatbelt when you are in a car?: Yes    Safety Interventions:  · Patient declines any further evaluation/treatment for this issue           Objective   Vitals:    06/15/22 0950 06/15/22 1019   BP: (!) 154/82 (!) 154/91   Weight: 203 lb (92.1 kg)    Height: 5' 8\" (1.727 m)       Body mass index is 30.87 kg/m². General Appearance: alert and oriented to person, place and time, well-developed and well-nourished, in no acute distress  Pulmonary/Chest: clear to auscultation bilaterally- no wheezes, rales or rhonchi, normal air movement, no respiratory distress  Cardiovascular: normal rate, normal S1 and S2, no gallops, intact distal pulses and no carotid bruits       Allergies   Allergen Reactions    Levofloxacin Rash     Prior to Visit Medications    Medication Sig Taking? Authorizing Provider   IVERMECTIN PO Take 1 capsule by mouth daily Yes Historical Provider, MD   melatonin 3 MG TABS tablet Take 3 mg by mouth at bedtime Yes Historical Provider, MD   hydroCHLOROthiazide (MICROZIDE) 12.5 MG capsule Take 1 capsule by mouth every morning Yes Fay Polo MD   Calcium Polycarbophil (FIBERCON PO) Take 1 tablet by mouth in the morning and at bedtime  Yes Historical Provider, MD   ZINC PO Take 50 mg by mouth daily  Yes Historical Provider, MD   VITAMIN D PO Take by mouth Takes as liquid 5 drops daily Yes Historical Provider, MD   sildenafil (VIAGRA) 100 MG tablet Take 100 mg by mouth as needed Yes Historical Provider, MD   Probiotic Product (PROBIOTIC-10 PO) Take 1 capsule by mouth daily Yes Historical Provider, MD   methylPREDNISolone (MEDROL DOSEPACK) 4 MG tablet Take by mouth as directed.  Start in morning Yes Jaswinder Linda, APRN - CNP   amLODIPine (NORVASC) 10 MG tablet Take 10 mg by mouth daily Yes Ar Automatic Reconciliation   ascorbic acid (VITAMIN C) 250 MG tablet Take 250 mg by mouth 2 times daily Yes Ar Automatic Reconciliation   aspirin 81 MG EC tablet Take 162 mg by mouth daily Yes Ar Automatic Reconciliation   atorvastatin (LIPITOR) 80 MG tablet Take 80 mg by mouth daily Yes Ar Automatic Reconciliation   Biotin 2.5 MG

## 2022-06-20 ENCOUNTER — OFFICE VISIT (OUTPATIENT)
Dept: INTERNAL MEDICINE CLINIC | Facility: CLINIC | Age: 72
End: 2022-06-20
Payer: MEDICARE

## 2022-06-20 VITALS
BODY MASS INDEX: 31.45 KG/M2 | HEIGHT: 68 IN | DIASTOLIC BLOOD PRESSURE: 80 MMHG | WEIGHT: 207.5 LBS | SYSTOLIC BLOOD PRESSURE: 148 MMHG

## 2022-06-20 DIAGNOSIS — R41.3 MEMORY LOSS, SHORT TERM: ICD-10-CM

## 2022-06-20 DIAGNOSIS — R41.3 MEMORY LOSS, SHORT TERM: Primary | ICD-10-CM

## 2022-06-20 DIAGNOSIS — E05.90 SUBCLINICAL HYPERTHYROIDISM: ICD-10-CM

## 2022-06-20 LAB
T4 FREE SERPL-MCNC: 1.5 NG/DL (ref 0.78–1.46)
VIT B12 SERPL-MCNC: 360 PG/ML (ref 193–986)

## 2022-06-20 PROCEDURE — 99214 OFFICE O/P EST MOD 30 MIN: CPT | Performed by: INTERNAL MEDICINE

## 2022-06-20 PROCEDURE — 3017F COLORECTAL CA SCREEN DOC REV: CPT | Performed by: INTERNAL MEDICINE

## 2022-06-20 PROCEDURE — G8427 DOCREV CUR MEDS BY ELIG CLIN: HCPCS | Performed by: INTERNAL MEDICINE

## 2022-06-20 PROCEDURE — 1036F TOBACCO NON-USER: CPT | Performed by: INTERNAL MEDICINE

## 2022-06-20 PROCEDURE — G8417 CALC BMI ABV UP PARAM F/U: HCPCS | Performed by: INTERNAL MEDICINE

## 2022-06-20 PROCEDURE — 1123F ACP DISCUSS/DSCN MKR DOCD: CPT | Performed by: INTERNAL MEDICINE

## 2022-06-20 RX ORDER — DONEPEZIL HYDROCHLORIDE 5 MG/1
5 TABLET, FILM COATED ORAL NIGHTLY
Qty: 90 TABLET | Refills: 1 | Status: SHIPPED | OUTPATIENT
Start: 2022-06-20 | End: 2022-09-20 | Stop reason: SDUPTHER

## 2022-06-20 ASSESSMENT — PATIENT HEALTH QUESTIONNAIRE - PHQ9
SUM OF ALL RESPONSES TO PHQ QUESTIONS 1-9: 0
SUM OF ALL RESPONSES TO PHQ QUESTIONS 1-9: 0
1. LITTLE INTEREST OR PLEASURE IN DOING THINGS: 0
SUM OF ALL RESPONSES TO PHQ QUESTIONS 1-9: 0
SUM OF ALL RESPONSES TO PHQ QUESTIONS 1-9: 0
SUM OF ALL RESPONSES TO PHQ9 QUESTIONS 1 & 2: 0
2. FEELING DOWN, DEPRESSED OR HOPELESS: 0

## 2022-06-20 NOTE — PROGRESS NOTES
SUBJECTIVE:   Anu Duarte is a 67 y.o. male seen for a visit regarding   Chief Complaint   Patient presents with    Memory Loss     wife and patient report short term memory issues for a few years        Other  This is a chronic problem. The current episode started more than 1 year ago (wife notes short term memory changes for 2-3 yr; may have converasation about something in afternoon, forget that next am -may sometimes remember when prompted and other times not; no driving issues; does make notes and lists about multiple things he used ). The problem occurs daily. The problem has been unchanged (some changes in judgement -did not decide about 2 estimates made-wife decides). Pertinent negatives include no headaches or numbness. Past Medical History, Past Surgical History, Family history, Social History, and Medications were all reviewed with the patient today and updated as necessary.        Current Outpatient Medications   Medication Sig Dispense Refill    Quercetin 250 MG TABS 250 mg by Other route daily       melatonin 3 MG TABS tablet Take 3 mg by mouth at bedtime      Calcium Polycarbophil (FIBERCON PO) Take 1 tablet by mouth in the morning and at bedtime       ZINC PO Take 50 mg by mouth daily       VITAMIN D PO Take by mouth Takes as liquid 5 drops daily      sildenafil (VIAGRA) 100 MG tablet Take 100 mg by mouth as needed      Probiotic Product (PROBIOTIC-10 PO) Take 1 capsule by mouth daily      amLODIPine (NORVASC) 10 MG tablet Take 10 mg by mouth daily      ascorbic acid (VITAMIN C) 250 MG tablet Take 250 mg by mouth 2 times daily      aspirin 81 MG EC tablet Take 162 mg by mouth daily      atorvastatin (LIPITOR) 80 MG tablet Take 80 mg by mouth daily      Biotin 2.5 MG CAPS Take 2,500 mcg by mouth daily      fexofenadine (ALLEGRA) 180 MG tablet Take 180 mg by mouth daily      fluticasone (FLONASE) 50 MCG/ACT nasal spray 2 sprays by Nasal route daily      levothyroxine (SYNTHROID) 150 MCG tablet Take 150 mcg by mouth every morning (before breakfast)      pantoprazole (PROTONIX) 40 MG tablet Take 40 mg by mouth daily      telmisartan-hydroCHLOROthiazide (MICARDIS HCT) 80-25 MG per tablet Take 1 tablet by mouth daily      torsemide (DEMADEX) 10 MG tablet Take 10 mg by mouth daily as needed      meloxicam (MOBIC) 15 MG tablet Take 15 mg by mouth daily       No current facility-administered medications for this visit. 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    Essential hypertension    Spondylosis of cervical region without myelopathy or radiculopathy    Reflux esophagitis    Fecal incontinence    Allergic rhinitis    Colon polyps    Rosacea    Hypothyroidism    Hyperlipidemia    H/O prostate cancer    Spondylosis of lumbar region without myelopathy or radiculopathy    Right hand pain    Primary osteoarthritis of first carpometacarpal joint of right hand     Social History     Tobacco Use    Smoking status: Former Smoker     Packs/day: 1.00     Quit date: 1998     Years since quittin.1    Smokeless tobacco: Never Used   Substance Use Topics    Alcohol use: Yes     Alcohol/week: 21.0 standard drinks          Review of Systems   Musculoskeletal:        Has inter stem device for back -lumbar--told can have head mri    Neurological: Negative for dizziness, speech difficulty, numbness and headaches. OBJECTIVE:  BP (!) 148/80   Ht 5' 8\" (1.727 m)   Wt 207 lb 8 oz (94.1 kg)   BMI 31.55 kg/m²      Physical Exam  Neurological:      Mental Status: He is oriented to person, place, and time. Cranial Nerves: No cranial nerve deficit. Deep Tendon Reflexes: Reflexes normal.            Medical problems and test results were reviewed with the patient today. ASSESSMENT and PLAN     1.  Memory loss, short term  -     Vitamin B12; Future  -     MRI BRAIN WO CONTRAST; Future  -     [address]  2. Subclinical hyperthyroidism  -     T4, Free; Future     Discussed MRI and lab , refer to neurology -cognitive changes not though did well on MMSE overall-the clinical description worse than this  the following changes in treatment are made start 5 mg aricept --has 29/30 mmse  lab results and schedule for future lab studies reviewed with patient  reviewed medications and side effects in detail   radiology results and schedule of future radiology studies reviewed with patient     No follow-ups on file.

## 2022-06-21 ENCOUNTER — HOSPITAL ENCOUNTER (OUTPATIENT)
Dept: PHYSICAL THERAPY | Age: 72
Setting detail: RECURRING SERIES
Discharge: HOME | End: 2022-06-24
Payer: MEDICARE

## 2022-06-21 PROCEDURE — 97110 THERAPEUTIC EXERCISES: CPT

## 2022-06-21 PROCEDURE — 97161 PT EVAL LOW COMPLEX 20 MIN: CPT

## 2022-06-21 PROCEDURE — 97012 MECHANICAL TRACTION THERAPY: CPT

## 2022-06-21 ASSESSMENT — PAIN DESCRIPTION - LOCATION: LOCATION: NECK;BACK

## 2022-06-21 ASSESSMENT — PAIN SCALES - GENERAL: PAINLEVEL_OUTOF10: 5

## 2022-06-21 NOTE — PROGRESS NOTES
Anu Duarte  : 1950  Primary: Medicare Part A And B  Secondary:  [employer] MEDICARE SUPP [facility]  [address] [place] [address]  Phone: [phone]  Fax: [phone] Plan Frequency: 2x/wk for 6 weeks    Plan of Care/Certification Expiration Date: 22      PT Visit Info: Total # of Visits to Date: 1      OUTPATIENT PHYSICAL THERAPY:OP NOTE TYPE: Treatment Note 2022       Episode  }Appt Desk              Treatment Diagnosis:  Cervicalgia (M54.2)  Medical/Referring Diagnosis:  Cervicalgia [M54.2]  Spondylosis without myelopathy or radiculopathy, lumbar region [C55.417]  Referring Physician:  KATIE Rodriguez MD Orders:  PT Eval and Treat, 2-3x/wk for 6 weeks  Date of Onset:  Onset Date: 22     Allergies:   Levofloxacin  Restrictions/Precautions:  Restrictions/Precautions: None  No data recorded   Interventions Planned (Treatment may consist of any combination of the following):    Current Treatment Recommendations: Strengthening; ROM; Functional mobility training; Manual Therapy - Soft Tissue Mobilization; Manual Therapy - Joint Manipulation; Pain management; Home exercise program; Patient/Caregiver education & training; Modalities; Integrated dry needling (Traction)     Subjective Comments:  Pt reports pain and stiffness in his neck. Initial:}  Neck,Back 5/10Post Session:     Neck,Back 0/10  Medications Last Reviewed:  2022  Updated Objective Findings:  See evaluation note from today  Treatment   THERAPEUTIC EXERCISE: (17 minutes):    Exercises per grid below to improve mobility, strength, balance and coordination. Required minimal verbal and tactile cues to promote proper body alignment, promote proper body posture and promote proper body mechanics. Progressed resistance, range, repetitions and complexity of movement as indicated.     MANUAL THERAPY: (8 minutes):   Joint mobilization and Soft tissue mobilization was utilized and necessary because of the patient's restricted joint motion, painful spasm, loss of articular motion and restricted motion of soft tissue. MODALITIES: (0 minutes): *  Hot Pack Therapy in order to provide analgesia and relieve muscle spasm. MECHANICAL TRACTION: (10 minutes):   Traction was used due to the patient's cervical radiculopathy in order to relieve pain in or originating from his spine. Date:  6/21/22 Date:   Date:     Activity/Exercise Parameters Parameters Parameters   Upper trap stretch 2x B     Levator stretch 2x B     Thoracic extension 10x in chair     Row 15x BTB     T 15x BTB     M 15x BTB     Y 15x BTB     Chin tuck 10x supine, 5x seated     Bridge 5x     Clamshell 5x B w/ lime band     TA contraction 5x hands to thighs       Treatment/Session Summary:    · Treatment Assessment:  Mr. Aj Blair had improved posture, pain, and ROM following today's session. · Communication/Consultation:  None today  · Equipment provided today:  None  · Recommendations/Intent for next treatment session: Next visit will focus on progression of functional tasks as tolerated.     Total Treatment Billable Duration:  10 minutes traction, 8 mins manual, 17 mins [id number]}  Time In: 1348  Time Out: 1448    Mya Kebede, PT       Charge Capture  }Post Session Pain  PT Visit Info  [facility] Portal  MD Guidelines  Scanned Media  Benefits  MyChart    Future Appointments   Date Time Provider Octavio Nur   6/28/2022  1:45 PM Mya Kebede, PT [facility]   6/30/2022  1:00 PM Mya Kebede, PT [facility]   7/7/2022  1:45 PM Mya Kebede, PT [facility]   7/12/2022  1:45 PM [place] Y[place], PT [facility]   7/14/2022  1:45 PM [place] Y[place], PT [facility]   7/19/2022  1:45 PM Mya Yandy, PT [facility]   7/21/2022  1:45 PM Mya Coloni, PT [facility]   7/26/2022  1:45 PM Mya Coloni, PT [facility]   7/28/2022  1:45 PM Mya Kebede, PT [facility]   8/2/2022  1:45 PM Allyn Browne, PT [facility]   9/20/2022  1:00 PM Nidhi Bo MD [facility]   12/16/2022  1:00 PM MD MENDOZA Batista [facility]   1/24/2023  2:00 PM PGU NILESH BLOOD DRAWS [facility]   1/31/2023  3:45 PM Danyel Siu MD [facility]   6/19/2023  9:30 AM JAVIER Eddy MD [facility]   Access Code: [email or web address]  URL: [email or web address]  Date: 06/21/2022  Prepared by: Allyn Browne    Exercises  Seated Upper Trapezius Stretch - 1 x daily - 7 x weekly - 1 sets - 10 reps - 5 hold  Seated Levator Scapulae Stretch - 1 x daily - 7 x weekly - 1 sets - 10 reps - 5 hold  Seated Thoracic Lumbar Extension with Pectoralis Stretch - 1 x daily - 7 x weekly - 1 sets - 10 reps - 5 hold  Standing Shoulder Row with Anchored Resistance - 1 x daily - 3 x weekly - 2 sets - 10 reps - 5 hold  Standing Shoulder Horizontal Abduction with Anchored Resistance - 1 x daily - 3 x weekly - 2 sets - 10 reps - 5 hold  Shoulder Extension with Resistance - Palms Forward - 1 x daily - 3 x weekly - 2 sets - 10 reps - 5 hold  Shoulder Flexion with Anterior Anchored Resistance - 1 x daily - 3 x weekly - 2 sets - 10 reps - 5 hold  Supine Bridge - 1 x daily - 3 x weekly - 2 sets - 10 reps - 5 hold  Clamshell with Resistance - 1 x daily - 3 x weekly - 2 sets - 10 reps - 5 hold  Supine Transversus Abdominis Bracing - Hands on Thighs - 1 x daily - 3 x weekly - 2 sets - 10 reps - 5 hold

## 2022-06-21 NOTE — THERAPY EVALUATION
Jada Nobles  : 1950  Primary: Medicare Part A And B  Secondary:  FOR LIFE MEDICARE SUPP [facility]  [address] [place] [address]  Phone: [phone]  Fax: [phone] Plan Frequency: 2x/wk for 6 weeks    Plan of Care/Certification Expiration Date: 22      PT Visit Info: Total # of Visits to Date: 1      OUTPATIENT PHYSICAL THERAPY:OP NOTE TYPE: Initial Assessment 2022               Episode  Appt Desk         Treatment Diagnosis:  Cervicalgia (M54.2)    Medical/Referring Diagnosis:  Cervicalgia [M54.2]  Spondylosis without myelopathy or radiculopathy, lumbar region [Y03.836]  Referring Physician:  KATIE Mitchell MD Orders:  PT Eval and Treat, 2-3x/wk for 6 weeks  Return MD Appt:  TBD  Date of Onset:  Onset Date: 22     Allergies:  Levofloxacin  Restrictions/Precautions:    Restrictions/Precautions: None  No data recorded   Medications Last Reviewed:  2022     SUBJECTIVE   History of Injury/Illness (Reason for Referral):  Pt unsure how his pain began. Notes this is actually an acute flare up of chronic issue. He has a home traction unit from a prior bout of PT for cervical issues. MD had him on a steroid pack which he recently completed. It helped but is already wearing off. Xray showed stenosis and arthritis. Patient Stated Goal(s): \"Improved pain and motion\"  Initial:   Neck,Back 5/10 Post Session:   Neck,Back 0/10  Past Medical History/Comorbidities:   Mr. Talia Scott  has a past medical history of Arthritis, Chronic pain, Colon polyps, Compression deformity of vertebra, Encounter for screening colonoscopy, Fecal incontinence, GERD (gastroesophageal reflux disease), Hypercholesterolemia, Hyperlipidemia, Hypertension, Malignant neoplasm of prostate ([facility] [address] Memory loss, Spondylosis of lumbar region without myelopathy or radiculopathy, Stress incontinence, male, and Thyroid disease.   Mr. Talia Scott  has a past surgical history that includes other surgical history (N/A); Cataract removal (Bilateral); Colonoscopy (07/03/2019); Prostatectomy (2007); lumbar laminectomy (08/2016); lumbar laminectomy (08/22/2016); orthopedic surgery (Right, 1998); Colonoscopy; and Urological Surgery (2008). Social History/Living Environment:   Lives With: Spouse  Type of Home: House  Home Layout: Multi-level  Home Access: Stairs to enter without rails  Entrance Stairs - Number of Steps: 2     Prior Level of Function/Work/Activity:   Prior level of function: Independent  Occupation: Retired  No data recordedNo data recorded   Learning:   What is the preferred language of the patient/guardian?: English  Is an  required?: No  How does the patient/guardian prefer to learn new concepts?: Listening; Reading; Demonstration; Pictures/Videos     Fall Risk Scale: Total Score: 15  Jerez Fall Risk: Low (0-24)     Dominant Side:  right handed        OBJECTIVE   Trigger points: B upper trap/levators, capitus, and occipitals    Posture/Deformity: Forward head, rounded shoulders, thoracic kyphosis     Date:  6/21/22 Date:   Date:     Cervical Rotation  R: 65*  L: 55*  Decreased strength     Cervical Side Bending R: 20*  L: 40*  Decreased strength     Cervical Flexion 45*, decreased strength     Cervical Extension 45*, decreased strength     Shoulder Flex/Scapt R: WFL  L: WFL     Shoulder ABD R: WFL  L: WFL     Shoulder ER R: WFL  L: WFL     Shoulder IR R: WFL  L: WFL     Elbow Flex R: WFL  L: WFL     Elbow Ext R: WFL  L: WFL     Scapular Decreased strength       strength 95# L  83# R        Sensation:  Biceps (C5): intact  Childers Radial (C6-C7): intact  Childers Ulner (C8-T1): intact    Special Test/Function:  Spurling's: Traction felt good, but no pain w/ compression  Accessory mobility/: Decreased mobility    ASSESSMENT   Initial Assessment:  Mr. Julian Agarwal presents to PT eval w/ c/o cervical pain and restricted motion along w/ LBP.  With PT assessment, his cervical ROM was noted to be quite limited as was his posture. Mr. Aj Blair has a home traction unit, so he was re-educated on the proper use of said unit. With treatment today that included traction, manual, and therex, his overall pain and ROM were much improved. Mr. Aj Blair will benefit from continued skilled PT services to improve deficits listed below and to progress towards his PLOF. Problem List: (Impacting functional limitations): Body Structures, Functions, Activity Limitations Requiring Skilled Therapeutic Intervention: Decreased functional mobility ; Decreased ADL status; Decreased ROM; Decreased tolerance to work activity; Decreased strength; Decreased endurance; Increased pain; Decreased posture     Therapy Prognosis:   Therapy Prognosis: Good     Assessment Complexity:   Low Complexity  PLAN   Effective Dates: 6/21/22 TO Plan of Care/Certification Expiration Date: 08/20/22     Frequency/Duration: Plan Frequency: 2x/wk for 6 weeks     Interventions Planned (Treatment may consist of any combination of the following):    Current Treatment Recommendations: Strengthening; ROM; Functional mobility training; Manual Therapy - Soft Tissue Mobilization; Manual Therapy - Joint Manipulation; Pain management; Home exercise program; Patient/Caregiver education & training; Modalities; Integrated dry needling (Traction)     Goals: (Goals have been discussed and agreed upon with patient.)  Short Term Goals: 4 weeks  1. Pt will be independent w/ HEP to improve outcomes and decrease pain levels. 2. Pt will have cervical rotation ROM bilaterally of 70* or more in order to increase safety while driving w/ pain of 8/93 or less. 3. Pt will report pain of 2/10 or less while performing ADLs in order to return to PLOF. Long Term Goals. 6 weeks  1. Pt will have NDI score of 7 or less showing decreased pain and decreased functional impairments.    2. Pt will have cervical side bending ROM bilaterally of 40* or more in order to increase safety while driving w/ pain of 9/73 or less. 3. Pt will report pain of 1/10 or less while performing ADLS in order to return to [facility]. Outcome Measure: Tool Used: Neck Disability Index (NDI)  Score:  Initial: 11/50  Most Recent: X/50 (Date: -- )   Interpretation of Score: The Neck Disability Index is a revised form of the Oswestry Low Back Pain Index and is designed to measure the activities of daily living in person's with neck pain. Each section is scored on a 0-5 scale, 5 representing the greatest disability. The scores of each section are added together for a total score of 50. Medical Necessity:    Patient is expected to demonstrate progress in strength, range of motion, balance and coordination to increase independence with functional tasks. Reason For Services/Other Comments:   Patient continues to demonstrate capacity to improve strength, ROM, balance, mobility which will increase independence. Total Duration:  Time In: 1348  Time Out: 7162    Regarding Amari Berkowitz's therapy, I certify that the treatment plan above will be carried out by a therapist or under their direction.   Thank you for this referral,  Lyle Becerra, PT     Referring Physician Signature: LOLITA Andersen*                    Post Session Pain  Charge Capture  PT Visit Info  POC Link  Treatment Note Link  MD Guidelines  Karolineharvanessa

## 2022-06-28 ENCOUNTER — HOSPITAL ENCOUNTER (OUTPATIENT)
Dept: PHYSICAL THERAPY | Age: 72
Setting detail: RECURRING SERIES
Discharge: HOME | End: 2022-07-01
Payer: MEDICARE

## 2022-06-28 PROCEDURE — 97110 THERAPEUTIC EXERCISES: CPT

## 2022-06-28 PROCEDURE — 97140 MANUAL THERAPY 1/> REGIONS: CPT

## 2022-06-28 ASSESSMENT — PAIN SCALES - GENERAL: PAINLEVEL_OUTOF10: 6

## 2022-06-28 ASSESSMENT — PAIN DESCRIPTION - LOCATION: LOCATION: BACK;NECK

## 2022-06-28 NOTE — PROGRESS NOTES
DATE:  06/30/2018



SUBJECTIVE:  The patient is in bed, in no acute distress, nontoxic.  Seen

earlier today.



PHYSICAL EXAMINATION:

VITAL SIGNS:  Temperature is 98, blood pressure is 105/60, respiratory rate

of 18.

HEENT:  Examination is unremarkable.

NECK:  Supple.

LUNGS:  Decreased breath sounds.

HEART:  Normal S1 and S2.

ABDOMEN:  Soft, nontender.



LABORATORY DATA:  Reveals the white count of 5.1.  Chemistries reveals a

BUN of 28, creatinine of 1.1.  Urinalysis is noted.  Microbiology is

reviewed.  Abdominal wall infection cultures with Klebsiella pneumonia and

VRE and urine culture with Klebsiella pneumonia and VRE and Dr. Perez's

note is reviewed.



Dr. Haque's progress note from yesterday is reviewed.  The patient's

abdominal x-rays from yesterday read by Dr. Tim Tomlin shows no

obstruction, drainage catheter overlying the mid anterior abdominal wall. 

Fluoroscopy also read by Dr. Tim Tomlin from yesterday.  The study

demonstrate two separate fistulas communication between the skin surface of

the anterior abdominal wall and small bowel likely mid jejunum, the first

and more inferiorly located fistula localized at the level of the sacral

rim just to the right of midline.



ASSESSMENT AND PLAN:  A 61-year-old male with abdominal wall infection,

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

Klebsiella with fistulogram showing two different fistulas, on Zyvox and

Zosyn.  We will most likely discontinue the antibiotics in the next 24

hours.







__________________________________________

Margarito Khan MD





DD:  06/30/2018 7:27:04

DT:  06/30/2018 8:46:57

Job # [id number] Gwendolyn Dotson  : 1950  Primary: Medicare Part A And B  Secondary:  [employer] LIFE [employer]ICARE [employer]PP [employer]  [address] [place] [address]  Phone: [phone]  Fax: [phone] Plan Frequency: 2x/wk for 6 weeks    Plan of Care/Certification Expiration Date: 22      PT Visit Info: Total # of Visits to Date: 2      OUTPATIENT PHYSICAL THERAPY:OP NOTE TYPE: Treatment Note 2022       Episode  }Appt Desk              Treatment Diagnosis:  Cervicalgia (M54.2)  Medical/Referring Diagnosis:  Cervicalgia [M54.2]  Other cervical disc degeneration, unspecified cervical region [M50.30]  Spondylosis without myelopathy or radiculopathy, lumbar region [Z85.333]  Referring Physician:  KATIE Canada MD Orders:  PT Eval and Treat, 2-3x/wk for 6 weeks  Date of Onset:  Onset Date: 22     Allergies:   Levofloxacin  Restrictions/Precautions:  Restrictions/Precautions: None  No data recorded   Interventions Planned (Treatment may consist of any combination of the following):    Current Treatment Recommendations: Strengthening; ROM; Functional mobility training; Manual Therapy - Soft Tissue Mobilization; Manual Therapy - Joint Manipulation; Pain management; Home exercise program; Patient/Caregiver education & training; Modalities; Integrated dry needling (Traction)     Subjective Comments:  Pt reports he is feeling a little bit better. Initial:}  Back,Neck 6/10Post Session:     Yusra Bailon 2/10  Medications Last Reviewed:  2022  Updated Objective Findings:  See evaluation note from today  Treatment   THERAPEUTIC EXERCISE: (45 minutes):    Exercises per grid below to improve mobility, strength, balance and coordination. Required minimal verbal and tactile cues to promote proper body alignment, promote proper body posture and promote proper body mechanics. Progressed resistance, range, repetitions and complexity of movement as indicated.    Date:  22 Date:  6/28/22 Date:     Activity/Exercise Parameters Parameters Parameters   UBE  4/4 2.0    Upper trap stretch 2x B     Levator stretch 2x B     Thoracic extension 10x in chair 1 min on foam 2 positions    Row 15x BTB 20x BTB    T 15x BTB 20x BTB    M 15x BTB 20x BTB    Y 15x BTB 20x BTB  10x wall lifts     Wall flaco  10x    Forward raise  2x10 2#    Lateral raise  2x10 2#    Shrugs  2x10 2#    Chin tuck 10x supine, 5x seated     Bridge 5x 2x10 w/ pt's band    Clamshell 5x B w/ lime band 2x10 pt's band     SLR  2x10 B    UTR  5x B    LTR  5x B    TA contraction 5x hands to thighs 10x B isometric holds hands to knee    90/90 lower  10x B      MANUAL THERAPY: (10 minutes):   Joint mobilization and Soft tissue mobilization was utilized and necessary because of the patient's restricted joint motion, painful spasm, loss of articular motion and restricted motion of soft tissue. Date:  6/28/22   Parameters   - STM to L upper trap/levator in prone  - Mobilizations to thoracic spine in prone     MODALITIES: (5 minutes): *  Hot Pack Therapy in order to provide analgesia and relieve muscle spasm. MECHANICAL TRACTION: (0 minutes):   Traction was used due to the patient's cervical radiculopathy in order to relieve pain in or originating from his spine. Treatment/Session Summary:    · Treatment Assessment:  Mr. Nikky Jose had improved pain and motion after postural strengthening and thoracic mobilizations. He had further improvements after STM and modalities. New HEP issued. · Communication/Consultation:  None today  · Equipment provided today:  None  · Recommendations/Intent for next treatment session: Next visit will focus on progression of functional tasks as tolerated.     Total Treatment Billable Duration:  45 mins therex, 10 mins manual   Time In: 1335  Time Out: [facility], PT       Charge Capture  }Post Session Pain  PT Visit Info  [employer] Portal  MD Guidelines  Scanned Media  Benefits MyChart    Future Appointments   Date Time Provider Octavio Nur   6/30/2022  1:00 PM Crystal Plata, PT [facility]   7/7/2022  1:45 PM Crystal Plata, PT [facility]   7/12/2022  1:45 PM Crystal Plata, PT [facility]   7/14/2022  1:45 PM Crystal Plata, PT [facility]   7/19/2022  1:45 PM Crystal Plata, PT [facility]   7/21/2022  1:45 PM Crystal Plata, PT [facility]   7/26/2022  1:45 PM Crystal Plata, PT [facility]   7/28/2022  1:45 PM Crystal Plata, PT [facility]   8/2/2022  1:45 PM Crystal Plata, PT [facility]   9/20/2022  1:00 PM Sumi Travis MD [facility]   10/24/2022 10:00 AM Jessica Barker DO [facility]   12/16/2022  1:00 PM Sumi Travis MD [facility]   1/24/2023  2:00 PM [facility] NILESH BLOOD DRAWS [facility]   1/31/2023  3:45 PM Shelli Stuart MD [facility]   6/19/2023  9:30 AM [facility] Svetlana eLach MD [facility]   Access Code: [email or web address]  Access Code: [email or web address]  URL: [email or web address]  Date: 06/28/2022  Prepared by: Crystal Plata    Exercises  Seated Upper Trapezius Stretch - 1 x daily - 7 x weekly - 1 sets - 10 reps - 5 hold  Seated Levator Scapulae Stretch - 1 x daily - 7 x weekly - 1 sets - 10 reps - 5 hold  Seated Thoracic Lumbar Extension with Pectoralis Stretch - 1 x daily - 7 x weekly - 1 sets - 10 reps - 5 hold  Standing Shoulder Row with Anchored Resistance - 1 x daily - 3 x weekly - 2 sets - 10 reps - 5 hold  Standing Shoulder Horizontal Abduction with Anchored Resistance - 1 x daily - 3 x weekly - 2 sets - 10 reps - 5 hold  Shoulder Extension with Resistance - Palms Forward - 1 x daily - 3 x weekly - 2 sets - 10 reps - 5 hold  Shoulder Flexion with Anterior Anchored Resistance - 1 x daily - 3 x weekly - 2 sets - 10 reps - 5 hold  Supine Bridge - 1 x daily - 3 x weekly - 2 sets - 10 reps - 5 hold  Clamshell with Resistance - 1 x daily - 3 x weekly - 2 sets - 10 reps - 5 hold  Supine Transversus Abdominis Bracing - Hands on Thighs - 1 x daily - 3 x weekly - 2 sets - 10 reps - 5 hold  Hooklying Isometric Hip Flexion - 1 x daily - 3 x weekly - 2 sets - 10 reps - 5 hold  Supine 90/90 Alternating Heel Touches with Posterior Pelvic Tilt - 1 x daily - 3 x weekly - 2 sets - 10 reps - 5 hold  Active Straight Leg Raise with Quad Set - 1 x daily - 3 x weekly - 2 sets - 10 reps - 5 hold  Wall Govan - 1 x daily - 3 x weekly - 2 sets - 10 reps - 5 hold

## 2022-06-30 ENCOUNTER — HOSPITAL ENCOUNTER (OUTPATIENT)
Dept: PHYSICAL THERAPY | Age: 72
Setting detail: RECURRING SERIES
Discharge: HOME | End: 2022-07-03
Payer: MEDICARE

## 2022-06-30 PROCEDURE — 97110 THERAPEUTIC EXERCISES: CPT

## 2022-06-30 PROCEDURE — 97140 MANUAL THERAPY 1/> REGIONS: CPT

## 2022-06-30 ASSESSMENT — PAIN SCALES - GENERAL: PAINLEVEL_OUTOF10: 2

## 2022-06-30 NOTE — PROGRESS NOTES
David Reach  : 1950  Primary: Medicare Part A And B  Secondary:  [employer] LIFE MEDICARE [employer]PP [employer]  [address] [place] [address]  Phone: [phone]  Fax: [phone] Plan Frequency: 2x/wk for 6 weeks    Plan of Care/Certification Expiration Date: 22      PT Visit Info: Total # of Visits to Date: 3      OUTPATIENT PHYSICAL THERAPY:OP NOTE TYPE: Treatment Note 2022       Episode  }Appt Desk              Treatment Diagnosis:  Cervicalgia (M54.2)  Medical/Referring Diagnosis:  Cervicalgia [M54.2]  Other cervical disc degeneration, unspecified cervical region [M50.30]  Spondylosis without myelopathy or radiculopathy, lumbar region [O45.735]  Referring Physician:  KATIE Francois MD Orders:  PT Eval and Treat, 2-3x/wk for 6 weeks  Date of Onset:  Onset Date: 22     Allergies:   Levofloxacin  Restrictions/Precautions:  Restrictions/Precautions: None  No data recorded   Interventions Planned (Treatment may consist of any combination of the following):    Current Treatment Recommendations: Strengthening; ROM; Functional mobility training; Manual Therapy - Soft Tissue Mobilization; Manual Therapy - Joint Manipulation; Pain management; Home exercise program; Patient/Caregiver education & training; Modalities; Integrated dry needling (Traction)     Subjective Comments:  Feels good. Had some muscle soreness from last session. Initial:}    2/10Post Session:       0/10  Medications Last Reviewed:  2022  Updated Objective Findings:  See evaluation note from today  Treatment   THERAPEUTIC EXERCISE: (45 minutes):    Exercises per grid below to improve mobility, strength, balance and coordination. Required minimal verbal and tactile cues to promote proper body alignment, promote proper body posture and promote proper body mechanics. Progressed resistance, range, repetitions and complexity of movement as indicated.    Date:  22 Date:  6/28/22 Date:  6/30/22   Activity/Exercise Parameters Parameters Parameters   UBE  4/4 2.0 4/4 2.0    Upper trap stretch 2x B     Levator stretch 2x B     Thoracic extension 10x in chair 1 min on foam 2 positions    Row 15x BTB 20x BTB 20x BTB   T 15x BTB 20x BTB 20x BTB   M 15x BTB 20x BTB 20x BTB   Y 15x BTB 20x BTB  10x wall lifts  20x BTB   Wall flaco  10x    Forward raise  2x10 2# 2x10 2#   Lateral raise  2x10 2# 2x10 2#   Shrugs  2x10 2# 2x10 2#   Chin tuck 10x supine, 5x seated     Bridge 5x 2x10 w/ pt's band    Clamshell 5x B w/ lime band 2x10 pt's band     SLR  2x10 B 2x10 B 1.5#    UTR  5x B    LTR  5x B    TA contraction 5x hands to thighs 10x B isometric holds hands to knee 10x DL   10x SL   90/90 lower  10x B 10x B   Heel tap   10x B 6 inch step   Hip abduction   Sidelying 2x10 B   Hip extension   2x10 B prone     MANUAL THERAPY: (10 minutes):   Joint mobilization and Soft tissue mobilization was utilized and necessary because of the patient's restricted joint motion, painful spasm, loss of articular motion and restricted motion of soft tissue. Date:  6/28/22 Date:  6/30/22   Parameters Parameters   - STM to L upper trap/levator in prone  - Mobilizations to thoracic spine in prone - STM to B upper traps/levators/capitus in prone  - Mobilizations to thoracic spine in prone     MODALITIES: (5 minutes): *  Hot Pack Therapy in order to provide analgesia and relieve muscle spasm. MECHANICAL TRACTION: (0 minutes):   Traction was used due to the patient's cervical radiculopathy in order to relieve pain in or originating from his spine. Treatment/Session Summary:    · Treatment Assessment:  Mr. Burciaga Arms progressed w/ therex this session. No pain post session. ROM is much better in his cervical spine.   · Communication/Consultation:  None today  · Equipment provided today:  None  · Recommendations/Intent for next treatment session: Next visit will focus on progression of functional tasks as tolerated. Total Treatment Billable Duration:  45 mins therex, 10 mins manual   Time In: 1300  Time Out: [place], PT       Charge Capture  }Post Session Pain  PT Visit Info  MedBridge Portal  MD Guidelines  Scanned Media  Benefits  MyChart    Future Appointments   Date Time Provider Octavio Nur   7/7/2022  1:45 PM Elizabeth Varghese, PT [facility]   7/12/2022  1:45 PM [facility]   7/14/2022  1:45 PM Elizabeth Varghese, PT [facility]   7/19/2022  1:45 PM Elizabeth Varghese, PT [facility]   7/21/2022  1:45 PM Elizabeth Varghese, PT [facility]   7/26/2022  1:45 PM Elizabeth Varghese, PT [facility]   7/28/2022  1:45 PM Elizabeth Varghese, PT [facility]   8/2/2022  1:45 PM Elizabeth Varghese, PT [facility]   9/20/2022  1:00 PM Ha Chu MD [facility]   10/24/2022 10:00 AM Glen Olmedo DO [facility]   12/16/2022  1:00 PM Ha Chu MD [facility]   1/24/2023  2:00 PM PGU NILESH BLOOD DRAWS [facility]   1/31/2023  3:45 PM Mason Kelly MD [facility]   6/19/2023  9:30 AM JAVIER Goodson MD [facility]   Access Code: [email or web address]  Access Code: [email or web address]  URL: [email or web address]  Date: 06/28/2022  Prepared by: Elizabeth Varghese    Exercises  Seated Upper Trapezius Stretch - 1 x daily - 7 x weekly - 1 sets - 10 reps - 5 hold  Seated Levator Scapulae Stretch - 1 x daily - 7 x weekly - 1 sets - 10 reps - 5 hold  Seated Thoracic Lumbar Extension with Pectoralis Stretch - 1 x daily - 7 x weekly - 1 sets - 10 reps - 5 hold  Standing Shoulder Row with Anchored Resistance - 1 x daily - 3 x weekly - 2 sets - 10 reps - 5 hold  Standing Shoulder Horizontal Abduction with Anchored Resistance - 1 x daily - 3 x weekly - 2 sets - 10 reps - 5 hold  Shoulder Extension with Resistance - Palms Forward - 1 x daily - 3 x weekly - 2 sets - 10 reps - 5 hold  Shoulder Flexion with Anterior Anchored Resistance - 1 x daily - 3 x weekly - 2 sets - 10 reps - 5 hold  Supine Bridge - 1 x daily - 3 x weekly - 2 sets - 10 reps - 5 hold  Clamshell with Resistance - 1 x daily - 3 x weekly - 2 sets - 10 reps - 5 hold  Supine Transversus Abdominis Bracing - Hands on Thighs - 1 x daily - 3 x weekly - 2 sets - 10 reps - 5 hold  Hooklying Isometric Hip Flexion - 1 x daily - 3 x weekly - 2 sets - 10 reps - 5 hold  Supine 90/90 Alternating Heel Touches with Posterior Pelvic Tilt - 1 x daily - 3 x weekly - 2 sets - 10 reps - 5 hold  Active Straight Leg Raise with Quad Set - 1 x daily - 3 x weekly - 2 sets - 10 reps - 5 hold  Wall Pitts - 1 x daily - 3 x weekly - 2 sets - 10 reps - 5 hold

## 2022-07-07 ENCOUNTER — HOSPITAL ENCOUNTER (OUTPATIENT)
Dept: PHYSICAL THERAPY | Age: 72
Setting detail: RECURRING SERIES
Discharge: HOME | End: 2022-07-10
Payer: MEDICARE

## 2022-07-07 PROCEDURE — 97140 MANUAL THERAPY 1/> REGIONS: CPT

## 2022-07-07 PROCEDURE — 97110 THERAPEUTIC EXERCISES: CPT

## 2022-07-07 ASSESSMENT — PAIN SCALES - GENERAL: PAINLEVEL_OUTOF10: 1

## 2022-07-07 NOTE — PROGRESS NOTES
Niharika De Souza  : 1950  Primary: Medicare Part A And B  Secondary:  FOR LIFE MEDICARE SUPP [employer]  [address]  [address]  [address] [place] [address] Way [address]  Phone: [phone]  Fax: [phone] Plan Frequency: 2x/wk for 6 weeks    Plan of Care/Certification Expiration Date: 22      PT Visit Info: Total # of Visits to Date: 4      OUTPATIENT PHYSICAL THERAPY:OP NOTE TYPE: Treatment Note 2022       Episode  }Appt Desk              Treatment Diagnosis:  Cervicalgia (M54.2)  Medical/Referring Diagnosis:  Cervicalgia [M54.2]  Other cervical disc degeneration, unspecified cervical region [M50.30]  Spondylosis without myelopathy or radiculopathy, 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]  Referring Physician:  KATIE Tomlin MD Orders:  PT Eval and Treat, 2-3x/wk for 6 weeks  Date of Onset:  Onset Date: 22     Allergies:   Levofloxacin  Restrictions/Precautions:  Restrictions/Precautions: None  No data recorded   Interventions Planned (Treatment may consist of any combination of the following):    Current Treatment Recommendations: Strengthening; ROM; Functional mobility training; Manual Therapy - Soft Tissue Mobilization; Manual Therapy - Joint Manipulation; Pain management; Home exercise program; Patient/Caregiver education & training; Modalities; Integrated dry needling (Traction)     Subjective Comments:  Low pain today. Did have some LB soreness randomly this week but it has dissipated now. Initial:}    1/10Post Session:       0/10  Medications Last Reviewed:  2022  Updated Objective Findings:  See evaluation note from today  Treatment   THERAPEUTIC EXERCISE: (35 minutes):    Exercises per grid below to improve mobility, strength, balance and coordination. Required minimal verbal and tactile cues to promote proper body alignment, promote proper body posture and promote proper body mechanics. Progressed resistance, range, repetitions and complexity of movement as indicated. Date:  6/21/22 Date:  6/28/22 Date:  6/30/22 Date:  7/7/22   Activity/Exercise Parameters Parameters Parameters Parameters   UBE  4/4 2.0 4/4 2.0  4/4 2.5   Upper trap stretch 2x B      Levator stretch 2x B      Thoracic extension 10x in chair 1 min on foam 2 positions     Row 15x BTB 20x BTB 20x BTB 20x BTB   T 15x BTB 20x BTB 20x BTB 20x BTB   M 15x BTB 20x BTB 20x BTB 20x BTB   Y 15x BTB 20x BTB  10x wall lifts  20x BTB 20x BTB   Wall flaco  10x  15x   Forward raise  2x10 2# 2x10 2# 2x10 2#   Lateral raise  2x10 2# 2x10 2# 2x10 2#   Shrugs  2x10 2# 2x10 2# 2x10 2#   Chin tuck 10x supine, 5x seated      Bridge 5x 2x10 w/ pt's band  2x10 blue band   Clamshell 5x B w/ lime band 2x10 pt's band   2x10 blue band   SLR  2x10 B 2x10 B 1.5#     UTR  5x B     LTR  5x B     TA contraction 5x hands to thighs 10x B isometric holds hands to knee 10x DL   10x SL 15x DL  15x SL   90/90 lower  10x B 10x B 10x B   Prone swim    10x B   Heel tap   10x B 6 inch step    Hip abduction   Sidelying 2x10 B    Hip extension   2x10 B prone 10x B prone     MANUAL THERAPY: (10 minutes):   Joint mobilization and Soft tissue mobilization was utilized and necessary because of the patient's restricted joint motion, painful spasm, loss of articular motion and restricted motion of soft tissue. Date:  6/28/22 Date:  6/30/22 Date:  7/7/22   Parameters Parameters Parameters   - STM to L upper trap/levator in prone  - Mobilizations to thoracic spine in prone - STM to B upper traps/levators/capitus in prone  - Mobilizations to thoracic spine in prone STM to L upper trap/levator, capitus, and occipital in supine     MODALITIES: (0 minutes): *  Hot Pack Therapy in order to provide analgesia and relieve muscle spasm. MECHANICAL TRACTION: (0 minutes):   Traction was used due to the patient's cervical radiculopathy in order to relieve pain in or originating from his spine.       Treatment/Session Summary:    · Treatment Assessment:  Mr. Virginia Calloways with Anchored Resistance - 1 x daily - 3 x weekly - 2 sets - 10 reps - 5 hold  Shoulder Extension with Resistance - Palms Forward - 1 x daily - 3 x weekly - 2 sets - 10 reps - 5 hold  Shoulder Flexion with Anterior Anchored Resistance - 1 x daily - 3 x weekly - 2 sets - 10 reps - 5 hold  Supine Bridge - 1 x daily - 3 x weekly - 2 sets - 10 reps - 5 hold  Clamshell with Resistance - 1 x daily - 3 x weekly - 2 sets - 10 reps - 5 hold  Supine Transversus Abdominis Bracing - Hands on Thighs - 1 x daily - 3 x weekly - 2 sets - 10 reps - 5 hold  Hooklying Isometric Hip Flexion - 1 x daily - 3 x weekly - 2 sets - 10 reps - 5 hold  Supine 90/90 Alternating Heel Touches with Posterior Pelvic Tilt - 1 x daily - 3 x weekly - 2 sets - 10 reps - 5 hold  Active Straight Leg Raise with Quad Set - 1 x daily - 3 x weekly - 2 sets - 10 reps - 5 hold  Wall [place] - 1 x daily - 3 x weekly - 2 sets - 10 reps - 5 hold

## 2022-07-12 ENCOUNTER — HOSPITAL ENCOUNTER (OUTPATIENT)
Dept: PHYSICAL THERAPY | Age: 72
Setting detail: RECURRING SERIES
Discharge: HOME | End: 2022-07-15
Payer: MEDICARE

## 2022-07-12 PROCEDURE — 97110 THERAPEUTIC EXERCISES: CPT

## 2022-07-12 ASSESSMENT — PAIN SCALES - GENERAL: PAINLEVEL_OUTOF10: 5

## 2022-07-12 NOTE — PROGRESS NOTES
Gwendolyn Dotson  : 1950  Primary: Medicare Part A And B  Secondary:  FOR LIFE MEDICARE [employer]PP [employer]  [address]  [address]  [address] [place] [address] Way [address]  Phone: [phone]  Fax: [phone] Plan Frequency: 2x/wk for 6 weeks    Plan of Care/Certification Expiration Date: 22      PT Visit Info: Total # of Visits to Date: 5      OUTPATIENT PHYSICAL THERAPY:OP NOTE TYPE: Treatment Note 2022       Episode  }Appt Desk              Treatment Diagnosis:  Cervicalgia (M54.2)  Medical/Referring Diagnosis:  Cervicalgia [M54.2]  Other cervical disc degeneration, unspecified cervical region [M50.30]  Spondylosis without myelopathy or radiculopathy, lumbar region [E92.612]  Referring Physician:  KATIE Canada MD Orders:  PT Eval and Treat, 2-3x/wk for 6 weeks  Date of Onset:  Onset Date: 22     Allergies:   Levofloxacin  Restrictions/Precautions:  Restrictions/Precautions: None  No data recorded   Interventions Planned (Treatment may consist of any combination of the following):    Current Treatment Recommendations: Strengthening; ROM; Functional mobility training; Manual Therapy - Soft Tissue Mobilization; Manual Therapy - Joint Manipulation; Pain management; Home exercise program; Patient/Caregiver education & training; Modalities; Integrated dry needling (Traction)     Subjective Comments:  Reports pain in neck is still present. Went to [place] so had lots of driving this weekend. Initial:}    5/10Post Session:       2/10  Medications Last Reviewed:  2022  Updated Objective Findings:  See evaluation note from today  Treatment   THERAPEUTIC EXERCISE: (40 minutes):    Exercises per grid below to improve mobility, strength, balance and coordination. Required minimal verbal and tactile cues to promote proper body alignment, promote proper body posture and promote proper body mechanics.   Progressed resistance, range, repetitions and complexity of movement as indicated. Date:  6/21/22 Date:  6/28/22 Date:  6/30/22 Date:  7/7/22 Date:  7/12/22   Activity/Exercise Parameters Parameters Parameters Parameters Parameters   UBE  4/4 2.0 4/4 2.0  4/4 2.5 4/4 2.5    Upper trap stretch 2x B    20x B   Cervical rotation     20x B   Levator stretch 2x B    20x B   Thoracic extension 10x in chair 1 min on foam 2 positions   10x in prone w/ PT assist   Row 15x BTB 20x BTB 20x BTB 20x BTB 20x BTB   T 15x BTB 20x BTB 20x BTB 20x BTB 20x BTB   M 15x BTB 20x BTB 20x BTB 20x BTB 20x BTB   Y 15x BTB 20x BTB  10x wall lifts  20x BTB 20x BTB 20x BTB   Wall flaco  10x  15x    Forward raise  2x10 2# 2x10 2# 2x10 2#    Lateral raise  2x10 2# 2x10 2# 2x10 2#    Shrugs  2x10 2# 2x10 2# 2x10 2#    Chin tuck 10x supine, 5x seated       Bridge 5x 2x10 w/ pt's band  2x10 blue band    Clamshell 5x B w/ lime band 2x10 pt's band   2x10 blue band    SLR  2x10 B 2x10 B 1.5#      UTR  5x B      LTR  5x B      TA contraction 5x hands to thighs 10x B isometric holds hands to knee 10x DL   10x SL 15x DL  15x SL    90/90 lower  10x B 10x B 10x B    Prone swim    10x B    Heel tap   10x B 6 inch step     Hip abduction   Sidelying 2x10 B     Hip extension   2x10 B prone 10x B prone      MANUAL THERAPY: (0 minutes):   Joint mobilization and Soft tissue mobilization was utilized and necessary because of the patient's restricted joint motion, painful spasm, loss of articular motion and restricted motion of soft tissue. Date:  6/28/22 Date:  6/30/22 Date:  7/7/22    Parameters Parameters Parameters    - STM to L upper trap/levator in prone  - Mobilizations to thoracic spine in prone - STM to B upper traps/levators/capitus in prone  - Mobilizations to thoracic spine in prone STM to L upper trap/levator, capitus, and occipital in supine      MODALITIES: (3 minutes): *  Hot Pack Therapy in order to provide analgesia and relieve muscle spasm.      MECHANICAL TRACTION: (0 minutes):   Traction was used due to the patient's cervical radiculopathy in order to relieve pain in or originating from his spine. DRY NEEDLES (3 mins): to L upper trap in prone, 1 needle used, 1 needle removed. No adverse reactions noted    Treatment/Session Summary:    · Treatment Assessment:  Mr. Maxim Littlejohn tolerated dry needles well this session. Improved cervical ROM and pain following needles and stretches. Assess carry over to next session. · Communication/Consultation:  None today  · Equipment provided today:  None  · Recommendations/Intent for next treatment session: Next visit will focus on progression of functional tasks as tolerated. Total Treatment Billable Duration:  40 mins therex   Time In: 3414  Time Out: 102 [facility], PT       Charge Capture  }Post Session Pain  PT Visit [facility] Portal  MD Guidelines  Scanned Media  Benefits  MyChart    Future Appointments   Date Time Provider Octavio Nur   7/14/2022  1:45 PM Violetta Vega, PT [facility]   7/19/2022  1:45 PM Violetta Vega, PT [facility]   7/21/2022  1:45 PM Violetta Vega, PT [facility]   7/26/2022  1:45 PM Violetta Vega, PT [facility]   7/28/2022  1:45 PM Violetta Vega, PT [facility]   8/2/2022  1:45 PM Violetta Vega, PT [facility]   9/20/2022  1:00 PM MD MAHIN VasquezMIROM [facility]   10/24/2022 10:00 AM DO FELY KimballNI [facility]   12/16/2022  1:00 PM MD MENDOZA Vasquez [facility]   1/24/2023  2:00 PM PGU NILESH BLOOD DRAWS [facility]   1/31/2023  3:45 PM Nabeel Edward MD [facility]   6/19/2023  9:30 AM JAVIER Perdomo MD [facility]   Access Code: [email or web address]  Access Code: [email or web address]  URL: [email or web address]  Date: 06/28/2022  Prepared by: Charlann Wanatah    Exercises  Seated Upper Trapezius Stretch - 1 x daily - 7 x weekly - 1 sets - 10 reps - 5 hold  Seated Levator Scapulae Stretch - 1 x daily - 7 x weekly - 1 sets - 10 reps - 5 hold  Seated Thoracic Lumbar Extension with Pectoralis

## 2022-07-14 ENCOUNTER — PATIENT MESSAGE (OUTPATIENT)
Dept: INTERNAL MEDICINE CLINIC | Facility: CLINIC | Age: 72
End: 2022-07-14

## 2022-07-14 ENCOUNTER — HOSPITAL ENCOUNTER (OUTPATIENT)
Dept: PHYSICAL THERAPY | Age: 72
Setting detail: RECURRING SERIES
Discharge: HOME | End: 2022-07-17
Payer: MEDICARE

## 2022-07-14 PROCEDURE — 97110 THERAPEUTIC EXERCISES: CPT

## 2022-07-14 PROCEDURE — 97140 MANUAL THERAPY 1/> REGIONS: CPT

## 2022-07-14 ASSESSMENT — PAIN SCALES - GENERAL: PAINLEVEL_OUTOF10: 1

## 2022-07-14 NOTE — PROGRESS NOTES
Karie Murphy  : 1950  Primary: Medicare Part A And B  Secondary:  FOR LIFE MEDICARE SUPP [employer]  [address]  [address]  [address] [place] [address] Way [address]  Phone: [phone]  Fax: [phone] Plan Frequency: 2x/wk for 6 weeks    Plan of Care/Certification Expiration Date: 22      PT Visit Info: Total # of Visits to Date: 6      OUTPATIENT PHYSICAL THERAPY:OP NOTE TYPE: Treatment Note 2022       Episode  }Appt Desk              Treatment Diagnosis:  Cervicalgia (M54.2)  Medical/Referring Diagnosis:  Cervicalgia [M54.2]  Other cervical disc degeneration, unspecified cervical region [M50.30]  Spondylosis without myelopathy or radiculopathy, lumbar region [S96.640]  Referring Physician:  KATIE Braswell MD Orders:  PT Eval and Treat, 2-3x/wk for 6 weeks  Date of Onset:  Onset Date: 22     Allergies:   Levofloxacin  Restrictions/Precautions:  Restrictions/Precautions: None  No data recorded   Interventions Planned (Treatment may consist of any combination of the following):    Current Treatment Recommendations: Strengthening; ROM; Functional mobility training; Manual Therapy - Soft Tissue Mobilization; Manual Therapy - Joint Manipulation; Pain management; Home exercise program; Patient/Caregiver education & training; Modalities; Integrated dry needling (Traction)     Subjective Comments:  Notes needles helped his pain and motion a lot. Was able to have less pain with sleep as well. Initial:}    1/10Post Session:       0/10  Medications Last Reviewed:  2022  Updated Objective Findings:  None Today  Treatment   THERAPEUTIC EXERCISE: (30 minutes):    Exercises per grid below to improve mobility, strength, balance and coordination. Required minimal verbal and tactile cues to promote proper body alignment, promote proper body posture and promote proper body mechanics. Progressed resistance, range, repetitions and complexity of movement as indicated. Date:  6/21/22 Date:  6/28/22 Date:  6/30/22 Date:  7/7/22 Date:  7/12/22 Date:  7/14/22   Activity/Exercise Parameters Parameters Parameters Parameters Parameters Parameters   UBE  4/4 2.0 4/4 2.0  4/4 2.5 4/4 2.5  4/4 2.5   Upper trap stretch 2x B    20x B    Cervical rotation     20x B    Levator stretch 2x B    20x B    Thoracic extension 10x in chair 1 min on foam 2 positions   10x in prone w/ PT assist    Row 15x BTB 20x BTB 20x BTB 20x BTB 20x BTB 20x BTB   T 15x BTB 20x BTB 20x BTB 20x BTB 20x BTB 20x BTB   M 15x BTB 20x BTB 20x BTB 20x BTB 20x BTB 20x BTB   Y 15x BTB 20x BTB  10x wall lifts  20x BTB 20x BTB 20x BTB 20x BTB   Wall flaco  10x  15x  15x   Forward raise  2x10 2# 2x10 2# 2x10 2#  2x10 2#   Lateral raise  2x10 2# 2x10 2# 2x10 2#  2x10 2#   Shrugs  2x10 2# 2x10 2# 2x10 2#  2x10 2#   Chin tuck 10x supine, 5x seated        Bridge 5x 2x10 w/ pt's band  2x10 blue band  2x10    Clamshell 5x B w/ lime band 2x10 pt's band   2x10 blue band     SLR  2x10 B 2x10 B 1.5#    2x10 B   UTR  5x B       LTR  5x B       TA contraction 5x hands to thighs 10x B isometric holds hands to knee 10x DL   10x SL 15x DL  15x SL  10x DL  10x SL   90/90 lower  10x B 10x B 10x B  10x B   Prone swim    10x B     Heel tap   10x B 6 inch step      Hip abduction   Sidelying 2x10 B      Hip extension   2x10 B prone 10x B prone       MANUAL THERAPY: (10 minutes):   Joint mobilization and Soft tissue mobilization was utilized and necessary because of the patient's restricted joint motion, painful spasm, loss of articular motion and restricted motion of soft tissue.    Date:  6/28/22 Date:  6/30/22 Date:  7/7/22 Date:  7/14/22   Parameters Parameters Parameters Parameters   - STM to L upper trap/levator in prone  - Mobilizations to thoracic spine in prone - STM to B upper traps/levators/capitus in prone  - Mobilizations to thoracic spine in prone STM to L upper trap/levator, capitus, and occipital in supine - Thoracic mobilizations in prone  - STM to B upper traps in prone     MODALITIES: (0 minutes): *  Hot Pack Therapy in order to provide analgesia and relieve muscle spasm. MECHANICAL TRACTION: (0 minutes):   Traction was used due to the patient's cervical radiculopathy in order to relieve pain in or originating from his spine. DRY NEEDLES (0 mins): to L upper trap in prone, 1 needle used, 1 needle removed. No adverse reactions noted    Treatment/Session Summary:    Treatment Assessment:  Mr. Vianey Alex ROM and pain are much improved. Increased total therex today compared to last session. No increased pain w/ session. Communication/Consultation:  None today  Equipment provided today:  None  Recommendations/Intent for next treatment session: Next visit will focus on progression of functional tasks as tolerated. Total Treatment Billable Duration:  30 mins therex, 10 mins manual  Time In: 2301  Time Out: [address], PT       Charge Capture  }Post Session Pain  PT Visit Info  [employer] Portal  MD Guidelines  Scanned Media  Benefits  MyChart    Future Appointments   Date Time Provider [place]   7/19/2022  1:45 PM Gene Maya, PT [facility]   7/21/2022  1:45 PM Gene Maya, PT [facility]   7/26/2022  1:45 PM Gene Maya, PT [facility]   7/28/2022  1:45 PM Gene Maya, PT [facility]   8/2/2022  1:45 PM Gene Maya, PT [facility]   9/20/2022  1:00 PM Sherwin Roe MD [facility]   10/24/2022 10:00 AM DO FELY SaldanaNI [facility]   12/16/2022  1:00 PM Sherwin Roe MD [facility]   1/24/2023  2:00 PM PGU NILESH BLOOD DRAWS [facility]   1/31/2023  3:45 PM Mireille Green MD [facility]   6/19/2023  9:30 AM W Juan Pablo Mesa MD [facility]   Access Code: [email or web address]  Access Code: [email or web address]  URL: [email or web address]  Date: 06/28/2022  Prepared by: Gene Maya    Exercises  Seated Upper Trapezius Stretch - 1 x daily - 7 x weekly - 1 sets - 10 reps - 5 hold  Seated Levator Scapulae Stretch - 1 x daily - 7 x weekly - 1 sets - 10 reps - 5 hold  Seated Thoracic Lumbar Extension with Pectoralis Stretch - 1 x daily - 7 x weekly - 1 sets - 10 reps - 5 hold  Standing Shoulder Row with Anchored Resistance - 1 x daily - 3 x weekly - 2 sets - 10 reps - 5 hold  Standing Shoulder Horizontal Abduction with Anchored Resistance - 1 x daily - 3 x weekly - 2 sets - 10 reps - 5 hold  Shoulder Extension with Resistance - Palms Forward - 1 x daily - 3 x weekly - 2 sets - 10 reps - 5 hold  Shoulder Flexion with Anterior Anchored Resistance - 1 x daily - 3 x weekly - 2 sets - 10 reps - 5 hold  Supine Bridge - 1 x daily - 3 x weekly - 2 sets - 10 reps - 5 hold  Clamshell with Resistance - 1 x daily - 3 x weekly - 2 sets - 10 reps - 5 hold  Supine Transversus Abdominis Bracing - Hands on Thighs - 1 x daily - 3 x weekly - 2 sets - 10 reps - 5 hold  Hooklying Isometric Hip Flexion - 1 x daily - 3 x weekly - 2 sets - 10 reps - 5 hold  Supine 90/90 Alternating Heel Touches with Posterior Pelvic Tilt - 1 x daily - 3 x weekly - 2 sets - 10 reps - 5 hold  Active Straight Leg Raise with Quad Set - 1 x daily - 3 x weekly - 2 sets - 10 reps - 5 hold  Wall [place] - 1 x daily - 3 x weekly - 2 sets - 10 reps - 5 hold

## 2022-07-19 ENCOUNTER — HOSPITAL ENCOUNTER (OUTPATIENT)
Dept: PHYSICAL THERAPY | Age: 72
Setting detail: RECURRING SERIES
Discharge: HOME | End: 2022-07-22
Payer: MEDICARE

## 2022-07-19 PROCEDURE — 97110 THERAPEUTIC EXERCISES: CPT

## 2022-07-19 ASSESSMENT — PAIN SCALES - GENERAL: PAINLEVEL_OUTOF10: 1

## 2022-07-19 NOTE — PROGRESS NOTES
Akila Macias  : 1950  Primary: Medicare Part A And B  Secondary:  FOR LIFE MEDICARE SUPP [facility] [employer]  [address]  [address]  [address] [place] [address] Way [address]  Phone: [phone]  Fax: [phone] Plan Frequency: 2x/wk for 6 weeks    Plan of Care/Certification Expiration Date: 22      PT Visit Info: Total # of Visits to Date: 7      OUTPATIENT PHYSICAL THERAPY:OP NOTE TYPE: Treatment Note 2022       Episode  }Appt Desk              Treatment Diagnosis:  Cervicalgia (M54.2)  Medical/Referring Diagnosis:  Cervicalgia [M54.2]  Other cervical disc degeneration, unspecified cervical region [M50.30]  Spondylosis without myelopathy or radiculopathy, 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]  Referring Physician:  KATIE Mario MD Orders:  PT Eval and Treat, 2-3x/wk for 6 weeks  Date of Onset:  Onset Date: 22     Allergies:   Levofloxacin  Restrictions/Precautions:  Restrictions/Precautions: None  No data recorded   Interventions Planned (Treatment may consist of any combination of the following):    Current Treatment Recommendations: Strengthening; ROM; Functional mobility training; Manual Therapy - Soft Tissue Mobilization; Manual Therapy - Joint Manipulation; Pain management; Home exercise program; Patient/Caregiver education & training; Modalities; Integrated dry needling (Traction)     Subjective Comments:  Feeling much better. Pain is 0.5/10. Initial:}    1/10Post Session:       0/10  Medications Last Reviewed:  2022  Updated Objective Findings:  None Today  Treatment   THERAPEUTIC EXERCISE: (40 minutes):    Exercises per grid below to improve mobility, strength, balance and coordination. Required minimal verbal and tactile cues to promote proper body alignment, promote proper body posture and promote proper body mechanics. Progressed resistance, range, repetitions and complexity of movement as indicated.    Date:  22 Date:  22 Date:  22 Date:  22 Date:  7/12/22 Date:  7/14/22 Date:  7/19/22   Activity/Exercise Parameters Parameters Parameters Parameters Parameters Parameters Parameters   UBE  4/4 2.0 4/4 2.0  4/4 2.5 4/4 2.5  4/4 2.5 4/4 3.0   Upper trap stretch 2x B    20x B     Cervical rotation     20x B     Levator stretch 2x B    20x B     Thoracic extension 10x in chair 1 min on foam 2 positions   10x in prone w/ PT assist  10x in prone w/ PT assist   Row 15x BTB 20x BTB 20x BTB 20x BTB 20x BTB 20x BTB 2x10 2# prone   T 15x BTB 20x BTB 20x BTB 20x BTB 20x BTB 20x BTB 2x10 2# prone   M 15x BTB 20x BTB 20x BTB 20x BTB 20x BTB 20x BTB 2x10 2# prone   Y 15x BTB 20x BTB  10x wall lifts  20x BTB 20x BTB 20x BTB 20x BTB 2x10 prone   Wall flaco  10x  15x  15x    Forward raise  2x10 2# 2x10 2# 2x10 2#  2x10 2#    Lateral raise  2x10 2# 2x10 2# 2x10 2#  2x10 2#    Shrugs  2x10 2# 2x10 2# 2x10 2#  2x10 2#    Chin tuck 10x supine, 5x seated         Bridge 5x 2x10 w/ pt's band  2x10 blue band  2x10  2x10   Clamshell 5x B w/ lime band 2x10 pt's band   2x10 blue band   2x10 B lime band   SLR  2x10 B 2x10 B 1.5#    2x10 B    UTR  5x B        LTR  5x B        TA contraction 5x hands to thighs 10x B isometric holds hands to knee 10x DL   10x SL 15x DL  15x SL  10x DL  10x SL 10x SL    Pelvic tilt       2x10 regular  10x B march 90/90 lower  10x B 10x B 10x B  10x B 10x B   Prone swim    10x B      Heel tap   10x B 6 inch step       Hip abduction   Sidelying 2x10 B       Hip extension   2x10 B prone 10x B prone   2x10 B prone   Paloff press       10x B 10#   Rotation        10x B 10#    Chops/Lifts       10x B 10# chop, 7# lift   Lumbar extension       2x10 prone  5x standing at //     MANUAL THERAPY: (0 minutes):   Joint mobilization and Soft tissue mobilization was utilized and necessary because of the patient's restricted joint motion, painful spasm, loss of articular motion and restricted motion of soft tissue.    Date:  6/28/22 Date:  6/30/22 Date:  7/7/22 Date:  7/14/22   Parameters Parameters Parameters Parameters   - STM to L upper trap/levator in prone  - Mobilizations to thoracic spine in prone - STM to B upper traps/levators/capitus in prone  - Mobilizations to thoracic spine in prone STM to L upper trap/levator, capitus, and occipital in supine - Thoracic mobilizations in prone  - STM to B upper traps in prone     MODALITIES: (0 minutes): *  Hot Pack Therapy in order to provide analgesia and relieve muscle spasm. MECHANICAL TRACTION: (0 minutes):   Traction was used due to the patient's cervical radiculopathy in order to relieve pain in or originating from his spine. DRY NEEDLES (0 mins): to L upper trap in prone, 1 needle used, 1 needle removed. No adverse reactions noted    Treatment/Session Summary:    Treatment Assessment:  Mr. Catrachito Bond progressed w/ core strengthening this session. Worked on lumbar motion and strength more this session as his neck is feeling much better. Communication/Consultation:  None today  Equipment provided today:  None  Recommendations/Intent for next treatment session: Next visit will focus on progression of functional tasks as tolerated.     Total Treatment Billable Duration:  40 mins therex  Time In: 5490  Time Out: [facility]       Charge Capture  }Post Session Pain  PT Visit Info  [facility] Portal  MD Guidelines  Scanned Media  Benefits  MyChart    Future Appointments   Date Time Provider Octavio Nur   7/21/2022  1:45 PM Bernadene Relic, PT [facility]   7/26/2022  1:45 PM Bernadene Relic, PT [facility]   7/28/2022  1:45 PM Bernadene Relic, PT [facility]   8/2/2022 10:40 AM Sixto Carreon MD [facility]   8/2/2022  1:45 PM Bernadene Relic, PT [facility]   9/20/2022  1:00 PM Constance Richardson MD [facility] AMB   10/24/2022 10:00 AM DO FELY AguilarNI [facility] AMB   12/16/2022  1:00 PM Constance Richardson MD [facility] AMB   1/24/2023  2:00 PM PGU NILESH BLOOD DRAWS [facility] AMB 1/31/2023  3:45 PM Shelli Stuart MD [facility] AMB   6/19/2023  9:30 AM JAVIER Leach MD [facility] AMB   Access Code: [email or web address]  Access Code: [email or web address]  URL: [email or web address]  Date: 06/28/2022  Prepared by: Crystal Plata    Exercises  Seated Upper Trapezius Stretch - 1 x daily - 7 x weekly - 1 sets - 10 reps - 5 hold  Seated Levator Scapulae Stretch - 1 x daily - 7 x weekly - 1 sets - 10 reps - 5 hold  Seated Thoracic Lumbar Extension with Pectoralis Stretch - 1 x daily - 7 x weekly - 1 sets - 10 reps - 5 hold  Standing Shoulder Row with Anchored Resistance - 1 x daily - 3 x weekly - 2 sets - 10 reps - 5 hold  Standing Shoulder Horizontal Abduction with Anchored Resistance - 1 x daily - 3 x weekly - 2 sets - 10 reps - 5 hold  Shoulder Extension with Resistance - Palms Forward - 1 x daily - 3 x weekly - 2 sets - 10 reps - 5 hold  Shoulder Flexion with Anterior Anchored Resistance - 1 x daily - 3 x weekly - 2 sets - 10 reps - 5 hold  Supine Bridge - 1 x daily - 3 x weekly - 2 sets - 10 reps - 5 hold  Clamshell with Resistance - 1 x daily - 3 x weekly - 2 sets - 10 reps - 5 hold  Supine Transversus Abdominis Bracing - Hands on Thighs - 1 x daily - 3 x weekly - 2 sets - 10 reps - 5 hold  Hooklying Isometric Hip Flexion - 1 x daily - 3 x weekly - 2 sets - 10 reps - 5 hold  Supine 90/90 Alternating Heel Touches with Posterior Pelvic Tilt - 1 x daily - 3 x weekly - 2 sets - 10 reps - 5 hold  Active Straight Leg Raise with Quad Set - 1 x daily - 3 x weekly - 2 sets - 10 reps - 5 hold  Wall [place] - 1 x daily - 3 x weekly - 2 sets - 10 reps - 5 hold

## 2022-07-19 NOTE — TELEPHONE ENCOUNTER
Handicap placards are for people who cannot walk 100 ft w/o pain or increase pain -sounds like he can do much more than that

## 2022-07-21 ENCOUNTER — HOSPITAL ENCOUNTER (OUTPATIENT)
Dept: PHYSICAL THERAPY | Age: 72
Setting detail: RECURRING SERIES
Discharge: HOME | End: 2022-07-24
Payer: MEDICARE

## 2022-07-21 PROCEDURE — 97110 THERAPEUTIC EXERCISES: CPT

## 2022-07-21 ASSESSMENT — PAIN SCALES - GENERAL: PAINLEVEL_OUTOF10: 2

## 2022-07-21 NOTE — PROGRESS NOTES
1Jbill Mathias  : 1950  Primary: Medicare Part A And B  Secondary:  FOR LIFE MEDICARE SUPP [facility] [employer]  [address]  [address]  [address] [place] [address] Way [address]  Phone: [phone]  Fax: [phone] Plan Frequency: 2x/wk for 6 weeks    Plan of Care/Certification Expiration Date: 22      PT Visit Info: Total # of Visits to Date: 8      OUTPATIENT PHYSICAL THERAPY:OP NOTE TYPE: Treatment Note 2022       Episode  }Appt Desk              Treatment Diagnosis:  Cervicalgia (M54.2)  Medical/Referring Diagnosis:  Cervicalgia [M54.2]  Other cervical disc degeneration, unspecified cervical region [M50.30]  Spondylosis without myelopathy or radiculopathy, lumbar region [J25.023]  Referring Physician:  KATIE Hamilton MD Orders:  PT Eval and Treat, 2-3x/wk for 6 weeks  Date of Onset:  Onset Date: 22     Allergies:   Levofloxacin  Restrictions/Precautions:  Restrictions/Precautions: None  No data recorded   Interventions Planned (Treatment may consist of any combination of the following):    Current Treatment Recommendations: Strengthening; ROM; Functional mobility training; Manual Therapy - Soft Tissue Mobilization; Manual Therapy - Joint Manipulation; Pain management; Home exercise program; Patient/Caregiver education & training; Modalities; Integrated dry needling (Traction)     Subjective Comments:  Pt reports neck is 1.5/10 and back is 2.5/10. Notes he cut the grass and it wasn't as sore as it has been so he can tell he is improving. Initial:}    2/10Post Session:       010  Medications Last Reviewed:  2022  Updated Objective Findings:  None Today  Treatment   THERAPEUTIC EXERCISE: (40 minutes):    Exercises per grid below to improve mobility, strength, balance and coordination. Required minimal verbal and tactile cues to promote proper body alignment, promote proper body posture and promote proper body mechanics.   Progressed resistance, range, repetitions and complexity of movement as indicated.    Date:  6/28/22 Date:  6/30/22 Date:  7/7/22 Date:  7/12/22 Date:  7/14/22 Date:  7/19/22 Date:  7/21/22   Activity/Exercise Parameters Parameters Parameters Parameters Parameters Parameters Parameters   UBE 4/4 2.0 4/4 2.0  4/4 2.5 4/4 2.5  4/4 2.5 4/4 3.0 NuStep 10 mins, 3.0   Upper trap stretch    20x B   10x L   Cervical rotation    20x B      Levator stretch    20x B      Thoracic extension 1 min on foam 2 positions   10x in prone w/ PT assist  10x in prone w/ PT assist 10x in quadruped   Row 20x BTB 20x BTB 20x BTB 20x BTB 20x BTB 2x10 2# prone 2x10 2# prone   T 20x BTB 20x BTB 20x BTB 20x BTB 20x BTB 2x10 2# prone 2x10 2# prone   M 20x BTB 20x BTB 20x BTB 20x BTB 20x BTB 2x10 2# prone 2x10 2# prone   Y 20x BTB  10x wall lifts  20x BTB 20x BTB 20x BTB 20x BTB 2x10 prone 2x10 prone   Wall flaco 10x  15x  15x     Forward raise 2x10 2# 2x10 2# 2x10 2#  2x10 2#     Lateral raise 2x10 2# 2x10 2# 2x10 2#  2x10 2#     Shrugs 2x10 2# 2x10 2# 2x10 2#  2x10 2#     Chin tuck          Bridge 2x10 w/ pt's band  2x10 blue band  2x10  2x10 2x10 w/ lime band   Clamshell 2x10 pt's band   2x10 blue band   2x10 B lime band 2x10 B lime band   SLR 2x10 B 2x10 B 1.5#    2x10 B     UTR 5x B         LTR 5x B         TA contraction 10x B isometric holds hands to knee 10x DL   10x SL 15x DL  15x SL  10x DL  10x SL 10x SL  10x SL   Pelvic tilt      2x10 regular  10x B march 10x regular  10x B march   90/90 lower 10x B 10x B 10x B  10x B 10x B 10x B   Prone swim   10x B       Heel tap  10x B 6 inch step        Hip abduction  Sidelying 2x10 B        Hip extension  2x10 B prone 10x B prone   2x10 B prone 2x10 B prone   Paloff press      10x B 10# 15x B 13#   Rotation       10x B 10#  15x B 13#   Chops/Lifts      10x B 10# chop, 7# lift 15x B 13# chop  15x B 7# lift   Lumbar extension      2x10 prone  5x standing at // 10x prone     MANUAL THERAPY: (3 minutes):   Joint mobilization and Soft tissue mobilization was utilized and necessary because of the patient's restricted joint motion, painful spasm, loss of articular motion and restricted motion of soft tissue. Date:  6/28/22 Date:  6/30/22 Date:  7/7/22 Date:  7/14/22 Date:  7/21/22   Parameters Parameters Parameters Parameters Parameters   - STM to L upper trap/levator in prone  - Mobilizations to thoracic spine in prone - STM to B upper traps/levators/capitus in prone  - Mobilizations to thoracic spine in prone STM to L upper trap/levator, capitus, and occipital in supine - Thoracic mobilizations in prone  - STM to B upper traps in prone - STM to L upper trap in prone - switched to needles      MODALITIES: (0 minutes): *  Hot Pack Therapy in order to provide analgesia and relieve muscle spasm. MECHANICAL TRACTION: (0 minutes):   Traction was used due to the patient's cervical radiculopathy in order to relieve pain in or originating from his spine. DRY NEEDLES (3 mins): to L upper trap in prone, 1 needle used, 1 needle removed. No adverse reactions noted    Treatment/Session Summary:    Treatment Assessment:  Mr. Matthew Gudino was challenged w/ increased weight w/ standing core therex. Added band to bridges this session as well. His back and neck continue to improve w/ PT. [place] to neck improved his pain. Communication/Consultation:  None today  Equipment provided today:  None  Recommendations/Intent for next treatment session: Next visit will focus on progression of functional tasks as tolerated.     Total Treatment Billable Duration:  40 mins therex  Time In: 4792  Time Out: 410 [address], PT       Charge Capture  }Post Session Pain  PT Visit 0060 [facility] Portal  MD Guidelines  Scanned Media  Benefits  MyChart    Future Appointments   Date Time Provider Octavio Nur   7/26/2022  1:45 PM Barbi Muller PT [facility]   7/28/2022  1:45 PM Barbi Muller PT [facility]   8/2/2022 10:40 AM Venancio Smith MD [facility] AMB   8/2/2022  1:45 PM Amos Styles, PT [facility]   9/20/2022  1:00 PM Maria Ines Galvan MD [facility] AMB   10/24/2022 10:00 AM Laura Lee DO [facility] AMB   12/16/2022  1:00 PM Maria Ines Galvan MD [facility] AMB   1/24/2023  2:00 PM PGU NILESH BLOOD DRAWS [facility] AMB   1/31/2023  3:45 PM Lora Nova MD [facility] AMB   6/19/2023  9:30 AM JAVIER Murdock MD [facility] AMB   Access Code: [email or web address]  Access Code: [email or web address]  URL: [email or web address]  Date: 06/28/2022  Prepared by: Amos Styles    Exercises  Seated Upper Trapezius Stretch - 1 x daily - 7 x weekly - 1 sets - 10 reps - 5 hold  Seated Levator Scapulae Stretch - 1 x daily - 7 x weekly - 1 sets - 10 reps - 5 hold  Seated Thoracic Lumbar Extension with Pectoralis Stretch - 1 x daily - 7 x weekly - 1 sets - 10 reps - 5 hold  Standing Shoulder Row with Anchored Resistance - 1 x daily - 3 x weekly - 2 sets - 10 reps - 5 hold  Standing Shoulder Horizontal Abduction with Anchored Resistance - 1 x daily - 3 x weekly - 2 sets - 10 reps - 5 hold  Shoulder Extension with Resistance - Palms Forward - 1 x daily - 3 x weekly - 2 sets - 10 reps - 5 hold  Shoulder Flexion with Anterior Anchored Resistance - 1 x daily - 3 x weekly - 2 sets - 10 reps - 5 hold  Supine Bridge - 1 x daily - 3 x weekly - 2 sets - 10 reps - 5 hold  Clamshell with Resistance - 1 x daily - 3 x weekly - 2 sets - 10 reps - 5 hold  Supine Transversus Abdominis Bracing - Hands on Thighs - 1 x daily - 3 x weekly - 2 sets - 10 reps - 5 hold  Hooklying Isometric Hip Flexion - 1 x daily - 3 x weekly - 2 sets - 10 reps - 5 hold  Supine 90/90 Alternating Heel Touches with Posterior Pelvic Tilt - 1 x daily - 3 x weekly - 2 sets - 10 reps - 5 hold  Active Straight Leg Raise with Quad Set - 1 x daily - 3 x weekly - 2 sets - 10 reps - 5 hold  Wall [place] - 1 x daily - 3 x weekly - 2 sets - 10 reps - 5 hold

## 2022-07-26 ENCOUNTER — HOSPITAL ENCOUNTER (OUTPATIENT)
Dept: PHYSICAL THERAPY | Age: 72
Setting detail: RECURRING SERIES
Discharge: HOME | End: 2022-07-29
Payer: MEDICARE

## 2022-07-26 PROCEDURE — 97110 THERAPEUTIC EXERCISES: CPT

## 2022-07-26 PROCEDURE — 97140 MANUAL THERAPY 1/> REGIONS: CPT

## 2022-07-26 ASSESSMENT — PAIN SCALES - GENERAL: PAINLEVEL_OUTOF10: 3

## 2022-07-26 NOTE — PROGRESS NOTES
1Jbill Adhikari  : 1950  Primary: Medicare Part A And B  Secondary:  FOR LIFE MEDICARE SUPP [facility] [employer]  [address]  [address]  [address] [place] [address] Way [address]  Phone: [phone]  Fax: [phone] Plan Frequency: 2x/wk for 6 weeks    Plan of Care/Certification Expiration Date: 22      PT Visit Info: Total # of Visits to Date: 9      OUTPATIENT PHYSICAL THERAPY:OP NOTE TYPE: Treatment Note 2022       Episode  }Appt Desk              Treatment Diagnosis:  Cervicalgia (M54.2)  Medical/Referring Diagnosis:  Cervicalgia [M54.2]  Other cervical disc degeneration, unspecified cervical region [M50.30]  Spondylosis without myelopathy or radiculopathy, lumbar region [H48.623]  Referring Physician:  KATIE Braswell MD Orders:  PT Eval and Treat, 2-3x/wk for 6 weeks  Date of Onset:  Onset Date: 22     Allergies:   Levofloxacin  Restrictions/Precautions:  Restrictions/Precautions: None  No data recorded   Interventions Planned (Treatment may consist of any combination of the following):    Current Treatment Recommendations: Strengthening; ROM; Functional mobility training; Manual Therapy - Soft Tissue Mobilization; Manual Therapy - Joint Manipulation; Pain management; Home exercise program; Patient/Caregiver education & training; Modalities; Integrated dry needling (Traction)     Subjective Comments:  Pt reports higher pain levels after 12 hours in a car up and back from [place]. 3/10 in neck and 4/10 in LB. Initial:}     /10Post Session:       0/10  Medications Last Reviewed:  2022  Updated Objective Findings:  None Today  Treatment   THERAPEUTIC EXERCISE: (30 minutes):    Exercises per grid below to improve mobility, strength, balance and coordination. Required minimal verbal and tactile cues to promote proper body alignment, promote proper body posture and promote proper body mechanics. Progressed resistance, range, repetitions and complexity of movement as indicated. Date:  6/30/22 Date:  7/7/22 Date:  7/12/22 Date:  7/14/22 Date:  7/19/22 Date:  7/21/22 Date:  7/26/22   Activity/Exercise Parameters Parameters Parameters Parameters Parameters Parameters Parameters   UBE 4/4 2.0  4/4 2.5 4/4 2.5  4/4 2.5 4/4 3.0 NuStep 10 mins, 3.0 4/4 3.0   Upper trap stretch   20x B   10x L    Cervical rotation   20x B       Levator stretch   20x B       Thoracic extension   10x in prone w/ PT assist  10x in prone w/ PT assist 10x in quadruped    Row 20x BTB 20x BTB 20x BTB 20x BTB 2x10 2# prone 2x10 2# prone 2x10 2# prone   T 20x BTB 20x BTB 20x BTB 20x BTB 2x10 2# prone 2x10 2# prone 2x10 2# prone   M 20x BTB 20x BTB 20x BTB 20x BTB 2x10 2# prone 2x10 2# prone 2x10 2# prone   Y 20x BTB 20x BTB 20x BTB 20x BTB 2x10 prone 2x10 prone 2x10 prone   Wall flaco  15x  15x      Forward raise 2x10 2# 2x10 2#  2x10 2#      Lateral raise 2x10 2# 2x10 2#  2x10 2#      Shrugs 2x10 2# 2x10 2#  2x10 2#      Chin tuck          Bridge  2x10 blue band  2x10  2x10 2x10 w/ lime band    Clamshell  2x10 blue band   2x10 B lime band 2x10 B lime band    SLR 2x10 B 1.5#    2x10 B      UTR          LTR          TA contraction 10x DL   10x SL 15x DL  15x SL  10x DL  10x SL 10x SL  10x SL    Pelvic tilt     2x10 regular  10x B march 10x regular  10x B march 90/90 lower 10x B 10x B  10x B 10x B 10x B    Prone swim  10x B        Heel tap 10x B 6 inch step         Hip abduction Sidelying 2x10 B         Hip extension 2x10 B prone 10x B prone   2x10 B prone 2x10 B prone    Paloff press     10x B 10# 15x B 13#    Rotation      10x B 10#  15x B 13#    Chops/Lifts     10x B 10# chop, 7# lift 15x B 13# chop  15x B 7# lift    Lumbar extension     2x10 prone  5x standing at // 10x prone 10x prone     MANUAL THERAPY: (15 minutes):   Joint mobilization and Soft tissue mobilization was utilized and necessary because of the patient's restricted joint motion, painful spasm, loss of articular motion and restricted motion of soft tissue. Date:  6/30/22 Date:  7/7/22 Date:  7/14/22 Date:  7/21/22 Date:  7/26/22   Parameters Parameters Parameters Parameters Parameters   - STM to B upper traps/levators/capitus in prone  - Mobilizations to thoracic spine in prone STM to L upper trap/levator, capitus, and occipital in supine - Thoracic mobilizations in prone  - STM to B upper traps in prone - STM to L upper trap in prone - switched to needles  - Mobilizations to thoracic spine  - STM to L upper trap, levator, and capitus muscles     MODALITIES: (0 minutes): *  Hot Pack Therapy in order to provide analgesia and relieve muscle spasm. MECHANICAL TRACTION: (0 minutes):   Traction was used due to the patient's cervical radiculopathy in order to relieve pain in or originating from his spine. DRY NEEDLES (0 mins): to L upper trap in prone, 1 needle used, 1 needle removed. No adverse reactions noted    Treatment/Session Summary:    Treatment Assessment:  See progress report for updates. Rotation improved as did pain after manual to cervical spine. Assess carry over to symptoms next session. Communication/Consultation:  None today  Equipment provided today:  None  Recommendations/Intent for next treatment session: Next visit will focus on progression of functional tasks as tolerated.     Total Treatment Billable Duration:  30 mins therex, 15 mins manual   Time In: 1340  Time Out: 102 [facility], PT       Charge Capture  }Post Session Pain  PT Visit Info  [employer] Portal  MD Guidelines  Scanned Media  Benefits  MyChart    Future Appointments   Date Time Provider Octavio Nur   7/28/2022  1:45 PM Jane Cortez PT [facility]   8/2/2022 10:40 AM Gabrielle Harrison MD [facility] AMB   8/2/2022  1:45 PM Jane Cortez PT [facility]   9/20/2022  1:00 PM MD MENDOZA Estrada [facility] AMB   10/24/2022 10:00 AM DO FELY AcNI [facility] AMB   12/16/2022  1:00 PM MD MENDOZA Estrada [facility] AMB   1/24/2023  2:00 PM PGU NILESH BLOOD DRAWS [facility] AMB   1/31/2023  3:45 PM Sia Self MD [facility] AMB   6/19/2023  9:30 AM W Bhavesh Gan MD [facility] AMB   Access Code: [email or web address]  Access Code: [email or web address]  URL: [email or web address]  Date: 06/28/2022  Prepared by: Kip Splinter    Exercises  Seated Upper Trapezius Stretch - 1 x daily - 7 x weekly - 1 sets - 10 reps - 5 hold  Seated Levator Scapulae Stretch - 1 x daily - 7 x weekly - 1 sets - 10 reps - 5 hold  Seated Thoracic Lumbar Extension with Pectoralis Stretch - 1 x daily - 7 x weekly - 1 sets - 10 reps - 5 hold  Standing Shoulder Row with Anchored Resistance - 1 x daily - 3 x weekly - 2 sets - 10 reps - 5 hold  Standing Shoulder Horizontal Abduction with Anchored Resistance - 1 x daily - 3 x weekly - 2 sets - 10 reps - 5 hold  Shoulder Extension with Resistance - Palms Forward - 1 x daily - 3 x weekly - 2 sets - 10 reps - 5 hold  Shoulder Flexion with Anterior Anchored Resistance - 1 x daily - 3 x weekly - 2 sets - 10 reps - 5 hold  Supine Bridge - 1 x daily - 3 x weekly - 2 sets - 10 reps - 5 hold  Clamshell with Resistance - 1 x daily - 3 x weekly - 2 sets - 10 reps - 5 hold  Supine Transversus Abdominis Bracing - Hands on Thighs - 1 x daily - 3 x weekly - 2 sets - 10 reps - 5 hold  Hooklying Isometric Hip Flexion - 1 x daily - 3 x weekly - 2 sets - 10 reps - 5 hold  Supine 90/90 Alternating Heel Touches with Posterior Pelvic Tilt - 1 x daily - 3 x weekly - 2 sets - 10 reps - 5 hold  Active Straight Leg Raise with Quad Set - 1 x daily - 3 x weekly - 2 sets - 10 reps - 5 hold  Wall [place] - 1 x daily - 3 x weekly - 2 sets - 10 reps - 5 hold

## 2022-07-26 NOTE — PROGRESS NOTES
Silvia Colin  : 1950  Primary: Medicare Part A And B  Secondary:  FOR LIFE MEDICARE SUPP [facility]  [address]  [address]  [address] [place] [address] Way [address]  Phone: [phone]  Fax: [phone] Plan Frequency: 2x/wk for 6 weeks    Plan of Care/Certification Expiration Date: 22      PT Visit Info: Total # of Visits to Date: 9      OUTPATIENT PHYSICAL THERAPY:OP NOTE TYPE: Progress Report 2022               Episode  Appt Desk         Treatment Diagnosis:  Cervicalgia (M54.2)    Medical/Referring Diagnosis:  Cervicalgia [M54.2]  Other cervical disc degeneration, unspecified cervical region [M50.30]  Spondylosis without myelopathy or radiculopathy, lumbar region [I09.072]  Referring Physician:  KATIE Calderon MD Orders:  PT Eval and Treat, 2-3x/wk for 6 weeks  Return MD Appt:  TBD  Date of Onset:  Onset Date: 22     Allergies:  Levofloxacin  Restrictions/Precautions:    Restrictions/Precautions: None  No data recorded   Medications Last Reviewed:  2022     SUBJECTIVE   History of Injury/Illness (Reason for Referral):  Pt unsure how his pain began. Notes this is actually an acute flare up of chronic issue. He has a home traction unit from a prior bout of PT for cervical issues. MD had him on a steroid pack which he recently completed. It helped but is already wearing off. Xray showed stenosis and arthritis. Patient Stated Goal(s):   \"Improved pain and motion\"  Initial:     3/10 Post Session:     010  Past Medical History/Comorbidities:   Mr. Milena Shetty  has a past medical history of Arthritis, Chronic pain, Colon polyps, Compression deformity of vertebra, Encounter for screening colonoscopy, Fecal incontinence, GERD (gastroesophageal reflux disease), Hypercholesterolemia, Hyperlipidemia, Hypertension, Malignant neoplasm of prostate ([address] Memory loss, Spondylosis of lumbar region without myelopathy or radiculopathy, Stress incontinence, male, and Thyroid disease. Mr. Maxim Littlejohn  has a past surgical history that includes other surgical history (N/A); Cataract removal (Bilateral); Colonoscopy (07/03/2019); Prostatectomy (2007); lumbar laminectomy (08/2016); lumbar laminectomy (08/22/2016); orthopedic surgery (Right, 1998); Colonoscopy; and Urological Surgery (2008). Social History/Living Environment:   Lives With: Spouse  Type of Home: House  Home Layout: Multi-level  Home Access: Stairs to enter without rails  Entrance Stairs - Number of Steps: 2     Prior Level of Function/Work/Activity:   Prior level of function: Independent  Occupation: Retired  No data recordedNo data recorded   Learning:   What is the preferred language of the patient/guardian?: English  Is an  required?: No  How does the patient/guardian prefer to learn new concepts?: Listening; Reading; Demonstration; Pictures/Videos     Fall Risk Scale: Total Score: 15  Jerez Fall Risk: Low (0-24)     Dominant Side:  right handed        OBJECTIVE   Trigger points: B upper trap/levators, capitus, and occipitals    Posture/Deformity:  Forward head, rounded shoulders, thoracic kyphosis     Date:  6/21/22 Date:  7/26/22 Date:     Cervical Rotation  R: 65*  L: 55*  Decreased strength 70* R  65* L    Cervical Side Bending R: 20*  L: 40*  Decreased strength 35* R    Cervical Flexion 45*, decreased strength -    Cervical Extension 45*, decreased strength -    Shoulder Flex/Scapt R: WFL  L: WFL     Shoulder ABD R: WFL  L: WFL     Shoulder ER R: WFL  L: WFL     Shoulder IR R: WFL  L: WFL     Elbow Flex R: WFL  L: WFL     Elbow Ext R: WFL  L: WFL     Scapular Decreased strength       strength 95# L  83# R        Sensation:  Biceps (C5): intact  Childers Radial (C6-C7): intact  Childers Ulner (C8-T1): intact    Special Test/Function:  Spurling's: Traction felt good, but no pain w/ compression  Accessory mobility/: Decreased mobility    ASSESSMENT   Initial Assessment:  Mr. Maxim Littlejohn presents to PT eval w/ c/o cervical pain and restricted motion along w/ LBP. With PT assessment, his cervical ROM was noted to be quite limited as was his posture. Mr. Jed Hendrickson has a home traction unit, so he was re-educated on the proper use of said unit. With treatment today that included traction, manual, and therex, his overall pain and ROM were much improved. Mr. Jed Hendrickson will benefit from continued skilled PT services to improve deficits listed below and to progress towards his PLOF. Updated Assessment: Mr. Jed Hendrickson presented to PT eval w/ c/o cervical pain and restricted motion along w/ LBP. At this time, he has met 2/6 PT goals and is progressing towards the final 4 goals. His cervical ROM has increased from 55* to 65* rotation to the left, and 20* to 35* w/ cervical sidebending. His pain in his back and neck had been improving until he took a 12 hour car trip over the weekend and now his pain is flared up. Mr. Jed Hendrickson will benefit from continued skilled PT services to improve deficits listed below and to progress towards his PLOF. Problem List: (Impacting functional limitations): Body Structures, Functions, Activity Limitations Requiring Skilled Therapeutic Intervention: Decreased functional mobility ; Decreased ADL status; Decreased ROM; Decreased tolerance to work activity; Decreased strength; Decreased endurance; Increased pain; Decreased posture     Therapy Prognosis:   Therapy Prognosis: Good       PLAN   Effective Dates: 6/21/22 TO Plan of Care/Certification Expiration Date: 08/20/22     Frequency/Duration: Plan Frequency: 2x/wk for 6 weeks     Interventions Planned (Treatment may consist of any combination of the following):    Current Treatment Recommendations: Strengthening; ROM; Functional mobility training; Manual Therapy - Soft Tissue Mobilization; Manual Therapy - Joint Manipulation; Pain management; Home exercise program; Patient/Caregiver education & training; Modalities;  Integrated dry needling (Traction) Goals: (Goals have been discussed and agreed upon with patient.)  Short Term Goals: 4 weeks  1. Pt will be independent w/ HEP to improve outcomes and decrease pain levels. MET  2. Pt will have cervical rotation ROM bilaterally of 70* or more in order to increase safety while driving w/ pain of 9/95 or less. Progressing  3. Pt will report pain of 2/10 or less while performing ADLs in order to return to PLOF. MET    Long Term Goals. 6 weeks  1. Pt will have NDI score of 7 or less showing decreased pain and decreased functional impairments. Progressing  2. Pt will have cervical side bending ROM bilaterally of 40* or more in order to increase safety while driving w/ pain of 1/40 or less. Progressing  3. Pt will report pain of 1/10 or less while performing ADLS in order to return to PLOF. Progressing           Outcome Measure: Tool Used: Neck Disability Index (NDI)  Score:  Initial: 11/50  Most Recent: 9/50 (Date: 7/26/22)   Interpretation of Score: The Neck Disability Index is a revised form of the Oswestry Low Back Pain Index and is designed to measure the activities of daily living in person's with neck pain. Each section is scored on a 0-5 scale, 5 representing the greatest disability. The scores of each section are added together for a total score of 50. Medical Necessity:   Patient is expected to demonstrate progress in strength, range of motion, balance and coordination to increase independence with functional tasks. Reason For Services/Other Comments:  Patient continues to demonstrate capacity to improve strength, ROM, balance, mobility which will increase independence. Total Duration:  Time In: 1340  Time Out: 2072    Regarding Chase Rodríguez Berkowitz's therapy, I certify that the treatment plan above will be carried out by a therapist or under their direction.   Thank you for this referral,  [facility], PT     Referring Physician Signature: LOLITA Mckeon*                    Post Session Pain  Charge Capture  PT Visit Info  POC Link  Treatment Note Link  MD Guidelines  MyChart

## 2022-07-28 ENCOUNTER — HOSPITAL ENCOUNTER (OUTPATIENT)
Dept: PHYSICAL THERAPY | Age: 72
Setting detail: RECURRING SERIES
Discharge: HOME | End: 2022-07-31
Payer: MEDICARE

## 2022-07-28 PROCEDURE — 97110 THERAPEUTIC EXERCISES: CPT

## 2022-07-28 ASSESSMENT — PAIN SCALES - GENERAL: PAINLEVEL_OUTOF10: 1

## 2022-07-28 NOTE — PROGRESS NOTES
movement as indicated.    Date:  7/7/22 Date:  7/12/22 Date:  7/14/22 Date:  7/19/22 Date:  7/21/22 Date:  7/26/22 Date:  7/28/22   Activity/Exercise Parameters Parameters Parameters Parameters Parameters Parameters Parameters   UBE 4/4 2.5 4/4 2.5  4/4 2.5 4/4 3.0 NuStep 10 mins, 3.0 4/4 3.0 4/4 3.0    Upper trap stretch  20x B   10x L     Cervical rotation  20x B        Levator stretch  20x B        Thoracic extension  10x in prone w/ PT assist  10x in prone w/ PT assist 10x in quadruped     Row 20x BTB 20x BTB 20x BTB 2x10 2# prone 2x10 2# prone 2x10 2# prone 2x10 2# prone   T 20x BTB 20x BTB 20x BTB 2x10 2# prone 2x10 2# prone 2x10 2# prone 2x10 2# prone   M 20x BTB 20x BTB 20x BTB 2x10 2# prone 2x10 2# prone 2x10 2# prone 2x10 2# prone   Y 20x BTB 20x BTB 20x BTB 2x10 prone 2x10 prone 2x10 prone 2x10 prone   Wall flaco 15x  15x       Forward raise 2x10 2#  2x10 2#    2x10 2#   Lateral raise 2x10 2#  2x10 2#    2x10 2#   Shrugs 2x10 2#  2x10 2#    2x10 2#   Chin tuck       2x10 supine   Bridge 2x10 blue band  2x10  2x10 2x10 w/ lime band  2x10 w/ lime band   Clamshell 2x10 blue band   2x10 B lime band 2x10 B lime band  2x10 B lime band   SLR   2x10 B       UTR          LTR          TA contraction 15x DL  15x SL  10x DL  10x SL 10x SL  10x SL  10x SL   Pelvic tilt    2x10 regular  10x B march 10x regular  10x B march  10x regular  10x march B   90/90 lower 10x B  10x B 10x B 10x B     Prone swim 10x B         Heel tap          Hip abduction          Hip extension 10x B prone   2x10 B prone 2x10 B prone  2x10 B prone   Paloff press    10x B 10# 15x B 13#  15x B 13#   Rotation     10x B 10#  15x B 13#  15x B 13#   Chops/Lifts    10x B 10# chop, 7# lift 15x B 13# chop  15x B 7# lift  15x B 13# chop  15x B 7# lift   Lumbar extension    2x10 prone  5x standing at // 10x prone 10x prone 10x prone     MANUAL THERAPY: (0 minutes):   Joint mobilization and Soft tissue mobilization was utilized and necessary because of the patient's restricted joint motion, painful spasm, loss of articular motion and restricted motion of soft tissue. Date:  6/30/22 Date:  7/7/22 Date:  7/14/22 Date:  7/21/22 Date:  7/26/22   Parameters Parameters Parameters Parameters Parameters   - STM to B upper traps/levators/capitus in prone  - Mobilizations to thoracic spine in prone STM to L upper trap/levator, capitus, and occipital in supine - Thoracic mobilizations in prone  - STM to B upper traps in prone - STM to L upper trap in prone - switched to needles  - Mobilizations to thoracic spine  - STM to L upper trap, levator, and capitus muscles     MODALITIES: (0 minutes): *  Hot Pack Therapy in order to provide analgesia and relieve muscle spasm. MECHANICAL TRACTION: (0 minutes):   Traction was used due to the patient's cervical radiculopathy in order to relieve pain in or originating from his spine. DRY NEEDLES (0 mins): to L upper trap in prone, 1 needle used, 1 needle removed. No adverse reactions noted    Treatment/Session Summary:    Treatment Assessment:  Mr. Alhaji Elmore had no pain post session. He was challenged w/ therex. Focused on core and postural strengthening. Communication/Consultation:  None today  Equipment provided today:  None  Recommendations/Intent for next treatment session: Next visit will focus on progression of functional tasks as tolerated.     Total Treatment Billable Duration:  45 mins therex   Time In: 8444  Time Out: [address], PT       Charge Capture  }Post Session Pain  PT Visit Info  [facility] Portal  MD Guidelines  Scanned Media  Benefits  MyChart    Future Appointments   Date Time Provider Octavio Nur   8/2/2022 10:40 AM Emily Ortega MD [facility]   8/2/2022  1:45 PM Kya Bellamy, PT [facility]   9/20/2022  1:00 PM MD MENDOZA Tatum [facility]   10/24/2022 10:00 AM Teresa Leal DO [facility]   12/16/2022  1:00 PM MD MENDOZA Tatum [facility]   1/24/2023  2:00 PM PGU NILESH BLOOD DRAWS [facility]   1/31/2023  3:45 PM Pankaj Jerez MD [facility]   6/19/2023  9:30 AM W Elayne Boas, MD [facility]   Access Code: [email or web address]  Access Code: [email or web address]  URL: [email or web address]  Date: 06/28/2022  Prepared by: Kya Bellamy    Exercises  Seated Upper Trapezius Stretch - 1 x daily - 7 x weekly - 1 sets - 10 reps - 5 hold  Seated Levator Scapulae Stretch - 1 x daily - 7 x weekly - 1 sets - 10 reps - 5 hold  Seated Thoracic Lumbar Extension with Pectoralis Stretch - 1 x daily - 7 x weekly - 1 sets - 10 reps - 5 hold  Standing Shoulder Row with Anchored Resistance - 1 x daily - 3 x weekly - 2 sets - 10 reps - 5 hold  Standing Shoulder Horizontal Abduction with Anchored Resistance - 1 x daily - 3 x weekly - 2 sets - 10 reps - 5 hold  Shoulder Extension with Resistance - Palms Forward - 1 x daily - 3 x weekly - 2 sets - 10 reps - 5 hold  Shoulder Flexion with Anterior Anchored Resistance - 1 x daily - 3 x weekly - 2 sets - 10 reps - 5 hold  Supine Bridge - 1 x daily - 3 x weekly - 2 sets - 10 reps - 5 hold  Clamshell with Resistance - 1 x daily - 3 x weekly - 2 sets - 10 reps - 5 hold  Supine Transversus Abdominis Bracing - Hands on Thighs - 1 x daily - 3 x weekly - 2 sets - 10 reps - 5 hold  Hooklying Isometric Hip Flexion - 1 x daily - 3 x weekly - 2 sets - 10 reps - 5 hold  Supine 90/90 Alternating Heel Touches with Posterior Pelvic Tilt - 1 x daily - 3 x weekly - 2 sets - 10 reps - 5 hold  Active Straight Leg Raise with Quad Set - 1 x daily - 3 x weekly - 2 sets - 10 reps - 5 hold  Wall [place] - 1 x daily - 3 x weekly - 2 sets - 10 reps - 5 hold

## 2022-08-02 ENCOUNTER — OFFICE VISIT (OUTPATIENT)
Dept: ORTHOPEDIC SURGERY | Age: 72
End: 2022-08-02
Payer: MEDICARE

## 2022-08-02 ENCOUNTER — HOSPITAL ENCOUNTER (OUTPATIENT)
Dept: PHYSICAL THERAPY | Age: 72
Setting detail: RECURRING SERIES
Discharge: HOME | End: 2022-08-05
Payer: MEDICARE

## 2022-08-02 VITALS — BODY MASS INDEX: 31.55 KG/M2 | HEIGHT: 68 IN

## 2022-08-02 DIAGNOSIS — Z11.59 NEED FOR HEPATITIS C SCREENING TEST: ICD-10-CM

## 2022-08-02 DIAGNOSIS — M47.816 SPONDYLOSIS OF LUMBAR REGION WITHOUT MYELOPATHY OR RADICULOPATHY: ICD-10-CM

## 2022-08-02 DIAGNOSIS — M47.816 LUMBAR FACET ARTHROPATHY: Primary | ICD-10-CM

## 2022-08-02 DIAGNOSIS — Z72.89 OTHER PROBLEMS RELATED TO LIFESTYLE: ICD-10-CM

## 2022-08-02 DIAGNOSIS — M51.36 DISC DEGENERATION, LUMBAR: ICD-10-CM

## 2022-08-02 PROCEDURE — G8417 CALC BMI ABV UP PARAM F/U: HCPCS | Performed by: PHYSICAL MEDICINE & REHABILITATION

## 2022-08-02 PROCEDURE — 1036F TOBACCO NON-USER: CPT | Performed by: PHYSICAL MEDICINE & REHABILITATION

## 2022-08-02 PROCEDURE — 99214 OFFICE O/P EST MOD 30 MIN: CPT | Performed by: PHYSICAL MEDICINE & REHABILITATION

## 2022-08-02 PROCEDURE — 3017F COLORECTAL CA SCREEN DOC REV: CPT | Performed by: PHYSICAL MEDICINE & REHABILITATION

## 2022-08-02 PROCEDURE — G8427 DOCREV CUR MEDS BY ELIG CLIN: HCPCS | Performed by: PHYSICAL MEDICINE & REHABILITATION

## 2022-08-02 PROCEDURE — 97110 THERAPEUTIC EXERCISES: CPT

## 2022-08-02 PROCEDURE — 1123F ACP DISCUSS/DSCN MKR DOCD: CPT | Performed by: PHYSICAL MEDICINE & REHABILITATION

## 2022-08-02 ASSESSMENT — PAIN SCALES - GENERAL: PAINLEVEL_OUTOF10: 1

## 2022-08-02 NOTE — LETTER
Aby Luz Maria  1950  ______________________________________________________________________    Radiographic Studies:    Cervical MRI/ Contrast        Thoracic MRI/ Contrast        Lumbar MRI/ Contrast    CT Myelogram __________________________________________________    NCS/EMG______________________________________________________    MRI of ________________________________________________________    Other__________________________________________________________      Injections:    _______________________________________________________________    Authorization to stop blood thinners________________________________      Medications:    Oral steroids___________________    Muscle Relaxers___________________    Pain medications_____________________    NSAIDS_____________________    Neuropathic pain medication_________________________________________      Physical Therapy:    Lumbar     Thoracic     Cervical       Other_______________________________      Follow up/ Referral:    Pain referral_______________________________________________________    Referral___________________________________________________________    Follow up_________________________________________________________    Handicapped Parking_______________________________________________    Other_____________________________________________________________

## 2022-08-02 NOTE — THERAPY RECERTIFICATION
Catheryn Castleman  : 1950  Primary: Medicare Part A And B  Secondary:  FOR LIFE MEDICARE SUPP [facility]  [address] [address]  Phone: [phone]  Fax: [phone] Plan Frequency: 2x/wk for 4 more weeks per new referral    Plan of Care/Certification Expiration Date: 10/01/22      PT Visit Info: Total # of Visits to Date: 6      OUTPATIENT PHYSICAL THERAPY:OP NOTE TYPE: Recertification 2998               Episode  Appt Desk         Treatment Diagnosis:  Low Back Pain (M54.5)  Cervicalgia (M54.2)    Medical/Referring Diagnosis:  Cervicalgia [M54.2]  Other cervical disc degeneration, unspecified cervical region [M50.30]  Spondylosis without myelopathy or radiculopathy, lumbar region [M47.816]  Lumbar facet arthropathy M47.816   Referring Physician:  LOLITA Crook*  MD Orders:  PT Eval and Treat, 2-3x/wk for 6 weeks  Return MD Appt:  TBD  Date of Onset:  Onset Date: 22     Allergies:  Levofloxacin  Restrictions/Precautions:    Restrictions/Precautions: None  No data recorded   Medications Last Reviewed:  2022     SUBJECTIVE   History of Injury/Illness (Reason for Referral):  Pt unsure how his pain began. Notes this is actually an acute flare up of chronic issue. He has a home traction unit from a prior bout of PT for cervical issues. MD had him on a steroid pack which he recently completed. It helped but is already wearing off. Xray showed stenosis and arthritis. Patient Stated Goal(s):   \"Improved pain and motion\"  Initial:     1/10 Post Session:     010  Past Medical History/Comorbidities:   Mr. Lee Horta  has a past medical history of Arthritis, Chronic pain, Colon polyps, Compression deformity of vertebra, Encounter for screening colonoscopy, Fecal incontinence, GERD (gastroesophageal reflux disease), Hypercholesterolemia, Hyperlipidemia, Hypertension, Malignant neoplasm of prostate ([address] Memory loss, Spondylosis of lumbar region without myelopathy or radiculopathy, Stress incontinence, male, and Thyroid disease. Mr. Eliza Sewell  has a past surgical history that includes other surgical history (N/A); Cataract removal (Bilateral); Colonoscopy (07/03/2019); Prostatectomy (2007); lumbar laminectomy (08/2016); lumbar laminectomy (08/22/2016); orthopedic surgery (Right, 1998); Colonoscopy; and Urological Surgery (2008). Social History/Living Environment:   Lives With: Spouse  Type of Home: House  Home Layout: Multi-level  Home Access: Stairs to enter without rails  Entrance Stairs - Number of Steps: 2     Prior Level of Function/Work/Activity:   Prior level of function: Independent  Occupation: Retired  No data recordedNo data recorded   Learning:   What is the preferred language of the patient/guardian?: English  Is an  required?: No  How does the patient/guardian prefer to learn new concepts?: Listening; Reading; Demonstration; Pictures/Videos     Fall Risk Scale: Total Score: 15  Jerez Fall Risk: Low (0-24)     Dominant Side:  right handed        OBJECTIVE   Trigger points: B upper trap/levators, capitus, and occipitals, QL muscles and paraspinals     Posture/Deformity:  Forward head, rounded shoulders, thoracic kyphosis     Date:  6/21/22 Date:  7/26/22 Date:  8/2/22   Cervical Rotation  R: 65*  L: 55*  Decreased strength 70* R  65* L 70* L   Cervical Side Bending R: 20*  L: 40*  Decreased strength 35* R 40* R   Cervical Flexion 45*, decreased strength -    Cervical Extension 45*, decreased strength -    Shoulder Flex/Scapt R: WFL  L: WFL     Shoulder ABD R: WFL  L: WFL     Shoulder ER R: WFL  L: WFL     Shoulder IR R: WFL  L: WFL     Elbow Flex R: WFL  L: WFL     Elbow Ext R: WFL  L: WFL     Scapular Decreased strength       strength 95# L  83# R     Lumbar   Decreased extension ROM   Hip flexion   5/5 B   Hip extension   4/5 R  5/5 L   Hip abduction   4-/5 R  5/5 L      Sensation:  Biceps (C5): intact  Childers Radial (C6-C7): intact  Childers Ulner (C8-T1): intact    Special Test/Function:  Spurling's: Traction felt good, but no pain w/ compression  Accessory mobility/: Decreased mobility    ASSESSMENT   Initial Assessment:  Mr. Schuyler Kim presents to PT eval w/ c/o cervical pain and restricted motion along w/ LBP. With PT assessment, his cervical ROM was noted to be quite limited as was his posture. Mr. Schuyler Kim has a home traction unit, so he was re-educated on the proper use of said unit. With treatment today that included traction, manual, and therex, his overall pain and ROM were much improved. Mr. Schuyler Kim will benefit from continued skilled PT services to improve deficits listed below and to progress towards his PLOF. Updated Assessment: Mr. Schuyler Kim presented to PT eval w/ c/o cervical pain and restricted motion along w/ LBP. At this time, he has met 2/6 PT goals and is progressing towards the final 4 goals. His cervical ROM has increased from 55* to 65* rotation to the left, and 20* to 35* w/ cervical sidebending. His pain in his back and neck had been improving until he took a 12 hour car trip over the weekend and now his pain is flared up. Mr. Schuyler Kim will benefit from continued skilled PT services to improve deficits listed below and to progress towards his PLOF. Recert Assessment: Mr. Schuyler Kim presented to PT eval w/ c/o cervical pain and restricted motion along w/ LBP. Currently, he has met 4/6 PT goals and two new goals have been added that pertain more to his lower back issues. He has met his cervical ROM and pain goals at this time, but still has some pain in his lower back. He followed up with his physician Dr. Kelsey Osborne who sent in a new referral for these same diagnoses as the initial referral. Will extend POC and add 4 more weeks to PT to continue to improve lower back pain and cervical pain but keep under same POC. Thank you for this referral.     Problem List: (Impacting functional limitations):     Body Structures, Functions, Activity Limitations Requiring Skilled Therapeutic Intervention: Decreased functional mobility ; Decreased ADL status; Decreased ROM; Decreased tolerance to work activity; Decreased strength; Decreased endurance; Increased pain; Decreased posture     Therapy Prognosis:   Therapy Prognosis: Good       PLAN   Effective Dates: 6/21/22 TO Plan of Care/Certification Expiration Date: 10/01/22     Frequency/Duration: Plan Frequency: 2x/wk for 4 more weeks per new referral     Interventions Planned (Treatment may consist of any combination of the following):    Current Treatment Recommendations: Strengthening; ROM; Functional mobility training; Balance training; Manual Therapy - Soft Tissue Mobilization; Manual Therapy - Joint Manipulation; Pain management; Home exercise program; Modalities; Integrated dry needling (Traction)     Goals: (Goals have been discussed and agreed upon with patient.)  Short Term Goals: 4 weeks  1. Pt will be independent w/ HEP to improve outcomes and decrease pain levels. MET  2. Pt will have cervical rotation ROM bilaterally of 70* or more in order to increase safety while driving w/ pain of 8/96 or less. MET  3. Pt will report pain of 2/10 or less while performing ADLs in order to return to PLOF. MET    Long Term Goals. 6 weeks  1. Pt will have NDI score of 7 or less showing decreased pain and decreased functional impairments. Progressing  2. Pt will have cervical side bending ROM bilaterally of 40* or more in order to increase safety while driving w/ pain of 1/53 or less. MET  3. Pt will report pain of 1/10 or less while performing ADLS in order to return to PLOF. Progressing  4. Pt will have Modified Oswestry score of 11 or less showing improved pain and mobility. 5. Pt will have B hip strength of 4+/5 or greater in all major muscles in order to improve balance and mobility. Outcome Measure:    Tool Used: Neck Disability Index (NDI)  Score:  Initial: 11/50 Most Recent: 9/50 (Date: 7/26/22)   Interpretation of Score: The Neck Disability Index is a revised form of the Oswestry Low Back Pain Index and is designed to measure the activities of daily living in person's with neck pain. Each section is scored on a 0-5 scale, 5 representing the greatest disability. The scores of each section are added together for a total score of 50. Medical Necessity:   Patient is expected to demonstrate progress in strength, range of motion, balance and coordination to increase independence with functional tasks. Reason For Services/Other Comments:  Patient continues to demonstrate capacity to improve strength, ROM, balance, mobility which will increase independence. Total Duration:  Time In: 1345  Time Out: 1430    Regarding Nia Berkowitz's therapy, I certify that the treatment plan above will be carried out by a therapist or under their direction.   Thank you for this referral,  Taniya Blanco, PT     Referring Physician Signature: LOLITA Diaz*                    Post Session Pain  Charge Capture  PT Visit Info  POC Link  Treatment Note Link  MD Guidelines  Max

## 2022-08-02 NOTE — PROGRESS NOTES
1Jbill Lopez  : 1950  Primary: Medicare Part A And B  Secondary:  FOR LIFE MEDICARE SUPP [facility]  [address] [address]  Phone: [phone]  Fax: [phone] Plan Frequency: 2x/wk for 4 more weeks per new referral    Plan of Care/Certification Expiration Date: 10/01/22      PT Visit Info: Total # of Visits to Date: 6      OUTPATIENT PHYSICAL THERAPY:OP NOTE TYPE: Treatment Note 2022       Episode  }Appt Desk              Treatment Diagnosis:  Low Back Pain (M54.5)  Cervicalgia (M54.2)  Medical/Referring Diagnosis:  Cervicalgia [M54.2]  Other cervical disc degeneration, unspecified cervical region [M50.30]  Spondylosis without myelopathy or radiculopathy, lumbar region [M47.816]  Lumbar facet arthropathy M47.816  Referring Physician:  KATIE Martinez MD Orders:  PT Eval and Treat, 2-3x/wk for 6 weeks  Date of Onset:  Onset Date: 22     Allergies:   Levofloxacin  Restrictions/Precautions:  Restrictions/Precautions: None  No data recorded   Interventions Planned (Treatment may consist of any combination of the following):    Current Treatment Recommendations: Strengthening; ROM; Functional mobility training; Balance training; Manual Therapy - Soft Tissue Mobilization; Manual Therapy - Joint Manipulation; Pain management; Home exercise program; Modalities; Integrated dry needling (Traction)     Subjective Comments:  Pt reports no pain in LB today and neck is very low pain. Notes he has been focusing on lumbar extension and that has helped plus he hasn't had much to do today. Initial:}    1/10Post Session:       0/10  Medications Last Reviewed:  2022  Updated Objective Findings:   See recert  Treatment   THERAPEUTIC EXERCISE: (45 minutes):    Exercises per grid below to improve mobility, strength, balance and coordination.   Required minimal verbal and tactile cues to promote proper body alignment, promote proper body posture and promote proper body mechanics. Progressed resistance, range, repetitions and complexity of movement as indicated.    Date:  7/7/22 Date:  7/12/22 Date:  7/14/22 Date:  7/19/22 Date:  7/21/22 Date:  7/26/22 Date:  7/28/22 Date:  8/2/22   Activity/Exercise Parameters Parameters Parameters Parameters Parameters Parameters Parameters Parameters   UBE 4/4 2.5 4/4 2.5  4/4 2.5 4/4 3.0 NuStep 10 mins, 3.0 4/4 3.0 4/4 3.0  4/4 3.0    Upper trap stretch  20x B   10x L      Cervical rotation  20x B         Levator stretch  20x B         Thoracic extension  10x in prone w/ PT assist  10x in prone w/ PT assist 10x in quadruped      Row 20x BTB 20x BTB 20x BTB 2x10 2# prone 2x10 2# prone 2x10 2# prone 2x10 2# prone 2x10 2# prone   T 20x BTB 20x BTB 20x BTB 2x10 2# prone 2x10 2# prone 2x10 2# prone 2x10 2# prone 2x10 2# prone   M 20x BTB 20x BTB 20x BTB 2x10 2# prone 2x10 2# prone 2x10 2# prone 2x10 2# prone 2x10 2# prone   Y 20x BTB 20x BTB 20x BTB 2x10 prone 2x10 prone 2x10 prone 2x10 prone 2x10 prone   Wall flaco 15x  15x        Forward raise 2x10 2#  2x10 2#    2x10 2#    Lateral raise 2x10 2#  2x10 2#    2x10 2#    Shrugs 2x10 2#  2x10 2#    2x10 2#    Chin tuck       2x10 supine    Bridge 2x10 blue band  2x10  2x10 2x10 w/ lime band  2x10 w/ lime band 2x10 w/ lime band   Clamshell 2x10 blue band   2x10 B lime band 2x10 B lime band  2x10 B lime band 2x10 B lime band   SLR   2x10 B        UTR           LTR           TA contraction 15x DL  15x SL  10x DL  10x SL 10x SL  10x SL  10x SL 10x double w/ ball hands pressing down into ball and knees pressing up into ball  10x SL   Pelvic tilt    2x10 regular  10x B march 10x regular  10x B march  10x regular  10x march B    90/90 lower 10x B  10x B 10x B 10x B      Prone swim 10x B       10x B   Heel tap           Dead bug        10x B w/ ball   Hip abduction           Bird dog        10x B   Hip extension 10x B prone   2x10 B prone 2x10 B prone  2x10 B prone 2x10 B prone   Dave press    10x B 10# 15x B 13#  15x B 13#    Rotation     10x B 10#  15x B 13#  15x B 13#    Chops/Lifts    10x B 10# chop, 7# lift 15x B 13# chop  15x B 7# lift  15x B 13# chop  15x B 7# lift    Lumbar extension    2x10 prone  5x standing at // 10x prone 10x prone 10x prone 2x10 prone   Sit to stand        10x from low mat   Deadlift        2x10 w/ 5# bar     MANUAL THERAPY: (0 minutes):   Joint mobilization and Soft tissue mobilization was utilized and necessary because of the patient's restricted joint motion, painful spasm, loss of articular motion and restricted motion of soft tissue. Date:  6/30/22 Date:  7/7/22 Date:  7/14/22 Date:  7/21/22 Date:  7/26/22   Parameters Parameters Parameters Parameters Parameters   - STM to B upper traps/levators/capitus in prone  - Mobilizations to thoracic spine in prone STM to L upper trap/levator, capitus, and occipital in supine - Thoracic mobilizations in prone  - STM to B upper traps in prone - STM to L upper trap in prone - switched to needles  - Mobilizations to thoracic spine  - STM to L upper trap, levator, and capitus muscles     MODALITIES: (0 minutes): *  Hot Pack Therapy in order to provide analgesia and relieve muscle spasm. MECHANICAL TRACTION: (0 minutes):   Traction was used due to the patient's cervical radiculopathy in order to relieve pain in or originating from his spine. DRY NEEDLES (0 mins): to L upper trap in prone, 1 needle used, 1 needle removed. No adverse reactions noted    Treatment/Session Summary:    Treatment Assessment:  Mr. Nikky Jose displayed improved cervical motion after today's session. Additionally, he had no lumbar or cervical pain. See recert note. He was given a new referral from a new physician for the same treatment areas for 4 more weeks of PT.   Communication/Consultation:  None today  Equipment provided today:  None  Recommendations/Intent for next treatment session: Next visit will focus on progression of functional tasks as tolerated. Total Treatment Billable Duration:  45 mins therex   Time In: 2572  Time Out: [address], PT       Charge Capture  }Post Session Pain  PT Visit [address] Portal  MD Guidelines  Scanned Media  Benefits  MyChart    Future Appointments   Date Time Provider Octavio Liudmila   8/10/2022  1:45 PM Garima Bors, PT [facility]   8/16/2022  [facility]45 [facility]   8/18/2022  1:45 PM Garima Bors, PT [facility]   8/22/2022  1:45 PM [facility]   8/24/2022  1:45 PM Garima Bors, PT [facility]   8/29/2022  1:45 PM Garima Bors, PT [facility]   8/31/2022  1:45 PM Garima Bors, PT [facility]   9/7/2022  1:45 PM Garima Bors, PT [facility]   9/20/2022  1:00 PM Lewis Delacruz MD [facility]   10/24/2022 10:00 AM Ariel Sahu DO [facility]   12/16/2022  1:00 PM Lewis Delacruz MD [facility]   1/24/2023  2:00 PM PGU NILESH BLOOD DRAWS [facility]   1/31/2023  3:45 PM Rochelle Wharton MD [facility]   6/19/2023  9:30 AM JAVIER Watson MD [facility]   Access Code: [email or web address]  Access Code: [email or web address]  URL: [email or web address]  Date: 06/28/2022  Prepared by: Garimamadison Hubers    Exercises  Seated Upper Trapezius Stretch - 1 x daily - 7 x weekly - 1 sets - 10 reps - 5 hold  Seated Levator Scapulae Stretch - 1 x daily - 7 x weekly - 1 sets - 10 reps - 5 hold  Seated Thoracic Lumbar Extension with Pectoralis Stretch - 1 x daily - 7 x weekly - 1 sets - 10 reps - 5 hold  Standing Shoulder Row with Anchored Resistance - 1 x daily - 3 x weekly - 2 sets - 10 reps - 5 hold  Standing Shoulder Horizontal Abduction with Anchored Resistance - 1 x daily - 3 x weekly - 2 sets - 10 reps - 5 hold  Shoulder Extension with Resistance - Palms Forward - 1 x daily - 3 x weekly - 2 sets - 10 reps - 5 hold  Shoulder Flexion with Anterior Anchored Resistance - 1 x daily - 3 x weekly - 2 sets - 10 reps - 5 hold  Supine Bridge - 1 x daily - 3 x weekly - 2 sets - 10 reps - 5 hold  Clamshell with Resistance - 1 x daily - 3 x weekly - 2 sets - 10 reps - 5 hold  Supine Transversus Abdominis Bracing - Hands on Thighs - 1 x daily - 3 x weekly - 2 sets - 10 reps - 5 hold  Hooklying Isometric Hip Flexion - 1 x daily - 3 x weekly - 2 sets - 10 reps - 5 hold  Supine 90/90 Alternating Heel Touches with Posterior Pelvic Tilt - 1 x daily - 3 x weekly - 2 sets - 10 reps - 5 hold  Active Straight Leg Raise with Quad Set - 1 x daily - 3 x weekly - 2 sets - 10 reps - 5 hold  Wall [place] - 1 x daily - 3 x weekly - 2 sets - 10 reps - 5 hold

## 2022-08-02 NOTE — PROGRESS NOTES
[facility]  Consultation Note    Patient ID:  Name: Negrito Morin  MRN: [id number]  AGE: 67 y.o.  : 1950    Date of Consultation:  2022    CC:   Chief Complaint   Patient presents with    Back Pain         HPI:  Mr. Manjinder Leone is a 54-year-old man who presents today for follow-up of low back pain. He has a history of prior lumbar surgery in . He is unable to have an MRI because of a stimulator placed for some bowel issues in . He has pain in the low back. It radiates to the mid to upper lumbar area. It is aggravated with housework. It is alleviated with sitting. He characterizes the pain as draining, dull ache. The pain does not radiate to the legs or feet. There are no lower extremity paresthesias. The pain does not awaken him at night. He is currently attending physical therapy for his neck. Bilateral L3-4-5-S1 facet joint injection 2021 did not help. He previously had interlaminar L4-5 epidural steroid injections in the past, and those helped for only a short period of time as well. CT myelogram lumbar spine  showed lumbar laminectomy L1-2-3 with multilevel degenerative change, scoliosis, and prominent facet arthropathy L3-4-5. He also had bilateral L4-5 neuroforaminal narrowing.      Past Medical History Includes:   Past Medical History:   Diagnosis Date    Arthritis     Chronic pain     back Left>right buttock    Colon polyps 2019 pathology report tubulovillous adenoma    Compression deformity of vertebra 2016    Compression C5/6    Encounter for screening colonoscopy 2019    Fecal incontinence 10/9/2019    After prostate surgery - followed by colorectal surgery Dr. Sofya Peter    GERD (gastroesophageal reflux disease)     controlled by nexium    Hypercholesterolemia     Hyperlipidemia 2017    Hypertension     Malignant neoplasm of prostate ([address]     Memory loss     Spondylosis of lumbar region without myelopathy or radiculopathy 12/10/2019    S/p surgery  with Dr. Rosetta Sanches incontinence, male     now with artificial sphinctor    Thyroid disease     hypothyroidism   ,   Past Surgical History:   Procedure Laterality Date    CATARACT REMOVAL Bilateral     COLONOSCOPY  2019 colonoscopy- multiple polyps     COLONOSCOPY      LUMBAR LAMINECTOMY  2016    Dr Alonso Knott  2016    for spinal stenosis, L1-L3    ORTHOPEDIC SURGERY Right 1998    right knee scope    OTHER SURGICAL HISTORY N/A     artificial urinary spinter     PROSTATECTOMY      rad. prostatectomy due to prostate ca    UROLOGICAL SURGERY      art. urinary sphincter with reservoir     Family History:   Family History   Problem Relation Age of Onset    Dementia Paternal Grandfather     Cancer Paternal Grandmother         Throat    Cancer Maternal Grandfather     Heart Disease Maternal Grandmother     Cancer Father 79        Kidney    Alcohol Abuse Brother         Now, recovered; at one time addicted to pain Rx    Diabetes Brother     Heart Disease Mother     Alzheimer's Disease Maternal Aunt       Social History:   Social History     Tobacco Use    Smoking status: Former     Packs/day: 1.00     Types: Cigarettes     Quit date: 1998     Years since quittin.3    Smokeless tobacco: Never   Substance Use Topics    Alcohol use:  Yes     Alcohol/week: 21.0 standard drinks       ALLERGIES:   Allergies   Allergen Reactions    Levofloxacin Rash        Patient Medications    Current Outpatient Medications   Medication Sig    Quercetin 250 MG TABS 250 mg by Other route daily     donepezil (ARICEPT) 5 MG tablet Take 1 tablet by mouth nightly    melatonin 3 MG TABS tablet Take 3 mg by mouth at bedtime    Calcium Polycarbophil (FIBERCON PO) Take 1 tablet by mouth in the morning and at bedtime     ZINC PO Take 50 mg by mouth daily     VITAMIN D PO Take by mouth Takes as liquid 5 drops daily results found for any visits on 08/02/22. Assessment and Plan:     ICD-10-CM    1. Lumbar facet arthropathy  M47.816 Ambulatory referral to Physical Therapy     CANCELED: MRI LUMBAR SPINE WO CONTRAST      2. Spondylosis of lumbar region without myelopathy or radiculopathy  M47.816 Ambulatory referral to Physical Therapy     CANCELED: MRI LUMBAR SPINE WO CONTRAST      3. Disc degeneration, lumbar  M51.36           Orders Placed This Encounter   Procedures    Ambulatory referral to Physical Therapy     Referral Priority:   Routine     Referral Type:   Eval and Treat     Referral Reason:   Specialty Services Required     Requested Specialty:   Physical Therapist     Number of Visits Requested:   1        4   This is a chronic illness/condition with exacerbation and progression  Treatment at this time: He will attend physical therapy for strengthening, stretching, and a home exercise program.  He was given a prescription for this today. Studies ordered today:  CT myelogram lumbar spine, compare to 2020 imaging. He cannot have an MRI because of bowel stimulator. Today's visit included 31 minutes of face-to-face time, including taking history, physical exam, review of previous treatment, and discussion of treatment options.     Veronica Joshi MD  8/2/2022,  5:27 PM

## 2022-08-03 LAB — HCV AB SER QL: NONREACTIVE

## 2022-08-10 ENCOUNTER — HOSPITAL ENCOUNTER (OUTPATIENT)
Dept: PHYSICAL THERAPY | Age: 72
Setting detail: RECURRING SERIES
Discharge: HOME | End: 2022-08-13
Payer: MEDICARE

## 2022-08-10 PROCEDURE — 97140 MANUAL THERAPY 1/> REGIONS: CPT

## 2022-08-10 PROCEDURE — 97110 THERAPEUTIC EXERCISES: CPT

## 2022-08-10 ASSESSMENT — PAIN SCALES - GENERAL: PAINLEVEL_OUTOF10: 1

## 2022-08-10 NOTE — PROGRESS NOTES
1Jbill Waters  : 1950  Primary: Medicare Part A And B  Secondary:  [employer] LIFE [employer]ICARE [employer]PP [facility] [employer]  [address] [address]  Phone: [phone]  Fax: [phone] Plan Frequency: 2x/wk for 4 more weeks per new referral    Plan of Care/Certification Expiration Date: 10/01/22      PT Visit Info: Total # of Visits to Date: 15      OUTPATIENT PHYSICAL THERAPY:OP NOTE TYPE: Treatment Note 8/10/2022       Episode  }Appt Desk              Treatment Diagnosis:  Low Back Pain (M54.5)  Cervicalgia (M54.2)  Medical/Referring Diagnosis:  Cervicalgia [M54.2]  Other cervical disc degeneration, unspecified cervical region [M50.30]  Spondylosis without myelopathy or radiculopathy, lumbar region [M47.816]  Lumbar facet arthropathy M47.816  Referring Physician:  KATIE Arreguin MD Orders:  PT Eval and Treat, 2-3x/wk for 6 weeks  Date of Onset:  Onset Date: 22     Allergies:   Levofloxacin  Restrictions/Precautions:  Restrictions/Precautions: None  No data recorded   Interventions Planned (Treatment may consist of any combination of the following):    Current Treatment Recommendations: Strengthening; ROM; Functional mobility training; Balance training; Manual Therapy - Soft Tissue Mobilization; Manual Therapy - Joint Manipulation; Pain management; Home exercise program; Modalities; Integrated dry needling (Traction)     Subjective Comments:  Pt reports neck is most of his pain today. Still mostly on L. Bothers him with turning. Notes his back feels pretty good right now. Initial:}    1/10Post Session:       0/10  Medications Last Reviewed:  8/10/2022  Updated Objective Findings:   See recert  Treatment   THERAPEUTIC EXERCISE: (25 minutes):    Exercises per grid below to improve mobility, strength, balance and coordination. Required minimal verbal and tactile cues to promote proper body alignment, promote proper body posture and promote proper body mechanics. Progressed resistance, range, repetitions and complexity of movement as indicated.    Date:  7/12/22 Date:  7/14/22 Date:  7/19/22 Date:  7/21/22 Date:  7/26/22 Date:  7/28/22 Date:  8/2/22 Date:  8/10/22   Activity/Exercise Parameters Parameters Parameters Parameters Parameters Parameters Parameters Parameters   UBE 4/4 2.5  4/4 2.5 4/4 3.0 NuStep 10 mins, 3.0 4/4 3.0 4/4 3.0  4/4 3.0  NuStep 10 mins, 3.0   Upper trap stretch 20x B   10x L       Cervical rotation 20x B          Levator stretch 20x B          Scalene stretch        10x L and R   Thoracic extension 10x in prone w/ PT assist  10x in prone w/ PT assist 10x in quadruped       Row 20x BTB 20x BTB 2x10 2# prone 2x10 2# prone 2x10 2# prone 2x10 2# prone 2x10 2# prone 2x10 2# prone   T 20x BTB 20x BTB 2x10 2# prone 2x10 2# prone 2x10 2# prone 2x10 2# prone 2x10 2# prone 2x10 2# prone   M 20x BTB 20x BTB 2x10 2# prone 2x10 2# prone 2x10 2# prone 2x10 2# prone 2x10 2# prone 2x10 2# prone   Y 20x BTB 20x BTB 2x10 prone 2x10 prone 2x10 prone 2x10 prone 2x10 prone 2x10 prone   Wall flaco  15x         Forward raise  2x10 2#    2x10 2#     Lateral raise  2x10 2#    2x10 2#     Shrugs  2x10 2#    2x10 2#     Chin tuck      2x10 supine  2x10    Bridge  2x10  2x10 2x10 w/ lime band  2x10 w/ lime band 2x10 w/ lime band    Clamshell   2x10 B lime band 2x10 B lime band  2x10 B lime band 2x10 B lime band    SLR  2x10 B         UTR           LTR           TA contraction  10x DL  10x SL 10x SL  10x SL  10x SL 10x double w/ ball hands pressing down into ball and knees pressing up into ball  10x SL    Pelvic tilt   2x10 regular  10x B march 10x regular  10x B march  10x regular  10x march B     90/90 lower  10x B 10x B 10x B       Prone swim       10x B    Heel tap           Dead bug       10x B w/ ball    Hip abduction           Bird dog       10x B    Hip extension   2x10 B prone 2x10 B prone  2x10 B prone 2x10 B prone    Paloff press   10x B 10# 15x B 13#  15x B 13# Rotation    10x B 10#  15x B 13#  15x B 13#     Chops/Lifts   10x B 10# chop, 7# lift 15x B 13# chop  15x B 7# lift  15x B 13# chop  15x B 7# lift     Lumbar extension   2x10 prone  5x standing at // 10x prone 10x prone 10x prone 2x10 prone    Sit to stand       10x from low mat    Deadlift       2x10 w/ 5# bar      MANUAL THERAPY: (15 minutes):   Joint mobilization and Soft tissue mobilization was utilized and necessary because of the patient's restricted joint motion, painful spasm, loss of articular motion and restricted motion of soft tissue. Date:  7/7/22 Date:  7/14/22 Date:  7/21/22 Date:  7/26/22 Date:  8/10/22   Parameters Parameters Parameters Parameters Parameters   STM to L upper trap/levator, capitus, and occipital in supine - Thoracic mobilizations in prone  - STM to B upper traps in prone - STM to L upper trap in prone - switched to needles  - Mobilizations to thoracic spine  - STM to L upper trap, levator, and capitus muscles - STM to B scalenes in sit  - Mobilizations to thoracic spine     MODALITIES: (5 minutes): *  Hot Pack Therapy in order to provide analgesia and relieve muscle spasm. MECHANICAL TRACTION: (0 minutes):   Traction was used due to the patient's cervical radiculopathy in order to relieve pain in or originating from his spine. DRY NEEDLES (0 mins): to L upper trap in prone, 1 needle used, 1 needle removed. No adverse reactions noted    Treatment/Session Summary:    Treatment Assessment:  Mr. Luis Felipe Lindsey had improved motion after scalene STM and stretches. Chin tucks helped as well. Communication/Consultation:  None today  Equipment provided today:  None  Recommendations/Intent for next treatment session: Next visit will focus on progression of functional tasks as tolerated.     Total Treatment Billable Duration:  25 mins therex, 15 mins manual   Time In: 3795  Time Out: [facility] [facility]  }Post Session Pain  PT Visit Info  [address] Portal  MD Guidelines  Scanned Media  Benefits  MyChart    Future Appointments   Date Time Provider Octavio Nur   8/16/2022  1:45 PM Kya Demark, PT [facility]   8/17/2022 12:00 PM [facility] IR UNIT 2 [facility]   8/17/2022  4:15 PM SFD CT 64 SLICE UNIT 1 [facility]   8/18/2022  1:45 PM Goshen Demark, PT [facility]   8/22/2022  1:45 PM Goshen Demark, PT [facility]   8/24/2022  1:45 PM Goshen Demark, PT [facility]   8/29/2022  1:45 PM Goshen Demark, PT [facility]   8/31/2022  1:45 PM Goshen Demark, PT [facility]   9/7/2022  1:45 PM Goshen Demark, PT [facility]   9/20/2022  1:00 PM MD MAHIN TatumMIROM [facility]   10/24/2022 10:00 AM Teresa MedicusDO [facility]   12/16/2022  1:00 PM MD MENDOZA Tatum [facility]   1/24/2023  2:00 PM PGU NILESH BLOOD DRAWS [facility]   1/31/2023  3:45 PM Pankaj Jerez MD [facility]   6/19/2023  9:30 AM W Elayne Boas, MD [facility]   Access Code: [email or web address]  Access Code: [email or web address]  URL: [email or web address]  Date: 06/28/2022  Prepared by: Kya Demark    Exercises  Seated Upper Trapezius Stretch - 1 x daily - 7 x weekly - 1 sets - 10 reps - 5 hold  Seated Levator Scapulae Stretch - 1 x daily - 7 x weekly - 1 sets - 10 reps - 5 hold  Seated Thoracic Lumbar Extension with Pectoralis Stretch - 1 x daily - 7 x weekly - 1 sets - 10 reps - 5 hold  Standing Shoulder Row with Anchored Resistance - 1 x daily - 3 x weekly - 2 sets - 10 reps - 5 hold  Standing Shoulder Horizontal Abduction with Anchored Resistance - 1 x daily - 3 x weekly - 2 sets - 10 reps - 5 hold  Shoulder Extension with Resistance - Palms Forward - 1 x daily - 3 x weekly - 2 sets - 10 reps - 5 hold  Shoulder Flexion with Anterior Anchored Resistance - 1 x daily - 3 x weekly - 2 sets - 10 reps - 5 hold  Supine Bridge - 1 x daily - 3 x weekly - 2 sets - 10 reps - 5 hold  Clamshell with Resistance - 1 x daily - 3 x weekly - 2 sets - 10 reps - 5 hold  Supine Transversus Abdominis Bracing - Hands on Thighs - 1 x daily - 3 x weekly - 2 sets - 10 reps - 5 hold  Hooklying Isometric Hip Flexion - 1 x daily - 3 x weekly - 2 sets - 10 reps - 5 hold  Supine 90/90 Alternating Heel Touches with Posterior Pelvic Tilt - 1 x daily - 3 x weekly - 2 sets - 10 reps - 5 hold  Active Straight Leg Raise with Quad Set - 1 x daily - 3 x weekly - 2 sets - 10 reps - 5 hold  Wall [place] - 1 x daily - 3 x weekly - 2 sets - 10 reps - 5 hold

## 2022-08-15 ENCOUNTER — APPOINTMENT (RX ONLY)
Dept: URBAN - METROPOLITAN AREA CLINIC 329 | Facility: CLINIC | Age: 72
Setting detail: DERMATOLOGY
End: 2022-08-15

## 2022-08-15 DIAGNOSIS — D18.0 HEMANGIOMA: ICD-10-CM

## 2022-08-15 DIAGNOSIS — B35.1 TINEA UNGUIUM: ICD-10-CM | Status: INADEQUATELY CONTROLLED

## 2022-08-15 DIAGNOSIS — L81.4 OTHER MELANIN HYPERPIGMENTATION: ICD-10-CM

## 2022-08-15 DIAGNOSIS — L82.1 OTHER SEBORRHEIC KERATOSIS: ICD-10-CM

## 2022-08-15 DIAGNOSIS — L57.0 ACTINIC KERATOSIS: ICD-10-CM

## 2022-08-15 DIAGNOSIS — D22 MELANOCYTIC NEVI: ICD-10-CM

## 2022-08-15 PROBLEM — D22.5 MELANOCYTIC NEVI OF TRUNK: Status: ACTIVE | Noted: 2022-08-15

## 2022-08-15 PROBLEM — D22.72 MELANOCYTIC NEVI OF LEFT LOWER LIMB, INCLUDING HIP: Status: ACTIVE | Noted: 2022-08-15

## 2022-08-15 PROBLEM — D18.01 HEMANGIOMA OF SKIN AND SUBCUTANEOUS TISSUE: Status: ACTIVE | Noted: 2022-08-15

## 2022-08-15 PROBLEM — D22.71 MELANOCYTIC NEVI OF RIGHT LOWER LIMB, INCLUDING HIP: Status: ACTIVE | Noted: 2022-08-15

## 2022-08-15 PROCEDURE — ? PRESCRIPTION

## 2022-08-15 PROCEDURE — ? PRESCRIPTION MEDICATION MANAGEMENT

## 2022-08-15 PROCEDURE — ? SUNSCREEN RECOMMENDATIONS

## 2022-08-15 PROCEDURE — ? COUNSELING

## 2022-08-15 PROCEDURE — ? FULL BODY SKIN EXAM

## 2022-08-15 PROCEDURE — ? LIQUID NITROGEN

## 2022-08-15 PROCEDURE — 17003 DESTRUCT PREMALG LES 2-14: CPT

## 2022-08-15 PROCEDURE — 99204 OFFICE O/P NEW MOD 45 MIN: CPT | Mod: 25

## 2022-08-15 PROCEDURE — 17000 DESTRUCT PREMALG LESION: CPT

## 2022-08-15 PROCEDURE — ? ADDITIONAL NOTES

## 2022-08-15 RX ORDER — CICLOPIROX 7.7 MG/G
GEL TOPICAL
Qty: 45 | Refills: 8 | Status: ERX | COMMUNITY
Start: 2022-08-15

## 2022-08-15 RX ADMIN — CICLOPIROX: 7.7 GEL TOPICAL at 00:00

## 2022-08-15 ASSESSMENT — LOCATION SIMPLE DESCRIPTION DERM
LOCATION SIMPLE: RIGHT FOREARM
LOCATION SIMPLE: RIGHT UPPER BACK
LOCATION SIMPLE: RIGHT THIGH
LOCATION SIMPLE: LEFT POSTERIOR THIGH
LOCATION SIMPLE: LEFT GREAT TOE
LOCATION SIMPLE: ABDOMEN
LOCATION SIMPLE: RIGHT GREAT TOE
LOCATION SIMPLE: CHEST
LOCATION SIMPLE: RIGHT POSTERIOR THIGH
LOCATION SIMPLE: LEFT CALF
LOCATION SIMPLE: LOWER BACK
LOCATION SIMPLE: LEFT UPPER ARM
LOCATION SIMPLE: RIGHT CHEEK
LOCATION SIMPLE: LEFT ZYGOMA
LOCATION SIMPLE: LEFT UPPER BACK
LOCATION SIMPLE: LEFT THIGH
LOCATION SIMPLE: RIGHT ZYGOMA
LOCATION SIMPLE: RIGHT PRETIBIAL REGION

## 2022-08-15 ASSESSMENT — LOCATION DETAILED DESCRIPTION DERM
LOCATION DETAILED: LEFT ANTERIOR PROXIMAL THIGH
LOCATION DETAILED: LEFT GREAT TOENAIL
LOCATION DETAILED: LEFT DISTAL POSTERIOR THIGH
LOCATION DETAILED: RIGHT SUPERIOR MEDIAL UPPER BACK
LOCATION DETAILED: RIGHT PROXIMAL PRETIBIAL REGION
LOCATION DETAILED: LEFT PROXIMAL CALF
LOCATION DETAILED: RIGHT SUPERIOR CENTRAL MALAR CHEEK
LOCATION DETAILED: RIGHT DISTAL PLANTAR GREAT TOE
LOCATION DETAILED: SUPERIOR LUMBAR SPINE
LOCATION DETAILED: RIGHT LATERAL ABDOMEN
LOCATION DETAILED: LEFT MEDIAL SUPERIOR CHEST
LOCATION DETAILED: LEFT PROXIMAL POSTERIOR UPPER ARM
LOCATION DETAILED: RIGHT ANTERIOR PROXIMAL THIGH
LOCATION DETAILED: RIGHT MEDIAL ZYGOMA
LOCATION DETAILED: LEFT LATERAL ZYGOMA
LOCATION DETAILED: RIGHT DISTAL DORSAL FOREARM
LOCATION DETAILED: RIGHT DISTAL POSTERIOR THIGH
LOCATION DETAILED: LEFT SUPERIOR LATERAL UPPER BACK

## 2022-08-15 ASSESSMENT — LOCATION ZONE DERM
LOCATION ZONE: LEG
LOCATION ZONE: TRUNK
LOCATION ZONE: TOE
LOCATION ZONE: TOENAIL
LOCATION ZONE: FACE
LOCATION ZONE: ARM

## 2022-08-15 NOTE — PROCEDURE: MIPS QUALITY
Quality 111:Pneumonia Vaccination Status For Older Adults: Pneumococcal vaccine administered on or after patient’s 60th birthday and before the end of the measurement period
Quality 130: Documentation Of Current Medications In The Medical Record: Current Medications Documented
Detail Level: Detailed
Quality 47: Advance Care Plan: Advance Care Planning discussed and documented; advance care plan or surrogate decision maker documented in the medical record.

## 2022-08-16 ENCOUNTER — HOSPITAL ENCOUNTER (OUTPATIENT)
Dept: PHYSICAL THERAPY | Age: 72
Setting detail: RECURRING SERIES
Discharge: HOME | End: 2022-08-19
Payer: MEDICARE

## 2022-08-16 PROCEDURE — 97110 THERAPEUTIC EXERCISES: CPT

## 2022-08-16 ASSESSMENT — PAIN SCALES - GENERAL: PAINLEVEL_OUTOF10: 3

## 2022-08-16 NOTE — PROGRESS NOTES
1Jbill Magallanes  : 1950  Primary: Medicare Part A And B  Secondary:  FOR LIFE MEDICARE SUPP [facility]  [address] [address]  Phone: [phone]  Fax: [phone] Plan Frequency: 2x/wk for 4 more weeks per new referral    Plan of Care/Certification Expiration Date: 10/01/22      PT Visit Info: Total # of Visits to Date: 15      OUTPATIENT PHYSICAL THERAPY:OP NOTE TYPE: Treatment Note 2022       Episode  }Appt Desk              Treatment Diagnosis:  Low Back Pain (M54.5)  Cervicalgia (M54.2)  Medical/Referring Diagnosis:  Cervicalgia [M54.2]  Other cervical disc degeneration, unspecified cervical region [M50.30]  Spondylosis without myelopathy or radiculopathy, lumbar region [M47.816]  Lumbar facet arthropathy M47.816  Referring Physician:  Brisa Solis APRN*  MD Orders:  PT Eval and Treat, 2-3x/wk for 6 weeks  Date of Onset:  Onset Date: 22     Allergies:   Levofloxacin  Restrictions/Precautions:  Restrictions/Precautions: None  No data recorded   Interventions Planned (Treatment may consist of any combination of the following):    Current Treatment Recommendations: Strengthening; ROM; Functional mobility training; Balance training; Manual Therapy - Soft Tissue Mobilization; Manual Therapy - Joint Manipulation; Pain management; Home exercise program; Modalities; Integrated dry needling (Traction)     Subjective Comments:  Pt reports LB and neck are sore today. Has been doing a lot today so that is why his back is more sore. Initial:}    3/10Post Session:       0/10  Medications Last Reviewed:  2022  Updated Objective Findings:   See recert  Treatment   THERAPEUTIC EXERCISE: (40 minutes):    Exercises per grid below to improve mobility, strength, balance and coordination. Required minimal verbal and tactile cues to promote proper body alignment, promote proper body posture and promote proper body mechanics.   Progressed resistance, range, repetitions and complexity of movement as indicated.    Date:  7/14/22 Date:  7/19/22 Date:  7/21/22 Date:  7/26/22 Date:  7/28/22 Date:  8/2/22 Date:  8/10/22 Date:  8/16/22   Activity/Exercise Parameters Parameters Parameters Parameters Parameters Parameters Parameters Parameters   UBE 4/4 2.5 4/4 3.0 NuStep 10 mins, 3.0 4/4 3.0 4/4 3.0  4/4 3.0  NuStep 10 mins, 3.0 NuStep 10 mins, 3.0    Upper trap stretch   10x L        Cervical rotation           Levator stretch           Scalene stretch       10x L and R    Thoracic extension  10x in prone w/ PT assist 10x in quadruped        Row 20x BTB 2x10 2# prone 2x10 2# prone 2x10 2# prone 2x10 2# prone 2x10 2# prone 2x10 2# prone 2x10 3# prone   T 20x BTB 2x10 2# prone 2x10 2# prone 2x10 2# prone 2x10 2# prone 2x10 2# prone 2x10 2# prone 2x10 3# prone   M 20x BTB 2x10 2# prone 2x10 2# prone 2x10 2# prone 2x10 2# prone 2x10 2# prone 2x10 2# prone 2x10 3# prone   Y 20x BTB 2x10 prone 2x10 prone 2x10 prone 2x10 prone 2x10 prone 2x10 prone 2x10 prone   Wall flaco 15x          Forward raise 2x10 2#    2x10 2#      Lateral raise 2x10 2#    2x10 2#      Shrugs 2x10 2#    2x10 2#      Chin tuck     2x10 supine  2x10  2x10    Bridge 2x10  2x10 2x10 w/ lime band  2x10 w/ lime band 2x10 w/ lime band  2x10 blue band   Clamshell  2x10 B lime band 2x10 B lime band  2x10 B lime band 2x10 B lime band  2x10 blue band   SLR 2x10 B          UTR        10x B   LTR        10x B   TA contraction 10x DL  10x SL 10x SL  10x SL  10x SL 10x double w/ ball hands pressing down into ball and knees pressing up into ball  10x SL     Pelvic tilt  2x10 regular  10x B march 10x regular  10x B march  10x regular  10x march B      90/90 lower 10x B 10x B 10x B        Prone swim      10x B  10x B   Heel tap        10x B 6 inch step   Step up        10x B 6 inch step   Side step up        10x B 6 inch step   Dead bug      10x B w/ ball     Hip abduction           Bird dog      10x B     Hip extension 2x10 B prone 2x10 B prone  2x10 B prone 2x10 B prone  2x10 B prone    Paloff press  10x B 10# 15x B 13#  15x B 13#   10x B 13#   Rotation   10x B 10#  15x B 13#  15x B 13#   10x B 13#   Chops/Lifts  10x B 10# chop, 7# lift 15x B 13# chop  15x B 7# lift  15x B 13# chop  15x B 7# lift   10x B 13# chop  10x B 7# lift   Lumbar extension  2x10 prone  5x standing at // 10x prone 10x prone 10x prone 2x10 prone  10x in //  10x prone   Sit to stand      10x from low mat     Deadlift      2x10 w/ 5# bar                  MANUAL THERAPY: (0 minutes):   Joint mobilization and Soft tissue mobilization was utilized and necessary because of the patient's restricted joint motion, painful spasm, loss of articular motion and restricted motion of soft tissue. Date:  7/7/22 Date:  7/14/22 Date:  7/21/22 Date:  7/26/22 Date:  8/10/22   Parameters Parameters Parameters Parameters Parameters   STM to L upper trap/levator, capitus, and occipital in supine - Thoracic mobilizations in prone  - STM to B upper traps in prone - STM to L upper trap in prone - switched to needles  - Mobilizations to thoracic spine  - STM to L upper trap, levator, and capitus muscles - STM to B scalenes in sit  - Mobilizations to thoracic spine     MODALITIES: (0 minutes): *  Hot Pack Therapy in order to provide analgesia and relieve muscle spasm. MECHANICAL TRACTION: (0 minutes):   Traction was used due to the patient's cervical radiculopathy in order to relieve pain in or originating from his spine. DRY NEEDLES (0 mins): to L upper trap in prone, 1 needle used, 1 needle removed. No adverse reactions noted    Treatment/Session Summary:    Treatment Assessment:  No pain in LB or neck post session. Issued new HEP with LTR and UTR as those helped loosen him up. Focused on LB and scapular strengthening this session.   Communication/Consultation:  None today  Equipment provided today:  None  Recommendations/Intent for next treatment session: Next visit will focus on progression of functional tasks as tolerated. Total Treatment Billable Duration:  40 mins therex  Time In: 2793  Time Out: [address], PT       Charge Capture  }Post Session Pain  PT Visit [address] Portal  MD Guidelines  Scanned Media  Benefits  MyChart    Future Appointments   Date Time Provider Octavio Liudmila   8/17/2022 12:00 PM [facility] IR UNIT 2 [facility]   8/17/2022  2:12 PM SFD CT 64 SLICE UNIT 1 SFDRCT [facility]   8/18/2022  7:00 PM Powers Demark, PT [facility]   8/22/2022  1:45 PM Kya Demark, PT [facility]   8/24/2022  1:45 PM Powers Demark, PT [facility]   8/29/2022  1:45 PM Powers Demark, PT [facility]   8/31/2022  1:45 PM Powers Demark, PT [facility]   9/7/2022  1:45 PM Kya Demark, PT [facility]   9/13/2022  1:45 PM Kya Demark, PT [facility]   9/20/2022  1:00 PM Ibis Zavala MD [facility]   10/24/2022 10:00 AM Teresa MedicDO mac BSNI [facility]   12/16/2022  1:00 PM MD MAHIN TatumMIROM [facility]   1/24/2023  2:00 PM PGU NILESH BLOOD DRAWS [facility]   1/31/2023  3:45 PM Pankaj Jerez MD [facility]   6/19/2023  9:30 AM W Elayne Boas, MD [facility]   Access Code: [email or web address]  Access Code: [email or web address]  URL: [email or web address]  Date: 08/16/2022  Prepared by: Kya Bellamy    Exercises  Supine Lower Trunk Rotation - 2 x daily - 7 x weekly - 1 sets - 10 reps - 5 hold  Sidelying Open Book Thoracic Lumbar Rotation and Extension - 2 x daily - 7 x weekly - 1 sets - 10 reps - 5 hold  Seated Upper Trapezius Stretch - 1 x daily - 7 x weekly - 1 sets - 10 reps - 5 hold  Seated Levator Scapulae Stretch - 1 x daily - 7 x weekly - 1 sets - 10 reps - 5 hold  Seated Thoracic Lumbar Extension with Pectoralis Stretch - 1 x daily - 7 x weekly - 1 sets - 10 reps - 5 hold  Standing Shoulder Row with Anchored Resistance - 1 x daily - 3 x weekly - 2 sets - 10 reps - 5 hold  Standing Shoulder Horizontal Abduction with Anchored Resistance - 1 x daily - 3 x weekly - 2 sets - 10 reps - 5 hold  Shoulder Extension with Resistance - Palms Forward - 1 x daily - 3 x weekly - 2 sets - 10 reps - 5 hold  Shoulder Flexion with Anterior Anchored Resistance - 1 x daily - 3 x weekly - 2 sets - 10 reps - 5 hold  Supine Bridge - 1 x daily - 3 x weekly - 2 sets - 10 reps - 5 hold  Clamshell with Resistance - 1 x daily - 3 x weekly - 2 sets - 10 reps - 5 hold  Supine Transversus Abdominis Bracing - Hands on Thighs - 1 x daily - 3 x weekly - 2 sets - 10 reps - 5 hold  Hooklying Isometric Hip Flexion - 1 x daily - 3 x weekly - 2 sets - 10 reps - 5 hold  Supine 90/90 Alternating Heel Touches with Posterior Pelvic Tilt - 1 x daily - 3 x weekly - 2 sets - 10 reps - 5 hold  Active Straight Leg Raise with Quad Set - 1 x daily - 3 x weekly - 2 sets - 10 reps - 5 hold  Wall [place] - 1 x daily - 3 x weekly - 2 sets - 10 reps - 5 hold

## 2022-08-17 ENCOUNTER — HOSPITAL ENCOUNTER (OUTPATIENT)
Dept: CT IMAGING | Age: 72
Discharge: HOME | End: 2022-08-20
Payer: MEDICARE

## 2022-08-17 ENCOUNTER — HOSPITAL ENCOUNTER (OUTPATIENT)
Dept: INTERVENTIONAL RADIOLOGY/VASCULAR | Age: 72
Discharge: HOME | End: 2022-08-20
Payer: MEDICARE

## 2022-08-17 VITALS
OXYGEN SATURATION: 97 % | TEMPERATURE: 99.8 F | SYSTOLIC BLOOD PRESSURE: 161 MMHG | DIASTOLIC BLOOD PRESSURE: 75 MMHG | RESPIRATION RATE: 18 BRPM | HEART RATE: 79 BPM

## 2022-08-17 DIAGNOSIS — M47.816 SPONDYLOSIS OF LUMBAR REGION WITHOUT MYELOPATHY OR RADICULOPATHY: ICD-10-CM

## 2022-08-17 DIAGNOSIS — M47.816 LUMBAR FACET ARTHROPATHY: ICD-10-CM

## 2022-08-17 DIAGNOSIS — M51.36 DISC DEGENERATION, LUMBAR: ICD-10-CM

## 2022-08-17 PROCEDURE — 72132 CT LUMBAR SPINE W/DYE: CPT

## 2022-08-17 PROCEDURE — 2500000003 HC RX 250 WO HCPCS: Performed by: PHYSICIAN ASSISTANT

## 2022-08-17 PROCEDURE — 6360000004 HC RX CONTRAST MEDICATION: Performed by: PHYSICIAN ASSISTANT

## 2022-08-17 PROCEDURE — 2709999900 IR MYELOGRAM LUMBOSACRAL

## 2022-08-17 RX ORDER — LIDOCAINE HYDROCHLORIDE 20 MG/ML
INJECTION, SOLUTION INFILTRATION; PERINEURAL
Status: COMPLETED | OUTPATIENT
Start: 2022-08-17 | End: 2022-08-17

## 2022-08-17 RX ADMIN — LIDOCAINE HYDROCHLORIDE 10 ML: 20 INJECTION, SOLUTION INFILTRATION; PERINEURAL at 12:33

## 2022-08-17 RX ADMIN — IOPAMIDOL 15 ML: 408 INJECTION, SOLUTION INTRATHECAL at 12:37

## 2022-08-17 NOTE — PROGRESS NOTES
TRANSFER - OUT REPORT:           Verbal report given to Erika Valdez RN(name) on kAila Macias  being transferred to IR Recovery 3(unit) for routine post-op              Report consisted of patients Situation, Background, Assessment and      Recommendations(SBAR). Information from the following report(s) SBAR and Procedure Summary was reviewed with the receiving nurse. Opportunity for questions and clarification was provided. Pt tolerated procedure well. CT staff notified.

## 2022-08-17 NOTE — OR NURSING
Recovery period without difficulty. Pt alert and oriented and denies pain. Dressing is clean, dry, and intact. Reviewed discharge instructions with patient and spouse, both verbalized understanding. Pt ambulatory to the lobby with spouse. No distress observed.

## 2022-08-17 NOTE — DISCHARGE INSTRUCTIONS
[facility]     Department of Interventional Radiology     Phone Number: [phone]     Fax: [phone]         If you have any questions about your procedure, please call the Interventional Radiology department at [phone]. After business hours (5pm) and weekends, call the answering service at [phone] and ask for the Radiologist on call to be paged. Si tiene Preguntas acerca del procedimiento, por favor llame al departamento de Radiología Intervencional al [phone]. Después de horas de oficina (5 pm) y los fines de [place], llamar al Corinne De León al [phone] y pregunte por el Radiologo de [facility].

## 2022-08-18 ENCOUNTER — HOSPITAL ENCOUNTER (OUTPATIENT)
Dept: PHYSICAL THERAPY | Age: 72
Setting detail: RECURRING SERIES
End: 2022-08-18
Payer: MEDICARE

## 2022-08-22 ENCOUNTER — HOSPITAL ENCOUNTER (OUTPATIENT)
Dept: PHYSICAL THERAPY | Age: 72
Setting detail: RECURRING SERIES
Discharge: HOME | End: 2022-08-25
Payer: MEDICARE

## 2022-08-22 PROCEDURE — 97140 MANUAL THERAPY 1/> REGIONS: CPT

## 2022-08-22 PROCEDURE — 97110 THERAPEUTIC EXERCISES: CPT

## 2022-08-22 ASSESSMENT — PAIN SCALES - GENERAL: PAINLEVEL_OUTOF10: 2

## 2022-08-22 NOTE — PROGRESS NOTES
1Jbill Lindsay  : 1950  Primary: Medicare Part A And B  Secondary:  [employer]ICARE [employer] [facility] [employer]  [address] [address]  Phone: [phone]  Fax: [phone] Plan Frequency: 2x/wk for 4 more weeks per new referral    Plan of Care/Certification Expiration Date: 10/01/22      PT Visit Info: Total # of Visits to Date: 15      OUTPATIENT PHYSICAL THERAPY:OP NOTE TYPE: Treatment Note 2022       Episode  }Appt Desk              Treatment Diagnosis:  Low Back Pain (M54.5)  Cervicalgia (M54.2)  Medical/Referring Diagnosis:  Cervicalgia [M54.2]  Other cervical disc degeneration, unspecified cervical region [M50.30]  Spondylosis without myelopathy or radiculopathy, lumbar region [M47.816]  Lumbar facet arthropathy M47.816  Referring Physician:  KATIE Martini MD Orders:  PT Eval and Treat, 2-3x/wk for 6 weeks  Date of Onset:  Onset Date: 22     Allergies:   Levofloxacin  Restrictions/Precautions:  Restrictions/Precautions: None  No data recorded   Interventions Planned (Treatment may consist of any combination of the following):    Current Treatment Recommendations: Strengthening; ROM; Functional mobility training; Balance training; Manual Therapy - Soft Tissue Mobilization; Manual Therapy - Joint Manipulation; Pain management; Home exercise program; Modalities; Integrated dry needling (Traction)     Subjective Comments:  Pt complains of pain in thoracic spine area. Says when he moves a certain way, it triggers pain through his back that takes his breath away. Initial:}    210Post Session:       0/10  Medications Last Reviewed:  2022  Updated Objective Findings:   See recert  Treatment   THERAPEUTIC EXERCISE: (35 minutes):    Exercises per grid below to improve mobility, strength, balance and coordination.   Required minimal verbal and tactile cues to promote proper body alignment, promote proper body posture and promote proper body mechanics. Progressed resistance, range, repetitions and complexity of movement as indicated.    Date:  7/19/22 Date:  7/21/22 Date:  7/26/22 Date:  7/28/22 Date:  8/2/22 Date:  8/10/22 Date:  8/16/22 Date:  8/22/22   Activity/Exercise Parameters Parameters Parameters Parameters Parameters Parameters Parameters Parameters   UBE 4/4 3.0 NuStep 10 mins, 3.0 4/4 3.0 4/4 3.0  4/4 3.0  NuStep 10 mins, 3.0 NuStep 10 mins, 3.0  4/4 3.0   Upper trap stretch  10x L         Cervical rotation           Levator stretch           Scalene stretch      10x L and R     Thoracic extension 10x in prone w/ PT assist 10x in quadruped         Row 2x10 2# prone 2x10 2# prone 2x10 2# prone 2x10 2# prone 2x10 2# prone 2x10 2# prone 2x10 3# prone 2x10 prone   T 2x10 2# prone 2x10 2# prone 2x10 2# prone 2x10 2# prone 2x10 2# prone 2x10 2# prone 2x10 3# prone 2x10 prone   M 2x10 2# prone 2x10 2# prone 2x10 2# prone 2x10 2# prone 2x10 2# prone 2x10 2# prone 2x10 3# prone 2x10 prone   Y 2x10 prone 2x10 prone 2x10 prone 2x10 prone 2x10 prone 2x10 prone 2x10 prone 2x10 prone   Wall flaco           Forward raise    2x10 2#       Lateral raise    2x10 2#       Shrugs    2x10 2#       Chin tuck    2x10 supine  2x10  2x10  2x10   Bridge 2x10 2x10 w/ lime band  2x10 w/ lime band 2x10 w/ lime band  2x10 blue band    Clamshell 2x10 B lime band 2x10 B lime band  2x10 B lime band 2x10 B lime band  2x10 blue band    SLR           UTR       10x B 10xB   LTR       10x B 10xB   TA contraction 10x SL  10x SL  10x SL 10x double w/ ball hands pressing down into ball and knees pressing up into ball  10x SL      Pelvic tilt 2x10 regular  10x B march 10x regular  10x B march  10x regular  10x march B       90/90 lower 10x B 10x B         Prone swim     10x B  10x B    Heel tap       10x B 6 inch step    Step up       10x B 6 inch step    Side step up       10x B 6 inch step    Dead bug     10x B w/ ball      Hip abduction           Bird dog     10x B      Hip extension 2x10 B prone 2x10 B prone  2x10 B prone 2x10 B prone  2x10 B prone     Paloff press 10x B 10# 15x B 13#  15x B 13#   10x B 13#    Rotation  10x B 10#  15x B 13#  15x B 13#   10x B 13#    Chops/Lifts 10x B 10# chop, 7# lift 15x B 13# chop  15x B 7# lift  15x B 13# chop  15x B 7# lift   10x B 13# chop  10x B 7# lift    Lumbar extension 2x10 prone  5x standing at // 10x prone 10x prone 10x prone 2x10 prone  10x in //  10x prone    Sit to stand     10x from low mat      Deadlift     2x10 w/ 5# bar      Lumbar flexion        10 seated     MANUAL THERAPY: (20 minutes):   Joint mobilization and Soft tissue mobilization was utilized and necessary because of the patient's restricted joint motion, painful spasm, loss of articular motion and restricted motion of soft tissue. Date:  7/14/22 Date:  7/21/22 Date:  7/26/22 Date:  8/10/22 Date:  8/22/22   Parameters Parameters Parameters Parameters Parameters   - Thoracic mobilizations in prone  - STM to B upper traps in prone - STM to L upper trap in prone - switched to needles  - Mobilizations to thoracic spine  - STM to L upper trap, levator, and capitus muscles - STM to B scalenes in sit  - Mobilizations to thoracic spine - STM/positional release of thoracic and cervical spine  - Mobilizations to thoracic spine for extension and rotation  - Mobilizations to cervical spine for right side rotation     MODALITIES: (0 minutes): *  Hot Pack Therapy in order to provide analgesia and relieve muscle spasm. MECHANICAL TRACTION: (0 minutes):   Traction was used due to the patient's cervical radiculopathy in order to relieve pain in or originating from his spine. DRY NEEDLES (0 mins): to L upper trap in prone, 1 needle used, 1 needle removed. No adverse reactions noted    Treatment/Session Summary:    Treatment Assessment:  STM on thoracic paraspinals decreased his pain, as did STM in his cervical spine.  Based on CT we did flexion exercises over extension exercises, which he seemed to respond well to. Assess next session how pt tolerated lumbar flexion exercises in his HEP. Communication/Consultation:  None today  Equipment provided today:  None  Recommendations/Intent for next treatment session: Next visit will focus on progression of functional tasks as tolerated. Total Treatment Billable Duration:  35 mins therex, 20 mins manual  Time In: 1345  Time Out: 1450    Zoraida Herrera, PT       Charge Capture  }Post Session Pain  PT Visit Info  [employer] Portal  MD Guidelines  Scanned Media  Benefits  MyChart    Future Appointments   Date Time Provider Octavio Nur   8/24/2022  1:45 PM Zoraida Herrera, PT [facility]   8/29/2022  1:45 PM Lorlinh Herrera, PT [facility]   8/31/2022  1:45 PM Zoraida Herrera, PT [facility]   9/7/2022  1:45 PM Lorence Javier, PT [facility]   9/13/2022  1:45 PM Lorlinh Herrera, PT [facility]   9/20/2022  1:00 PM Isidoro Ortega MD ML[facility]   10/24/2022 10:00 AM DO FELY YatesNI [facility]   12/16/2022  1:00 PM Isidoro Ortega MD [facility]   1/24/2023  2:00 PM PGU NILESH BLOOD DRAWS [facility]   1/31/2023  3:45 PM Bakari Walter MD [facility]   6/19/2023  9:30 AM JAVIER Burns MD [facility]   Access Code: [email or web address]  Access Code: [email or web address]  URL: [email or web address]  Date: 08/16/2022  Prepared by: Zoraida Herrera    Exercises  Supine Lower Trunk Rotation - 2 x daily - 7 x weekly - 1 sets - 10 reps - 5 hold  Sidelying Open Book Thoracic Lumbar Rotation and Extension - 2 x daily - 7 x weekly - 1 sets - 10 reps - 5 hold  Seated Upper Trapezius Stretch - 1 x daily - 7 x weekly - 1 sets - 10 reps - 5 hold  Seated Levator Scapulae Stretch - 1 x daily - 7 x weekly - 1 sets - 10 reps - 5 hold  Seated Thoracic Lumbar Extension with Pectoralis Stretch - 1 x daily - 7 x weekly - 1 sets - 10 reps - 5 hold  Standing Shoulder Row with Anchored Resistance - 1 x daily - 3 x weekly - 2 sets - 10 reps - 5 hold  Standing Shoulder Horizontal Abduction with Anchored Resistance - 1 x daily - 3 x weekly - 2 sets - 10 reps - 5 hold  Shoulder Extension with Resistance - Palms Forward - 1 x daily - 3 x weekly - 2 sets - 10 reps - 5 hold  Shoulder Flexion with Anterior Anchored Resistance - 1 x daily - 3 x weekly - 2 sets - 10 reps - 5 hold  Supine Bridge - 1 x daily - 3 x weekly - 2 sets - 10 reps - 5 hold  Clamshell with Resistance - 1 x daily - 3 x weekly - 2 sets - 10 reps - 5 hold  Supine Transversus Abdominis Bracing - Hands on Thighs - 1 x daily - 3 x weekly - 2 sets - 10 reps - 5 hold  Hooklying Isometric Hip Flexion - 1 x daily - 3 x weekly - 2 sets - 10 reps - 5 hold  Supine 90/90 Alternating Heel Touches with Posterior Pelvic Tilt - 1 x daily - 3 x weekly - 2 sets - 10 reps - 5 hold  Active Straight Leg Raise with Quad Set - 1 x daily - 3 x weekly - 2 sets - 10 reps - 5 hold  Wall [place] - 1 x daily - 3 x weekly - 2 sets - 10 reps - 5 hold

## 2022-08-24 ENCOUNTER — HOSPITAL ENCOUNTER (OUTPATIENT)
Dept: PHYSICAL THERAPY | Age: 72
Setting detail: RECURRING SERIES
Discharge: HOME | End: 2022-08-27
Payer: MEDICARE

## 2022-08-24 PROCEDURE — 97110 THERAPEUTIC EXERCISES: CPT

## 2022-08-24 ASSESSMENT — PAIN SCALES - GENERAL: PAINLEVEL_OUTOF10: 0

## 2022-08-24 NOTE — PROGRESS NOTES
1Jbill Smith  : 1950  Primary: Medicare Part A And B  Secondary:  [employer] MEDICARE [employer]PP [facility]  [address] [place] [address]  Phone: [phone]  Fax: [phone] Plan Frequency: 2x/wk for 4 more weeks per new referral    Plan of Care/Certification Expiration Date: 10/01/22      PT Visit Info: Total # of Visits to Date: 13      OUTPATIENT PHYSICAL THERAPY:OP NOTE TYPE: Treatment Note 2022       Episode  }Appt Desk              Treatment Diagnosis:  Low Back Pain (M54.5)  Cervicalgia (M54.2)  Medical/Referring Diagnosis:  Cervicalgia [M54.2]  Other cervical disc degeneration, unspecified cervical region [M50.30]  Spondylosis without myelopathy or radiculopathy, lumbar region [M47.816]  Lumbar facet arthropathy M47.816  Referring Physician:  KATIE Mario MD Orders:  PT Eval and Treat, 2-3x/wk for 6 weeks  Date of Onset:  Onset Date: 22     Allergies:   Levofloxacin  Restrictions/Precautions:  Restrictions/Precautions: None  No data recorded   Interventions Planned (Treatment may consist of any combination of the following):    Current Treatment Recommendations: Strengthening; ROM; Functional mobility training; Balance training; Manual Therapy - Soft Tissue Mobilization; Manual Therapy - Joint Manipulation; Pain management; Home exercise program; Modalities; Integrated dry needling (Traction)     Subjective Comments:  Pt reports no pain in neck or LB really today. Notes he changed his pillow up at home and is no longer propped up as much and that helps. Flexion of lumbar spine helped him recover more quickly after yard work as well. Initial:}    0/10Post Session:       0/10  Medications Last Reviewed:  2022  Updated Objective Findings:   See recert  Treatment   THERAPEUTIC EXERCISE: (40 minutes):    Exercises per grid below to improve mobility, strength, balance and coordination.   Required minimal verbal and tactile cues to promote proper body alignment, promote proper body posture and promote proper body mechanics. Progressed resistance, range, repetitions and complexity of movement as indicated.    Date:  7/21/22 Date:  7/26/22 Date:  7/28/22 Date:  8/2/22 Date:  8/10/22 Date:  8/16/22 Date:  8/22/22 Date:  8/24/22   Activity/Exercise Parameters Parameters Parameters Parameters Parameters Parameters Parameters Parameters   UBE NuStep 10 mins, 3.0 4/4 3.0 4/4 3.0  4/4 3.0  NuStep 10 mins, 3.0 NuStep 10 mins, 3.0  4/4 3.0 NuStep 10 mins, 3.0   Upper trap stretch 10x L          Cervical rotation           Levator stretch           Scalene stretch     10x L and R      Thoracic extension 10x in quadruped          Row 2x10 2# prone 2x10 2# prone 2x10 2# prone 2x10 2# prone 2x10 2# prone 2x10 3# prone 2x10 prone    T 2x10 2# prone 2x10 2# prone 2x10 2# prone 2x10 2# prone 2x10 2# prone 2x10 3# prone 2x10 prone    M 2x10 2# prone 2x10 2# prone 2x10 2# prone 2x10 2# prone 2x10 2# prone 2x10 3# prone 2x10 prone    Y 2x10 prone 2x10 prone 2x10 prone 2x10 prone 2x10 prone 2x10 prone 2x10 prone    Wall flaco           Forward raise   2x10 2#        Lateral raise   2x10 2#        Shrugs   2x10 2#        Chin tuck   2x10 supine  2x10  2x10  2x10    Bridge 2x10 w/ lime band  2x10 w/ lime band 2x10 w/ lime band  2x10 blue band  2x10 blue band   Clamshell 2x10 B lime band  2x10 B lime band 2x10 B lime band  2x10 blue band  2x10 blue band   SLR           UTR      10x B 10xB    LTR      10x B 10xB    TA contraction 10x SL  10x SL 10x double w/ ball hands pressing down into ball and knees pressing up into ball  10x SL    2x10 hands on knee SL  2x10 hands to knees    Pelvic tilt 10x regular  10x B march  10x regular  10x march B        90/90 lower 10x B       10x B   Deadbug    10x B w/ ball    10x B, 5x w/ ball   Prone swim    10x B  10x B     Heel tap      10x B 6 inch step     Step up      10x B 6 inch step     Side step up      10x B 6 inch step Hip abduction           Bird dog    10x B       Hip extension 2x10 B prone  2x10 B prone 2x10 B prone  2x10 B prone      Paloff press 15x B 13#  15x B 13#   10x B 13#  10x B 17#    Rotation  15x B 13#  15x B 13#   10x B 13#  10x B 17#    Chops/Lifts 15x B 13# chop  15x B 7# lift  15x B 13# chop  15x B 7# lift   10x B 13# chop  10x B 7# lift  10x B 17# chop  10x B 10# lift   Lumbar extension 10x prone 10x prone 10x prone 2x10 prone  10x in //  10x prone     Sit to stand    10x from low mat       Deadlift    2x10 w/ 5# bar       Lumbar flexion       10 seated 10x supine      MANUAL THERAPY: (0 minutes):   Joint mobilization and Soft tissue mobilization was utilized and necessary because of the patient's restricted joint motion, painful spasm, loss of articular motion and restricted motion of soft tissue. Date:  7/14/22 Date:  7/21/22 Date:  7/26/22 Date:  8/10/22 Date:  8/22/22   Parameters Parameters Parameters Parameters Parameters   - Thoracic mobilizations in prone  - STM to B upper traps in prone - STM to L upper trap in prone - switched to needles  - Mobilizations to thoracic spine  - STM to L upper trap, levator, and capitus muscles - STM to B scalenes in sit  - Mobilizations to thoracic spine - STM/positional release of thoracic and cervical spine  - Mobilizations to thoracic spine for extension and rotation  - Mobilizations to cervical spine for right side rotation     MODALITIES: (0 minutes): *  Hot Pack Therapy in order to provide analgesia and relieve muscle spasm. MECHANICAL TRACTION: (0 minutes):   Traction was used due to the patient's cervical radiculopathy in order to relieve pain in or originating from his spine. DRY NEEDLES (0 mins): to L upper trap in prone, 1 needle used, 1 needle removed. No adverse reactions noted    Treatment/Session Summary:    Treatment Assessment:  Pt tolerated increased core work well, but did report some fatigue.  He required moderate cues for proper performance of supine and standing therex. No pain pre or post session. Communication/Consultation:  None today  Equipment provided today:  None  Recommendations/Intent for next treatment session: Next visit will focus on progression of functional tasks as tolerated. Total Treatment Billable Duration:  40 mins therex  Time In: 3519  Time Out: [address], PT       Charge Capture  }Post Session Pain  PT Visit [address] Portal  MD Guidelines  Scanned Media  Benefits  MyChart    Future Appointments   Date Time Provider Octavio Nur   8/29/2022  1:45 PM Everton Ruby, PT [facility]   8/31/2022  1:45 PM Everton Ruby, PT [facility]   9/7/2022  1:45 PM Everton Ruby, PT [facility]   9/13/2022  1:45 PM Everton Ruby, PT [facility]   9/20/2022  1:00 PM MD MENDOZA Murray [facility]   10/24/2022 10:00 AM Miranda Lozano DO [facility]   12/16/2022  1:00 PM MD MENDOZA Murray [facility]   1/24/2023  2:00 PM PGU NILESH BLOOD DRAWS [facility]   1/31/2023  3:45 PM Layton Haas MD [facility]   6/19/2023  9:30 AM JAVIER Elkins MD [facility]   Access Code: [email or web address]  Access Code: [email or web address]  URL: [email or web address]  Date: 08/16/2022  Prepared by: Everton Ruby    Exercises  Supine Lower Trunk Rotation - 2 x daily - 7 x weekly - 1 sets - 10 reps - 5 hold  Sidelying Open Book Thoracic Lumbar Rotation and Extension - 2 x daily - 7 x weekly - 1 sets - 10 reps - 5 hold  Seated Upper Trapezius Stretch - 1 x daily - 7 x weekly - 1 sets - 10 reps - 5 hold  Seated Levator Scapulae Stretch - 1 x daily - 7 x weekly - 1 sets - 10 reps - 5 hold  Seated Thoracic Lumbar Extension with Pectoralis Stretch - 1 x daily - 7 x weekly - 1 sets - 10 reps - 5 hold  Standing Shoulder Row with Anchored Resistance - 1 x daily - 3 x weekly - 2 sets - 10 reps - 5 hold  Standing Shoulder Horizontal Abduction with Anchored Resistance - 1 x daily - 3 x weekly - 2 sets - 10

## 2022-08-29 ENCOUNTER — HOSPITAL ENCOUNTER (OUTPATIENT)
Dept: PHYSICAL THERAPY | Age: 72
Setting detail: RECURRING SERIES
Discharge: HOME | End: 2022-09-01
Payer: MEDICARE

## 2022-08-29 PROCEDURE — 97140 MANUAL THERAPY 1/> REGIONS: CPT

## 2022-08-29 PROCEDURE — 97110 THERAPEUTIC EXERCISES: CPT

## 2022-08-29 ASSESSMENT — PAIN SCALES - GENERAL: PAINLEVEL_OUTOF10: 5

## 2022-08-29 NOTE — PROGRESS NOTES
alignment, promote proper body posture and promote proper body mechanics. Progressed resistance, range, repetitions and complexity of movement as indicated.    Date:  7/26/22 Date:  7/28/22 Date:  8/2/22 Date:  8/10/22 Date:  8/16/22 Date:  8/22/22 Date:  8/24/22 Date:  8/29/22   Activity/Exercise Parameters Parameters Parameters Parameters Parameters Parameters Parameters Parameters   UBE 4/4 3.0 4/4 3.0  4/4 3.0  NuStep 10 mins, 3.0 NuStep 10 mins, 3.0  4/4 3.0 NuStep 10 mins, 3.0 UBE 4/4 3.0    Upper trap stretch           Cervical rotation           Levator stretch           Scalene stretch    10x L and R       Thoracic extension           Row 2x10 2# prone 2x10 2# prone 2x10 2# prone 2x10 2# prone 2x10 3# prone 2x10 prone  2x10 prone 3#   T 2x10 2# prone 2x10 2# prone 2x10 2# prone 2x10 2# prone 2x10 3# prone 2x10 prone  2x10 prone 3#   M 2x10 2# prone 2x10 2# prone 2x10 2# prone 2x10 2# prone 2x10 3# prone 2x10 prone  2x10 prone 3#   Y 2x10 prone 2x10 prone 2x10 prone 2x10 prone 2x10 prone 2x10 prone  2x10 prone   Wall flaco           Forward raise  2x10 2#         Lateral raise  2x10 2#         Shrugs  2x10 2#         Chin tuck  2x10 supine  2x10  2x10  2x10     Bridge  2x10 w/ lime band 2x10 w/ lime band  2x10 blue band  2x10 blue band    Clamshell  2x10 B lime band 2x10 B lime band  2x10 blue band  2x10 blue band    SLR           UTR     10x B 10xB     LTR     10x B 10xB     TA contraction  10x SL 10x double w/ ball hands pressing down into ball and knees pressing up into ball  10x SL    2x10 hands on knee SL  2x10 hands to knees     Pelvic tilt  10x regular  10x march B         90/90 lower       10x B    Deadbug   10x B w/ ball    10x B, 5x w/ ball    Prone swim   10x B  10x B      Heel tap     10x B 6 inch step      Step up     10x B 6 inch step      Side step up     10x B 6 inch step      Hip abduction           Bird dog   10x B        Hip extension  2x10 B prone 2x10 B prone  2x10 B prone       Paloff press  15x B 13#   10x B 13#  10x B 17#     Rotation   15x B 13#   10x B 13#  10x B 17#     Chops/Lifts  15x B 13# chop  15x B 7# lift   10x B 13# chop  10x B 7# lift  10x B 17# chop  10x B 10# lift    Lumbar extension 10x prone 10x prone 2x10 prone  10x in //  10x prone      Sit to stand   10x from low mat        Deadlift   2x10 w/ 5# bar        Lumbar flexion      10 seated 10x supine       MANUAL THERAPY: (20 minutes):   Joint mobilization and Soft tissue mobilization was utilized and necessary because of the patient's restricted joint motion, painful spasm, loss of articular motion and restricted motion of soft tissue. Date:  7/21/22 Date:  7/26/22 Date:  8/10/22 Date:  8/22/22 Date:  8/29/22   Parameters Parameters Parameters Parameters Parameters   - STM to L upper trap in prone - switched to needles  - Mobilizations to thoracic spine  - STM to L upper trap, levator, and capitus muscles - STM to B scalenes in sit  - Mobilizations to thoracic spine - STM/positional release of thoracic and cervical spine  - Mobilizations to thoracic spine for extension and rotation  - Mobilizations to cervical spine for right side rotation -STM/positional release of thoracic spine around rhomboids and upper trapezius  -Subscapular stretch to help decrease tightness around thoracic spine  -KT tape applied to left shoulder to provide stability and support     MODALITIES: (0 minutes): *  Hot Pack Therapy in order to provide analgesia and relieve muscle spasm. MECHANICAL TRACTION: (0 minutes):   Traction was used due to the patient's cervical radiculopathy in order to relieve pain in or originating from his spine. DRY NEEDLES (0 mins): to L upper trap in prone, 1 needle used, 1 needle removed. No adverse reactions noted    Treatment/Session Summary:    Treatment Assessment:  Performed STM to left upper thoracic area to decrease tightness around rhomboids and upper trapezius muscles and improve range of motion.  Pt tolerated resistive exercises well with no pain during tx. STM in combination with resistive exercises helped to decrease pain in his thoracic spine area. Communication/Consultation:  None today  Equipment provided today:  None  Recommendations/Intent for next treatment session: Next visit will focus on progression of functional tasks as tolerated. Total Treatment Billable Duration:  20 mins therex, 20 mins manual  Time In: 6058  Time Out: [address], PT       Charge Capture  }Post Session Pain  PT Visit Info  [employer] Portal  MD Guidelines  Scanned Media  Benefits  MyChart    Future Appointments   Date Time Provider Octavio Nur   8/31/2022  1:45 PM Geoffrey Garrett, PT [facility]   9/7/2022  1:45 PM Geoffrey Garrett, PT [facility]   9/13/2022  1:45 PM Geoffrey Garrett, PT [facility]   9/20/2022  1:00 PM Billey Dancer, MD [facility]   10/24/2022 10:00 AM Samreen Segura DO [facility]   12/16/2022  1:00 PM Billey Dancer, MD [facility]   1/24/2023  2:00 PM PGU NILESH BLOOD DRAWS [facility]   1/31/2023  3:45 PM Neville Henry MD [facility]   6/19/2023  9:30 AM JAVIER Varela MD [facility]   Access Code: [email or web address]  Access Code: [email or web address]  URL: [email or web address]  Date: 08/16/2022  Prepared by: Geoffrey Garrett    Exercises  Supine Lower Trunk Rotation - 2 x daily - 7 x weekly - 1 sets - 10 reps - 5 hold  Sidelying Open Book Thoracic Lumbar Rotation and Extension - 2 x daily - 7 x weekly - 1 sets - 10 reps - 5 hold  Seated Upper Trapezius Stretch - 1 x daily - 7 x weekly - 1 sets - 10 reps - 5 hold  Seated Levator Scapulae Stretch - 1 x daily - 7 x weekly - 1 sets - 10 reps - 5 hold  Seated Thoracic Lumbar Extension with Pectoralis Stretch - 1 x daily - 7 x weekly - 1 sets - 10 reps - 5 hold  Standing Shoulder Row with Anchored Resistance - 1 x daily - 3 x weekly - 2 sets - 10 reps - 5 hold  Standing Shoulder Horizontal Abduction with Anchored Resistance - 1 x daily - 3 x weekly - 2 sets - 10 reps - 5 hold  Shoulder Extension with Resistance - Palms Forward - 1 x daily - 3 x weekly - 2 sets - 10 reps - 5 hold  Shoulder Flexion with Anterior Anchored Resistance - 1 x daily - 3 x weekly - 2 sets - 10 reps - 5 hold  Supine Bridge - 1 x daily - 3 x weekly - 2 sets - 10 reps - 5 hold  Clamshell with Resistance - 1 x daily - 3 x weekly - 2 sets - 10 reps - 5 hold  Supine Transversus Abdominis Bracing - Hands on Thighs - 1 x daily - 3 x weekly - 2 sets - 10 reps - 5 hold  Hooklying Isometric Hip Flexion - 1 x daily - 3 x weekly - 2 sets - 10 reps - 5 hold  Supine 90/90 Alternating Heel Touches with Posterior Pelvic Tilt - 1 x daily - 3 x weekly - 2 sets - 10 reps - 5 hold  Active Straight Leg Raise with Quad Set - 1 x daily - 3 x weekly - 2 sets - 10 reps - 5 hold  Wall [place] - 1 x daily - 3 x weekly - 2 sets - 10 reps - 5 hold

## 2022-08-31 ENCOUNTER — HOSPITAL ENCOUNTER (OUTPATIENT)
Dept: PHYSICAL THERAPY | Age: 72
Setting detail: RECURRING SERIES
Discharge: HOME | End: 2022-09-03
Payer: MEDICARE

## 2022-08-31 PROCEDURE — 97140 MANUAL THERAPY 1/> REGIONS: CPT

## 2022-08-31 PROCEDURE — 97110 THERAPEUTIC EXERCISES: CPT

## 2022-08-31 ASSESSMENT — PAIN SCALES - GENERAL: PAINLEVEL_OUTOF10: 0

## 2022-08-31 NOTE — PROGRESS NOTES
1Jbill Altman  : 1950  Primary: Medicare Part A And B  Secondary:  [employer] MEDICARE [employer]PP [facility]  [address] [address]  Phone: [phone]  Fax: [phone] Plan Frequency: 2x/wk for 4 more weeks per new referral    Plan of Care/Certification Expiration Date: 10/01/22      PT Visit Info: Total # of Visits to Date: 16      OUTPATIENT PHYSICAL THERAPY:OP NOTE TYPE: Treatment Note 2022       Episode  }Appt Desk              Treatment Diagnosis:  Low Back Pain (M54.5)  Cervicalgia (M54.2)  Medical/Referring Diagnosis:  Cervicalgia [M54.2]  Other cervical disc degeneration, unspecified cervical region [M50.30]  Spondylosis without myelopathy or radiculopathy, lumbar region [M47.816]  Lumbar facet arthropathy M47.816  Referring Physician:  KATIE Snyder MD Orders:  PT Eval and Treat, 2-3x/wk for 6 weeks  Date of Onset:  Onset Date: 22     Allergies:   Levofloxacin  Restrictions/Precautions:  Restrictions/Precautions: None  No data recorded   Interventions Planned (Treatment may consist of any combination of the following):    Current Treatment Recommendations: Strengthening; ROM; Functional mobility training; Balance training; Manual Therapy - Soft Tissue Mobilization; Manual Therapy - Joint Manipulation; Pain management; Home exercise program; Modalities; Integrated dry needling (Traction)     Subjective Comments:  Mr. Phoenix said his back has been feeling better and that the last tx really helped decrease his symptoms. He said that he still experiences the pain occasionally but not as often. Initial:}    0/10Post Session:       0/10  Medications Last Reviewed:  2022  Updated Objective Findings:   See recert  Treatment   THERAPEUTIC EXERCISE: (20 minutes):    Exercises per grid below to improve mobility, strength, balance and coordination.   Required minimal verbal and tactile cues to promote proper body alignment, promote proper body posture and promote proper body mechanics. Progressed resistance, range, repetitions and complexity of movement as indicated.    Date:  7/28/22 Date:  8/2/22 Date:  8/10/22 Date:  8/16/22 Date:  8/22/22 Date:  8/24/22 Date:  8/29/22 Date:  8/31/22   Activity/Exercise Parameters Parameters Parameters Parameters Parameters Parameters Parameters Parameters   UBE 4/4 3.0  4/4 3.0  NuStep 10 mins, 3.0 NuStep 10 mins, 3.0  4/4 3.0 NuStep 10 mins, 3.0 UBE 4/4 3.0  UBE 4/4 3.0   Upper trap stretch        1x5, 5 second holds   Rhomboid stretch        1x5 5 second holds   Occipital release        1x 60 seconds   Cervical rotation           Levator stretch           Scalene stretch   10x L and R        Thoracic extension           Row 2x10 2# prone 2x10 2# prone 2x10 2# prone 2x10 3# prone 2x10 prone  2x10 prone 3# 2x10 prone 3#   T 2x10 2# prone 2x10 2# prone 2x10 2# prone 2x10 3# prone 2x10 prone  2x10 prone 3# 2x10 prone 3#   M 2x10 2# prone 2x10 2# prone 2x10 2# prone 2x10 3# prone 2x10 prone  2x10 prone 3# 2x10 prone 3#   Y 2x10 prone 2x10 prone 2x10 prone 2x10 prone 2x10 prone  2x10 prone 2x10 prone   Wall flaco           Forward raise 2x10 2#          Lateral raise 2x10 2#          Shrugs 2x10 2#          Chin tuck 2x10 supine  2x10  2x10  2x10      Bridge 2x10 w/ lime band 2x10 w/ lime band  2x10 blue band  2x10 blue band     Clamshell 2x10 B lime band 2x10 B lime band  2x10 blue band  2x10 blue band     SLR           UTR    10x B 10xB      LTR    10x B 10xB      TA contraction 10x SL 10x double w/ ball hands pressing down into ball and knees pressing up into ball  10x SL    2x10 hands on knee SL  2x10 hands to knees      Pelvic tilt 10x regular  10x march B          90/90 lower      10x B     Deadbug  10x B w/ ball    10x B, 5x w/ ball     Prone swim  10x B  10x B       Heel tap    10x B 6 inch step       Step up    10x B 6 inch step       Side step up    10x B 6 inch step       Hip abduction           Bird dog 10x B         Hip extension 2x10 B prone 2x10 B prone  2x10 B prone        Paloff press 15x B 13#   10x B 13#  10x B 17#      Rotation  15x B 13#   10x B 13#  10x B 17#      Chops/Lifts 15x B 13# chop  15x B 7# lift   10x B 13# chop  10x B 7# lift  10x B 17# chop  10x B 10# lift     Lumbar extension 10x prone 2x10 prone  10x in //  10x prone       Sit to stand  10x from low mat         Deadlift  2x10 w/ 5# bar         Lumbar flexion     10 seated 10x supine   10x supine   Cat camel        10x   Quadruped hand taps        Tapping hands to shoulders 10x each side     MANUAL THERAPY: (15 minutes):   Joint mobilization and Soft tissue mobilization was utilized and necessary because of the patient's restricted joint motion, painful spasm, loss of articular motion and restricted motion of soft tissue. Date:  7/26/22 Date:  8/10/22 Date:  8/22/22 Date:  8/29/22 Date:  8/31/22   Parameters Parameters Parameters Parameters Parameters   - Mobilizations to thoracic spine  - STM to L upper trap, levator, and capitus muscles - STM to B scalenes in sit  - Mobilizations to thoracic spine - STM/positional release of thoracic and cervical spine  - Mobilizations to thoracic spine for extension and rotation  - Mobilizations to cervical spine for right side rotation -STM/positional release of thoracic spine around rhomboids and upper trapezius  -Subscapular stretch to help decrease tightness around thoracic spine  -KT tape applied to left shoulder to provide stability and support - STM/positional release of thoracic spine and around rhomboids of upper traps  - Thoracic mobilizations in prone  - Subscapularis stretch in sidelying     MODALITIES: (0 minutes): *  Hot Pack Therapy in order to provide analgesia and relieve muscle spasm. MECHANICAL TRACTION: (0 minutes):   Traction was used due to the patient's cervical radiculopathy in order to relieve pain in or originating from his spine.     DRY NEEDLES (0 mins): to L upper 10 reps - 5 hold  Standing Shoulder Row with Anchored Resistance - 1 x daily - 3 x weekly - 2 sets - 10 reps - 5 hold  Standing Shoulder Horizontal Abduction with Anchored Resistance - 1 x daily - 3 x weekly - 2 sets - 10 reps - 5 hold  Shoulder Extension with Resistance - Palms Forward - 1 x daily - 3 x weekly - 2 sets - 10 reps - 5 hold  Shoulder Flexion with Anterior Anchored Resistance - 1 x daily - 3 x weekly - 2 sets - 10 reps - 5 hold  Supine Bridge - 1 x daily - 3 x weekly - 2 sets - 10 reps - 5 hold  Clamshell with Resistance - 1 x daily - 3 x weekly - 2 sets - 10 reps - 5 hold  Supine Transversus Abdominis Bracing - Hands on Thighs - 1 x daily - 3 x weekly - 2 sets - 10 reps - 5 hold  Hooklying Isometric Hip Flexion - 1 x daily - 3 x weekly - 2 sets - 10 reps - 5 hold  Supine 90/90 Alternating Heel Touches with Posterior Pelvic Tilt - 1 x daily - 3 x weekly - 2 sets - 10 reps - 5 hold  Active Straight Leg Raise with Quad Set - 1 x daily - 3 x weekly - 2 sets - 10 reps - 5 hold  Wall [place] - 1 x daily - 3 x weekly - 2 sets - 10 reps - 5 hold

## 2022-09-07 ENCOUNTER — HOSPITAL ENCOUNTER (OUTPATIENT)
Dept: PHYSICAL THERAPY | Age: 72
Setting detail: RECURRING SERIES
Discharge: HOME | End: 2022-09-10
Payer: MEDICARE

## 2022-09-07 PROCEDURE — 97110 THERAPEUTIC EXERCISES: CPT

## 2022-09-07 ASSESSMENT — PAIN SCALES - GENERAL: PAINLEVEL_OUTOF10: 5

## 2022-09-07 NOTE — PROGRESS NOTES
1Jbill Smith  : 1950  Primary: Medicare Part A And B  Secondary:  [employer] LIFE MEDICARE SUPP [facility]  [address] [id number]  Phone: [phone]  Fax: [phone] Plan Frequency: 2x/wk for 4 more weeks per new referral    Plan of Care/Certification Expiration Date: 10/01/22      PT Visit Info: Total # of Visits to Date: 25      OUTPATIENT PHYSICAL THERAPY:OP NOTE TYPE: Treatment Note 2022       Episode  }Appt Desk              Treatment Diagnosis:  Low Back Pain (M54.5)  Cervicalgia (M54.2)  Medical/Referring Diagnosis:  Cervicalgia [M54.2]  Other cervical disc degeneration, unspecified cervical region [M50.30]  Spondylosis without myelopathy or radiculopathy, lumbar region [M47.816]  Lumbar facet arthropathy M47.816  Referring Physician:  Fouzia KEISHA*  MD Orders:  PT Eval and Treat, 2-3x/wk for 6 weeks  Date of Onset:  Onset Date: 22     Allergies:   Levofloxacin  Restrictions/Precautions:  Restrictions/Precautions: None  No data recorded   Interventions Planned (Treatment may consist of any combination of the following):    Current Treatment Recommendations: Strengthening; ROM; Functional mobility training; Balance training; Manual Therapy - Soft Tissue Mobilization; Manual Therapy - Joint Manipulation; Pain management; Home exercise program; Modalities; Integrated dry needling (Traction)     Subjective Comments:  Mr. Kristina Crowe reports his grandkids were in town so he over did it carrying chairs. He had high pain this weekend and even went to get a massage Monday and that helped but it was temporary. Follows up with MD soon and is scheduling appt with his chiropractor as well. Initial:}    10Post Session:       0/10  Medications Last Reviewed:  2022  Updated Objective Findings:   See recert  Treatment   THERAPEUTIC EXERCISE: (40 minutes):    Exercises per grid below to improve mobility, strength, balance and coordination. Required minimal verbal and tactile cues to promote proper body alignment, promote proper body posture and promote proper body mechanics. Progressed resistance, range, repetitions and complexity of movement as indicated.    Date:  8/2/22 Date:  8/10/22 Date:  8/16/22 Date:  8/22/22 Date:  8/24/22 Date:  8/29/22 Date:  8/31/22 Date:  9/7/22   Activity/Exercise Parameters Parameters Parameters Parameters Parameters Parameters Parameters Parameters   UBE 4/4 3.0  NuStep 10 mins, 3.0 NuStep 10 mins, 3.0  4/4 3.0 NuStep 10 mins, 3.0 UBE 4/4 3.0  UBE 4/4 3.0 Nustep 10 mins, 3.0    Upper trap stretch       1x5, 5 second holds    Rhomboid stretch       1x5 5 second holds    Occipital release       1x 60 seconds    Cervical rotation           Levator stretch           Scalene stretch  10x L and R         Thoracic extension           Row 2x10 2# prone 2x10 2# prone 2x10 3# prone 2x10 prone  2x10 prone 3# 2x10 prone 3#    T 2x10 2# prone 2x10 2# prone 2x10 3# prone 2x10 prone  2x10 prone 3# 2x10 prone 3#    M 2x10 2# prone 2x10 2# prone 2x10 3# prone 2x10 prone  2x10 prone 3# 2x10 prone 3#    Y 2x10 prone 2x10 prone 2x10 prone 2x10 prone  2x10 prone 2x10 prone    Wall flaco           Forward raise           Lateral raise           Shrugs           Chin tuck  2x10  2x10  2x10       Bridge 2x10 w/ lime band  2x10 blue band  2x10 blue band      Clamshell 2x10 B lime band  2x10 blue band  2x10 blue band      SLR        2x10 B 2#   UTR   10x B 10xB       LTR   10x B 10xB       TA contraction 10x double w/ ball hands pressing down into ball and knees pressing up into ball  10x SL    2x10 hands on knee SL  2x10 hands to knees    10x B hands to knee   Pelvic tilt        10x   90/90 lower     10x B   -Modified legs still and arms move back towards head  -10x regular   Deadbug 10x B w/ ball    10x B, 5x w/ ball      Prone swim 10x B  10x B        Heel tap   10x B 6 inch step     10x B 6 inch step   Step up   10x B 6 inch step     10x B 6 inch step   Side step up   10x B 6 inch step     10x B 6 inch step   Hip abduction        2x10 B 2#   Bird dog 10x B          Hip extension 2x10 B prone  2x10 B prone      2x10 B 2# w/ knee flexion prone w/ pillows under waist    Paloff press   10x B 13#  10x B 17#       Rotation    10x B 13#  10x B 17#       Chops/Lifts   10x B 13# chop  10x B 7# lift  10x B 17# chop  10x B 10# lift      Lumbar extension 2x10 prone  10x in //  10x prone        Sit to stand 10x from low mat          Deadlift 2x10 w/ 5# bar          Lumbar flexion    10 seated 10x supine   10x supine 2x10 supine   Cat camel       10x    Quadruped hand taps       Tapping hands to shoulders 10x each side      MANUAL THERAPY: (0 minutes):   Joint mobilization and Soft tissue mobilization was utilized and necessary because of the patient's restricted joint motion, painful spasm, loss of articular motion and restricted motion of soft tissue. Date:  7/26/22 Date:  8/10/22 Date:  8/22/22 Date:  8/29/22 Date:  8/31/22   Parameters Parameters Parameters Parameters Parameters   - Mobilizations to thoracic spine  - STM to L upper trap, levator, and capitus muscles - STM to B scalenes in sit  - Mobilizations to thoracic spine - STM/positional release of thoracic and cervical spine  - Mobilizations to thoracic spine for extension and rotation  - Mobilizations to cervical spine for right side rotation -STM/positional release of thoracic spine around rhomboids and upper trapezius  -Subscapular stretch to help decrease tightness around thoracic spine  -KT tape applied to left shoulder to provide stability and support - STM/positional release of thoracic spine and around rhomboids of upper traps  - Thoracic mobilizations in prone  - Subscapularis stretch in sidelying     MODALITIES: (5 minutes): *  Hot Pack Therapy in order to provide analgesia and relieve muscle spasm.      MECHANICAL TRACTION: (0 minutes):   Traction was used due to the patient's cervical radiculopathy in order to relieve pain in or originating from his spine. DRY NEEDLES (0 mins): to L upper trap in prone, 1 needle used, 1 needle removed. No adverse reactions noted    Treatment/Session Summary:    Treatment Assessment:  Mr. Hunter Castellano is following up with his physician and has made plans to see a chiropractor as well. His pain dissipated with today's PT session focusing on strengthening and stretching. Pt will reach out to PT after his appt with his physician for follow up plan. Communication/Consultation:  None today  Equipment provided today:  None  Recommendations/Intent for next treatment session: Next visit will focus on progression of functional tasks as tolerated. Total Treatment Billable Duration:  40 mins therex  Time In: 3816  Time Out: [address] , PT       Charge Capture  }Post Session Pain  PT Visit [address] Portal  MD Guidelines  Scanned Media  Benefits  MyChart    Future Appointments   Date Time Provider Octavio Nur   9/13/2022  1:45 PM [facility]   9/20/2022  1:00 PM Yandel Tracy MD [facility]   10/24/2022 10:00 AM Robert Johnson DO [facility]   12/16/2022  1:00 PM Yandel Tracy MD [facility]   1/24/2023  2:00 PM PGU NILESH BLOOD DRAWS [facility]   1/31/2023  3:45 PM Fide Do MD [facility]   6/19/2023  9:30 AM JAVIER Aguilar MD [facility]   Access Code: [email or web address]  Access Code: [email or web address]  URL: [email or web address]  Date: 08/16/2022  Prepared by: Allison Medicine    Exercises  Supine Lower Trunk Rotation - 2 x daily - 7 x weekly - 1 sets - 10 reps - 5 hold  Sidelying Open Book Thoracic Lumbar Rotation and Extension - 2 x daily - 7 x weekly - 1 sets - 10 reps - 5 hold  Seated Upper Trapezius Stretch - 1 x daily - 7 x weekly - 1 sets - 10 reps - 5 hold  Seated Levator Scapulae Stretch - 1 x daily - 7 x weekly - 1 sets - 10 reps - 5 hold  Seated Thoracic Lumbar Extension with Pectoralis Stretch - 1 x daily - 7 x weekly - 1 sets - 10 reps - 5 hold  Standing Shoulder Row with Anchored Resistance - 1 x daily - 3 x weekly - 2 sets - 10 reps - 5 hold  Standing Shoulder Horizontal Abduction with Anchored Resistance - 1 x daily - 3 x weekly - 2 sets - 10 reps - 5 hold  Shoulder Extension with Resistance - Palms Forward - 1 x daily - 3 x weekly - 2 sets - 10 reps - 5 hold  Shoulder Flexion with Anterior Anchored Resistance - 1 x daily - 3 x weekly - 2 sets - 10 reps - 5 hold  Supine Bridge - 1 x daily - 3 x weekly - 2 sets - 10 reps - 5 hold  Clamshell with Resistance - 1 x daily - 3 x weekly - 2 sets - 10 reps - 5 hold  Supine Transversus Abdominis Bracing - Hands on Thighs - 1 x daily - 3 x weekly - 2 sets - 10 reps - 5 hold  Hooklying Isometric Hip Flexion - 1 x daily - 3 x weekly - 2 sets - 10 reps - 5 hold  Supine 90/90 Alternating Heel Touches with Posterior Pelvic Tilt - 1 x daily - 3 x weekly - 2 sets - 10 reps - 5 hold  Active Straight Leg Raise with Quad Set - 1 x daily - 3 x weekly - 2 sets - 10 reps - 5 hold  Wall [place] - 1 x daily - 3 x weekly - 2 sets - 10 reps - 5 hold

## 2022-09-13 ENCOUNTER — APPOINTMENT (OUTPATIENT)
Dept: PHYSICAL THERAPY | Age: 72
End: 2022-09-13
Payer: MEDICARE

## 2022-09-13 NOTE — PROGRESS NOTES
[facility]  Consultation Note    Patient ID:  Name: Aby Loera  MRN: [id number]  AGE: 67 y.o.  : 1950    Date of Consultation:  2022    CC:   Chief Complaint   Patient presents with    Back Problem           HPI:  Mr. Kendrick Garcia is a 76-year-old man who presents today for follow-up of low back pain. CT myelogram lumbar spine was performed 2022. He has lumbar scoliosis and multilevel degenerative change. I reviewed these images today and compared them to the prior CT myelogram from 2020. Overall, the findings appear to show mildly increased L4-5 central stenosis compared to the prior MRI. He has mild central stenosis at L1-2, stable. He does have what appears to be at least moderate central stenosis at L4-5 with bilateral lateral recess narrowing and neuroforaminal narrowing, which appears to be mildly increased at this level compared to the prior CT myelogram 2020. He also had evidence of prior laminectomy L1-2-3. We discussed these findings and reviewed these images together today. As I last saw him, he has been attending physical therapy. The pain was initially bilateral, across the low back. Now, it has settled in the right low back. It is very severe. It is aggravated with walking. It does not radiate to the legs or feet. The symptoms are alleviated when he lies on his right side. He does not have pain in the right groin, but he is now having some pain radiating into the right anterior thigh, which is new. He has not had any recent hip imaging.      Past Medical History Includes:   Past Medical History:   Diagnosis Date    Arthritis     Chronic pain     back Left>right buttock    Colon polyps 2019 pathology report tubulovillous adenoma    Compression deformity of vertebra 2016    Compression C5/6    Encounter for screening colonoscopy 2019    Fecal incontinence 10/9/2019    After prostate surgery - followed by colorectal surgery Dr. Kristina Lacy    GERD (gastroesophageal reflux disease)     controlled by nexium    Hypercholesterolemia     Hyperlipidemia 2017    Hypertension     Malignant neoplasm of prostate ([address]     Memory loss     Spondylosis of lumbar region without myelopathy or radiculopathy 12/10/2019    S/p surgery  with Dr. Alfonso Sun incontinence, male     now with artificial sphinctor    Thyroid disease     hypothyroidism   ,   Past Surgical History:   Procedure Laterality Date    CATARACT REMOVAL Bilateral     COLONOSCOPY  2019 colonoscopy- multiple polyps     COLONOSCOPY      LUMBAR LAMINECTOMY  2016    Dr Nirmal Martínez  2016    for spinal stenosis, L1-L3    ORTHOPEDIC SURGERY Right 1998    right knee scope    OTHER SURGICAL HISTORY N/A     artificial urinary spinter     PROSTATECTOMY      rad. prostatectomy due to prostate ca    UROLOGICAL SURGERY      art. urinary sphincter with reservoir     Family History:   Family History   Problem Relation Age of Onset    Dementia Paternal Grandfather     Cancer Paternal Grandmother         Throat    Cancer Maternal Grandfather     Heart Disease Maternal Grandmother     Cancer Father 79        Kidney    Alcohol Abuse Brother         Now, recovered; at one time addicted to pain Rx    Diabetes Brother     Heart Disease Mother     Alzheimer's Disease Maternal Aunt       Social History:   Social History     Tobacco Use    Smoking status: Former     Packs/day: 1.00     Types: Cigarettes     Quit date: 1998     Years since quittin.4    Smokeless tobacco: Never   Substance Use Topics    Alcohol use:  Yes     Alcohol/week: 21.0 standard drinks       ALLERGIES:   Allergies   Allergen Reactions    Levofloxacin Rash        Patient Medications    Current Outpatient Medications   Medication Sig    traMADol (ULTRAM) 50 MG tablet Take 1 tablet by mouth 2 times daily as needed for Pain for up to 30 days. Quercetin 250 MG TABS 250 mg by Other route daily  (Patient not taking: Reported on 8/17/2022)    donepezil (ARICEPT) 5 MG tablet Take 1 tablet by mouth nightly    melatonin 3 MG TABS tablet Take 3 mg by mouth at bedtime    Calcium Polycarbophil (FIBERCON PO) Take 1 tablet by mouth in the morning and at bedtime     ZINC PO Take 50 mg by mouth daily  (Patient not taking: Reported on 8/17/2022)    VITAMIN D PO Take by mouth Takes as liquid 5 drops daily (Patient not taking: Reported on 8/17/2022)    sildenafil (VIAGRA) 100 MG tablet Take 100 mg by mouth as needed    Probiotic Product (PROBIOTIC-10 PO) Take 1 capsule by mouth daily    amLODIPine (NORVASC) 10 MG tablet Take 10 mg by mouth daily    ascorbic acid (VITAMIN C) 250 MG tablet Take 250 mg by mouth 2 times daily (Patient not taking: Reported on 8/17/2022)    aspirin 81 MG EC tablet Take 162 mg by mouth daily    atorvastatin (LIPITOR) 80 MG tablet Take 80 mg by mouth daily    Biotin 2.5 MG CAPS Take 2,500 mcg by mouth daily    fexofenadine (ALLEGRA) 180 MG tablet Take 180 mg by mouth daily    fluticasone (FLONASE) 50 MCG/ACT nasal spray 2 sprays by Nasal route daily    levothyroxine (SYNTHROID) 150 MCG tablet Take 150 mcg by mouth every morning (before breakfast)    meloxicam (MOBIC) 15 MG tablet Take 15 mg by mouth daily    pantoprazole (PROTONIX) 40 MG tablet Take 40 mg by mouth daily    telmisartan-hydroCHLOROthiazide (MICARDIS HCT) 80-25 MG per tablet Take 1 tablet by mouth daily    torsemide (DEMADEX) 10 MG tablet Take 10 mg by mouth daily as needed (Patient not taking: Reported on 8/17/2022)     No current facility-administered medications for this visit. ROS/Meds/PSH/PMH/FH/SH: I personally reviewed the patients standard intake form. Physical Exam:      Limited: PE: General: Awake, alert, no distress. HEENT: Mucous membranes moist and pink. Psych: Appropriate and conversant.  Derm: No rash Gait: Unassisted, non-ataxic MS: Pain in the right groin and right anterior thigh with resisted right hip flexion. No pain with right hip internal or external rotation. He has pain with lumbar extension. He has tenderness to palpation over the right lumbar paraspinals at approximately L4-5-S1. No pain with lumbar flexion. Pelvis, right hip XR: AP, Lateral views     Clinical Indication    ICD-10-CM    1. Lumbar spondylosis  M47.816 Eladia Goodson MD, Orthopedic Surgery, [facility]     Injection Authorization - Spine     traMADol (ULTRAM) 50 MG tablet      2. Disc degeneration, lumbar  M51.36 [facility] Jerry Carlisle MD, Orthopedic Surgery, [address]     Injection Authorization - Spine     traMADol (ULTRAM) 50 MG tablet      3. Lumbar radiculopathy  M54.16 [facility] YULIANA Carlisle MD, Orthopedic Surgery, [facility]     Injection Authorization - Spine     traMADol (ULTRAM) 50 MG tablet      4. Spinal stenosis of lumbar region, unspecified whether neurogenic claudication present  M48.061 [facility] Jerry Carlisle MD, Orthopedic Surgery, [facility]     Injection Authorization - Spine     traMADol (ULTRAM) 50 MG tablet      5. Right hip pain  M25.551 XR HIP 2-3 VW W PELVIS RIGHT             Report: AP, lateral x-ray of the pelvis, right hip demonstrates early bilateral hip DJD, symmetric. Stimulator unit. Surgical staples. We discussed these findings and reviewed these images together today. Impression: Early bilateral hip DJD, symmetric. Stimulator unit. Surgical betina. Rickey Aguilar MD       VITALS: @IPVITALS(8:)@ , [id number]       No flowsheet data found. Results for orders placed or performed in visit on 09/14/22   XR HIP 2-3 VW W PELVIS RIGHT    Narrative    AUTOMATIC ADMINISTRATIVE RESULT    The result for this exam can be found in the Progress note in the chart. Impression    See Progress note in the chart.              Assessment and Plan:     ICD-10-CM    1. Lumbar spondylosis  M47.816 Valdo  III, Jennifer Hartman MD, Orthopedic Surgery, [address]     Injection Authorization - Spine     traMADol (ULTRAM) 50 MG tablet      2. Disc degeneration, lumbar  M51.36 [facility] Edward Che MD, Orthopedic Surgery, [address]     Injection Authorization - Spine     traMADol (ULTRAM) 50 MG tablet      3. Lumbar radiculopathy  M54.16 [facility] Edward Che MD, Orthopedic Surgery, [address]     Injection Authorization - Spine     traMADol (ULTRAM) 50 MG tablet      4. Spinal stenosis of lumbar region, unspecified whether neurogenic claudication present  M48.061 [facility], [facility], [address]     Injection Authorization - Spine     traMADol (ULTRAM) 50 MG tablet      5. Right hip pain  M25.551 XR HIP 2-3 VW W PELVIS RIGHT          Orders Placed This Encounter   Procedures    XR HIP 2-3 VW W PELVIS RIGHT     Standing Status:   Future     Number of Occurrences:   1     Standing Expiration Date:   9/14/2023    [facility] Edward Che MD, Orthopedic Surgery, [address]     Referral Priority:   Routine     Referral Type:   Eval and Treat     Referral Reason:   Specialty Services Required     Referred to Provider:   Stefan Padgett MD     Requested Specialty:   Orthopedic Surgery     Number of Visits Requested:   1    Injection Authorization - Spine     Referral Priority:   Routine     Number of Visits Requested:   1          4   This is a chronic illness/condition with exacerbation and progression  Treatment at this time: Prescription Drug Management. .  For somatic pain relief, he was given a prescription for tramadol and counseled regarding the possibility of risks, complications, and alternative treatment options from this medication. Discussed Injection therapy at length today, including risks, complications and alternative treatment options. The patient wishes to proceed. The injection will be performed as right L4-5 transforaminal epidural steroid injection.     He would like to revisit surgical consultation. He has a history of prior surgery with Dr. Brad Kaur, and we will arrange for this consultation. We discussed at length today that nonsurgical treatment for lumbar stenosis includes medications, injections, and physical therapy.     Studies ordered today: none      Fanta Ruby MD  9/14/2022,  9:45 AM

## 2022-09-14 ENCOUNTER — OFFICE VISIT (OUTPATIENT)
Dept: ORTHOPEDIC SURGERY | Age: 72
End: 2022-09-14
Payer: MEDICARE

## 2022-09-14 DIAGNOSIS — M48.061 SPINAL STENOSIS OF LUMBAR REGION, UNSPECIFIED WHETHER NEUROGENIC CLAUDICATION PRESENT: ICD-10-CM

## 2022-09-14 DIAGNOSIS — M25.551 RIGHT HIP PAIN: ICD-10-CM

## 2022-09-14 DIAGNOSIS — M54.16 LUMBAR RADICULOPATHY: ICD-10-CM

## 2022-09-14 DIAGNOSIS — M47.816 LUMBAR SPONDYLOSIS: Primary | ICD-10-CM

## 2022-09-14 DIAGNOSIS — M51.36 DISC DEGENERATION, LUMBAR: ICD-10-CM

## 2022-09-14 PROCEDURE — 3017F COLORECTAL CA SCREEN DOC REV: CPT | Performed by: PHYSICAL MEDICINE & REHABILITATION

## 2022-09-14 PROCEDURE — 1036F TOBACCO NON-USER: CPT | Performed by: PHYSICAL MEDICINE & REHABILITATION

## 2022-09-14 PROCEDURE — G8417 CALC BMI ABV UP PARAM F/U: HCPCS | Performed by: PHYSICAL MEDICINE & REHABILITATION

## 2022-09-14 PROCEDURE — 99214 OFFICE O/P EST MOD 30 MIN: CPT | Performed by: PHYSICAL MEDICINE & REHABILITATION

## 2022-09-14 PROCEDURE — 1123F ACP DISCUSS/DSCN MKR DOCD: CPT | Performed by: PHYSICAL MEDICINE & REHABILITATION

## 2022-09-14 PROCEDURE — G8427 DOCREV CUR MEDS BY ELIG CLIN: HCPCS | Performed by: PHYSICAL MEDICINE & REHABILITATION

## 2022-09-14 RX ORDER — TRAMADOL HYDROCHLORIDE 50 MG/1
50 TABLET ORAL 2 TIMES DAILY PRN
Qty: 30 TABLET | Refills: 0 | Status: SHIPPED | OUTPATIENT
Start: 2022-09-14 | End: 2022-09-20 | Stop reason: ALTCHOICE

## 2022-09-14 NOTE — LETTER
Felicitas League  1950  ______________________________________________________________________    Radiographic Studies:    Cervical MRI/ Contrast        Thoracic MRI/ Contrast        Lumbar MRI/ Contrast    CT Myelogram __________________________________________________    NCS/EMG______________________________________________________    MRI of ________________________________________________________    Other__________________________________________________________      Injections:    _______________________________________________________________    Authorization to stop blood thinners________________________________      Medications:    Oral steroids___________________    Muscle Relaxers___________________    Pain medications_____________________    NSAIDS_____________________    Neuropathic pain medication_________________________________________      Physical Therapy:    Lumbar     Thoracic     Cervical       Other_______________________________      Follow up/ Referral:    Pain referral_______________________________________________________    Referral___________________________________________________________    Follow up_________________________________________________________    Handicapped Parking_______________________________________________    Other_____________________________________________________________

## 2022-09-19 ENCOUNTER — OFFICE VISIT (OUTPATIENT)
Dept: ORTHOPEDIC SURGERY | Age: 72
End: 2022-09-19
Payer: MEDICARE

## 2022-09-19 DIAGNOSIS — M47.816 LUMBAR SPONDYLOSIS: Primary | ICD-10-CM

## 2022-09-19 DIAGNOSIS — M51.36 DISC DEGENERATION, LUMBAR: ICD-10-CM

## 2022-09-19 DIAGNOSIS — M54.16 LUMBAR RADICULOPATHY: ICD-10-CM

## 2022-09-19 PROCEDURE — 64483 NJX AA&/STRD TFRM EPI L/S 1: CPT | Performed by: PHYSICAL MEDICINE & REHABILITATION

## 2022-09-19 RX ORDER — TRIAMCINOLONE ACETONIDE 40 MG/ML
40 INJECTION, SUSPENSION INTRA-ARTICULAR; INTRAMUSCULAR ONCE
Status: COMPLETED | OUTPATIENT
Start: 2022-09-19 | End: 2022-09-19

## 2022-09-19 RX ADMIN — TRIAMCINOLONE ACETONIDE 40 MG: 40 INJECTION, SUSPENSION INTRA-ARTICULAR; INTRAMUSCULAR at 14:42

## 2022-09-19 NOTE — PROGRESS NOTES
Name: Karie Murphy  YOB: 1950  Gender: male  MRN: [id number]        Transforaminal KATIE Procedure Note    Procedure: Right  L4-L5 transforaminal epidural steroid injections     Precautions: Karie Murphy denies prior sensitivity to steroid, local anesthetic, iodine, or shellfish. He reported immediate pain relief after today's injection. Consent:  Consent was obtained prior to the procedure. The procedure was discussed at length with Karie Murphy. He was given the opportunity to ask questions regarding the procedure and its associated risks. In addition to the potential risks associated with the procedure itself, the patient was informed both verbally and in writing of potential side effects of the use glucocorticoids. The patient appeared to comprehend the informed consent and desired to have the procedure performed, and informed consent was signed. He was placed in a prone position on the fluoroscopy table and the skin was prepped and draped in a routine sterile fashion. The areas to be injected were each anesthetized with 1 ml of 1% Lidocaine. A 22 gauge 3.5 inch spinal needle was carefully advanced under fluoroscopic guidance to the right L4-L5 transforaminal space  0.5 ml of 70% of Omnipaque was injected to confirm proper needle placement and absence of subdural or vascular flow Once proper placement was confirmed, 0.5ml of 0.25 marcaine and 40 mg of kenalog were injected through the spinal needle. Fluoroscopic guidance was used intermittently over a 10-minute period to insure proper needle placement and his safety. A hard copy of the fluoroscopic image has been placed in his chart and is saved on the C-arm hard drive. He was monitored for 30 minutes after the procedure and discharged home in a stable fashion with a routine follow up.     Procedural Diagnosis:     ICD-10-CM    1. Lumbar spondylosis  M47.816 FL NERVE BLOCK LUMBOSACRAL 1ST triamcinolone acetonide (KENALOG-40) injection 40 mg      2. Disc degeneration, lumbar  M51.36 FL NERVE BLOCK LUMBOSACRAL 1ST     triamcinolone acetonide (KENALOG-40) injection 40 mg      3.  Lumbar radiculopathy  M54.16 FL NERVE BLOCK LUMBOSACRAL 1ST     triamcinolone acetonide (KENALOG-40) injection 40 mg         Marizol Atkins MD  09/19/22

## 2022-09-20 ENCOUNTER — OFFICE VISIT (OUTPATIENT)
Dept: INTERNAL MEDICINE CLINIC | Facility: CLINIC | Age: 72
End: 2022-09-20
Payer: MEDICARE

## 2022-09-20 VITALS
BODY MASS INDEX: 30.23 KG/M2 | DIASTOLIC BLOOD PRESSURE: 74 MMHG | SYSTOLIC BLOOD PRESSURE: 134 MMHG | WEIGHT: 199.5 LBS | HEIGHT: 68 IN

## 2022-09-20 DIAGNOSIS — E78.5 HYPERLIPIDEMIA, UNSPECIFIED HYPERLIPIDEMIA TYPE: ICD-10-CM

## 2022-09-20 DIAGNOSIS — R41.3 MEMORY LOSS, SHORT TERM: Primary | ICD-10-CM

## 2022-09-20 DIAGNOSIS — E03.9 HYPOTHYROIDISM, UNSPECIFIED TYPE: ICD-10-CM

## 2022-09-20 DIAGNOSIS — I10 ESSENTIAL (PRIMARY) HYPERTENSION: ICD-10-CM

## 2022-09-20 PROCEDURE — 99213 OFFICE O/P EST LOW 20 MIN: CPT | Performed by: INTERNAL MEDICINE

## 2022-09-20 PROCEDURE — G8417 CALC BMI ABV UP PARAM F/U: HCPCS | Performed by: INTERNAL MEDICINE

## 2022-09-20 PROCEDURE — 3017F COLORECTAL CA SCREEN DOC REV: CPT | Performed by: INTERNAL MEDICINE

## 2022-09-20 PROCEDURE — G8427 DOCREV CUR MEDS BY ELIG CLIN: HCPCS | Performed by: INTERNAL MEDICINE

## 2022-09-20 PROCEDURE — 1036F TOBACCO NON-USER: CPT | Performed by: INTERNAL MEDICINE

## 2022-09-20 PROCEDURE — 1123F ACP DISCUSS/DSCN MKR DOCD: CPT | Performed by: INTERNAL MEDICINE

## 2022-09-20 RX ORDER — DONEPEZIL HYDROCHLORIDE 5 MG/1
5 TABLET, FILM COATED ORAL NIGHTLY
Qty: 90 TABLET | Refills: 3 | Status: SHIPPED | OUTPATIENT
Start: 2022-09-20

## 2022-09-20 RX ORDER — TELMISARTAN AND HYDROCHLORTHIAZIDE 80; 25 MG/1; MG/1
1 TABLET ORAL DAILY
Qty: 90 TABLET | Refills: 3 | Status: SHIPPED | OUTPATIENT
Start: 2022-09-20

## 2022-09-20 RX ORDER — LEVOTHYROXINE SODIUM 0.15 MG/1
150 TABLET ORAL
Qty: 90 TABLET | Refills: 3 | Status: SHIPPED | OUTPATIENT
Start: 2022-09-20

## 2022-09-20 RX ORDER — ATORVASTATIN CALCIUM 80 MG/1
80 TABLET, FILM COATED ORAL DAILY
Qty: 90 TABLET | Refills: 3 | Status: SHIPPED | OUTPATIENT
Start: 2022-09-20

## 2022-09-20 ASSESSMENT — PATIENT HEALTH QUESTIONNAIRE - PHQ9
SUM OF ALL RESPONSES TO PHQ QUESTIONS 1-9: 0
2. FEELING DOWN, DEPRESSED OR HOPELESS: 0
SUM OF ALL RESPONSES TO PHQ QUESTIONS 1-9: 0
1. LITTLE INTEREST OR PLEASURE IN DOING THINGS: 0
SUM OF ALL RESPONSES TO PHQ QUESTIONS 1-9: 0
SUM OF ALL RESPONSES TO PHQ QUESTIONS 1-9: 0
SUM OF ALL RESPONSES TO PHQ9 QUESTIONS 1 & 2: 0

## 2022-09-20 ASSESSMENT — ENCOUNTER SYMPTOMS: BACK PAIN: 1

## 2022-09-20 NOTE — PROGRESS NOTES
SUBJECTIVE:   Silvia Colin is a 67 y.o. male seen for a visit regarding   Chief Complaint   Patient presents with    Follow-up     3 month and discuss refill on Aricept and will be having a procedure in Nov for vein leakage and possibly got forward with back surgery with Dr Harjinder Drew    Medication Refill        Thyroid Problem  Presents for follow-up visit. Patient reports no cold intolerance or heat intolerance. The symptoms have been stable (TSH 0.258 in June --free 1.5). Other  This is a chronic problem. The current episode started more than 1 year ago (wife notes short term memory changes for 2-3 yr; may have converasation about something in afternoon, forget that next am -may sometimes remember when prompted and other times not; no driving issues; does make notes and lists about multiple things he used ). The problem occurs daily. The problem has been unchanged (some changes in judgement -did not decide about 2 estimates made-wife decides). Did MMSE in June and was 29/30--but descriptions worse than this-started aricept 5 mg in June-has not noted anything earth shattering -is not worse -forgets to close cabinet door; or . May forget to do other things but if reminded then remembers. Sees neurology next month  Past Medical History, Past Surgical History, Family history, Social History, and Medications were all reviewed with the patient today and updated as necessary.        Current Outpatient Medications   Medication Sig Dispense Refill    donepezil (ARICEPT) 5 MG tablet Take 1 tablet by mouth nightly 90 tablet 3    levothyroxine (SYNTHROID) 150 MCG tablet Take 1 tablet by mouth every morning (before breakfast) (Patient taking differently: Take 150 mcg by mouth every morning (before breakfast) 1/2 tab on Sunday) 90 tablet 3    atorvastatin (LIPITOR) 80 MG tablet Take 1 tablet by mouth daily 90 tablet 3    telmisartan-hydroCHLOROthiazide (MICARDIS HCT) 80-25 MG per tablet Take 1 tablet by mouth daily 90 tablet 3    melatonin 3 MG TABS tablet Take 3 mg by mouth at bedtime      Calcium Polycarbophil (FIBERCON PO) Take 1 tablet by mouth in the morning and at bedtime       Probiotic Product (PROBIOTIC-10 PO) Take 1 capsule by mouth daily      amLODIPine (NORVASC) 10 MG tablet Take 10 mg by mouth daily      ascorbic acid (VITAMIN C) 250 MG tablet Take 250 mg by mouth 2 times daily      aspirin 81 MG EC tablet Take 162 mg by mouth daily      Biotin 2.5 MG CAPS Take 2,500 mcg by mouth daily      fexofenadine (ALLEGRA) 180 MG tablet Take 180 mg by mouth daily      fluticasone (FLONASE) 50 MCG/ACT nasal spray 2 sprays by Nasal route daily      pantoprazole (PROTONIX) 40 MG tablet Take 40 mg by mouth daily       No current facility-administered medications for this visit. 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    Essential hypertension    Spondylosis of cervical region without myelopathy or radiculopathy    Reflux esophagitis    Fecal incontinence    Allergic rhinitis    Colon polyps    Rosacea    Hypothyroidism    Hyperlipidemia    H/O prostate cancer    Spondylosis of lumbar region without myelopathy or radiculopathy    Right hand pain    Primary osteoarthritis of first carpometacarpal joint of right hand     Social History     Tobacco Use    Smoking status: Former     Packs/day: 1.00     Types: Cigarettes     Quit date: 1998     Years since quittin.4    Smokeless tobacco: Never   Substance Use Topics    Alcohol use: Yes     Alcohol/week: 21.0 standard drinks          Review of Systems   Endocrine: Negative for cold intolerance and heat intolerance. Musculoskeletal:  Positive for back pain (had injection by Dr Rolando Hernandez yesterday--70% better --had CT myelogram).        OBJECTIVE:  /74   Ht 5' 8\" (1.727 m)   Wt 199 lb 8 oz (90.5 kg)   BMI 30.33 kg/m²      Physical Exam Medical problems and test results were reviewed with the patient today. ASSESSMENT and PLAN     1. Memory loss, short term  -     donepezil (ARICEPT) 5 MG tablet; Take 1 tablet by mouth nightly, Disp-90 tablet, R-3Normal  2. Hypothyroidism, unspecified type  -     levothyroxine (SYNTHROID) 150 MCG tablet; Take 1 tablet by mouth every morning (before breakfast), Disp-90 tablet, R-3Normal (Patient taking differently: 150 mcg, Oral, DAILY BEFORE BREAKFAST, 1/2 tab on Sunday, Other)  -     TSH with Reflex; Future  3. Hyperlipidemia, unspecified hyperlipidemia type  -     atorvastatin (LIPITOR) 80 MG tablet; Take 1 tablet by mouth daily, Disp-90 tablet, R-3Normal  4. Essential (primary) hypertension  -     telmisartan-hydroCHLOROthiazide (MICARDIS HCT) 80-25 MG per tablet; Take 1 tablet by mouth daily, Disp-90 tablet, R-3Normal   Last TSH suppressed and free up minimally; recheck since used 1/2 tab on Sun now-keep on 5 mg aricept for now    Current treatment plan is effective. no changes in therapy  lab results and schedule for future lab studies reviewed with patient  reviewed medications and side effects in detail     Return in about 6 months (around 3/20/2023).

## 2022-09-21 LAB — TSH W FREE THYROID IF ABNORMAL: 0.74 UIU/ML (ref 0.36–3.74)

## 2022-10-04 ENCOUNTER — HOSPITAL ENCOUNTER (EMERGENCY)
Age: 72
Discharge: HOME | End: 2022-10-04
Attending: STUDENT IN AN ORGANIZED HEALTH CARE EDUCATION/TRAINING PROGRAM
Payer: MEDICARE

## 2022-10-04 VITALS
HEART RATE: 61 BPM | OXYGEN SATURATION: 99 % | SYSTOLIC BLOOD PRESSURE: 157 MMHG | TEMPERATURE: 98.2 F | DIASTOLIC BLOOD PRESSURE: 91 MMHG | BODY MASS INDEX: 30.31 KG/M2 | RESPIRATION RATE: 21 BRPM | HEIGHT: 68 IN | WEIGHT: 200 LBS

## 2022-10-04 DIAGNOSIS — M54.50 ACUTE EXACERBATION OF CHRONIC LOW BACK PAIN: Primary | ICD-10-CM

## 2022-10-04 DIAGNOSIS — G89.29 ACUTE EXACERBATION OF CHRONIC LOW BACK PAIN: Primary | ICD-10-CM

## 2022-10-04 PROCEDURE — 99283 EMERGENCY DEPT VISIT LOW MDM: CPT

## 2022-10-04 RX ORDER — MELOXICAM 15 MG/1
15 TABLET ORAL DAILY
Qty: 30 TABLET | Refills: 0 | Status: SHIPPED | OUTPATIENT
Start: 2022-10-04 | End: 2022-11-03

## 2022-10-04 RX ORDER — CHLORZOXAZONE 500 MG/1
500 TABLET ORAL 3 TIMES DAILY PRN
Qty: 30 TABLET | Refills: 0 | Status: SHIPPED | OUTPATIENT
Start: 2022-10-04 | End: 2022-10-14

## 2022-10-04 ASSESSMENT — ENCOUNTER SYMPTOMS
SHORTNESS OF BREATH: 0
EYE REDNESS: 0
DIARRHEA: 0
SINUS PRESSURE: 0
RHINORRHEA: 0
SORE THROAT: 0
COLOR CHANGE: 0
COUGH: 0
PHOTOPHOBIA: 0
EYE DISCHARGE: 0
BLOOD IN STOOL: 0
EYE PAIN: 0
NAUSEA: 0
CONSTIPATION: 0
APNEA: 0
WHEEZING: 0
VOMITING: 0
CHEST TIGHTNESS: 0
BACK PAIN: 1
ABDOMINAL PAIN: 0
VOICE CHANGE: 0

## 2022-10-04 ASSESSMENT — PAIN SCALES - GENERAL: PAINLEVEL_OUTOF10: 3

## 2022-10-04 ASSESSMENT — PAIN - FUNCTIONAL ASSESSMENT: PAIN_FUNCTIONAL_ASSESSMENT: 0-10

## 2022-10-04 ASSESSMENT — PAIN DESCRIPTION - ORIENTATION: ORIENTATION: RIGHT;LOWER

## 2022-10-04 ASSESSMENT — PAIN DESCRIPTION - LOCATION: LOCATION: BACK

## 2022-10-04 ASSESSMENT — PAIN DESCRIPTION - DESCRIPTORS: DESCRIPTORS: SHOOTING

## 2022-10-05 ENCOUNTER — TELEPHONE (OUTPATIENT)
Dept: ORTHOPEDIC SURGERY | Age: 72
End: 2022-10-05

## 2022-10-05 NOTE — ED PROVIDER NOTES
Emergency Department Provider Note                   PCP:                Jesús Rucker MD               Age: 67 y.o. Sex: male       ICD-10-CM    1. Acute exacerbation of chronic low back pain  M54.50     G89.29           DISPOSITION Decision To Discharge 10/04/2022 09:34:15 PM        MDM  Number of Diagnoses or Management Options  Acute exacerbation of chronic low back pain  Diagnosis management comments: 78-year-old male patient presenting with chronic back pain that has not changed in nature. He is already being followed up by other providers for this condition. He has had CT which showed stenosis. He has no new symptoms today to suggest any emergent causes. I do not feel that repeat imaging was necessary. No urinary retention, urinary incontinence, saddle anesthesia, fecal incontinence, leg weakness to suggest cauda equina. No trauma or falls to suggest fracture. No fevers to suggest epidural abscess. Patient CT did not show any signs of abscess. Patient mainly presented today because he was requesting joint injections. I did inform the patient that we do not perform that here. I advised him to continue tramadol. We will add on Mobic. Advised him to follow-up with his surgeon. Counseled patient on warning signs return immediately for including but not limited to fevers, vomiting, intractable pain, signs of cauda equina. Patient verbalized understanding and is in agreement to treatment plan. He was discharged in stable condition. Amount and/or Complexity of Data Reviewed  Tests in the radiology section of CPT®: reviewed  Independent visualization of images, tracings, or specimens: yes    Risk of Complications, Morbidity, and/or Mortality  Presenting problems: low  Diagnostic procedures: low  Management options: low    Patient Progress  Patient progress: stable             No orders of the defined types were placed in this encounter.        Medications - No data to display    Discharge Medication List as of 10/4/2022  9:36 PM        START taking these medications    Details   chlorzoxazone (PARAFON FORTE) 500 MG tablet Take 1 tablet by mouth 3 times daily as needed for Muscle spasms, Disp-30 tablet, R-0Print      meloxicam (MOBIC) 15 MG tablet Take 1 tablet by mouth daily, Disp-30 tablet, R-0Print              Mat Pascual is a 67 y.o. male who presents to the Emergency Department with chief complaint of    Chief Complaint   Patient presents with    Back Pain      67-year-old male patient with history of chronic back pain presents today complaining of continued chronic back pain worse on the right side. He has been evaluated for this condition by multiple doctors and had recently had a CT myelogram which showed stenosis. He is scheduled in 2 weeks to be evaluated by surgeon. He reports today requesting injections to control his pain. He is currently on tramadol which he does state helps. He states his pain has not changed and has not worsened. He denies fever, chills, nausea, vomiting, abdominal pain. Denies paresthesias, leg weakness, saddle anesthesia, urinary retention, urinary incontinence, fecal incontinence. Denies trauma or falls. Denies dysuria, urinary frequency. Patient is primary historian and quality is reliable. The history is provided by the patient. No  was used. Review of Systems   Constitutional:  Negative for appetite change, chills, fatigue, fever and unexpected weight change. HENT:  Negative for congestion, ear pain, hearing loss, postnasal drip, rhinorrhea, sinus pressure, sore throat and voice change. Eyes:  Negative for photophobia, pain, discharge, redness and visual disturbance. Respiratory:  Negative for apnea, cough, chest tightness, shortness of breath and wheezing. Cardiovascular:  Negative for chest pain, palpitations and leg swelling.    Gastrointestinal:  Negative for abdominal pain, blood in stool, constipation, diarrhea, nausea and vomiting. Endocrine: Negative for polydipsia, polyphagia and polyuria. Genitourinary:  Negative for decreased urine volume, dysuria, flank pain, frequency and hematuria. Musculoskeletal:  Positive for back pain. Negative for arthralgias, joint swelling, myalgias and neck stiffness. Skin:  Negative for color change, rash and wound. Neurological:  Negative for dizziness, syncope, speech difficulty, weakness, light-headedness and headaches. Hematological:  Negative for adenopathy. Does not bruise/bleed easily. Psychiatric/Behavioral:  Negative for confusion, self-injury, sleep disturbance and suicidal ideas. All other systems reviewed and are negative. Past Medical History:   Diagnosis Date    Arthritis     Chronic pain     back Left>right buttock    Colon polyps 07/2019 7-2019 pathology report tubulovillous adenoma    Compression deformity of vertebra 02/2016    Compression C5/6    Encounter for screening colonoscopy 07/03/2019    Fecal incontinence 10/9/2019    After prostate surgery - followed by colorectal surgery Dr. New Curran    GERD (gastroesophageal reflux disease)     controlled by nexium    Hypercholesterolemia     Hyperlipidemia 5/22/2017    Hypertension     Malignant neoplasm of prostate ([facility] [address]     Memory loss     Spondylosis of lumbar region without myelopathy or radiculopathy 12/10/2019    S/p surgery 1-2016 with Dr. Leonor Walsh incontinence, male     now with artificial sphinctor    Thyroid disease     hypothyroidism        Past Surgical History:   Procedure Laterality Date    CATARACT REMOVAL Bilateral     COLONOSCOPY  07/03/2019 7-2019 colonoscopy- multiple polyps     COLONOSCOPY      LUMBAR LAMINECTOMY  08/2016    Dr Seamus Tobias  08/22/2016    for spinal stenosis, L1-L3    ORTHOPEDIC SURGERY Right 1998    right knee scope    OTHER SURGICAL HISTORY N/A     artificial urinary spinter     PROSTATECTOMY  2007    rad. prostatectomy due to prostate ca    UROLOGICAL SURGERY      art. urinary sphincter with reservoir        Family History   Problem Relation Age of Onset    Dementia Paternal Grandfather     Cancer Paternal Grandmother         Throat    Cancer Maternal Grandfather     Heart Disease Maternal Grandmother     Cancer Father 79        Kidney    Alcohol Abuse Brother         Now, recovered; at one time addicted to pain Rx    Diabetes Brother     Heart Disease Mother     Alzheimer's Disease Maternal Aunt         Social History     Socioeconomic History    Marital status:      Spouse name: None    Number of children: None    Years of education: None    Highest education level: None   Tobacco Use    Smoking status: Former     Packs/day: 1.00     Types: Cigarettes     Quit date: 1998     Years since quittin.4    Smokeless tobacco: Never   Substance and Sexual Activity    Alcohol use:  Yes     Alcohol/week: 21.0 standard drinks    Drug use: No   Social History Narrative    Lives with wife  2 children  4 grandchildren [address]    [employer] 25 years     Social Determinants of Health     Physical Activity: Insufficiently Active    Days of Exercise per Week: 3 days    Minutes of Exercise per Session: 40 min         Levofloxacin     Discharge Medication List as of 10/4/2022  9:36 PM        CONTINUE these medications which have NOT CHANGED    Details   donepezil (ARICEPT) 5 MG tablet Take 1 tablet by mouth nightly, Disp-90 tablet, R-3Normal      levothyroxine (SYNTHROID) 150 MCG tablet Take 1 tablet by mouth every morning (before breakfast), Disp-90 tablet, R-3Normal      atorvastatin (LIPITOR) 80 MG tablet Take 1 tablet by mouth daily, Disp-90 tablet, R-3Normal      telmisartan-hydroCHLOROthiazide (MICARDIS HCT) 80-25 MG per tablet Take 1 tablet by mouth daily, Disp-90 tablet, R-3Normal      melatonin 3 MG TABS tablet Take 3 mg by mouth at bedtimeHistorical Med      Calcium Polycarbophil (FIBERCON PO) Take 1 tablet by mouth in the morning and at bedtime Historical Med      Probiotic Product (PROBIOTIC-10 PO) Take 1 capsule by mouth dailyHistorical Med      amLODIPine (NORVASC) 10 MG tablet Take 10 mg by mouth dailyHistorical Med      ascorbic acid (VITAMIN C) 250 MG tablet Take 250 mg by mouth 2 times dailyHistorical Med      aspirin 81 MG EC tablet Take 162 mg by mouth dailyHistorical Med      Biotin 2.5 MG CAPS Take 2,500 mcg by mouth dailyHistorical Med      fexofenadine (ALLEGRA) 180 MG tablet Take 180 mg by mouth dailyHistorical Med      fluticasone (FLONASE) 50 MCG/ACT nasal spray 2 sprays by Nasal route dailyHistorical Med      pantoprazole (PROTONIX) 40 MG tablet Take 40 mg by mouth dailyHistorical Med              Vitals signs and nursing note reviewed. Patient Vitals for the past 4 hrs:   Pulse Resp BP SpO2   10/04/22 2130 61 21 (!) 157/91 99 %          Physical Exam  Vitals and nursing note reviewed. Exam conducted with a chaperone present. Constitutional:       General: He is not in acute distress. Appearance: Normal appearance. He is not ill-appearing, toxic-appearing or diaphoretic. HENT:      Head: Normocephalic and atraumatic. Right Ear: Tympanic membrane, ear canal and external ear normal.      Left Ear: Tympanic membrane, ear canal and external ear normal.      Nose: Nose normal.      Mouth/Throat:      Mouth: Mucous membranes are moist.      Pharynx: Oropharynx is clear. Eyes:      Extraocular Movements: Extraocular movements intact. Pupils: Pupils are equal, round, and reactive to light. Cardiovascular:      Rate and Rhythm: Normal rate and regular rhythm. Pulses: Normal pulses. Heart sounds: Normal heart sounds. No murmur heard. Comments: No pulse deficits. Pulmonary:      Effort: Pulmonary effort is normal. No respiratory distress. Breath sounds: Normal breath sounds. No wheezing, rhonchi or rales. Abdominal:      General: Abdomen is flat.  Bowel sounds are normal. There is no distension. Palpations: Abdomen is soft. There is no mass. Tenderness: There is no abdominal tenderness. There is no right CVA tenderness, left CVA tenderness, guarding or rebound. Hernia: No hernia is present. Comments: No pulsating masses. Genitourinary:     Comments: Declined by patient  Musculoskeletal:      Cervical back: Normal, normal range of motion and neck supple. No rigidity, tenderness or bony tenderness. Thoracic back: Normal. No tenderness. Lumbar back: No edema, deformity, lacerations, spasms, tenderness or bony tenderness. Normal range of motion. Negative right straight leg raise test and negative left straight leg raise test.      Right lower leg: No edema. Left lower leg: No edema. Comments: No midline tenderness. No stepoffs or deformities. Normal exam.     Lymphadenopathy:      Cervical: No cervical adenopathy. Skin:     General: Skin is warm and dry. Capillary Refill: Capillary refill takes less than 2 seconds. Findings: No rash. Neurological:      General: No focal deficit present. Mental Status: He is alert and oriented to person, place, and time. Mental status is at baseline. Cranial Nerves: No cranial nerve deficit. Sensory: No sensory deficit. Motor: No weakness. Coordination: Coordination normal.      Gait: Gait normal.      Deep Tendon Reflexes: Reflexes normal.      Comments: Normal neuro exam. No saddle anesthesia. No leg weakness. Good reflexes. Psychiatric:         Mood and Affect: Mood normal.         Behavior: Behavior normal.         Thought Content: Thought content normal.         Judgment: Judgment normal.        Procedures    No results found for any visits on 10/04/22. No orders to display                       Voice dictation software was used during the making of this note. This software is not perfect and grammatical and other typographical errors may be present. This note has not been completely proofread for errors.      Nino Conrad  10/05/22 2370

## 2022-10-05 NOTE — ED TRIAGE NOTES
Pt c/o right lower back pain that started in August. Pt states she has been seen at [address] [place] [address], pt states he was receiving injections. Pt states he had increased right lower back pain today. Pt states it is a shooting pain. Pt states he has a follow up appointment on 10/20. Pt ambulatory to triage.

## 2022-10-05 NOTE — ED NOTES
I have reviewed discharge instructions with the patient. The patient verbalized understanding. Patient left ED via Discharge Method: ambulatory to Home with family. Opportunity for questions and clarification provided. Patient given 2 scripts. To continue your aftercare when you leave the hospital, you may receive an automated call from our care team to check in on how you are doing. This is a free service and part of our promise to provide the best care and service to meet your aftercare needs.  If you have questions, or wish to unsubscribe from this service please call [phone]. Thank you for Choosing our [facility] Emergency Department.        Sage Dick, RN  10/04/22 2136       Sage Dick RN  10/04/22 2137

## 2022-10-05 NOTE — DISCHARGE INSTRUCTIONS
As we discussed, unfortunately we do not perform joint injections here. I advise continuing your tramadol. I am adding on chlorzoxazone to relax her muscles. Do not drive after taking this medication as it may make you drowsy. Use Mobic once daily for pain. Do not take Advil while taking this medication. Please follow-up with your surgeon for more definitive care. I do advise calling them to see if they can move your appointment sooner. Return for any new or worsening symptoms, especially difficulty urinating, losing your bowels, fevers, vomiting.

## 2022-10-05 NOTE — TELEPHONE ENCOUNTER
Due to the compensation of incorporating another physicians patient's and his surgeries into our schedule we do not have any non urgent work in appointments at this time.  We can always call with any cancellations

## 2022-10-05 NOTE — TELEPHONE ENCOUNTER
Is begging to be seen earlier than 10/20 with Dr Morgan Cottrell, had to go to er last night pain was so bad.    Had an apt with Dr Judith Ward 9/30 but was rescheduled at a later date with Dr Morgan Cottrell 10/20

## 2022-10-19 ENCOUNTER — OFFICE VISIT (OUTPATIENT)
Dept: ORTHOPEDIC SURGERY | Age: 72
End: 2022-10-19
Payer: MEDICARE

## 2022-10-19 DIAGNOSIS — M67.432 GANGLION CYST OF WRIST, LEFT: ICD-10-CM

## 2022-10-19 DIAGNOSIS — R22.32 MASS OF LEFT HAND: Primary | ICD-10-CM

## 2022-10-19 PROCEDURE — G8427 DOCREV CUR MEDS BY ELIG CLIN: HCPCS | Performed by: NURSE PRACTITIONER

## 2022-10-19 PROCEDURE — 1036F TOBACCO NON-USER: CPT | Performed by: NURSE PRACTITIONER

## 2022-10-19 PROCEDURE — G8417 CALC BMI ABV UP PARAM F/U: HCPCS | Performed by: NURSE PRACTITIONER

## 2022-10-19 PROCEDURE — 99213 OFFICE O/P EST LOW 20 MIN: CPT | Performed by: NURSE PRACTITIONER

## 2022-10-19 PROCEDURE — G8484 FLU IMMUNIZE NO ADMIN: HCPCS | Performed by: NURSE PRACTITIONER

## 2022-10-19 PROCEDURE — 1123F ACP DISCUSS/DSCN MKR DOCD: CPT | Performed by: NURSE PRACTITIONER

## 2022-10-19 PROCEDURE — 3017F COLORECTAL CA SCREEN DOC REV: CPT | Performed by: NURSE PRACTITIONER

## 2022-10-19 RX ORDER — CHLORZOXAZONE 500 MG/1
TABLET ORAL
COMMUNITY
Start: 2022-10-05

## 2022-10-19 NOTE — PROGRESS NOTES
Orthopaedic Hand Clinic Note    Name: Santo Joel  YOB: 1950  Gender: male  MRN: [id number]      CC: Mass on left wrist    HPI: Santo Joel is a 67 y.o. male right hand dominant with a chief complaint of a mass on the left wrist.  He said this came up approximately 2 weeks ago. He denies it being painful. He says it started out small and has gotten larger. He comes in today to have it checked out. .      ROS/Meds/PSH/PMH/FH/SH: I personally reviewed the patients standard intake form. Pertinents are discussed in the HPI    Physical Examination:  General: Awake and alert. HEENT: Normocephalic, atraumatic  CV/Pulm: Breathing even and unlabored  Skin: No obvious rashes noted. Lymphatic: No obvious evidence of lymphedema or lymphadenopathy    Musculoskeletal Exam:  Examination on the left upper extremity demonstrates cap refill < 5 seconds in all fingers,   Patient has no swelling or ecchymosis to the left wrist, hand, digits. He has a palpable mass on the volar aspect of the left wrist.  It is firm. It is mobile. It is not tender to palpation. There is no erythema. Patient is able to make a composite fist.  He has no pain with flexion and extension of the wrist.  He is neurovascular intact with good capillary refill. .    Imaging / Electrodiagnostic Tests:     Strays include a 3 view left wrist reviewed. Patient has left thumb CMC arthritis. Assessment:   1. Mass of left hand    2. Ganglion cyst of wrist, left        Plan:   We discussed the diagnosis and different treatment options. We discussed observation, therapy, antiinflammatory medications and other pertinent treatment modalities. After discussing in detail the patient elects to proceed with observation. We did discuss removing the mass if it becomes painful or bothers him. He said it is not painful. He will follow-up as needed. .     Patient voiced accordance and understanding of the information provided and the formulated plan. All questions were answered to the patient's satisfaction during the encounter.     Treatment at this time: Observation    LOLITA Velázquez CNP  Orthopaedic Surgery  10/19/22  11:46 AM

## 2022-10-20 ENCOUNTER — OFFICE VISIT (OUTPATIENT)
Dept: ORTHOPEDIC SURGERY | Age: 72
End: 2022-10-20
Payer: MEDICARE

## 2022-10-20 VITALS — WEIGHT: 200 LBS | HEIGHT: 68 IN | BODY MASS INDEX: 30.31 KG/M2

## 2022-10-20 DIAGNOSIS — M47.816 FACET ARTHROPATHY, LUMBAR: ICD-10-CM

## 2022-10-20 DIAGNOSIS — M45.6 ANKYLOSING SPONDYLITIS OF LUMBAR REGION (HCC): ICD-10-CM

## 2022-10-20 DIAGNOSIS — M51.36 DISC DEGENERATION, LUMBAR: Primary | ICD-10-CM

## 2022-10-20 DIAGNOSIS — M48.062 SPINAL STENOSIS OF LUMBAR REGION WITH NEUROGENIC CLAUDICATION: ICD-10-CM

## 2022-10-20 PROCEDURE — 1123F ACP DISCUSS/DSCN MKR DOCD: CPT | Performed by: ORTHOPAEDIC SURGERY

## 2022-10-20 PROCEDURE — G8484 FLU IMMUNIZE NO ADMIN: HCPCS | Performed by: ORTHOPAEDIC SURGERY

## 2022-10-20 PROCEDURE — 99214 OFFICE O/P EST MOD 30 MIN: CPT | Performed by: ORTHOPAEDIC SURGERY

## 2022-10-20 PROCEDURE — G8417 CALC BMI ABV UP PARAM F/U: HCPCS | Performed by: ORTHOPAEDIC SURGERY

## 2022-10-20 PROCEDURE — 3017F COLORECTAL CA SCREEN DOC REV: CPT | Performed by: ORTHOPAEDIC SURGERY

## 2022-10-20 PROCEDURE — 1036F TOBACCO NON-USER: CPT | Performed by: ORTHOPAEDIC SURGERY

## 2022-10-20 PROCEDURE — G8427 DOCREV CUR MEDS BY ELIG CLIN: HCPCS | Performed by: ORTHOPAEDIC SURGERY

## 2022-10-20 NOTE — PROGRESS NOTES
Name: Garry Salgado  YOB: 1950  Gender: male  MRN: [id number]  Age: 67 y.o. Chief complaint: Back and buttock pain with activities. History of present illness: This is a very pleasant 67 y.o. old male who had previously had a lumbar laminectomy by Dr. Castillo. The patient has known long-term degenerative scoliosis in the lumbar spine. Recently, he has developed back pain with radiation to the right leg. He was evaluated by CT myelogram and found to have some right-sided stenosis at L4-L5. He was evaluated by Dr. Leonard Owusu and options including a spinal cord stimulator versus a scoliosis fusion surgery were discussed. He was ultimately referred to Dr. Ewelina Lima to discuss the fusion surgery in more detail. The patient had already been established with Dr. Toni Hamlin who has performed epidural injections. He is essentially coming to me for another opinion in Dr. Jennifer espinal.            Medications:       Current Outpatient Medications:     chlorzoxazone (PARAFON FORTE) 500 MG tablet, , Disp: , Rfl:     meloxicam (MOBIC) 15 MG tablet, Take 1 tablet by mouth daily, Disp: 30 tablet, Rfl: 0    donepezil (ARICEPT) 5 MG tablet, Take 1 tablet by mouth nightly, Disp: 90 tablet, Rfl: 3    levothyroxine (SYNTHROID) 150 MCG tablet, Take 1 tablet by mouth every morning (before breakfast) (Patient taking differently: Take 150 mcg by mouth every morning (before breakfast) 1/2 tab on Sunday), Disp: 90 tablet, Rfl: 3    atorvastatin (LIPITOR) 80 MG tablet, Take 1 tablet by mouth daily, Disp: 90 tablet, Rfl: 3    telmisartan-hydroCHLOROthiazide (MICARDIS HCT) 80-25 MG per tablet, Take 1 tablet by mouth daily, Disp: 90 tablet, Rfl: 3    melatonin 3 MG TABS tablet, Take 3 mg by mouth at bedtime, Disp: , Rfl:     Calcium Polycarbophil (FIBERCON PO), Take 1 tablet by mouth in the morning and at bedtime , Disp: , Rfl:     Probiotic Product (PROBIOTIC-10 PO), Take 1 capsule by mouth daily, Disp: , Rfl: amLODIPine (NORVASC) 10 MG tablet, Take 10 mg by mouth daily, Disp: , Rfl:     ascorbic acid (VITAMIN C) 250 MG tablet, Take 250 mg by mouth 2 times daily, Disp: , Rfl:     aspirin 81 MG EC tablet, Take 162 mg by mouth daily, Disp: , Rfl:     Biotin 2.5 MG CAPS, Take 2,500 mcg by mouth daily, Disp: , Rfl:     fexofenadine (ALLEGRA) 180 MG tablet, Take 180 mg by mouth daily, Disp: , Rfl:     fluticasone (FLONASE) 50 MCG/ACT nasal spray, 2 sprays by Nasal route daily, Disp: , Rfl:     pantoprazole (PROTONIX) 40 MG tablet, Take 40 mg by mouth daily, Disp: , Rfl:     Allergies: Allergies   Allergen Reactions    Levofloxacin Rash         Physical Exam:     This is a well developed well nourished male adult in no acute distress. Mood and affect are appropriate. Oriented to person, place, and time. Respirations are unlabored and there is no evidence of cyanosis    The patient ambulates with a mildly antalgic gait and crouched posture. He is neurologically intact. Radiographic Studies:     X-rays including AP and lateral views of the lumbar spine were reviewed and interpreted: There are multiple spondylotic changes noted including loss of disk height and osteophytes. No destructive lesion. He does have a significant scoliosis but it appears unchanged in comparison to a radiograph image that he has in his folder from 2019. bone quality appears normal.    Radiographic Impression:    Lumbar spondylosis  Scoliosis as noted above    CT myelogram of the lumbar spine images were reviewed and interpreted: Postoperative changes status post L1-L3 laminectomy are noted. He has significant scoliosis with auto ankylosis of L1-L2 and L3-L4. At L4-L5 he has vacuum disc phenomenon and lateral listhesis. There is hypertrophic facet arthropathy and ligamentum flavum hypertrophy resulting in right greater than left lateral recess stenosis. The foramen appears to be elongated. Diagnosis:      ICD-10-CM    1. Disc degeneration, lumbar  M51.36 XR LUMBAR SPINE (2-3 VIEWS)      2. Ankylosing spondylitis of lumbar region ([address]  M45.6       3. Facet arthropathy, lumbar  M47.816       4. Spinal stenosis of lumbar region with neurogenic claudication  M48.062           Assessment/Plan: This patient's clinical history and physical exam is somewhat consistent with right-sided L4-L5 radiculopathy which correlates with the right-sided lateral recess stenosis seen on his CT myelogram.  However, with the significant scoliosis and lateral listhesis, I think surgical options such as a limited decompression would be at risk for decompensation and worsening listhesis or worsening scoliosis. In this scenario, spinal cord stimulator may be a valid option. Alternatively, a laminectomy and fusion could be considered either at the L4-L5 level or the fusion extended to include the entire scoliosis. I think any of these are reasonable options to consider and really there is not 1 that I would favor over another. I recommended going ahead and seeing Dr. Sid Zepeda for his opinion and then making a decision.        Electronically Signed By Mayra Huitron MD     3:14 PM

## 2022-10-20 NOTE — LETTER
[address] [facility]  [address] [facility]. [address]  Phone: [phone]  Fax: [phone]    Alvaro Day MD        October 20, 2022     Patient: Yael Chicas   YOB: 1950   Date of Visit: 10/20/2022       To Whom It May Concern: It is my medical opinion that Javier Viveros is unable to stand/walk greater than 10 minutes without time allowed for sitting    If you have any questions or concerns, please don't hesitate to call.     Sincerely,        Alvaro Day MD

## 2022-10-24 ENCOUNTER — OFFICE VISIT (OUTPATIENT)
Dept: NEUROLOGY | Age: 72
End: 2022-10-24
Payer: MEDICARE

## 2022-10-24 VITALS
HEART RATE: 69 BPM | BODY MASS INDEX: 30.01 KG/M2 | SYSTOLIC BLOOD PRESSURE: 154 MMHG | WEIGHT: 198 LBS | DIASTOLIC BLOOD PRESSURE: 90 MMHG | HEIGHT: 68 IN

## 2022-10-24 DIAGNOSIS — R41.3 MEMORY LOSS: Primary | ICD-10-CM

## 2022-10-24 DIAGNOSIS — G47.33 OSA (OBSTRUCTIVE SLEEP APNEA): ICD-10-CM

## 2022-10-24 DIAGNOSIS — G45.9 TIA (TRANSIENT ISCHEMIC ATTACK): ICD-10-CM

## 2022-10-24 PROCEDURE — 99204 OFFICE O/P NEW MOD 45 MIN: CPT | Performed by: PSYCHIATRY & NEUROLOGY

## 2022-10-24 PROCEDURE — 1036F TOBACCO NON-USER: CPT | Performed by: PSYCHIATRY & NEUROLOGY

## 2022-10-24 PROCEDURE — G8417 CALC BMI ABV UP PARAM F/U: HCPCS | Performed by: PSYCHIATRY & NEUROLOGY

## 2022-10-24 PROCEDURE — G8484 FLU IMMUNIZE NO ADMIN: HCPCS | Performed by: PSYCHIATRY & NEUROLOGY

## 2022-10-24 PROCEDURE — G8427 DOCREV CUR MEDS BY ELIG CLIN: HCPCS | Performed by: PSYCHIATRY & NEUROLOGY

## 2022-10-24 PROCEDURE — 3017F COLORECTAL CA SCREEN DOC REV: CPT | Performed by: PSYCHIATRY & NEUROLOGY

## 2022-10-24 PROCEDURE — 1123F ACP DISCUSS/DSCN MKR DOCD: CPT | Performed by: PSYCHIATRY & NEUROLOGY

## 2022-10-24 RX ORDER — HYDROCODONE BITARTRATE AND ACETAMINOPHEN 5; 325 MG/1; MG/1
TABLET ORAL
COMMUNITY
Start: 2022-10-18

## 2022-10-24 RX ORDER — DONEPEZIL HYDROCHLORIDE 10 MG/1
10 TABLET, FILM COATED ORAL NIGHTLY
Qty: 90 TABLET | Refills: 3 | Status: SHIPPED | OUTPATIENT
Start: 2022-10-24

## 2022-10-24 ASSESSMENT — ENCOUNTER SYMPTOMS
GASTROINTESTINAL NEGATIVE: 1
EYES NEGATIVE: 1
RESPIRATORY NEGATIVE: 1

## 2022-11-02 ENCOUNTER — HOSPITAL ENCOUNTER (OUTPATIENT)
Dept: MRI IMAGING | Age: 72
Discharge: HOME | End: 2022-11-05

## 2022-11-02 DIAGNOSIS — G45.9 TIA (TRANSIENT ISCHEMIC ATTACK): ICD-10-CM

## 2022-11-07 ENCOUNTER — HOSPITAL ENCOUNTER (OUTPATIENT)
Dept: MRI IMAGING | Age: 72
Discharge: HOME | End: 2022-11-10
Payer: MEDICARE

## 2022-11-07 PROCEDURE — 6360000004 HC RX CONTRAST MEDICATION: Performed by: INTERNAL MEDICINE

## 2022-11-07 PROCEDURE — 70553 MRI BRAIN STEM W/O & W/DYE: CPT

## 2022-11-07 PROCEDURE — A9579 GAD-BASE MR CONTRAST NOS,1ML: HCPCS | Performed by: INTERNAL MEDICINE

## 2022-11-07 PROCEDURE — 2580000003 HC RX 258: Performed by: INTERNAL MEDICINE

## 2022-11-07 RX ORDER — SODIUM CHLORIDE 0.9 % (FLUSH) 0.9 %
10 SYRINGE (ML) INJECTION AS NEEDED
Status: DISCONTINUED | OUTPATIENT
Start: 2022-11-07 | End: 2022-11-11 | Stop reason: HOSPADM

## 2022-11-07 RX ADMIN — SODIUM CHLORIDE, PRESERVATIVE FREE 10 ML: 5 INJECTION INTRAVENOUS at 19:07

## 2022-11-07 RX ADMIN — GADOTERIDOL 18 ML: 279.3 INJECTION, SOLUTION INTRAVENOUS at 19:06

## 2022-11-08 ENCOUNTER — OFFICE VISIT (OUTPATIENT)
Dept: ORTHOPEDIC SURGERY | Age: 72
End: 2022-11-08
Payer: MEDICARE

## 2022-11-08 DIAGNOSIS — M47.816 FACET ARTHROPATHY, LUMBAR: ICD-10-CM

## 2022-11-08 DIAGNOSIS — M48.062 SPINAL STENOSIS OF LUMBAR REGION WITH NEUROGENIC CLAUDICATION: Primary | ICD-10-CM

## 2022-11-08 PROCEDURE — 64483 NJX AA&/STRD TFRM EPI L/S 1: CPT | Performed by: PHYSICAL MEDICINE & REHABILITATION

## 2022-11-08 PROCEDURE — 64484 NJX AA&/STRD TFRM EPI L/S EA: CPT | Performed by: PHYSICAL MEDICINE & REHABILITATION

## 2022-11-08 RX ORDER — BETAMETHASONE SODIUM PHOSPHATE AND BETAMETHASONE ACETATE 3; 3 MG/ML; MG/ML
8 INJECTION, SUSPENSION INTRA-ARTICULAR; INTRALESIONAL; INTRAMUSCULAR; SOFT TISSUE ONCE
Status: COMPLETED | OUTPATIENT
Start: 2022-11-08 | End: 2022-11-08

## 2022-11-08 RX ORDER — METHYLPREDNISOLONE ACETATE 80 MG/ML
80 INJECTION, SUSPENSION INTRA-ARTICULAR; INTRALESIONAL; INTRAMUSCULAR; SOFT TISSUE ONCE
Status: SHIPPED | OUTPATIENT
Start: 2022-11-08

## 2022-11-08 RX ADMIN — BETAMETHASONE SODIUM PHOSPHATE AND BETAMETHASONE ACETATE 8 MG: 3; 3 INJECTION, SUSPENSION INTRA-ARTICULAR; INTRALESIONAL; INTRAMUSCULAR; SOFT TISSUE at 13:56

## 2022-11-08 NOTE — PROGRESS NOTES
Name: Jeanette Gaffney  YOB: 1950  Gender: male  MRN: [id number]        Transforaminal KATIE Procedure Note    Procedure: Right  L1-L2 and L4-L5 transforaminal epidural steroid injections     Precautions: Jeanette Gaffney denies prior sensitivity to steroid, local anesthetic, iodine, or shellfish. Consent:  Consent was obtained prior to the procedure. The procedure was discussed at length with Jeanette Gaffney. He was given the opportunity to ask questions regarding the procedure and its associated risks. In addition to the potential risks associated with the procedure itself, the patient was informed both verbally and in writing of potential side effects of the use glucocorticoids. The patient appeared to comprehend the informed consent and desired to have the procedure performed, and informed consent was signed. He was placed in a prone position on the fluoroscopy table and the skin was prepped and draped in a routine sterile fashion. The areas to be injected were each anesthetized with 1 ml of 1% Lidocaine. A 22 gauge 3.5 inch spinal needle was carefully advanced under fluoroscopic guidance to the right L4-L5 transforaminal space  0.5 ml of 70% of Omnipaque was injected to confirm proper needle placement and absence of subdural or vascular flow Once proper placement was confirmed, 0.5ml of 0.25 marcaine and 6 mg of betamethasone were injected through the spinal needle. The above procedure was then repeated at the Right  L1-L2 transforaminal space using 6 mg of betamethasone and a 25 gauge 3.5 inch spinal needle. Fluoroscopic guidance was used intermittently over a 10-minute period to insure proper needle placement and his safety. A hard copy of the fluoroscopic image has been placed in his chart and is saved on the C-arm hard drive.  He was monitored for 30 minutes after the procedure and discharged home in a stable fashion with a routine follow up.    Procedural Diagnosis:     ICD-10-CM    1. Spinal stenosis of lumbar region with neurogenic claudication  M48.062 FL NERVE BLOCK LUMBOSACRAL 1ST     FL NERVE BLOCK LUMBOSACRAL EACH ADD          betamethasone acetate-betamethasone sodium phosphate (CELESTONE) injection 8 mg      2.  Facet arthropathy, lumbar  M47.816 FL NERVE BLOCK LUMBOSACRAL 1ST     FL NERVE BLOCK LUMBOSACRAL EACH ADD          betamethasone acetate-betamethasone sodium phosphate (CELESTONE) injection 8 mg         Billie Tavares MD  11/08/22

## 2022-11-16 NOTE — THERAPY DISCHARGE
Curtis Lim  : 1950  Primary: Medicare Part A And B  Secondary:  [employer] MEDICARE [employer]PP [employer]  [address] [address]  Phone: [phone]  Fax: [phone] Plan Frequency: 2x/wk for 4 more weeks per new referral    Plan of Care/Certification Expiration Date: 10/01/22      PT Visit Info: Total # of Visits to Date: 25      OUTPATIENT PHYSICAL THERAPY:OP NOTE TYPE: Discontinuation Summary 2022               Episode  Appt Desk         Treatment Diagnosis:  Low Back Pain (M54.5)  Cervicalgia (M54.2)    Medical/Referring Diagnosis:  Cervicalgia [M54.2]  Other cervical disc degeneration, unspecified cervical region [M50.30]  Spondylosis without myelopathy or radiculopathy, lumbar region [M47.816]  Lumbar facet arthropathy M47.816   Referring Physician:  LOLITA Vargas*  MD Orders:  PT Eval and Treat, 2-3x/wk for 6 weeks  Return MD Appt:  TBD  Date of Onset:  Onset Date: 22     Allergies:  Levofloxacin  Restrictions/Precautions:    Restrictions/Precautions: None  No data recorded   Medications Last Reviewed:  2022         OBJECTIVE   Trigger points: B upper trap/levators, capitus, and occipitals, QL muscles and paraspinals     Posture/Deformity:  Forward head, rounded shoulders, thoracic kyphosis     Date:  22 Date:  22 Date:  22   Cervical Rotation  R: 65*  L: 55*  Decreased strength 70* R  65* L 70* L   Cervical Side Bending R: 20*  L: 40*  Decreased strength 35* R 40* R   Cervical Flexion 45*, decreased strength -    Cervical Extension 45*, decreased strength -    Shoulder Flex/Scapt R: WFL  L: WFL     Shoulder ABD R: WFL  L: WFL     Shoulder ER R: WFL  L: WFL     Shoulder IR R: WFL  L: WFL     Elbow Flex R: WFL  L: WFL     Elbow Ext R: WFL  L: WFL     Scapular Decreased strength       strength 95# L  83# R     Lumbar   Decreased extension ROM   Hip flexion   5/5 B   Hip extension   4/5 R  5/5 L   Hip abduction 4-/5 R  5/5 L      Sensation:  Biceps (C5): intact  Childers Radial (C6-C7): intact  Childers Ulner (C8-T1): intact    Special Test/Function:  Spurling's: Traction felt good, but no pain w/ compression  Accessory mobility/: Decreased mobility    ASSESSMENT   Initial Assessment:  Mr. Petr Carr presents to PT eval w/ c/o cervical pain and restricted motion along w/ LBP. With PT assessment, his cervical ROM was noted to be quite limited as was his posture. Mr. Petr Carr has a home traction unit, so he was re-educated on the proper use of said unit. With treatment today that included traction, manual, and therex, his overall pain and ROM were much improved. Mr. Petr Carr will benefit from continued skilled PT services to improve deficits listed below and to progress towards his PLOF. Updated Assessment: Mr. Petr Carr presented to PT eval w/ c/o cervical pain and restricted motion along w/ LBP. At this time, he has met 2/6 PT goals and is progressing towards the final 4 goals. His cervical ROM has increased from 55* to 65* rotation to the left, and 20* to 35* w/ cervical sidebending. His pain in his back and neck had been improving until he took a 12 hour car trip over the weekend and now his pain is flared up. Mr. Petr Carr will benefit from continued skilled PT services to improve deficits listed below and to progress towards his PLOF. Recert Assessment: Mr. Petr Carr presented to PT eval w/ c/o cervical pain and restricted motion along w/ LBP. Currently, he has met 4/6 PT goals and two new goals have been added that pertain more to his lower back issues. He has met his cervical ROM and pain goals at this time, but still has some pain in his lower back. He followed up with his physician Dr. Lexie Handley who sent in a new referral for these same diagnoses as the initial referral. Will extend POC and add 4 more weeks to PT to continue to improve lower back pain and cervical pain but keep under same POC.  Thank you for this referral. Discontinuation Summary: Mr. Eliza Sewell presented to PT eval w/ c/o cervical pain and restricted motion along w/ LBP. At the time of his last progress report, he had met 4/6 goals and we made 2 new goals that day. Unable to assess progress towards those remaining goals at this time. Per chart audit, his neck pain is improved, but his back pain is still bothering him. He continues to follow up with spinal and orthopedic physicians for shots and opinions. Patient's plan of care has  and he has not returned to PT at this time. DC from outpatient PT at this time. Thank you for this referral.   PLAN     Goals: (Goals have been discussed and agreed upon with patient.)  Short Term Goals: 4 weeks  1. Pt will be independent w/ HEP to improve outcomes and decrease pain levels. MET  2. Pt will have cervical rotation ROM bilaterally of 70* or more in order to increase safety while driving w/ pain of 0/79 or less. MET  3. Pt will report pain of 2/10 or less while performing ADLs in order to return to PLOF. MET    Long Term Goals. 6 weeks  1. Pt will have NDI score of 7 or less showing decreased pain and decreased functional impairments. Unable to assess  2. Pt will have cervical side bending ROM bilaterally of 40* or more in order to increase safety while driving w/ pain of 0/73 or less. MET  3. Pt will report pain of 1/10 or less while performing ADLS in order to return to PLOF. Unable to assess  4. Pt will have Modified Oswestry score of 11 or less showing improved pain and mobility. Unable to assess  5. Pt will have B hip strength of 4+/5 or greater in all major muscles in order to improve balance and mobility. Unable to assess           Outcome Measure: Tool Used: Neck Disability Index (NDI)  Score:  Initial:   Most Recent:  (Date: 22)   Interpretation of Score:  The Neck Disability Index is a revised form of the Oswestry Low Back Pain Index and is designed to measure the activities of daily living in person's with neck pain. Each section is scored on a 0-5 scale, 5 representing the greatest disability. The scores of each section are added together for a total score of 50. Regarding Beulah Soto Berkowitz's therapy, I certify that the treatment plan above will be carried out by a therapist or under their direction. Thank you for this referral,  Amos Styles, PT     Referring Physician Signature: LOLITA Atkins* No Signature is Required for this note.         Post Session Pain  Charge Capture  PT Visit Info  POC Link  Treatment Note Link  MD Guidelines  [place]

## 2022-11-23 DIAGNOSIS — I10 ESSENTIAL HYPERTENSION: ICD-10-CM

## 2022-11-23 DIAGNOSIS — K22.70 BARRETT'S ESOPHAGUS WITHOUT DYSPLASIA: Primary | ICD-10-CM

## 2022-11-23 RX ORDER — AMLODIPINE BESYLATE 10 MG/1
10 TABLET ORAL DAILY
Qty: 90 TABLET | Refills: 3 | Status: SHIPPED | OUTPATIENT
Start: 2022-11-23

## 2022-11-23 RX ORDER — PANTOPRAZOLE SODIUM 40 MG/1
40 TABLET, DELAYED RELEASE ORAL DAILY
Qty: 90 TABLET | Refills: 3 | Status: SHIPPED | OUTPATIENT
Start: 2022-11-23

## 2022-12-07 ENCOUNTER — TELEPHONE (OUTPATIENT)
Dept: SLEEP MEDICINE | Age: 72
End: 2022-12-07

## 2022-12-07 NOTE — TELEPHONE ENCOUNTER
Patient says he is having a Neurostimulator put in his back , he will only be able to sleep on his right side .  He is wondering if this is going to cause a problem with the test being accurate

## 2022-12-13 ENCOUNTER — TELEMEDICINE (OUTPATIENT)
Dept: INTERNAL MEDICINE CLINIC | Facility: CLINIC | Age: 72
End: 2022-12-13
Payer: MEDICARE

## 2022-12-13 DIAGNOSIS — U07.1 COVID-19: Primary | ICD-10-CM

## 2022-12-13 PROCEDURE — 99213 OFFICE O/P EST LOW 20 MIN: CPT | Performed by: INTERNAL MEDICINE

## 2022-12-13 PROCEDURE — 3017F COLORECTAL CA SCREEN DOC REV: CPT | Performed by: INTERNAL MEDICINE

## 2022-12-13 PROCEDURE — 1123F ACP DISCUSS/DSCN MKR DOCD: CPT | Performed by: INTERNAL MEDICINE

## 2022-12-13 PROCEDURE — G8427 DOCREV CUR MEDS BY ELIG CLIN: HCPCS | Performed by: INTERNAL MEDICINE

## 2022-12-13 RX ORDER — HYDROCHLOROTHIAZIDE 12.5 MG/1
CAPSULE, GELATIN COATED ORAL
COMMUNITY
Start: 2022-11-23

## 2022-12-13 RX ORDER — PREGABALIN 100 MG/1
CAPSULE ORAL
COMMUNITY
Start: 2022-12-06

## 2022-12-13 SDOH — ECONOMIC STABILITY: FOOD INSECURITY: WITHIN THE PAST 12 MONTHS, YOU WORRIED THAT YOUR FOOD WOULD RUN OUT BEFORE YOU GOT MONEY TO BUY MORE.: NEVER TRUE

## 2022-12-13 SDOH — ECONOMIC STABILITY: FOOD INSECURITY: WITHIN THE PAST 12 MONTHS, THE FOOD YOU BOUGHT JUST DIDN'T LAST AND YOU DIDN'T HAVE MONEY TO GET MORE.: NEVER TRUE

## 2022-12-13 ASSESSMENT — SOCIAL DETERMINANTS OF HEALTH (SDOH): HOW HARD IS IT FOR YOU TO PAY FOR THE VERY BASICS LIKE FOOD, HOUSING, MEDICAL CARE, AND HEATING?: NOT HARD AT ALL

## 2022-12-13 ASSESSMENT — PATIENT HEALTH QUESTIONNAIRE - PHQ9
2. FEELING DOWN, DEPRESSED OR HOPELESS: 0
SUM OF ALL RESPONSES TO PHQ QUESTIONS 1-9: 0
1. LITTLE INTEREST OR PLEASURE IN DOING THINGS: 0
SUM OF ALL RESPONSES TO PHQ9 QUESTIONS 1 & 2: 0
DEPRESSION UNABLE TO ASSESS: PT REFUSES
SUM OF ALL RESPONSES TO PHQ QUESTIONS 1-9: 0

## 2022-12-13 ASSESSMENT — ENCOUNTER SYMPTOMS
BACK PAIN: 1
DIARRHEA: 1
COUGH: 1

## 2022-12-13 NOTE — PROGRESS NOTES
SUBJECTIVE:   Jeanette Gaffney is a 67 y.o. male seen for a visit regarding   Chief Complaint   Patient presents with    Positive For Covid-19    Congestion    Diarrhea    Cough    Chills    Generalized Body Aches        Positive For Covid-19  This is a new problem. The current episode started in the past 7 days (cold like, congestion and cough started Sat(3d ago)- sinus congestion , body ache today, malaise both today; loose stool today-pos covid test yesterday). The problem occurs constantly (had 1 st booster and passed out after it 2 d later-none since). Associated symptoms include congestion, coughing and fatigue. Diarrhea   Associated symptoms include coughing. Cough    Generalized Body Aches  Associated symptoms include congestion, coughing and fatigue. Past Medical History, Past Surgical History, Family history, Social History, and Medications were all reviewed with the patient today and updated as necessary. Current Outpatient Medications   Medication Sig Dispense Refill    pregabalin (LYRICA) 100 MG capsule       hydroCHLOROthiazide (MICROZIDE) 12.5 MG capsule       nirmatrelvir/ritonavir (PAXLOVID) 20 x 150 MG & 10 x 100MG TBPK Take 3 tablets (two 150 mg nirmatrelvir and one 100 mg ritonavir tablets) by mouth every 12 hours for 5 days.  30 tablet 0    amLODIPine (NORVASC) 10 MG tablet Take 1 tablet by mouth daily 90 tablet 3    pantoprazole (PROTONIX) 40 MG tablet Take 1 tablet by mouth daily 90 tablet 3    donepezil (ARICEPT) 10 MG tablet Take 1 tablet by mouth nightly 90 tablet 3    meloxicam (MOBIC) 15 MG tablet Take 1 tablet by mouth daily 30 tablet 0    levothyroxine (SYNTHROID) 150 MCG tablet Take 1 tablet by mouth every morning (before breakfast) (Patient taking differently: Take 150 mcg by mouth every morning (before breakfast) 1/2 tab on Sunday) 90 tablet 3    atorvastatin (LIPITOR) 80 MG tablet Take 1 tablet by mouth daily 90 tablet 3    telmisartan-hydroCHLOROthiazide (MICARDIS HCT) 80-25 MG per tablet Take 1 tablet by mouth daily 90 tablet 3    melatonin 3 MG TABS tablet Take 3 mg by mouth at bedtime      Calcium Polycarbophil (FIBERCON PO) Take 1 tablet by mouth in the morning and at bedtime       Probiotic Product (PROBIOTIC-10 PO) Take 1 capsule by mouth daily      ascorbic acid (VITAMIN C) 250 MG tablet Take 250 mg by mouth 2 times daily      aspirin 81 MG EC tablet Take 162 mg by mouth daily      fexofenadine (ALLEGRA) 180 MG tablet Take 180 mg by mouth daily      fluticasone (FLONASE) 50 MCG/ACT nasal spray 2 sprays by Nasal route daily       No current facility-administered medications for this visit. 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    Essential hypertension    Spondylosis of cervical region without myelopathy or radiculopathy    Reflux esophagitis    Fecal incontinence    Allergic rhinitis    Colon polyps    Rosacea    Hypothyroidism    Hyperlipidemia    H/O prostate cancer    Spondylosis of lumbar region without myelopathy or radiculopathy    Right hand pain    Primary osteoarthritis of first carpometacarpal joint of right hand    Ganglion cyst of wrist, left     Social History     Tobacco Use    Smoking status: Former     Packs/day: 1.00     Years: 30.00     Pack years: 30.00     Types: Cigarettes     Start date: 1968     Quit date: 1998     Years since quittin.6    Smokeless tobacco: Never   Substance Use Topics    Alcohol use: Yes     Alcohol/week: 20.0 standard drinks     Types: 20 Drinks containing 0.5 oz of alcohol per week          Review of Systems   Constitutional:  Positive for fatigue. HENT:  Positive for congestion. Respiratory:  Positive for cough. Gastrointestinal:  Positive for diarrhea. Musculoskeletal:  Positive for back pain.         Has scoliosis, stenosis and disc disease-by CT myelogram-saw Dr Meghann Aguila

## 2022-12-16 DIAGNOSIS — N30.00 ACUTE CYSTITIS WITHOUT HEMATURIA: Primary | ICD-10-CM

## 2022-12-16 RX ORDER — CEPHALEXIN 500 MG/1
500 CAPSULE ORAL 3 TIMES DAILY
Qty: 15 CAPSULE | Refills: 0 | Status: SHIPPED | OUTPATIENT
Start: 2022-12-16 | End: 2022-12-21

## 2023-01-04 ENCOUNTER — TELEPHONE (OUTPATIENT)
Dept: UROLOGY | Age: 73
End: 2023-01-04

## 2023-01-04 NOTE — TELEPHONE ENCOUNTER
Pt called about his 1/10 appt w/Dr Pita Hale, stating it is supposed to be w/Dr Jackson. Dr Pita Hale has previously seen him but has referred him to Dr Jackson for AUS surgery. I cannot find an open office visit any time before March to schedule Mr Alyssa Paez to see Dr Jackson. Can you see if there is anywhere to fit him in with Dr Jackson in January? His issue is getting worse. Thank you.

## 2023-01-04 NOTE — TELEPHONE ENCOUNTER
Called and spoke to the pt and offered him 2-1-23 and 2-8-23 he did not want either appt states he will call back to resched appt with Dr. Cande Jason at a later date//dh

## 2023-01-10 ENCOUNTER — OFFICE VISIT (OUTPATIENT)
Dept: INTERNAL MEDICINE CLINIC | Facility: CLINIC | Age: 73
End: 2023-01-10
Payer: MEDICARE

## 2023-01-10 VITALS
HEIGHT: 68 IN | BODY MASS INDEX: 29.7 KG/M2 | SYSTOLIC BLOOD PRESSURE: 118 MMHG | WEIGHT: 196 LBS | DIASTOLIC BLOOD PRESSURE: 64 MMHG

## 2023-01-10 DIAGNOSIS — G60.9 IDIOPATHIC PERIPHERAL NEUROPATHY: Primary | ICD-10-CM

## 2023-01-10 DIAGNOSIS — G60.9 IDIOPATHIC PERIPHERAL NEUROPATHY: ICD-10-CM

## 2023-01-10 DIAGNOSIS — L97.511 SKIN ULCER OF TOE OF RIGHT FOOT, LIMITED TO BREAKDOWN OF SKIN (HCC): ICD-10-CM

## 2023-01-10 PROCEDURE — G8417 CALC BMI ABV UP PARAM F/U: HCPCS | Performed by: INTERNAL MEDICINE

## 2023-01-10 PROCEDURE — 1123F ACP DISCUSS/DSCN MKR DOCD: CPT | Performed by: INTERNAL MEDICINE

## 2023-01-10 PROCEDURE — 3074F SYST BP LT 130 MM HG: CPT | Performed by: INTERNAL MEDICINE

## 2023-01-10 PROCEDURE — 3017F COLORECTAL CA SCREEN DOC REV: CPT | Performed by: INTERNAL MEDICINE

## 2023-01-10 PROCEDURE — G8427 DOCREV CUR MEDS BY ELIG CLIN: HCPCS | Performed by: INTERNAL MEDICINE

## 2023-01-10 PROCEDURE — G8484 FLU IMMUNIZE NO ADMIN: HCPCS | Performed by: INTERNAL MEDICINE

## 2023-01-10 PROCEDURE — 1036F TOBACCO NON-USER: CPT | Performed by: INTERNAL MEDICINE

## 2023-01-10 PROCEDURE — 99214 OFFICE O/P EST MOD 30 MIN: CPT | Performed by: INTERNAL MEDICINE

## 2023-01-10 PROCEDURE — 3078F DIAST BP <80 MM HG: CPT | Performed by: INTERNAL MEDICINE

## 2023-01-10 ASSESSMENT — PATIENT HEALTH QUESTIONNAIRE - PHQ9
SUM OF ALL RESPONSES TO PHQ QUESTIONS 1-9: 0
2. FEELING DOWN, DEPRESSED OR HOPELESS: 0
1. LITTLE INTEREST OR PLEASURE IN DOING THINGS: 0
SUM OF ALL RESPONSES TO PHQ QUESTIONS 1-9: 0
SUM OF ALL RESPONSES TO PHQ9 QUESTIONS 1 & 2: 0
SUM OF ALL RESPONSES TO PHQ QUESTIONS 1-9: 0
SUM OF ALL RESPONSES TO PHQ QUESTIONS 1-9: 0

## 2023-01-10 ASSESSMENT — ENCOUNTER SYMPTOMS: BACK PAIN: 1
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
## 2023-01-11 LAB — VIT B12 SERPL-MCNC: 606 PG/ML (ref 193–986)

## 2023-01-11 NOTE — PROGRESS NOTES
[facility] [place]  [address], [address] [facility]  [phone]    Patient Name:  Davian Jenkins  YOB: 1950      Office Visit 1/12/2023    CHIEF COMPLAINT:    Chief Complaint   Patient presents with    New Patient       HISTORY OF PRESENT ILLNESS:      The patient present in outpatient consultation at the request of Dr. Jose Luis Lomeli for evaluation of suspected sleep apnea. The patient was accompanied by his wife who indicated he has history of snoring, witnessed apneas for quite some time but the symptoms seem to be getting worse over the last 2 years. Additional symptoms include cognitive impairment which was noted by memory loss and trouble concentrating over the last couple years. He was diagnosed with bruxism and was given nightguard previously. Has trouble falling asleep and staying asleep at times especially he was in the [employer] for 22 years before he retired. Currently he use melatonin 3 to 6 mg nightly which seem to help him significantly. There is no history of cataplexy, hypnagogic hallucinations, or sleep paralysis. In addition, there is no history of frequent leg movements, kicking at night, or an inability to keep the legs still. He indicated he go to sleep around 11 PM and wake up between 7 and 8 AM daily. The Cowden score today is 7 out of 24. He has been using Aricept for the last 3 months and the dosage was increased per neurology recommendation and he seemed to tolerate that quite well. Severe scoliosis which was associated with some vertebral compression in his lumbar and thoracic spine. He is being scheduled for spinal cord stimulator for pain control. Currently he is using combination of Mobic and Lyrica for neuropathy and pain control. I reviewed with him the pathophysiology of sleep apnea and the correlation between sleep apnea and memory loss and early dementia.   Furthermore, I suggested that appropriate evaluation to rule out sleep apnea is indicated at this time and he agreed to proceed with in lab sleep study to get adequate analysis of his sleep. He was instructed to bring his melatonin with him when he comes for his sleep study. Sleepiness Scale:     Sitting and reading 1    Watching TV 2    Sitting inactive in a public place 2    As a passenger in a car for an hour without a break 0    Lying down to rest in the afternoon when circumstances Permits 2    Sitting and talking to someone 0    Sitting quietly after lunch without alcohol 0    In a car, while stopped for a few minutes 0    Total :  7/24    Past Medical History:   Diagnosis Date    Arthritis     Chronic back pain 2016/17    After Laminectomy L1-L4    Chronic pain     back Left>right buttock    Colon polyps 07/2019 7-2019 pathology report tubulovillous adenoma    Compression deformity of vertebra 02/2016    Compression C5/6    Encounter for screening colonoscopy 07/03/2019    Erectile dysfunction 2007    After Radical Prostatectomy    Fecal incontinence 10/09/2019    After prostate surgery - followed by colorectal surgery Dr. Kinjal Ennis    GERD (gastroesophageal reflux disease)     controlled by nexium    Hypercholesterolemia     Hyperlipidemia 05/22/2017    Hypertension     Hypothyroidism 2001    Malignant neoplasm of prostate ([address]     Memory loss     Peripheral vascular disease ([address] ?2020?     Spondylosis of lumbar region without myelopathy or radiculopathy 12/10/2019    S/p surgery 1-2016 with Dr. 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    Essential hypertension    Spondylosis of cervical region without myelopathy or radiculopathy    Reflux esophagitis    Fecal incontinence    Allergic rhinitis    Colon polyps    Rosacea    Hypothyroidism    Hyperlipidemia    H/O prostate cancer    Spondylosis of lumbar region without myelopathy or radiculopathy    Right hand pain    Primary osteoarthritis of first carpometacarpal joint of right hand    Ganglion cyst of wrist, left    Suspected sleep apnea    Memory loss    Physiological insomnia       Past Surgical History:   Procedure Laterality Date    CATARACT REMOVAL Bilateral     COLONOSCOPY  2019 colonoscopy- multiple polyps     COLONOSCOPY      EYE SURGERY  2019    [facility]    LUMBAR LAMINECTOMY  2016    Dr Katalina Steward  2016    for spinal stenosis, L1-L3    ORTHOPEDIC SURGERY Right 1998    right knee scope    OTHER SURGICAL HISTORY N/A     artificial urinary spinter     PROSTATECTOMY      rad. prostatectomy due to prostate ca    UPPER GASTROINTESTINAL ENDOSCOPY  ? UROLOGICAL SURGERY  2008    art. urinary sphincter with reservoir       [unfilled]    Social History     Socioeconomic History    Marital status:      Spouse name: Not on file    Number of children: Not on file    Years of education: Not on file    Highest education level: Not on file   Occupational History    Not on file   Tobacco Use    Smoking status: Former     Packs/day: 1.00     Years: 30.00     Pack years: 30.00     Types: Cigarettes     Start date: 1968     Quit date: 1998     Years since quittin.7    Smokeless tobacco: Never   Substance and Sexual Activity    Alcohol use:  Yes     Alcohol/week: 20.0 standard drinks     Types: 20 Drinks containing 0.5 oz of alcohol per week    Drug use: No    Sexual activity: Not Currently     Partners: Female   Other Topics Concern    Not on file   Social History Narrative    Lives with wife  2 children  4 grandchildren one on way    [employer] 22 years     Social Determinants of Health     Financial Resource Strain: Low Risk     Difficulty of Paying Living Expenses: Not hard at all   Food Insecurity: No Food Insecurity    Worried About [employer] [employer] in the Last Year: Never true Ran Out of Food in the Last Year: Never true   Transportation Needs: Not on file   Physical Activity: Insufficiently Active    Days of Exercise per Week: 3 days    Minutes of Exercise per Session: 40 min   Stress: Not on file   Social Connections: Not on file   Intimate Partner Violence: Not on file   Housing Stability: Not on file         Family History   Problem Relation Age of Onset    Dementia Paternal Grandfather     Cancer Paternal Grandmother         Throat    Cancer Maternal Grandfather     Heart Disease Maternal Grandmother     Cancer Father 70        1993    Alcohol Abuse Brother         Now, recovered; at one time addicted to pain Rx    Diabetes Brother     Heart Disease Mother     Alzheimer's Disease Maternal Aunt     Alcohol Abuse Brother     Cancer Brother         2020    Diabetes Brother          Allergies   Allergen Reactions    Levofloxacin Rash         Current Outpatient Medications   Medication Sig    NONFORMULARY Take 1 capsule by mouth daily OTC Immune C    pregabalin (LYRICA) 100 MG capsule Take 100 mg by mouth in the morning, at noon, and at bedtime.    hydroCHLOROthiazide (MICROZIDE) 12.5 MG capsule Take 12.5 mg by mouth daily    amLODIPine (NORVASC) 10 MG tablet Take 1 tablet by mouth daily    pantoprazole (PROTONIX) 40 MG tablet Take 1 tablet by mouth daily    donepezil (ARICEPT) 10 MG tablet Take 1 tablet by mouth nightly    levothyroxine (SYNTHROID) 150 MCG tablet Take 1 tablet by mouth every morning (before breakfast) (Patient taking differently: Take 150 mcg by mouth every morning (before breakfast) 1/2 tab on Sunday)    atorvastatin (LIPITOR) 80 MG tablet Take 1 tablet by mouth daily    telmisartan-hydroCHLOROthiazide (MICARDIS HCT) 80-25 MG per tablet Take 1 tablet by mouth daily    melatonin 3 MG TABS tablet Take 3 mg by mouth at bedtime    Calcium Polycarbophil (FIBERCON PO) Take 1 tablet by mouth in the morning and at bedtime     Probiotic Product (PROBIOTIC-10 PO) Take 1 capsule  by mouth daily    aspirin 81 MG EC tablet Take 162 mg by mouth daily    fexofenadine (ALLEGRA) 180 MG tablet Take 180 mg by mouth daily    fluticasone (FLONASE) 50 MCG/ACT nasal spray 2 sprays by Nasal route daily as needed    meloxicam (MOBIC) 15 MG tablet Take 1 tablet by mouth daily     No current facility-administered medications for this visit. REVIEW OF SYSTEMS:   CONSTITUTIONAL:   There is no history of fever, chills, night sweats, weight loss, weight gain, persistent fatigue, or lethargy/hypersomnolence. EYES:   Denies problems with eye pain, erythema, blurred vision, or visual field loss. ENTM:   Denies history of tinnitus, epistaxis, sore throat, hoarseness, or dysphonia. LYMPH:   Denies swollen glands. CARDIAC:   No chest pain, pressure, discomfort, palpitations, orthopnea, murmurs, or edema. GI:   No dysphagia, heartburn reflux, nausea/vomiting, diarrhea, abdominal pain, or bleeding. :   Denies history of dysuria, hematuria, polyuria, or decreased urine output. MS:   No history of myalgias, arthralgias, bone pain, or muscle cramps. SKIN:   No history of rashes, jaundice, cyanosis, nodules, or ulcers. ENDO:   Negative for heat or cold intolerance. No history of DM. PSYCH:   Negative for anxiety, depression, insomnia, hallucinations. NEURO:   There is no history of AMS, persistent headache, decreased level of consciousness, seizures, or motor or sensory deficits. PHYSICAL EXAM:    Vitals:    01/12/23 1608   BP: 128/64   Pulse: 65   Resp: 14   Temp: 97 °F (36.1 °C)   SpO2: 98%        GENERAL APPEARANCE:   The patient is normal weight and in no respiratory distress. HEENT:   PERRL. Conjunctivae unremarkable. Nasal mucosa is without epistaxis, exudate, or polyps. Gums and dentition are unremarkable. There is moderate oropharyngeal narrowing. He is Mallampati 2   NECK/LYMPHATIC:   Symmetrical with no elevation of jugular venous pulsation. Trachea midline.  No thyroid enlargement. No cervical adenopathy. LUNGS:   Normal respiratory effort with symmetrical lung expansion. Breath sounds are diminished in the bases. HEART:   There is a regular rate and rhythm. No murmur, rub, or gallop. There is no edema in the lower extremities. ABDOMEN:   Soft and non-tender. No hepatosplenomegaly. Bowel sounds are normal.     SKIN:   There are no rashes, cyanosis, jaundice, or ecchymosis present. EXTREMITIES:   The extremities are unremarkable without clubbing, cyanosis, joint inflammation, degenerative, or ischemic change. MUSCULOSKELETAL:   There is no abnormal tone, muscle atrophy, or abnormal movement present. NEURO:   The patient is alert and oriented to person, place, and time. Memory appears intact and mood is normal.  No gross sensorimotor deficits are present. DIAGNOSTIC TESTS:         ASSESSMENT:  (Medical Decision Making)         ICD-10-CM    1. Suspected sleep apnea , the history and exam suggest possible sleep apnea as a contributing factor to memory loss. This requires further evaluation by in lab study. The pathophysiology of obstructive sleep apnea was reviewed with the patient. It's potential to promote severe neurologic, cardiac, pulmonary, and gastrointestinal problems was discussed. Specifically, the increased incidence of hypertension, coronary artery disease, congestive heart failure, pulmonary hypertension, gastroesophageal reflux, pathologic hypersomnolence, memory loss, and glucose intolerance was related to the consequences of hypoxemia, hypercapnia, airway obstruction, and sympathetic overdrive. We also discussed the ability of nasal CPAP to correct these abnormalities through maintenance of a patent airway. Therapeutic options including surgery, oral appliances, and weight loss were also reviewed. R29.818 Ambulatory Referral to Sleep Studies      2. Memory loss , very followed by neurology.   He had an MRI which was okay without any significant intracranial pathology R41. 3 Ambulatory Referral to Sleep Studies      3. Physiological insomnia , currently on melatonin with fairly good result but this could be aggravated by sleep apnea G47.00 Ambulatory Referral to Sleep Studies             PLAN:    The patient be scheduled for urgent in lab sleep study to rule out sleep apnea. Pathophysiology of sleep apnea was discussed in detail along with different treatment option as noted above    He was instructed to follow proper sleep hygiene and positional therapy    He will maintain his follow-up with neurology    Maintain his follow-up with primary care provider    Appropriate handout regarding sleep hygiene and sleep education will be provided to the patient    Continue melatonin 3-6 mg nightly    He will be scheduled tentatively for follow-up in the sleep center in 4 months or sooner if needed  Orders Placed This Encounter   Procedures    Ambulatory Referral to Sleep Studies     Referral Priority:   Urgent     Referral Type:   Consult for Advice and Opinion     Referral Reason:   Specialty Services Required     Number of Visits Requested:   1             No orders of the defined types were placed in this encounter. Over 50% of today's office visit was spent in face to face time reviewing test results, prognosis, importance of compliance, education about disease process, benefits of medications, instructions for management of acute flare-ups, and follow up plans. Total face to face time spent with patient including charting was 45 minutes.     King Agarwal MD  Electronically signed

## 2023-01-12 ENCOUNTER — OFFICE VISIT (OUTPATIENT)
Dept: SLEEP MEDICINE | Age: 73
End: 2023-01-12
Payer: MEDICARE

## 2023-01-12 VITALS
HEIGHT: 68 IN | SYSTOLIC BLOOD PRESSURE: 128 MMHG | WEIGHT: 194 LBS | OXYGEN SATURATION: 98 % | TEMPERATURE: 97 F | BODY MASS INDEX: 29.4 KG/M2 | RESPIRATION RATE: 14 BRPM | HEART RATE: 65 BPM | DIASTOLIC BLOOD PRESSURE: 64 MMHG

## 2023-01-12 DIAGNOSIS — R41.3 MEMORY LOSS: ICD-10-CM

## 2023-01-12 DIAGNOSIS — G47.00 PHYSIOLOGICAL INSOMNIA: ICD-10-CM

## 2023-01-12 DIAGNOSIS — R29.818 SUSPECTED SLEEP APNEA: ICD-10-CM

## 2023-01-12 PROCEDURE — G8484 FLU IMMUNIZE NO ADMIN: HCPCS | Performed by: INTERNAL MEDICINE

## 2023-01-12 PROCEDURE — 1123F ACP DISCUSS/DSCN MKR DOCD: CPT | Performed by: INTERNAL MEDICINE

## 2023-01-12 PROCEDURE — 3074F SYST BP LT 130 MM HG: CPT | Performed by: INTERNAL MEDICINE

## 2023-01-12 PROCEDURE — G8427 DOCREV CUR MEDS BY ELIG CLIN: HCPCS | Performed by: INTERNAL MEDICINE

## 2023-01-12 PROCEDURE — G8417 CALC BMI ABV UP PARAM F/U: HCPCS | Performed by: INTERNAL MEDICINE

## 2023-01-12 PROCEDURE — 3017F COLORECTAL CA SCREEN DOC REV: CPT | Performed by: INTERNAL MEDICINE

## 2023-01-12 PROCEDURE — 99204 OFFICE O/P NEW MOD 45 MIN: CPT | Performed by: INTERNAL MEDICINE

## 2023-01-12 PROCEDURE — 1036F TOBACCO NON-USER: CPT | Performed by: INTERNAL MEDICINE

## 2023-01-12 PROCEDURE — 3078F DIAST BP <80 MM HG: CPT | Performed by: INTERNAL MEDICINE

## 2023-01-12 NOTE — PATIENT INSTRUCTIONS
Sleep Hygiene Instructions    Sleep only as much as you need to feel refreshed during the following day. Restricting your time in bed helps to consolidate and deepen your sleep. Excessively long times in bed lead to fragmented and shallow sleep. Get up at your regular time the next day, no matter how little your slept. Get up at the same time each day, 7 days a week. A regular wake time in the morning leads to regular times on sleep onset, and helps to set your biological clock. Exercise regularly. Schedule exercise times so that they do not occur within 3 hours of when you intend to go to bed. Exercise makes it easier to initiate sleep and deepen sleep. Don't take your problems to bed. Plan some time earlier in the evening for working on your problems or planning the next day's activities. Worrying may interfere with initiating sleep and produce shallow sleep. Train yourself to use the bedroom only for sleep and sexual activity. This will help condition your brain to see bed as the place for sleeping. Do not read, watch TV or eat in bed. Do not try and fall asleep. This only makes the problem worse. Instead, turn on the light, leave the bedroom, and do something different like reading a book. Don't engage in stimulating activity. Return to bed only when you feel sleepy. Avoid long naps. Staying awake during the day helps to fall asleep at night. Naps totalling more than 30 minutes increase your chances of having trouble sleeping at night. Make sure that your bedroom is comfortable and free from light and noise. A comfortable, noise-free sleep environment will reduce the likelihood that you will wake up during the night. Noise that does not awaken you may disturb the quality of your sleep. Carpeting, insulated curtains, and closing the door may help. Make sure that your bedroom is at a comfortable temperature during the night.  Excessively warm or cold sleep environments may disturb sleep. Eat regular meals and di not go to bed hungry. Hunger may disturb sleep. A light snack at bedtime (especially carbohydrates) may help sleep, but avoid greasy or heavy foods. Avoid excessive liquids in the evening. Reducing liquid intake will minimize the need for night-time trips to the bathroom. Cut down on all caffeine products. Caffeinated beverages and food (Coffee, tea, cola, chocolate) can cause difficulty falling asleep, awakenings during the night, and shallow sleep. Even caffeine early in the day can disrupt night-time sleep. Avoid alcohol, especially in the evening. Although alcohol helps tense people fall asleep more easily, it causes awakenings later in the night. Smoking may disturb sleep. Nicotine is a stimulant. Try not to smoke during the night when you have trouble sleeping. Learning about Sleeping Well    What does sleeping well mean? Sleeping well means getting enough sleep. How much sleep is enough varies among people. The number of hours you sleep is not as improtant as how you feel when you wake up. If you do not feel refreshed, you probably need more sleep. Another sign of not getting enough sleep is feeling tired during the day. The average totally nightly sleep time is 7 1/2 to 8 hours. Healthy adults may need a little more or a little less than this. Why is getting enough sleep important? Getting enough quality sleep is a basic part of good health. When your sleep suffers, your mood and your thoughts can suffer too. Your thoughts can suffer too. You ma find yourself feeling more grumpy or stressed. No getting enough sleep also can lead to serious problems, including injury, accidents, anxiety, and depression. What might cause poor sleeping? Many things can cause sleep problems, including:    Stress. Stress can be caused by fear about a single event, such as giving a speech.   Or you may have ongoing stress, such as worry about work or school. Depression, anxiety, and other mental or emotional conditions. Changes in your sleep habits or surroundings. This includes changes that happen where you sleep, such as noise, light, or sleeping in a different bed. It also includes changes in your sleep pattern, such as having jet lab or working a late shift. Health problems, such as pain, breathing problems, and restless legs syndrome. Lack of regular exercise. How can you help yourself? Here are some tips that may help you sleep more soundly and wake up feeling more refreshed. Your sleeping area    Use your bedroom only for sleeping and sex. A bit of light reading may help you fall asleep. But if it doesn't, do your reading elsewhere in the house. Don't watch TV in bed. Be sure your bed is big enough to stretch out comfortable, especially if you have a sleep partner. Keep your bedroom quiet, dark, and cool. Use curtains, blinds, or sleep mask to block out light. To block out noise, use earplugs, soothing music, or a \"white noise\" machine. Your evening and bedtime routine    Create a relaxing bedtime routine. You might want to take a warm shower or bath, listen to soothing music, or drink a cup of non-caffeinated tea. Go to bed at the same time every night. And get up at the same time every morning, even if you feel tired. What to avoid:    Limit caffeine (coffee, tea, caffeinated sodas) during the day, and dont have any for at least 4 to 6 hours before bedtime. Don't drink alcohol before bedtime. Alcohol can cause you to wake up more often during the night. Don't smoke or use tobacco, especially in the evening. Nicotine can keep you awake. Don't like in bed away for too long. If you can't fall asleep, or if you wake up in the middle of the night and can't get back to sleep within 15 minutes or so, get out of bed and go to another room until you feel sleepy.     Don't take medicine right before bed that may keep you awake or make you feel hyper or enegerized. Your doctor can tell you if your medicine may do this and if you can take it earlier in the day. If you can't sleep:    Imagine yourself in a peaceful, pleasant scene. Focus on the details and feelings of being in a place that is relaxing. Get up and do a quiet or boring activity until you feel sleepy. Don't drink liquids after 6 p.m. if you wake up often because you have to go to the bathroom. Where can you learn more? Go to http://www.gray.com/    Enter O770 in the search box to learn more about \"Learning About Sleeping Well. \"           Learning About Sleeping Well  What does sleeping well mean? Sleeping well means getting enough sleep. How much sleep is enough varies among people. The number of hours you sleep is not as important as how you feel when you wake up. If you do not feel refreshed, you probably need more sleep. Another sign of not getting enough sleep is feeling tired during the day. The average total nightly sleep time is 7½ to 8 hours. Healthy adults may need a little more or a little less than this. Why is getting enough sleep important? Getting enough quality sleep is a basic part of good health. When your sleep suffers, your mood and your thoughts can suffer too. You may find yourself feeling more grumpy or stressed. Not getting enough sleep also can lead to serious problems, including injury, accidents, anxiety, and depression. What might cause poor sleeping? Many things can cause sleep problems, including:  Stress. Stress can be caused by fear about a single event, such as giving a speech. Or you may have ongoing stress, such as worry about work or school. Depression, anxiety, and other mental or emotional conditions. Changes in your sleep habits or surroundings. This includes changes that happen where you sleep, such as noise, light, or sleeping in a different bed.  It also includes changes in your sleep pattern, such as having jet lag or working a late shift. Health problems, such as pain, breathing problems, and restless legs syndrome. Lack of regular exercise. How can you help yourself? Here are some tips that may help you sleep more soundly and wake up feeling more refreshed. Your sleeping area   Use your bedroom only for sleeping and sex. A bit of light reading may help you fall asleep. But if it doesn't, do your reading elsewhere in the house. Don't watch TV in bed. Be sure your bed is big enough to stretch out comfortably, especially if you have a sleep partner. Keep your bedroom quiet, dark, and cool. Use curtains, blinds, or a sleep mask to block out light. To block out noise, use earplugs, soothing music, or a \"white noise\" machine. Your evening and bedtime routine   Get regular exercise, but not within 3 to 4 hours before your bedtime. Create a relaxing bedtime routine. You might want to take a warm shower or bath, listen to soothing music, or drink a cup of noncaffeinated tea. Go to bed at the same time every night. And get up at the same time every morning, even if you feel tired. What to avoid   Limit caffeine (coffee, tea, caffeinated sodas) during the day, and don't have any for at least 4 to 6 hours before bedtime. Don't drink alcohol before bedtime. Alcohol can cause you to wake up more often during the night. Don't smoke or use tobacco, especially in the evening. Nicotine can keep you awake. Don't take naps during the day, especially close to bedtime. Don't lie in bed awake for too long. If you can't fall asleep, or if you wake up in the middle of the night and can't get back to sleep within 15 minutes or so, get out of bed and go to another room until you feel sleepy. Don't take medicine right before bed that may keep you awake or make you feel hyper or energized.  Your doctor can tell you if your medicine may do this and if you can take it earlier in the day.  If you can't sleep   Imagine yourself in a peaceful, pleasant scene. Focus on the details and feelings of being in a place that is relaxing. Get up and do a quiet or boring activity until you feel sleepy. Don't drink any liquids after 6 p.m. if you wake up often because you have to go to the bathroom. Where can you learn more? Go to [employer]  Enter N185 in the search box to learn more about \"Learning About Sleeping Well. \"   © 7356-6480 Healthwise, Incorporated. Care instructions adapted under license by [facility] (which disclaims liability or warranty for this information). This care instruction is for use with your licensed healthcare professional. If you have questions about a medical condition or this instruction, always ask your healthcare professional. [address] any warranty or liability for your use of this information. Content Version: 42.7.192171; Current as of: March 12, 2014              Sleep Apnea: After Your Visit  Your Care Instructions  Sleep apnea means that you frequently stop breathing for 10 seconds or longer during sleep. It can be mild to severe, based on the number of times an hour that you stop breathing or have slowed breathing. Blocked or narrowed airways in your nose, mouth, or throat can cause sleep apnea. Your airway can become blocked when your throat muscles and tongue relax during sleep. You can treat sleep apnea at home by making lifestyle changes. You also can use a CPAP breathing machine that keeps tissues in the throat from blocking your airway. Or your doctor may suggest that you use a breathing device while you sleep. It helps keep your airway open. This could be a device that you put in your mouth. Other examples include strips or disks that you use on your nose. In some cases, surgery may be needed to remove enlarged tissues in the throat. Follow-up care is a key part of your treatment and safety.  Be sure to make and go to all appointments, and call your doctor if you are having problems. It's also a good idea to know your test results and keep a list of the medicines you take. How can you care for yourself at home? Lose weight, if needed. It may reduce the number of times you stop breathing or have slowed breathing. Sleep on your side. It may stop mild apnea. If you tend to roll onto your back, sew a pocket in the back of your pajama top. Put a tennis ball into the pocket, and stitch the pocket shut. This will help keep you from sleeping on your back. Avoid alcohol and medicines such as sleeping pills and sedatives before bed. Do not smoke. Smoking can make sleep apnea worse. If you need help quitting, talk to your doctor about stop-smoking programs and medicines. These can increase your chances of quitting for good. Prop up the head of your bed 4 to 6 inches by putting bricks under the legs of the bed. Treat breathing problems, such as a stuffy nose, caused by a cold or allergies. Try a continuous positive airway pressure (CPAP) breathing machine if your doctor recommends it. The machine keeps your airway open when you sleep. If CPAP does not work for you, ask your doctor if you can try other breathing machines. A bilevel positive airway pressure machine uses one type of air pressure for breathing in and another type for breathing out. Another device raises or lowers air pressure as needed while you breathe. Talk to your doctor if:  Your nose feels dry or bleeds when you use one of these machines. You may need to increase moisture in the air. A humidifier may help. Your nose is runny or stuffy from using a breathing machine. Decongestants or a corticosteroid nasal spray may help. You are sleepy during the day and it gets in the way of the normal things you do. Do not drive when you are drowsy. When should you call for help?   Watch closely for changes in your health, and be sure to contact your doctor if: You still have sleep apnea even though you have made lifestyle changes. You are thinking of trying a device such as CPAP. You are having problems using a CPAP or similar machine. Where can you learn more? Go to [employer].be  Enter J936 in the search box to learn more about \"Sleep Apnea: After Your Visit. \"   © 5717-0607 Healthwise, Incorporated. Care instructions adapted under license by [facility] (which disclaims liability or warranty for this information). This care instruction is for use with your licensed healthcare professional. If you have questions about a medical condition or this instruction, always ask your healthcare professional. [address] any warranty or liability for your use of this information. Content Version: 18.0.711508; Current as of: January 14, 2014                        Eating Healthy Foods: After Your Visit  Your Care Instructions  Eating healthy foods can help lower your risk for disease. Healthy food gives you energy and keeps your heart strong, your brain active, your muscles working, and your bones strong. A healthy diet includes a variety of foods from the basic food groups: grains, vegetables, fruits, milk and milk products, and meat and beans. Some people may eat more of their favorite foods from only one food group and, as a result, miss getting the nutrients they need. So, it is important to pay attention not only to what you eat but also to what you are missing from your diet. You can eat a healthy, balanced diet by making a few small changes. Follow-up care is a key part of your treatment and safety. Be sure to make and go to all appointments, and call your doctor if you are having problems. Its also a good idea to know your test results and keep a list of the medicines you take. How can you care for yourself at home? Look at what you eat  Keep a food diary for a week or two and record everything you eat or drink. Track the number of servings you eat from each food group. For a balanced diet every day, eat a variety of:  6 or more ounce-equivalents of grains, such as cereals, breads, crackers, rice, or pasta, every day. An ounce-equivalent is 1 slice of bread, 1 cup of ready-to-eat cereal, or ½ cup of cooked rice, cooked pasta, or cooked cereal.  2½ cups of vegetables, especially:  Dark-green vegetables such as broccoli and spinach. Orange vegetables such as carrots and sweet potatoes. Dry beans (such as jimenez and kidney beans) and peas (such as lentils). 2 cups of fresh, frozen, or canned fruit. A small apple or 1 banana or orange equals 1 cup.  3 cups of nonfat or low-fat milk, yogurt, or other milk products. 5½ ounces of meat and beans, such as chicken, fish, lean meat, beans, nuts, and seeds. One egg, 1 tablespoon of peanut butter, ½ ounce nuts or seeds, or ¼ cup of cooked beans equals 1 ounce of meat. Learn how to read food labels for serving sizes and ingredients. Fast-food and convenience-food meals often contain few or no fruits or vegetables. Make sure you eat some fruits and vegetables to make the meal more nutritious. Look at your food diary. For each food group, add up what you have eaten and then divide the total by the number of days. This will give you an idea of how much you are eating from each food group. See if you can find some ways to change your diet to make it more healthy. Start small  Do not try to make dramatic changes to your diet all at once. You might feel that you are missing out on your favorite foods and then be more likely to fail. Start slowly, and gradually change your habits. Try some of the following:  Use whole wheat bread instead of white bread. Use nonfat or low-fat milk instead of whole milk. Eat brown rice instead of white rice, and eat whole wheat pasta instead of white-flour pasta. Try low-fat cheeses and low-fat yogurt.   Add more fruits and vegetables to meals and have them for snacks. Add lettuce, tomato, cucumber, and onion to sandwiches. Add fruit to yogurt and cereal.  Enjoy food  You can still eat your favorite foods. You just may need to eat less of them. If your favorite foods are high in fat, salt, and sugar, limit how often you eat them, but do not cut them out entirely. Eat a wide variety of foods. Make healthy choices when eating out  The type of restaurant you choose can help you make healthy choices. Even fast-food chains are now offering more low-fat or healthier choices on the menu. Choose smaller portions, or take half of your meal home. When eating out, try:  A veggie pizza with a whole wheat crust or grilled chicken (instead of sausage or pepperoni). Pasta with roasted vegetables, grilled chicken, or marinara sauce instead of cream sauce. A vegetable wrap or grilled chicken wrap. Broiled or poached food instead of fried or breaded items. Make healthy choices easy  Buy packaged, prewashed, ready-to-eat fresh vegetables and fruits, such as baby carrots, salad mixes, and chopped or shredded broccoli and cauliflower. Buy packaged, presliced fruits, such as melon or pineapple. Choose 100% fruit or vegetable juice instead of soda. Limit juice intake to 4 to 6 oz (½ to ¾ cup) a day. Blend low-fat yogurt, fruit juice, and canned or frozen fruit to make a smoothie for breakfast or a snack. Where can you learn more? Go to [employer]  Enter T756 in the search box to learn more about \"Eating Healthy Foods: After Your Visit. \"   © 8787-9904 Healthwise, Incorporated. Care instructions adapted under license by [facility] (which disclaims liability or warranty for this information).  This care instruction is for use with your licensed healthcare professional. If you have questions about a medical condition or this instruction, always ask your healthcare professional. [address] any warranty or liability for your use of this information. Content Version: 10.1.359648; Current as of: May 17, 2013                                    Learning About Physical Activity  What is physical activity? Physical activity is any kind of activity that gets your body moving. Being active means allowing your body to \"practice\" breathing, stretching, and lifting. The more practice your body gets, the better it works. The types of physical activity that can help you get fit and stay healthy include:  Aerobic or \"cardio\" activities that make your heart beat faster and make you breathe harder, such as brisk walking, riding a bike, or running. Aerobic activities strengthen your heart and lungs and build up your endurance. Strength training activities that make your muscles work against, or \"resist,\" something, such as lifting weights or doing push-ups. These activities help tone and strengthen your muscles. Stretches that allow you to move your joints and muscles through their full range of motion. Stretching helps you be more flexible and avoid injury. What are the benefits of physical activity? Being active is one of the best things you can do to get fit and stay healthy. It helps you to:  Feel stronger and have more energy to do all the things you like to do. Focus better at school or work and perform better in sports. Feel, think, and sleep better. Reach and stay at a healthy weight. Lose fat and build lean muscle. Lower your risk for serious health problems. Keep your bones, muscles, and joints strong. Being fit lets you do more physical activity. And it lets you work out harder without as much effort. How can you make physical activity part of your life? Get at least 30 minutes of exercise on most days of the week. Walking is a good choice. You also may want to do other activities, such as running, swimming, cycling, or playing tennis or team sports.   Pick activities that you like--ones that make your heart beat faster, your muscles stronger, and your muscles and joints more flexible. If you find more than one thing you like doing, do them all. You don't have to do the same thing every day. Get your heart pumping every day. Any activity that makes your heart beat faster and keeps it at that rate for a while counts. Here are some great ways to get your heart beating faster:  Go for a brisk walk, run, or bike ride. Go for a hike or swim. Go in-line skating. Play a game of touch football, basketball, or soccer. Ride a bike. Play tennis or racquetball. Climb stairs. Even some household chores can be aerobic--just do them at a faster pace. Vacuuming, raking or mowing the lawn, sweeping the garage, and washing and waxing the car all can help get your heart rate up. Strengthen your muscles during the week. You don't have to lift heavy weights or grow big, bulky muscles to get stronger. Doing a few simple activities that make your muscles work against, or \"resist,\" something can help you get stronger. For example, you can:  Do push-ups or sit-ups, which use your own body weight as resistance. Lift weights or dumbbells or use stretch bands at home or in a gym or community center. Stretch your muscles often. Stretching will help you as you become more active. It can help you stay flexible, loosen tight muscles, and avoid injury. It can also help improve your balance and posture and can be a great way to relax. Be sure to stretch the muscles you'll be using when you work out. Its best to warm your muscles slightly before you stretch them. Walk or do some other light aerobic activity for a few minutes, and then start stretching. When you stretch your muscles:  Do it slowly. Stretching is not about going fast or making sudden movements. Don't push or bounce during a stretch. Hold each stretch for at least 15 to 30 seconds, if you can. You should feel a stretch in the muscle, but not pain. Breathe out as you do the stretch.  Then breathe in as you hold the stretch. Don't hold your breath. If you're worried about how more activity might affect your health, have a checkup before you start. Follow any special advice your doctor gives you for getting a smart start. Where can you learn more? Go to [employer]  Enter [id number] in the search box to learn more about \"Learning About Physical Activity. \"   © 0455-2697 [employer]. Care instructions adapted under license by [employer] (which disclaims liability or warranty for this information). This care instruction is for use with your licensed healthcare professional. If you have questions about a medical condition or this instruction, always ask your healthcare professional. [address] any warranty or liability for your use of this information. Content Version: 29.0.672086; Current as of: November 15, 2013                                          Learning About CPAP for Sleep Apnea  What is CPAP? CPAP is a small machine that you use at home every night while you sleep. It increases air pressure in your throat to keep your airway open. When you have sleep apnea, this can help you sleep better so you feel much better. CPAP stands for \"continuous positive airway pressure. \"  The CPAP machine will have one of the following:  A mask that covers your nose and mouth  Prongs that fit into your nose  A mask that covers your nose only, the most common type. This type is called NCPAP. The N stands for \"nasal.\"  Why is it done? CPAP is usually the best treatment for obstructive sleep apnea. It is the first treatment choice and the most widely used. Your doctor may suggest CPAP if you have: Moderate to severe sleep apnea. Sleep apnea and coronary artery disease (CAD) or heart failure. How does it help? CPAP can help you have more normal sleep, so you feel less sleepy and more alert during the daytime.   CPAP may help keep heart failure or other heart problems from getting worse. CPAP may help lower your blood pressure. If you use CPAP, your bed partner may also sleep better because you are not snoring or restless. What are the side effects? Some people who use CPAP have:  A dry or stuffy nose and a sore throat. Irritated skin on the face. Sore eyes. Bloating. If you have any of these problems, work with your doctor to fix them. Here are some things you can try:  Be sure the mask or nasal prongs fit well. See if your doctor can adjust the pressure of your CPAP. If your nose is dry, try a humidifier. If your nose is runny or stuffy, try decongestant medicine or a steroid nasal spray. Be safe with medicines. Read and follow all instructions on the label. Do not use the medicine longer than the label says. If these things do not help, you might try a different type of machine. Some machines have air pressure that adjusts on its own. Others have air pressures that are different when you breathe in than when you breathe out. This may reduce discomfort caused by too much pressure in your nose. Where can you learn more? Go to [employer]  Enter Lorenzo Jasmine in the search box to learn more about \"Learning About CPAP for Sleep Apnea. \"   © 4133-2980 Healthwise, Incorporated. Care instructions adapted under license by [facility] (which disclaims liability or warranty for this information). This care instruction is for use with your licensed healthcare professional. If you have questions about a medical condition or this instruction, always ask your healthcare professional. [place] [address] any warranty or liability for your use of this information.   Content Version: 19.7.588809; Current as of: January 24, 2014

## 2023-01-13 LAB
ALBUMIN SERPL ELPH-MCNC: 3 G/DL (ref 2.9–4.4)
ALBUMIN/GLOB SERPL: 0.9 (ref 0.7–1.7)
ALPHA1 GLOB SERPL ELPH-MCNC: 0.5 G/DL (ref 0–0.4)
ALPHA2 GLOB SERPL ELPH-MCNC: 1 G/DL (ref 0.4–1)
B-GLOBULIN SERPL ELPH-MCNC: 0.8 G/DL (ref 0.7–1.3)
GAMMA GLOB SERPL ELPH-MCNC: 1.1 G/DL (ref 0.4–1.8)
GLOBULIN SER CALC-MCNC: 3.4 G/DL (ref 2.2–3.9)
M PROTEIN SERPL ELPH-MCNC: ABNORMAL G/DL
PROT SERPL-MCNC: 6.4 G/DL (ref 6–8.5)

## 2023-01-30 ENCOUNTER — HOSPITAL ENCOUNTER (OUTPATIENT)
Dept: WOUND CARE | Age: 73
Discharge: HOME | End: 2023-01-30
Payer: MEDICARE

## 2023-01-30 ENCOUNTER — HOSPITAL ENCOUNTER (OUTPATIENT)
Dept: GENERAL RADIOLOGY | Age: 73
Discharge: HOME | End: 2023-02-02
Payer: MEDICARE

## 2023-01-30 VITALS
RESPIRATION RATE: 18 BRPM | DIASTOLIC BLOOD PRESSURE: 66 MMHG | BODY MASS INDEX: 29.86 KG/M2 | SYSTOLIC BLOOD PRESSURE: 123 MMHG | TEMPERATURE: 97.6 F | HEIGHT: 68 IN | WEIGHT: 197 LBS | HEART RATE: 62 BPM

## 2023-01-30 DIAGNOSIS — S91.104A OPEN WOUND OF FIFTH TOE, RIGHT, INITIAL ENCOUNTER: ICD-10-CM

## 2023-01-30 DIAGNOSIS — L03.031 CELLULITIS OF FIFTH TOE, RIGHT: ICD-10-CM

## 2023-01-30 DIAGNOSIS — S91.104A OPEN WOUND OF FIFTH TOE, RIGHT, INITIAL ENCOUNTER: Primary | ICD-10-CM

## 2023-01-30 PROCEDURE — 73630 X-RAY EXAM OF FOOT: CPT

## 2023-01-30 PROCEDURE — 87070 CULTURE OTHR SPECIMN AEROBIC: CPT

## 2023-01-30 PROCEDURE — 87075 CULTR BACTERIA EXCEPT BLOOD: CPT

## 2023-01-30 PROCEDURE — 99202 OFFICE O/P NEW SF 15 MIN: CPT | Performed by: FAMILY MEDICINE

## 2023-01-30 PROCEDURE — 87106 FUNGI IDENTIFICATION YEAST: CPT

## 2023-01-30 PROCEDURE — 87176 TISSUE HOMOGENIZATION CULTR: CPT

## 2023-01-30 PROCEDURE — 11042 DBRDMT SUBQ TIS 1ST 20SQCM/<: CPT

## 2023-01-30 PROCEDURE — 11042 DBRDMT SUBQ TIS 1ST 20SQCM/<: CPT | Performed by: FAMILY MEDICINE

## 2023-01-30 PROCEDURE — 99203 OFFICE O/P NEW LOW 30 MIN: CPT

## 2023-01-30 RX ORDER — SULFAMETHOXAZOLE AND TRIMETHOPRIM 800; 160 MG/1; MG/1
1 TABLET ORAL 2 TIMES DAILY
Qty: 20 TABLET | Refills: 0 | Status: SHIPPED | OUTPATIENT
Start: 2023-01-30 | End: 2023-02-09

## 2023-01-30 RX ORDER — DOXYCYCLINE HYCLATE 100 MG
100 TABLET ORAL 2 TIMES DAILY
Qty: 20 TABLET | Refills: 0 | Status: SHIPPED | OUTPATIENT
Start: 2023-01-30 | End: 2023-01-30

## 2023-01-30 NOTE — WOUND CARE
Discharge Instructions for  Elizabeth Trejo  [address] [place] [address]  [address], [address]  Phone [phone]   Fax [phone]      NAME:  Arben Martinez  YOB: 1950  MEDICAL RECORD NUMBER:  [id number]  DATE:  1/30/2023    Return Appointment:   2 weeks with Fidel Aponte, DO      Instructions: Right 5th toe  Cleanse wound and periwound with wound cleanser or normal saline. You may cleanse with Vashe at each dressing change. This can be obtained on [employer]. Gently pack alginate with silver rope into wound bed filling wound space. Do not pack tightly. Secure with gauze  Change 3x weekly. Tubigrip to right lower leg    C/S at today's visit. Prescription sent to your pharmacy for Bactrim. Xray ordered of your right foot at today's visit. Increase your protein levels to at least 60 grams per day. You may supplement with protein shakes with at least 30 grams per day. Should you experience increased redness, swelling, pain, foul odor, size of wound(s), or have a temperature over 101 degrees please contact the [address] [place] [address] Road at [phone] or if after hours contact your primary care physician or go to the hospital emergency department. PLEASE NOTE: IF YOU ARE UNABLE TO OBTAIN WOUND SUPPLIES, CONTINUE TO USE THE SUPPLIES YOU HAVE AVAILABLE UNTIL YOU ARE ABLE TO REACH US. IT IS MOST IMPORTANT TO KEEP THE WOUND COVERED AT ALL TIMES.     Electronically signed Belkys Smith RN on 1/30/2023 at 2:39 PM

## 2023-01-30 NOTE — FLOWSHEET NOTE
01/30/23 1406   Wound 01/30/23 Toe (Comment  which one) Anterior;Right #1 right medial 5th toe   Date First Assessed/Time First Assessed: 01/30/23 1405   Present on Hospital Admission: Yes  Wound Approximate Age at First Assessment (Weeks): 4 weeks  Primary Wound Type: Neuropathic  Location: Toe (Comment  which one)  Wound Location Orientation: A...    Wound Image     Wound Etiology Other  (neuropathic)   Wound Cleansed Cleansed with saline   Wound Length (cm) 0.3 cm   Wound Width (cm) 0.6 cm   Wound Depth (cm) 0.3 cm   Wound Surface Area (cm^2) 0.18 cm^2   Wound Volume (cm^3) 0.054 cm^3   Post-Procedure Length (cm) 0.3 cm   Post-Procedure Width (cm) 0.6 cm   Post-Procedure Depth (cm) 0.3 cm   Post-Procedure Surface Area (cm^2) 0.18 cm^2   Post-Procedure Volume (cm^3) 0.054 cm^3   Wound Assessment Granulation tissue   Drainage Amount Moderate   Odor None   Wound Thickness Description not for Pressure Injury Full thickness   Patient is on asa daily

## 2023-01-30 NOTE — DISCHARGE INSTRUCTIONS
Discharge Instructions for  Elizabeth Trejo  [address] [place] [address]  [address], [address]  Phone [phone]   Fax [phone]      NAME:  Ladon Blizzard  YOB: 1950  MEDICAL RECORD NUMBER:  [id number]  DATE:  @ED@    Return Appointment:   2 weeks with Max Rees,       Instructions: Right 5th toe  Cleanse wound and periwound with wound cleanser or normal saline. You may cleanse with Vashe at each dressing change. This can be obtained on [employer]. Gently pack alginate with silver rope into wound bed filling wound space. Do not pack tightly. Secure with gauze  Change 3x weekly. C/S at today's visit. Prescription sent to your pharmacy for Bactrim. Xray ordered of your right foot at today's visit. Increase your protein levels to at least 60 grams per day. You may supplement with protein shakes with at least 30 grams per day. Should you experience increased redness, swelling, pain, foul odor, size of wound(s), or have a temperature over 101 degrees please contact the [address] [place] [address] Road at [phone] or if after hours contact your primary care physician or go to the hospital emergency department. PLEASE NOTE: IF YOU ARE UNABLE TO OBTAIN WOUND SUPPLIES, CONTINUE TO USE THE SUPPLIES YOU HAVE AVAILABLE UNTIL YOU ARE ABLE TO REACH US. IT IS MOST IMPORTANT TO KEEP THE WOUND COVERED AT ALL TIMES.     Electronically signed Arya Poe RN on 1/30/2023 at 3:06 PM

## 2023-01-31 PROBLEM — S91.104A OPEN WOUND OF FIFTH TOE, RIGHT, INITIAL ENCOUNTER: Chronic | Status: ACTIVE | Noted: 2023-01-31

## 2023-01-31 PROBLEM — S91.104A OPEN WOUND OF FIFTH TOE, RIGHT, INITIAL ENCOUNTER: Status: ACTIVE | Noted: 2023-01-31

## 2023-01-31 RX ORDER — GINSENG 100 MG
CAPSULE ORAL ONCE
OUTPATIENT
Start: 2023-01-31 | End: 2023-01-31

## 2023-01-31 RX ORDER — LIDOCAINE HYDROCHLORIDE 40 MG/ML
SOLUTION TOPICAL ONCE
OUTPATIENT
Start: 2023-01-31 | End: 2023-01-31

## 2023-01-31 RX ORDER — BACITRACIN, NEOMYCIN, POLYMYXIN B 400; 3.5; 5 [USP'U]/G; MG/G; [USP'U]/G
OINTMENT TOPICAL ONCE
OUTPATIENT
Start: 2023-01-31 | End: 2023-01-31

## 2023-01-31 RX ORDER — LIDOCAINE HYDROCHLORIDE 20 MG/ML
JELLY TOPICAL ONCE
OUTPATIENT
Start: 2023-01-31 | End: 2023-01-31

## 2023-01-31 RX ORDER — BACITRACIN ZINC AND POLYMYXIN B SULFATE 500; 1000 [USP'U]/G; [USP'U]/G
OINTMENT TOPICAL ONCE
OUTPATIENT
Start: 2023-01-31 | End: 2023-01-31

## 2023-01-31 RX ORDER — CLOBETASOL PROPIONATE 0.5 MG/G
OINTMENT TOPICAL ONCE
OUTPATIENT
Start: 2023-01-31 | End: 2023-01-31

## 2023-01-31 RX ORDER — GENTAMICIN SULFATE 1 MG/G
OINTMENT TOPICAL ONCE
OUTPATIENT
Start: 2023-01-31 | End: 2023-01-31

## 2023-01-31 RX ORDER — LIDOCAINE 50 MG/G
OINTMENT TOPICAL ONCE
OUTPATIENT
Start: 2023-01-31 | End: 2023-01-31

## 2023-01-31 RX ORDER — LIDOCAINE 40 MG/G
CREAM TOPICAL ONCE
OUTPATIENT
Start: 2023-01-31 | End: 2023-01-31

## 2023-02-01 ENCOUNTER — HOSPITAL ENCOUNTER (OUTPATIENT)
Dept: SLEEP MEDICINE | Age: 73
Discharge: HOME | End: 2023-02-04
Payer: MEDICARE

## 2023-02-01 PROCEDURE — 95811 POLYSOM 6/>YRS CPAP 4/> PARM: CPT

## 2023-02-02 ENCOUNTER — TELEPHONE (OUTPATIENT)
Dept: WOUND CARE | Age: 73
End: 2023-02-02

## 2023-02-02 DIAGNOSIS — S91.104A OPEN WOUND OF FIFTH TOE, RIGHT, INITIAL ENCOUNTER: Primary | ICD-10-CM

## 2023-02-02 PROBLEM — L03.031 CELLULITIS OF FIFTH TOE, RIGHT: Chronic | Status: ACTIVE | Noted: 2023-02-02

## 2023-02-02 PROBLEM — L03.031 CELLULITIS OF FIFTH TOE, RIGHT: Status: ACTIVE | Noted: 2023-02-02

## 2023-02-02 RX ORDER — FLUCONAZOLE 100 MG/1
100 TABLET ORAL DAILY
Qty: 30 TABLET | Refills: 0 | Status: SHIPPED | OUTPATIENT
Start: 2023-02-02 | End: 2023-03-04

## 2023-02-02 NOTE — PROGRESS NOTES
[address] [facility]   History and Physical Note   Referring Provider: Svetlana Jeronimo MD  Reason for Referral: Chronic wound base of fifth toe right side near webbing space. [address] [facility] RECORD NUMBER:  [id number]  AGE: 67 y.o. GENDER: male  : 1950  EPISODE DATE:  2023    Chief complaint and reason for visit:     Chief Complaint   Patient presents with    Wound Check     Right 5th toe         HISTORY of PRESENT ILLNESS HPI     Cain García is a 67 y.o. male who presents today for an initial evaluation of a wound/ulcer. Patient is new to the wound center. Wound duration:  2 month(s). History of Wound Context: Patient presents today with a chronic wound just distal to the webbing space of the fifth toe right foot. This has been persistent. Comes in today for evaluation of this. Pertinent associated symptoms: drainage , redness, and swelling    PAST MEDICAL HISTORY        Diagnosis Date    Arthritis     Chronic back pain     After Laminectomy L1-L4    Chronic pain     back Left>right buttock    Colon polyps 2019 pathology report tubulovillous adenoma    Compression deformity of vertebra 2016    Compression C5/6    Encounter for screening colonoscopy 2019    Erectile dysfunction 2007    After Radical Prostatectomy    Fecal incontinence 10/09/2019    After prostate surgery - followed by colorectal surgery Dr. Lodema Nyhan    GERD (gastroesophageal reflux disease)     controlled by nexium    Hypercholesterolemia     Hyperlipidemia 2017    Hypertension     Hypothyroidism 2001    Malignant neoplasm of prostate ([address]     Memory loss     Peripheral vascular disease ([address] ??     Spondylosis of lumbar region without myelopathy or radiculopathy 12/10/2019    S/p surgery  with Dr. Jaki Lorenz incontinence, male     now with artificial sphinctor    Thyroid disease     hypothyroidism       PAST SURGICAL HISTORY  Past Surgical History:   Procedure Laterality Date    CATARACT REMOVAL Bilateral     COLONOSCOPY  2019 colonoscopy- multiple polyps     COLONOSCOPY      EYE SURGERY  2019    [facility]    LUMBAR LAMINECTOMY  2016    Dr Samir Matt  2016    for spinal stenosis, L1-L3    ORTHOPEDIC SURGERY Right 1998    right knee scope    OTHER SURGICAL HISTORY N/A     artificial urinary spinter     PROSTATECTOMY  2007    rad. prostatectomy due to prostate ca    UPPER GASTROINTESTINAL ENDOSCOPY  2017? UROLOGICAL SURGERY  2008    art. urinary sphincter with reservoir       FAMILY HISTORY  Family History   Problem Relation Age of Onset    Dementia Paternal Grandfather     Cancer Paternal Grandmother         Throat    Cancer Maternal Grandfather     Heart Disease Maternal Grandmother     Cancer Father 79        46    Alcohol Abuse Brother         Now, recovered; at one time addicted to pain Rx    Diabetes Brother     Heart Disease Mother     Alzheimer's Disease Maternal Aunt     Alcohol Abuse Brother     Cancer Brother         2020    Diabetes Brother        SOCIAL HISTORY  Social History     Tobacco Use    Smoking status: Former     Packs/day: 1.00     Years: 30.00     Pack years: 30.00     Types: Cigarettes     Start date: 1968     Quit date: 1998     Years since quittin.8    Smokeless tobacco: Never   Substance Use Topics    Alcohol use: Yes     Alcohol/week: 20.0 standard drinks     Types: 20 Drinks containing 0.5 oz of alcohol per week    Drug use: No       ALLERGIES  Allergies   Allergen Reactions    Levofloxacin Rash       MEDICATIONS  Current Outpatient Medications on File Prior to Encounter   Medication Sig Dispense Refill    NONFORMULARY Take 1 capsule by mouth daily OTC Immune C      pregabalin (LYRICA) 100 MG capsule Take 100 mg by mouth in the morning, at noon, and at bedtime.       hydroCHLOROthiazide (MICROZIDE) 12.5 MG capsule Take 12.5 mg by mouth daily amLODIPine (NORVASC) 10 MG tablet Take 1 tablet by mouth daily 90 tablet 3    pantoprazole (PROTONIX) 40 MG tablet Take 1 tablet by mouth daily 90 tablet 3    donepezil (ARICEPT) 10 MG tablet Take 1 tablet by mouth nightly 90 tablet 3    meloxicam (MOBIC) 15 MG tablet Take 1 tablet by mouth daily 30 tablet 0    levothyroxine (SYNTHROID) 150 MCG tablet Take 1 tablet by mouth every morning (before breakfast) (Patient taking differently: Take 150 mcg by mouth every morning (before breakfast) 1/2 tab on Sunday) 90 tablet 3    atorvastatin (LIPITOR) 80 MG tablet Take 1 tablet by mouth daily 90 tablet 3    telmisartan-hydroCHLOROthiazide (MICARDIS HCT) 80-25 MG per tablet Take 1 tablet by mouth daily 90 tablet 3    melatonin 3 MG TABS tablet Take 6 mg by mouth at bedtime      Calcium Polycarbophil (FIBERCON PO) Take 1 tablet by mouth in the morning and at bedtime  (Patient not taking: Reported on 1/30/2023)      Probiotic Product (PROBIOTIC-10 PO) Take 1 capsule by mouth daily      aspirin 81 MG EC tablet Take 162 mg by mouth daily      fexofenadine (ALLEGRA) 180 MG tablet Take 180 mg by mouth daily      fluticasone (FLONASE) 50 MCG/ACT nasal spray 2 sprays by Nasal route daily as needed       No current facility-administered medications on file prior to encounter. REVIEW OF SYSTEMS  A comprehensive review of systems was negative except for: Pertinent items are noted in HPI. Objective:      /66   Pulse 62   Temp 97.6 °F (36.4 °C) (Temporal)   Resp 18   Ht 5' 8\" (1.727 m)   Wt 197 lb (89.4 kg)   BMI 29.95 kg/m²     Wt Readings from Last 3 Encounters:   01/30/23 197 lb (89.4 kg)   01/12/23 194 lb (88 kg)   01/10/23 196 lb (88.9 kg)       PHYSICAL EXAM  General Appearance/Constitutional: alert and oriented to person, place and time,  and in no acute distress. Nontoxic. Skin: warm and dry, no rash, positive wound per LDA documentation if applicable. Head: normocephalic and atraumatic.   Eyes: extraocular eye movements intact, conjunctivae normal, and sclera anicteric. ENT: hearing grossly normal bilaterally. Normal appearance. Pulmonary/Chest: no chest wall tenderness and clear anteriorly. No respiratory distress. Cardiovascular: normal rate and regular rhythm. GI: abdomen soft, non-tender and non-distended. Musculoskeletal: normal range of motion of joints. Nontender calves. No cyanosis. Nontender calves. Edema trace  Neurologic: no gross cranial nerve deficit and speech normal. No focal deficits. Mental status normal.    Medical Decision Making:     Patient is a chronic wound on the base of the fifth toe in the webbing space area. I am able to probe down deep into the tunnel. After this was debrided today Tissue culture was obtained. We will initiate Bactrim. Adjust accordingly once culture is available. X-ray was ordered. Further recommendations pending the above data. May consider MRI if wound is not improved. Discussed excellent protein intake. Assessment required other independent historian(s): Yes. Additional Historian: patient . Comorbid conditions affecting wound healing: As noted in [facility] which was reviewed. Problem List Items Addressed This Visit          Other    * (Principal) Open wound of fifth toe, right, initial encounter - Primary (Chronic)    Relevant Orders    XR FOOT RIGHT (MIN 3 VIEWS) (Completed)    Cellulitis of fifth toe, right (Chronic)       Wounds and Treatment Plan:  Return Appointment:   2 weeks with Mandy Baumann DO        Instructions: Right 5th toe  Cleanse wound and periwound with wound cleanser or normal saline. You may cleanse with Vashe at each dressing change. This can be obtained on [employer]. Gently pack alginate with silver rope into wound bed filling wound space. Do not pack tightly. Secure with gauze  Change 3x weekly. C/S at today's visit. Prescription sent to your pharmacy for Bactrim. Xray ordered of your right foot at today's visit. Increase your protein levels to at least 60 grams per day. You may supplement with protein shakes with at least 30 grams per day. Other diagnoses or problems addressed:      Pertinent labs reviewed. Review of medical records and external note (s) from other providers was done for this visit. New lab or imaging orders placed:  X-ray ordered and culture ordered. Prescription drug management: N/A     Risk of complications and/or mortality of patient management: This patient has a minimal risk of morbidity and mortality from additional diagnostic testing or treatment. This is due to the above conditions affecting wound healing as well as patient and procedure risk factors. Education and discussion held with patient regarding these disease processes pertinent to wound(s). Other pertinent decisions include: minor surgery or procedures as below. The patient's diagnosis or treatment is  significantly limited by social determinants of health as noted by: nutritional resource limitations . Discussion of management or test interpretation with N/A. Time spent with patient and patient care issues above the usual time needed for wound assessment and treatment was: [] 15-20 min  [x] 21-30 min  [] 31-44 min  [] 45 min or more  This included time retrieving and reviewing records with patient and education provided to patient regarding disease process(es), offloading or pressure relief, nutrition needed for wound healing, smoking cessation when applicable, and infection risk.     This time also included physician non-face-to-face service time visit on the date of service such as  Preparing to see the patient (eg, review of tests)  Obtaining and/or reviewing separately obtained history  Performing a medically necessary appropriate examination and/or evaluation  Counseling and educating the patient/family/caregiver  Ordering medications, tests, or procedures  Referring and communicating with other health care professionals as needed  Documenting clinical information in the electronic or other health record  Independently interpreting results (not reported separately) and communicating results to the patient/family/caregiver  Care coordination (not reported separately)    Wound 01/30/23 Toe (Comment  which one) Anterior;Right #1 right medial 5th toe (Active)   Wound Image    01/30/23 1406   Wound Etiology Other 01/30/23 1406   Wound Cleansed Cleansed with saline 01/30/23 1406   Wound Length (cm) 0.3 cm 01/30/23 1406   Wound Width (cm) 0.6 cm 01/30/23 1406   Wound Depth (cm) 0.3 cm 01/30/23 1406   Wound Surface Area (cm^2) 0.18 cm^2 01/30/23 1406   Wound Volume (cm^3) 0.054 cm^3 01/30/23 1406   Post-Procedure Length (cm) 0.3 cm 01/30/23 1406   Post-Procedure Width (cm) 0.6 cm 01/30/23 1406   Post-Procedure Depth (cm) 0.3 cm 01/30/23 1406   Post-Procedure Surface Area (cm^2) 0.18 cm^2 01/30/23 1406   Post-Procedure Volume (cm^3) 0.054 cm^3 01/30/23 1406   Wound Assessment Granulation tissue 01/30/23 1406   Drainage Amount Moderate 01/30/23 1406   Odor None 01/30/23 1406   Wound Thickness Description not for Pressure Injury Full thickness 01/30/23 1406   Number of days: 3          Procedures done this visit:   Procedure Note  Indications:  Based on my examination of this patient's wound(s)/ulcer(s) today, debridement is required to promote healing and evaluate the wound base.  Performed by: Beck Carvalho DO  Consent obtained:  Yes  Time out taken:  Yes  Pain Control:     Debridement: Excisional Debridement  Using curette the wound(s)/ulcer(s) was/were debrided down through and including the removal of epidermis, dermis, and subcutaneous tissue.      Devitalized Tissue Debrided:  fibrin, biofilm, slough, and necrotic/eschar  Pre Debridement Measurements:  Are located in the Wound/Ulcer Documentation Flow Sheet  Diabetic/Pressure/Non Pressure Ulcers only:  Ulcer: Non-Pressure ulcer, fat layer exposed   Wound/Ulcer #:  1  Post Debridement Measurements:  Wound/Ulcer Descriptions are Pre Debridement except measurements: Total Surface Area Debrided:  0.18 sq cm   Estimated Blood Loss:  Minimal  Hemostasis Achieved:  by pressure  Procedural Pain:  2  / 10   Post Procedural Pain:  2 / 10   Response to treatment:  Well tolerated by patient. Written patient dismissal instructions given to patient and signed by patient or POA. Patient voiced understanding that the importance of adherence to instructions is paramount to wound healing improvement or success.      Electronically signed by Kezia Steinberg DO on 2/2/2023 at 4:25 PM

## 2023-02-02 NOTE — TELEPHONE ENCOUNTER
Preliminary result on right toe culture reviewed with Dr. Polly Chávez. New orders received. This RN called patient to inform him on new rx, instructed him to  and start taking asap. Also instructed to continue taking the bactrim. Will give further instructions on continuing bactrim upon receipt of final culture. Patient verbalized understanding.

## 2023-02-03 LAB
BACTERIA SPEC CULT: NORMAL
SERVICE CMNT-IMP: NORMAL

## 2023-02-04 LAB
BACTERIA SPEC CULT: ABNORMAL
BACTERIA SPEC CULT: ABNORMAL
GRAM STN SPEC: ABNORMAL
GRAM STN SPEC: ABNORMAL
SERVICE CMNT-IMP: ABNORMAL

## 2023-02-07 ENCOUNTER — TELEPHONE (OUTPATIENT)
Dept: SLEEP MEDICINE | Age: 73
End: 2023-02-07

## 2023-02-13 ENCOUNTER — TELEPHONE (OUTPATIENT)
Dept: SLEEP MEDICINE | Age: 73
End: 2023-02-13

## 2023-02-13 ENCOUNTER — HOSPITAL ENCOUNTER (OUTPATIENT)
Dept: WOUND CARE | Age: 73
Discharge: HOME | End: 2023-02-13
Payer: MEDICARE

## 2023-02-13 VITALS
WEIGHT: 193 LBS | SYSTOLIC BLOOD PRESSURE: 124 MMHG | TEMPERATURE: 97.8 F | HEART RATE: 76 BPM | OXYGEN SATURATION: 96 % | BODY MASS INDEX: 29.25 KG/M2 | RESPIRATION RATE: 18 BRPM | DIASTOLIC BLOOD PRESSURE: 76 MMHG | HEIGHT: 68 IN

## 2023-02-13 DIAGNOSIS — L03.031 CELLULITIS OF FIFTH TOE, RIGHT: Primary | ICD-10-CM

## 2023-02-13 DIAGNOSIS — S91.104A OPEN WOUND OF FIFTH TOE, RIGHT, INITIAL ENCOUNTER: ICD-10-CM

## 2023-02-13 DIAGNOSIS — G47.33 OSA (OBSTRUCTIVE SLEEP APNEA): Primary | ICD-10-CM

## 2023-02-13 PROCEDURE — 11042 DBRDMT SUBQ TIS 1ST 20SQCM/<: CPT

## 2023-02-13 RX ORDER — LIDOCAINE HYDROCHLORIDE 20 MG/ML
JELLY TOPICAL ONCE
OUTPATIENT
Start: 2023-02-13 | End: 2023-02-13

## 2023-02-13 RX ORDER — GENTAMICIN SULFATE 1 MG/G
OINTMENT TOPICAL ONCE
OUTPATIENT
Start: 2023-02-13 | End: 2023-02-13

## 2023-02-13 RX ORDER — BACITRACIN ZINC AND POLYMYXIN B SULFATE 500; 1000 [USP'U]/G; [USP'U]/G
OINTMENT TOPICAL ONCE
OUTPATIENT
Start: 2023-02-13 | End: 2023-02-13

## 2023-02-13 RX ORDER — LIDOCAINE HYDROCHLORIDE 20 MG/ML
JELLY TOPICAL ONCE
Status: COMPLETED | OUTPATIENT
Start: 2023-02-13 | End: 2023-02-13

## 2023-02-13 RX ORDER — LIDOCAINE HYDROCHLORIDE 40 MG/ML
SOLUTION TOPICAL ONCE
OUTPATIENT
Start: 2023-02-13 | End: 2023-02-13

## 2023-02-13 RX ORDER — LIDOCAINE 40 MG/G
CREAM TOPICAL ONCE
OUTPATIENT
Start: 2023-02-13 | End: 2023-02-13

## 2023-02-13 RX ORDER — LIDOCAINE 50 MG/G
OINTMENT TOPICAL ONCE
OUTPATIENT
Start: 2023-02-13 | End: 2023-02-13

## 2023-02-13 RX ORDER — BACITRACIN, NEOMYCIN, POLYMYXIN B 400; 3.5; 5 [USP'U]/G; MG/G; [USP'U]/G
OINTMENT TOPICAL ONCE
OUTPATIENT
Start: 2023-02-13 | End: 2023-02-13

## 2023-02-13 RX ORDER — GINSENG 100 MG
CAPSULE ORAL ONCE
OUTPATIENT
Start: 2023-02-13 | End: 2023-02-13

## 2023-02-13 RX ORDER — CLOBETASOL PROPIONATE 0.5 MG/G
OINTMENT TOPICAL ONCE
OUTPATIENT
Start: 2023-02-13 | End: 2023-02-13

## 2023-02-13 RX ADMIN — LIDOCAINE HYDROCHLORIDE: 20 JELLY TOPICAL at 15:18

## 2023-02-13 NOTE — FLOWSHEET NOTE
02/13/23 1502   Wound 01/30/23 Toe (Comment  which one) Anterior;Right #1 right medial 5th toe   Date First Assessed/Time First Assessed: 01/30/23 1405   Present on Hospital Admission: Yes  Wound Approximate Age at First Assessment (Weeks): 4 weeks  Primary Wound Type: Neuropathic  Location: Toe (Comment  which one)  Wound Location Orientation: A...    Wound Image     Wound Etiology Other   Wound Cleansed Vashe   Wound Length (cm) 0.4 cm   Wound Width (cm) 0.3 cm   Wound Depth (cm) 0.1 cm   Wound Surface Area (cm^2) 0.12 cm^2   Change in Wound Size % (l*w) 33.33   Wound Volume (cm^3) 0.012 cm^3   Wound Healing % 78   Post-Procedure Length (cm) 0.4 cm   Post-Procedure Width (cm) 0.3 cm   Post-Procedure Depth (cm) 0.1 cm   Post-Procedure Surface Area (cm^2) 0.12 cm^2   Post-Procedure Volume (cm^3) 0.012 cm^3   Wound Assessment Pink/red   Drainage Amount Small   Drainage Description Serous   Odor None   Florence-wound Assessment Maceration   Wound Thickness Description not for Pressure Injury Full thickness

## 2023-02-13 NOTE — DISCHARGE INSTRUCTIONS
Discharge Instructions for  Elizabeth Trejo  [address] [place] [address]  [address], [address]  Phone [phone]   Fax [phone]      NAME:  Akila Macias  YOB: 1950  MEDICAL RECORD NUMBER:  [id number]  DATE:  @ED@    Return Appointment:   2 weeks with Nina Beavers DO      Instructions: Right 5th toe  Cleanse wound and periwound with wound cleanser or normal saline. You may cleanse with Vashe at each dressing change. This can be obtained on [employer]. Gently pack alginate with silver rope into wound bed filling wound space. Do not pack tightly. Secure with gauze  Change 3x weekly. Tubigrip to right lower leg     Continue your diflucan as prescribed. Increase your protein levels to at least 60 grams per day. You may supplement with protein shakes with at least 30 grams per day. Should you experience increased redness, swelling, pain, foul odor, size of wound(s), or have a temperature over 101 degrees please contact the [address] [place] [address] Road at [phone] or if after hours contact your primary care physician or go to the hospital emergency department. PLEASE NOTE: IF YOU ARE UNABLE TO OBTAIN WOUND SUPPLIES, CONTINUE TO USE THE SUPPLIES YOU HAVE AVAILABLE UNTIL YOU ARE ABLE TO REACH US. IT IS MOST IMPORTANT TO KEEP THE WOUND COVERED AT ALL TIMES.     Electronically signed Elle Cloud RN on 2/13/2023 at 3:15 PM

## 2023-02-13 NOTE — WOUND CARE
Discharge Instructions for  Elizabeth Trejo  [address] [place] [address]  [address], [address]  Phone [phone]   Fax [phone]      NAME:  Trupti Wright  YOB: 1950  MEDICAL RECORD NUMBER:  [id number]  DATE:  2/13/2023    Return Appointment:  3 weeks with Pasha Angeles DO      Instructions: Right 5th toe  Cleanse wound and periwound with wound cleanser or normal saline. You may cleanse with Vashe at each dressing change. This can be obtained on [employer]. Gently pack alginate with silver rope into wound bed filling wound space. Do not pack tightly. Secure with gauze  Change 3x weekly. Tubigrip to right lower leg     Continue your diflucan as prescribed. Increase your protein levels to at least 60 grams per day. You may supplement with protein shakes with at least 30 grams per day. Should you experience increased redness, swelling, pain, foul odor, size of wound(s), or have a temperature over 101 degrees please contact the [address] [place] [address] Road at [phone] or if after hours contact your primary care physician or go to the hospital emergency department. PLEASE NOTE: IF YOU ARE UNABLE TO OBTAIN WOUND SUPPLIES, CONTINUE TO USE THE SUPPLIES YOU HAVE AVAILABLE UNTIL YOU ARE ABLE TO REACH US. IT IS MOST IMPORTANT TO KEEP THE WOUND COVERED AT ALL TIMES.     Electronically signed Rony Castrejon RN on 2/13/2023 at 3:14 PM

## 2023-02-20 ENCOUNTER — PROCEDURE VISIT (OUTPATIENT)
Dept: PHYSICAL MEDICINE AND REHAB | Age: 73
End: 2023-02-20

## 2023-02-20 VITALS
BODY MASS INDEX: 29.25 KG/M2 | DIASTOLIC BLOOD PRESSURE: 77 MMHG | HEIGHT: 68 IN | HEART RATE: 63 BPM | SYSTOLIC BLOOD PRESSURE: 142 MMHG | WEIGHT: 193 LBS

## 2023-02-20 DIAGNOSIS — M54.17 LUMBOSACRAL RADICULOPATHY AT L4: ICD-10-CM

## 2023-02-20 DIAGNOSIS — G62.89 MIXED AXONAL-DEMYELINATING POLYNEUROPATHY: Primary | ICD-10-CM

## 2023-02-20 PROBLEM — M67.432 GANGLION CYST OF WRIST, LEFT: Chronic | Status: ACTIVE | Noted: 2022-10-19

## 2023-02-27 ENCOUNTER — OFFICE VISIT (OUTPATIENT)
Dept: NEUROLOGY | Age: 73
End: 2023-02-27
Payer: MEDICARE

## 2023-02-27 DIAGNOSIS — G47.33 OSA (OBSTRUCTIVE SLEEP APNEA): ICD-10-CM

## 2023-02-27 DIAGNOSIS — M51.36 DDD (DEGENERATIVE DISC DISEASE), LUMBAR: ICD-10-CM

## 2023-02-27 DIAGNOSIS — R41.3 MEMORY LOSS: Primary | ICD-10-CM

## 2023-02-27 PROCEDURE — G8427 DOCREV CUR MEDS BY ELIG CLIN: HCPCS | Performed by: PSYCHIATRY & NEUROLOGY

## 2023-02-27 PROCEDURE — G8417 CALC BMI ABV UP PARAM F/U: HCPCS | Performed by: PSYCHIATRY & NEUROLOGY

## 2023-02-27 PROCEDURE — 99214 OFFICE O/P EST MOD 30 MIN: CPT | Performed by: PSYCHIATRY & NEUROLOGY

## 2023-02-27 PROCEDURE — G8484 FLU IMMUNIZE NO ADMIN: HCPCS | Performed by: PSYCHIATRY & NEUROLOGY

## 2023-02-27 PROCEDURE — 1036F TOBACCO NON-USER: CPT | Performed by: PSYCHIATRY & NEUROLOGY

## 2023-02-27 PROCEDURE — 3017F COLORECTAL CA SCREEN DOC REV: CPT | Performed by: PSYCHIATRY & NEUROLOGY

## 2023-02-27 PROCEDURE — 1123F ACP DISCUSS/DSCN MKR DOCD: CPT | Performed by: PSYCHIATRY & NEUROLOGY

## 2023-02-27 RX ORDER — DONEPEZIL HYDROCHLORIDE 10 MG/1
10 TABLET, FILM COATED ORAL NIGHTLY
Qty: 90 TABLET | Refills: 3 | Status: SHIPPED | OUTPATIENT
Start: 2023-02-27

## 2023-02-27 ASSESSMENT — ENCOUNTER SYMPTOMS
BACK PAIN: 1
EYES NEGATIVE: 1
GASTROINTESTINAL NEGATIVE: 1
ALLERGIC/IMMUNOLOGIC NEGATIVE: 1
RESPIRATORY NEGATIVE: 1

## 2023-02-27 NOTE — PROGRESS NOTES
[facility], [address] [place] [address], [address] [place] [address]  Phone: [phone] Fax [phone]  Dr. Waldo Rodriguez      2/27/2023  Ramandeep Liliana     Patient is referred by the following provider for consultation regarding as below:       I reviewed the available records and notes and have examined patient with the following findings:     Chief Complaint:  No chief complaint on file. HPI: This is a right handed 67 y.o.  male with his wife. The patient is very pleasant very appropriate gentleman who started having memory loss about 3 to 3-1/2 years ago. He repeats questions within a 24-hour. But he is very high functioning. Usually he forgets their plans the following day. He does take notes on his iPhone and typically would review his notes at the end of the day but many times he forgets to review his notes. Still doing financials for his home. And that is going okay. He does not have to think about driving from point a to point B. He is not getting lost in the house outside the house or in conversation. He has an enormous amount of spinal issues in fact they were about to do surgery they chose to do a spinal cord stimulator that was placed last Friday 3 days ago and if this does not work then they will consider surgical options. I also said I also set him up for sleep study and he had a split sleep study on 2-8-2023 because he had severe sleep apnea is currently on CPAP of +13 and he loves the machine I am not sure it helped him cognitively but he is more alert more focus more dynamic more outgoing. And hopefully over longer period of time it might help more with memory. IMAGING REVIEW:  I REVIEWED PERTINENT  IMAGES AND REPORTS WITH THE PATIENT PERSONALLY, DIRECTLY AND FULLY.      Past Medical History:  Past Medical History:   Diagnosis Date    Arthritis     Chronic back pain 2016/17    After Laminectomy L1-L4    Chronic pain     back Left>right buttock Colon polyps 2019 pathology report tubulovillous adenoma    Compression deformity of vertebra 2016    Compression C5/6    Encounter for screening colonoscopy 2019    Erectile dysfunction 2007    After Radical Prostatectomy    Fecal incontinence 10/09/2019    After prostate surgery - followed by colorectal surgery Dr. Sallie Acosta    GERD (gastroesophageal reflux disease)     controlled by nexium    Hypercholesterolemia     Hyperlipidemia 2017    Hypertension     Hypothyroidism 2001    Malignant neoplasm of prostate [facility])     Memory loss     Peripheral vascular disease ([address] ?2020? Spondylosis of lumbar region without myelopathy or radiculopathy 12/10/2019    S/p surgery  with Dr. Nadine Montanez incontinence, male     now with artificial sphinctor    Thyroid disease     hypothyroidism       Past Surgical History:  Past Surgical History:   Procedure Laterality Date    CATARACT REMOVAL Bilateral     COLONOSCOPY  2019 colonoscopy- multiple polyps     COLONOSCOPY      EYE SURGERY      Catarack    LUMBAR LAMINECTOMY  2016    Dr Ramirez Look  2016    for spinal stenosis, L1-L3    ORTHOPEDIC SURGERY Right 1998    right knee scope    OTHER SURGICAL HISTORY N/A     artificial urinary spinter     PROSTATECTOMY  2007    rad. prostatectomy due to prostate ca    UPPER GASTROINTESTINAL ENDOSCOPY  ?     UROLOGICAL SURGERY  2008    art. urinary sphincter with reservoir       Social History:  Social History     Socioeconomic History    Marital status:      Spouse name: Not on file    Number of children: Not on file    Years of education: Not on file    Highest education level: Not on file   Occupational History    Not on file   Tobacco Use    Smoking status: Former     Packs/day: 1.00     Years: 30.00     Pack years: 30.00     Types: Cigarettes     Start date: 1968     Quit date: 1998     Years since quittin.8    Smokeless tobacco: Never   Substance and Sexual Activity    Alcohol use: Yes     Alcohol/week: 20.0 standard drinks     Types: 20 Drinks containing 0.5 oz of alcohol per week    Drug use: No    Sexual activity: Not Currently     Partners: Female   Other Topics Concern    Not on file   Social History Narrative    Lives with wife  2 children  4 grandchildren one on way    [employer] 25 years     Social Determinants of Health     Financial Resource Strain: Low Risk     Difficulty of Paying Living Expenses: Not hard at all   Food Insecurity: No Food Insecurity    Worried About Running Out of Food in the Last Year: Never true    [address] in the Last Year: Never true   Transportation Needs: Not on file   Physical Activity: Insufficiently Active    Days of Exercise per Week: 3 days    Minutes of Exercise per Session: 40 min   Stress: Not on file   Social Connections: Not on file   Intimate Partner Violence: Not on file   Housing Stability: Not on file       Family History:   Family History   Problem Relation Age of Onset    Dementia Paternal Grandfather     Cancer Paternal Grandmother         Throat    Cancer Maternal Grandfather     Heart Disease Maternal Grandmother     Cancer Father 79        46    Alcohol Abuse Brother         Now, recovered; at one time addicted to pain Rx    Diabetes Brother     Heart Disease Mother     Alzheimer's Disease Maternal Aunt     Alcohol Abuse Brother     Cancer Brother         2020    Diabetes Brother        Current Outpatient Medications on File Prior to Visit   Medication Sig Dispense Refill    fluconazole (DIFLUCAN) 100 MG tablet Take 1 tablet by mouth daily 30 tablet 0    NONFORMULARY Take 1 capsule by mouth daily OTC Immune C      pregabalin (LYRICA) 100 MG capsule Take 100 mg by mouth in the morning, at noon, and at bedtime.       hydroCHLOROthiazide (MICROZIDE) 12.5 MG capsule Take 12.5 mg by mouth daily      amLODIPine (NORVASC) 10 MG tablet Take 1 tablet by mouth daily 90 tablet 3 pantoprazole (PROTONIX) 40 MG tablet Take 1 tablet by mouth daily 90 tablet 3    meloxicam (MOBIC) 15 MG tablet Take 1 tablet by mouth daily 30 tablet 0    levothyroxine (SYNTHROID) 150 MCG tablet Take 1 tablet by mouth every morning (before breakfast) (Patient taking differently: Take 150 mcg by mouth every morning (before breakfast) 1/2 tab on Sunday) 90 tablet 3    atorvastatin (LIPITOR) 80 MG tablet Take 1 tablet by mouth daily 90 tablet 3    telmisartan-hydroCHLOROthiazide (MICARDIS HCT) 80-25 MG per tablet Take 1 tablet by mouth daily 90 tablet 3    melatonin 3 MG TABS tablet Take 6 mg by mouth at bedtime      Calcium Polycarbophil (FIBERCON PO) Take 1 tablet by mouth in the morning and at bedtime       Probiotic Product (PROBIOTIC-10 PO) Take 1 capsule by mouth daily      aspirin 81 MG EC tablet Take 162 mg by mouth daily      fexofenadine (ALLEGRA) 180 MG tablet Take 180 mg by mouth daily      fluticasone (FLONASE) 50 MCG/ACT nasal spray 2 sprays by Nasal route daily as needed       No current facility-administered medications on file prior to visit. Allergies   Allergen Reactions    Levofloxacin Rash       Review of Systems:  Review of Systems   Constitutional: Negative. HENT: Negative. Eyes: Negative. Respiratory: Negative. Cardiovascular: Negative. Gastrointestinal: Negative. Endocrine: Negative. Genitourinary: Negative. Musculoskeletal:  Positive for back pain. Allergic/Immunologic: Negative. Neurological:         Memory loss   Hematological: Negative. Psychiatric/Behavioral: Negative. No flowsheet data found. No flowsheet data found. There were no vitals filed for this visit. Physical Exam  Constitutional:       Appearance: Normal appearance. HENT:      Head: Normocephalic and atraumatic. Eyes:      Extraocular Movements: Extraocular movements intact and EOM normal.      Pupils: Pupils are equal, round, and reactive to light.    Cardiovascular: Pulses: Normal pulses. Heart sounds: Normal heart sounds. Pulmonary:      Effort: Pulmonary effort is normal.   Abdominal:      General: Abdomen is flat. Neurological:      Mental Status: He is alert and oriented to person, place, and time. Deep Tendon Reflexes:      Reflex Scores:       Tricep reflexes are 1+ on the right side and 1+ on the left side. Bicep reflexes are 1+ on the right side and 1+ on the left side. Brachioradialis reflexes are 1+ on the right side and 1+ on the left side. Patellar reflexes are 1+ on the right side and 1+ on the left side. Achilles reflexes are 1+ on the right side and 1+ on the left side. Neurologic Exam     Mental Status   Oriented to person, place, and time. Attention: normal. Concentration: normal.   Level of consciousness: alert  Knowledge: good. He is an accurate historian. Cranial Nerves     CN II   Visual fields full to confrontation. CN III, IV, VI   Pupils are equal, round, and reactive to light. Extraocular motions are normal.     CN VII   Facial expression full, symmetric. Motor Exam   Right arm tone: normal  Left arm tone: normal  Right leg tone: decreased  Left leg tone: decreased    Gait, Coordination, and Reflexes     Gait  Gait: wide-based    Tremor   Resting tremor: absent  Intention tremor: absent  Action tremor: absent    Reflexes   Right brachioradialis: 1+  Left brachioradialis: 1+  Right biceps: 1+  Left biceps: 1+  Right triceps: 1+  Left triceps: 1+  Right patellar: 1+  Left patellar: 1+  Right achilles: 1+  Left achilles: 1+Hunched over little bit kyphotic. Assessment   Assessment / Plan:    Diagnoses and all orders for this visit:    Memory loss that is short-term memory loss. We have him currently on Aricept 10 mg a day. But it is mild cognitive impairment she is very high functioning.   At this time would continue the donepezil 10 mg at bedtime consider Yamila in the future and I did go over the vitamins with them that they are going to consider adding. But he has a great deal stomach issues. Even as I believe a bowel stimulator and most recently meeting last Friday close to 3 days ago he was a spinal cord stimulator was placed in his lumbar spine. He is healing from at the moment. At this point we will hold off on Namenda. ANTOLIN (obstructive sleep apnea) he does have CPAP +13 he had a split sleep study done. DDD (degenerative disc disease), lumbar they did not do surgery they chose to use a spinal cord stimulator first and then consider surgical options. Other orders  -     donepezil (ARICEPT) 10 MG tablet; Take 1 tablet by mouth nightly      The Diagnosis and differential diagnostic considerations, and Rx Tx were reviewed with the patient at length. No orders of the defined types were placed in this encounter. I have spent greater than 50% of visit discussing and counseling of patient  for treatment and diagnostic plan review. Total time30 min     . Notes: Patient is to continue all medications as directed by prescribing physicians. Continuations on today's visit are made based on the patient's report of current medications.              Dr. Carollynn Romberg  Consultation Neurology, Neurodiagnostics and Neurotherapeutics  Neuroelectrophysiology, EEG, EMG  [facility]  [address] [place] [address], [address] [facility]  Phone:  [phone]  Fax:   [phone]

## 2023-03-01 ENCOUNTER — OFFICE VISIT (OUTPATIENT)
Dept: UROLOGY | Age: 73
End: 2023-03-01
Payer: MEDICARE

## 2023-03-01 DIAGNOSIS — C61 MALIGNANT NEOPLASM OF PROSTATE (HCC): ICD-10-CM

## 2023-03-01 DIAGNOSIS — N39.3 STRESS INCONTINENCE (FEMALE) (MALE): Primary | ICD-10-CM

## 2023-03-01 PROCEDURE — 99213 OFFICE O/P EST LOW 20 MIN: CPT | Performed by: UROLOGY

## 2023-03-01 PROCEDURE — 1123F ACP DISCUSS/DSCN MKR DOCD: CPT | Performed by: UROLOGY

## 2023-03-01 PROCEDURE — 3017F COLORECTAL CA SCREEN DOC REV: CPT | Performed by: UROLOGY

## 2023-03-01 PROCEDURE — G8417 CALC BMI ABV UP PARAM F/U: HCPCS | Performed by: UROLOGY

## 2023-03-01 PROCEDURE — G8484 FLU IMMUNIZE NO ADMIN: HCPCS | Performed by: UROLOGY

## 2023-03-01 PROCEDURE — 1036F TOBACCO NON-USER: CPT | Performed by: UROLOGY

## 2023-03-01 PROCEDURE — G8428 CUR MEDS NOT DOCUMENT: HCPCS | Performed by: UROLOGY

## 2023-03-01 RX ORDER — HYOSCYAMINE SULFATE EXTENDED-RELEASE 0.38 MG/1
TABLET ORAL
COMMUNITY

## 2023-03-01 RX ORDER — DOXYCYCLINE HYCLATE 100 MG/1
CAPSULE ORAL
COMMUNITY
Start: 2023-02-24

## 2023-03-01 ASSESSMENT — ENCOUNTER SYMPTOMS
BACK PAIN: 0
NAUSEA: 0

## 2023-03-01 NOTE — PROGRESS NOTES
[facility]  [address]  [phone]    Dayday Olivier  : 1950    Chief Complaint   Patient presents with    Other     Discuss AUS         HPI     Dayday Olivier is a 67 y.o. male (goes to [employer]) s/p RRP by Dr. Marco Antonio Baker in [place] in  secondary to [address]. PSA's have been undetectable since. He is also s/p AUS by Dr. Bryce Franco at [facility] in . He deactivates the device at night and it works well. He uses a vacuum device in regards to ED and it works well. Recurrent UTI's began in . The first led to dysuria and foul odor. The 2nd was only foul odor. He took an unknown antibiotic with the first and macrobid with the 2nd. He has seen no swelling or change if efficacy of AUS. Cystoscopy 3/6/18 revealed no erosion or bladder neck contracture. He took a month of cipro in the spring of '18 and has had no more UTI's. He had an interstim placed by Dr. Ana Leon, [facility], in  due to fecal incontinence and has seen amazing improvements. Issues began after a back surgery. PSA: 10/14 <0.015, 10/15 <0.015,  <0.1,  <0.006, 19 <0.014,  <0.014,  <0.006   he returns today to discuss his incontinence. He is starting to have more urinary incontinence. He had a spinal cord stimulator placed on 23, done in [place]. Now wears 1-2 pads/day. No pain or hematuria.             Past Medical History:   Diagnosis Date    Arthritis     Chronic back pain     After Laminectomy L1-L4    Chronic pain     back Left>right buttock    Colon polyps 2019 pathology report tubulovillous adenoma    Compression deformity of vertebra 2016    Compression C5/6    Encounter for screening colonoscopy 2019    Erectile dysfunction     After Radical Prostatectomy    Fecal incontinence 10/09/2019    After prostate surgery - followed by colorectal surgery Dr. Ana Leon    GERD (gastroesophageal reflux disease)     controlled by nexium    Hypercholesterolemia     Hyperlipidemia 05/22/2017    Hypertension     Hypothyroidism 2001    Malignant neoplasm of prostate [facility])     Memory loss     Peripheral vascular disease ([facility] [address] ?2020? Spondylosis of lumbar region without myelopathy or radiculopathy 12/10/2019    S/p surgery 1-2016 with Dr. Sarah Knott incontinence, male     now with artificial sphinctor    Thyroid disease     hypothyroidism     Past Surgical History:   Procedure Laterality Date    CATARACT REMOVAL Bilateral     COLONOSCOPY  07/03/2019 7-2019 colonoscopy- multiple polyps     COLONOSCOPY      EYE SURGERY  2019    Catarack    LUMBAR LAMINECTOMY  08/2016    Dr Merlinda Gloss  08/22/2016    for spinal stenosis, L1-L3    ORTHOPEDIC SURGERY Right 1998    right knee scope    OTHER SURGICAL HISTORY N/A     artificial urinary spinter     PROSTATECTOMY  2007    rad. prostatectomy due to prostate ca    UPPER GASTROINTESTINAL ENDOSCOPY  2017? UROLOGICAL SURGERY  2008    art. urinary sphincter with reservoir     Current Outpatient Medications   Medication Sig Dispense Refill    doxycycline hyclate (VIBRAMYCIN) 100 MG capsule       hyoscyamine (LEVBID) 375 MCG extended release tablet hyoscyamine ER 0.375 mg tablet,extended release,12 hr   TAKE ONE TABLET BY MOUTH EVERY 12 HOURS      donepezil (ARICEPT) 10 MG tablet Take 1 tablet by mouth nightly 90 tablet 3    fluconazole (DIFLUCAN) 100 MG tablet Take 1 tablet by mouth daily 30 tablet 0    NONFORMULARY Take 1 capsule by mouth daily OTC Immune C      pregabalin (LYRICA) 100 MG capsule Take 100 mg by mouth in the morning, at noon, and at bedtime.       hydroCHLOROthiazide (MICROZIDE) 12.5 MG capsule Take 12.5 mg by mouth daily      amLODIPine (NORVASC) 10 MG tablet Take 1 tablet by mouth daily 90 tablet 3    pantoprazole (PROTONIX) 40 MG tablet Take 1 tablet by mouth daily 90 tablet 3    levothyroxine (SYNTHROID) 150 MCG tablet Take 1 tablet by mouth every morning (before breakfast) (Patient taking differently: Take 150 mcg by mouth every morning (before breakfast) 1/2 tab on ) 90 tablet 3    atorvastatin (LIPITOR) 80 MG tablet Take 1 tablet by mouth daily 90 tablet 3    telmisartan-hydroCHLOROthiazide (MICARDIS HCT) 80-25 MG per tablet Take 1 tablet by mouth daily 90 tablet 3    melatonin 3 MG TABS tablet Take 6 mg by mouth at bedtime      Calcium Polycarbophil (FIBERCON PO) Take 1 tablet by mouth in the morning and at bedtime       Probiotic Product (PROBIOTIC-10 PO) Take 1 capsule by mouth daily      aspirin 81 MG EC tablet Take 162 mg by mouth daily      fexofenadine (ALLEGRA) 180 MG tablet Take 180 mg by mouth daily      fluticasone (FLONASE) 50 MCG/ACT nasal spray 2 sprays by Nasal route daily as needed      meloxicam (MOBIC) 15 MG tablet Take 1 tablet by mouth daily 30 tablet 0     No current facility-administered medications for this visit.     Allergies   Allergen Reactions    Levofloxacin Rash     Social History     Socioeconomic History    Marital status:      Spouse name: Not on file    Number of children: Not on file    Years of education: Not on file    Highest education level: Not on file   Occupational History    Not on file   Tobacco Use    Smoking status: Former     Packs/day: 1.00     Years: 30.00     Pack years: 30.00     Types: Cigarettes     Start date: 1968     Quit date: 1998     Years since quittin.8    Smokeless tobacco: Never   Substance and Sexual Activity    Alcohol use: Yes     Alcohol/week: 20.0 standard drinks     Types: 20 Drinks containing 0.5 oz of alcohol per week    Drug use: No    Sexual activity: Not Currently     Partners: Female   Other Topics Concern    Not on file   Social History Narrative    Lives with wife  2 children  4 grandchildren one on [address]    [employer] 22 years     Social Determinants of Health     Financial Resource Strain: Low Risk     Difficulty of  Paying Living Expenses: Not hard at all   Food Insecurity: No Food Insecurity    Worried About Running Out of Food in the Last Year: Never true    Ran Out of Food in the Last Year: Never true   Transportation Needs: Not on file   Physical Activity: Insufficiently Active    Days of Exercise per Week: 3 days    Minutes of Exercise per Session: 40 min   Stress: Not on file   Social Connections: Not on file   Intimate Partner Violence: Not on file   Housing Stability: Not on file     Family History   Problem Relation Age of Onset    Dementia Paternal Grandfather     Cancer Paternal Grandmother         Throat    Cancer Maternal Grandfather     Heart Disease Maternal Grandmother     Cancer Father 79        46    Alcohol Abuse Brother         Now, recovered; at one time addicted to pain Rx    Diabetes Brother     Heart Disease Mother     Alzheimer's Disease Maternal Aunt     Alcohol Abuse Brother     Cancer Brother         2020    Diabetes Brother        Review of Systems  Constitutional:   Negative for fever. GI:  Negative for nausea. Musculoskeletal:  Negative for back pain. There were no vitals taken for this visit. PE: : AUS pump in right scrotum. Cuff palpable through perineum, nontender. Urinalysis  UA - Dipstick  No results found for this or any previous visit. UA - Micro  WBC - 0  RBC - 0  Bacteria - 0  Epith - 0    Physical Exam    Assessment and Plan    ICD-10-CM    1. Stress incontinence (female) (male)  N39.3       2. Malignant neoplasm of prostate ([facility] [address]  C61         Likely has urethral atrophy. We discussed AUS revision or replacement. He would like to have some time to heal from his spine stimulator. He will call, will schedule office cysto when he calls. He would like to have his PSA checked yearly by his PCP. No orders of the defined types were placed in this encounter. Follow-up and Dispositions    Return if symptoms worsen or fail to improve.

## 2023-03-10 ENCOUNTER — HOSPITAL ENCOUNTER (OUTPATIENT)
Dept: WOUND CARE | Age: 73
Discharge: HOME | End: 2023-03-10
Payer: MEDICARE

## 2023-03-10 VITALS
RESPIRATION RATE: 18 BRPM | HEIGHT: 68 IN | BODY MASS INDEX: 31.67 KG/M2 | TEMPERATURE: 97.7 F | HEART RATE: 61 BPM | DIASTOLIC BLOOD PRESSURE: 67 MMHG | SYSTOLIC BLOOD PRESSURE: 131 MMHG | WEIGHT: 209 LBS

## 2023-03-10 DIAGNOSIS — L03.031 CELLULITIS OF FIFTH TOE, RIGHT: Primary | ICD-10-CM

## 2023-03-10 DIAGNOSIS — S91.104A OPEN WOUND OF FIFTH TOE, RIGHT, INITIAL ENCOUNTER: ICD-10-CM

## 2023-03-10 PROCEDURE — 97597 DBRDMT OPN WND 1ST 20 CM/<: CPT

## 2023-03-10 RX ORDER — LIDOCAINE HYDROCHLORIDE 20 MG/ML
JELLY TOPICAL ONCE
OUTPATIENT
Start: 2023-03-10 | End: 2023-03-10

## 2023-03-10 RX ORDER — GENTAMICIN SULFATE 1 MG/G
OINTMENT TOPICAL ONCE
OUTPATIENT
Start: 2023-03-10 | End: 2023-03-10

## 2023-03-10 RX ORDER — LIDOCAINE HYDROCHLORIDE 40 MG/ML
SOLUTION TOPICAL ONCE
OUTPATIENT
Start: 2023-03-10 | End: 2023-03-10

## 2023-03-10 RX ORDER — LIDOCAINE 40 MG/G
CREAM TOPICAL ONCE
OUTPATIENT
Start: 2023-03-10 | End: 2023-03-10

## 2023-03-10 RX ORDER — CLOBETASOL PROPIONATE 0.5 MG/G
OINTMENT TOPICAL ONCE
OUTPATIENT
Start: 2023-03-10 | End: 2023-03-10

## 2023-03-10 RX ORDER — CLINDAMYCIN HYDROCHLORIDE 300 MG/1
CAPSULE ORAL
COMMUNITY
Start: 2023-03-06

## 2023-03-10 RX ORDER — BACITRACIN ZINC AND POLYMYXIN B SULFATE 500; 1000 [USP'U]/G; [USP'U]/G
OINTMENT TOPICAL ONCE
OUTPATIENT
Start: 2023-03-10 | End: 2023-03-10

## 2023-03-10 RX ORDER — BACITRACIN, NEOMYCIN, POLYMYXIN B 400; 3.5; 5 [USP'U]/G; MG/G; [USP'U]/G
OINTMENT TOPICAL ONCE
OUTPATIENT
Start: 2023-03-10 | End: 2023-03-10

## 2023-03-10 RX ORDER — GINSENG 100 MG
CAPSULE ORAL ONCE
OUTPATIENT
Start: 2023-03-10 | End: 2023-03-10

## 2023-03-10 RX ORDER — LIDOCAINE HYDROCHLORIDE 20 MG/ML
JELLY TOPICAL ONCE
Status: COMPLETED | OUTPATIENT
Start: 2023-03-10 | End: 2023-03-10

## 2023-03-10 RX ORDER — LIDOCAINE 50 MG/G
OINTMENT TOPICAL ONCE
OUTPATIENT
Start: 2023-03-10 | End: 2023-03-10

## 2023-03-10 RX ADMIN — LIDOCAINE HYDROCHLORIDE: 20 JELLY TOPICAL at 11:54

## 2023-03-10 NOTE — WOUND CARE
Discharge Instructions for  Elizabeth Trejo  [address] [facility] [address]  [address] [facility], [address]  Phone [phone]   Fax [phone]      NAME:  Karie Murphy  YOB: 1950  MEDICAL RECORD NUMBER:  [id number]  DATE:  3/10/2023    Return Appointment:   2 weeks with Jett Rivera DO      Instructions: Right 5th toe  Cleanse wound and periwound with wound cleanser or normal saline. You may cleanse with Vashe at each dressing change. This can be obtained on [employer] Iodosorb- apply thin layer to wound bed, cover with cover dressing, change daily  Secure with gauze. Change daily/   Tubigrip to right lower leg     Increase your protein levels to at least 60 grams per day. You may supplement with protein shakes with at least 30 grams per day. Should you experience increased redness, swelling, pain, foul odor, size of wound(s), or have a temperature over 101 degrees please contact the [address] [place] [address] Road at [phone] or if after hours contact your primary care physician or go to the hospital emergency department. PLEASE NOTE: IF YOU ARE UNABLE TO OBTAIN WOUND SUPPLIES, CONTINUE TO USE THE SUPPLIES YOU HAVE AVAILABLE UNTIL YOU ARE ABLE TO REACH US. IT IS MOST IMPORTANT TO KEEP THE WOUND COVERED AT ALL TIMES.     Electronically signed Jinny Orr RN on 3/10/2023 at 11:43 AM

## 2023-03-10 NOTE — FLOWSHEET NOTE
03/10/23 1111   Right Leg Edema Point of Measurement   Leg circumference 40.5 cm   Ankle circumference 26.5 cm   Foot circumference 23 cm   Wound 01/30/23 Toe (Comment  which one) Anterior;Right #1 right medial 5th toe   Date First Assessed/Time First Assessed: 01/30/23 1405   Present on Hospital Admission: Yes  Wound Approximate Age at First Assessment (Weeks): 4 weeks  Primary Wound Type: Neuropathic  Location: Toe (Comment  which one)  Wound Location Orientation: A...    Wound Image     Wound Etiology Other   Wound Cleansed Vashe   Wound Length (cm) 0.3 cm   Wound Width (cm) 0.2 cm   Wound Depth (cm) 0.1 cm   Wound Surface Area (cm^2) 0.06 cm^2   Change in Wound Size % (l*w) 66.67   Wound Volume (cm^3) 0.006 cm^3   Wound Healing % 89   Post-Procedure Length (cm) 0.3 cm   Post-Procedure Width (cm) 0.2 cm   Post-Procedure Depth (cm) 0.1 cm   Post-Procedure Surface Area (cm^2) 0.06 cm^2   Post-Procedure Volume (cm^3) 0.006 cm^3   Wound Assessment Pink/red   Drainage Amount Small   Drainage Description Serous   Odor None   Florence-wound Assessment Maceration   Margins Epibole (rolled edges)   Wound Thickness Description not for Pressure Injury Full thickness   Patient is on asa daily

## 2023-03-10 NOTE — DISCHARGE INSTRUCTIONS
Discharge Instructions for  Elizabeth Trejo  [address] [facility] [address]  [facility], [address]  Phone [phone]   Fax [phone]      NAME:  [place]  YOB: 1950  MEDICAL RECORD NUMBER:  [id number]  DATE:  @ED@    Return Appointment:   2 weeks with Bertha Yepez DO      Instructions: Right 5th toe  Cleanse wound and periwound with wound cleanser or normal saline. You may cleanse with Vashe at each dressing change. This can be obtained on [employer] Iodosorb- apply thin layer to wound bed, cover with cover dressing, change daily  Secure with gauze. Change daily/   Tubigrip to right lower leg     Increase your protein levels to at least 60 grams per day. You may supplement with protein shakes with at least 30 grams per day. Should you experience increased redness, swelling, pain, foul odor, size of wound(s), or have a temperature over 101 degrees please contact the [address] [place] [address] Road at [phone] or if after hours contact your primary care physician or go to the hospital emergency department. PLEASE NOTE: IF YOU ARE UNABLE TO OBTAIN WOUND SUPPLIES, CONTINUE TO USE THE SUPPLIES YOU HAVE AVAILABLE UNTIL YOU ARE ABLE TO REACH US. IT IS MOST IMPORTANT TO KEEP THE WOUND COVERED AT ALL TIMES.     Electronically signed Noah Clemente RN on 3/10/2023 at 11:55 AM

## 2023-03-20 ENCOUNTER — OFFICE VISIT (OUTPATIENT)
Dept: INTERNAL MEDICINE CLINIC | Facility: CLINIC | Age: 73
End: 2023-03-20
Payer: MEDICARE

## 2023-03-20 VITALS
DIASTOLIC BLOOD PRESSURE: 80 MMHG | BODY MASS INDEX: 30.62 KG/M2 | WEIGHT: 202 LBS | HEIGHT: 68 IN | SYSTOLIC BLOOD PRESSURE: 152 MMHG

## 2023-03-20 DIAGNOSIS — J34.9 PARANASAL SINUS DISEASE: ICD-10-CM

## 2023-03-20 DIAGNOSIS — J30.1 ALLERGIC RHINITIS DUE TO POLLEN, UNSPECIFIED SEASONALITY: ICD-10-CM

## 2023-03-20 DIAGNOSIS — E03.9 HYPOTHYROIDISM, UNSPECIFIED TYPE: ICD-10-CM

## 2023-03-20 DIAGNOSIS — I10 ESSENTIAL HYPERTENSION: Primary | ICD-10-CM

## 2023-03-20 DIAGNOSIS — G60.8 MIXED SENSORY-MOTOR POLYNEUROPATHY: ICD-10-CM

## 2023-03-20 DIAGNOSIS — H83.3X3 NOISE-INDUCED HEARING LOSS OF BOTH EARS: ICD-10-CM

## 2023-03-20 PROCEDURE — 99214 OFFICE O/P EST MOD 30 MIN: CPT | Performed by: INTERNAL MEDICINE

## 2023-03-20 PROCEDURE — 3077F SYST BP >= 140 MM HG: CPT | Performed by: INTERNAL MEDICINE

## 2023-03-20 PROCEDURE — G8427 DOCREV CUR MEDS BY ELIG CLIN: HCPCS | Performed by: INTERNAL MEDICINE

## 2023-03-20 PROCEDURE — 1123F ACP DISCUSS/DSCN MKR DOCD: CPT | Performed by: INTERNAL MEDICINE

## 2023-03-20 PROCEDURE — 3017F COLORECTAL CA SCREEN DOC REV: CPT | Performed by: INTERNAL MEDICINE

## 2023-03-20 PROCEDURE — G8484 FLU IMMUNIZE NO ADMIN: HCPCS | Performed by: INTERNAL MEDICINE

## 2023-03-20 PROCEDURE — G8417 CALC BMI ABV UP PARAM F/U: HCPCS | Performed by: INTERNAL MEDICINE

## 2023-03-20 PROCEDURE — 3079F DIAST BP 80-89 MM HG: CPT | Performed by: INTERNAL MEDICINE

## 2023-03-20 PROCEDURE — 1036F TOBACCO NON-USER: CPT | Performed by: INTERNAL MEDICINE

## 2023-03-20 SDOH — ECONOMIC STABILITY: FOOD INSECURITY: WITHIN THE PAST 12 MONTHS, THE FOOD YOU BOUGHT JUST DIDN'T LAST AND YOU DIDN'T HAVE MONEY TO GET MORE.: PATIENT DECLINED

## 2023-03-20 SDOH — ECONOMIC STABILITY: INCOME INSECURITY: HOW HARD IS IT FOR YOU TO PAY FOR THE VERY BASICS LIKE FOOD, HOUSING, MEDICAL CARE, AND HEATING?: PATIENT DECLINED

## 2023-03-20 SDOH — ECONOMIC STABILITY: HOUSING INSECURITY
IN THE LAST 12 MONTHS, WAS THERE A TIME WHEN YOU DID NOT HAVE A STEADY PLACE TO SLEEP OR SLEPT IN A SHELTER (INCLUDING NOW)?: PATIENT REFUSED

## 2023-03-20 SDOH — ECONOMIC STABILITY: FOOD INSECURITY: WITHIN THE PAST 12 MONTHS, YOU WORRIED THAT YOUR FOOD WOULD RUN OUT BEFORE YOU GOT MONEY TO BUY MORE.: PATIENT DECLINED

## 2023-03-20 ASSESSMENT — PATIENT HEALTH QUESTIONNAIRE - PHQ9
SUM OF ALL RESPONSES TO PHQ QUESTIONS 1-9: 0
SUM OF ALL RESPONSES TO PHQ QUESTIONS 1-9: 0
1. LITTLE INTEREST OR PLEASURE IN DOING THINGS: 0
SUM OF ALL RESPONSES TO PHQ QUESTIONS 1-9: 0
SUM OF ALL RESPONSES TO PHQ QUESTIONS 1-9: 0
2. FEELING DOWN, DEPRESSED OR HOPELESS: 0
SUM OF ALL RESPONSES TO PHQ9 QUESTIONS 1 & 2: 0

## 2023-03-20 ASSESSMENT — ENCOUNTER SYMPTOMS
SINUS PAIN: 0
BACK PAIN: 1

## 2023-03-20 NOTE — PROGRESS NOTES
SUBJECTIVE:   Jeniffer Acevedo is a 67 y.o. male seen for a visit regarding   Chief Complaint   Patient presents with    Thyroid Problem     6 month f/u. Results     Asking about nerve conduction results and MRI results (showed sinus problems)        Thyroid Problem  Presents for follow-up visit. Patient reports no cold intolerance, heat intolerance or palpitations. The symptoms have been stable (uses 150 x6d; 1/2 on 1 day-TSH nl in Sept). Hypertension  This is a chronic problem. The current episode started more than 1 year ago. The problem is unchanged. Pertinent negatives include no chest pain, headaches or palpitations. Identifiable causes of hypertension include a thyroid problem. Past Medical History, Past Surgical History, Family history, Social History, and Medications were all reviewed with the patient today and updated as necessary. Current Outpatient Medications   Medication Sig Dispense Refill    hyoscyamine (LEVBID) 375 MCG extended release tablet hyoscyamine ER 0.375 mg tablet,extended release,12 hr   TAKE ONE TABLET BY MOUTH EVERY 12 HOURS      donepezil (ARICEPT) 10 MG tablet Take 1 tablet by mouth nightly 90 tablet 3    NONFORMULARY Take 1 capsule by mouth daily OTC Immune C      pregabalin (LYRICA) 100 MG capsule Take 100 mg by mouth daily.       hydroCHLOROthiazide (MICROZIDE) 12.5 MG capsule Take 12.5 mg by mouth daily      amLODIPine (NORVASC) 10 MG tablet Take 1 tablet by mouth daily 90 tablet 3    pantoprazole (PROTONIX) 40 MG tablet Take 1 tablet by mouth daily 90 tablet 3    levothyroxine (SYNTHROID) 150 MCG tablet Take 1 tablet by mouth every morning (before breakfast) (Patient taking differently: Take 150 mcg by mouth every morning (before breakfast) 1/2 tab on Sunday) 90 tablet 3    atorvastatin (LIPITOR) 80 MG tablet Take 1 tablet by mouth daily 90 tablet 3    telmisartan-hydroCHLOROthiazide (MICARDIS HCT) 80-25 MG per tablet Take 1 tablet by mouth daily 90 tablet 3

## 2023-03-23 ENCOUNTER — HOSPITAL ENCOUNTER (OUTPATIENT)
Dept: WOUND CARE | Age: 73
Discharge: HOME | End: 2023-03-23
Payer: MEDICARE

## 2023-03-23 VITALS — WEIGHT: 200 LBS | HEIGHT: 68 IN | BODY MASS INDEX: 30.31 KG/M2

## 2023-03-23 DIAGNOSIS — S91.104A OPEN WOUND OF FIFTH TOE, RIGHT, INITIAL ENCOUNTER: ICD-10-CM

## 2023-03-23 DIAGNOSIS — L03.031 CELLULITIS OF FIFTH TOE, RIGHT: Primary | ICD-10-CM

## 2023-03-23 PROCEDURE — 99213 OFFICE O/P EST LOW 20 MIN: CPT | Performed by: FAMILY MEDICINE

## 2023-03-23 PROCEDURE — 99212 OFFICE O/P EST SF 10 MIN: CPT

## 2023-03-23 RX ORDER — LIDOCAINE HYDROCHLORIDE 20 MG/ML
JELLY TOPICAL ONCE
Status: CANCELLED | OUTPATIENT
Start: 2023-03-23 | End: 2023-03-23

## 2023-03-23 RX ORDER — BACITRACIN, NEOMYCIN, POLYMYXIN B 400; 3.5; 5 [USP'U]/G; MG/G; [USP'U]/G
OINTMENT TOPICAL ONCE
Status: CANCELLED | OUTPATIENT
Start: 2023-03-23 | End: 2023-03-23

## 2023-03-23 RX ORDER — BACITRACIN ZINC AND POLYMYXIN B SULFATE 500; 1000 [USP'U]/G; [USP'U]/G
OINTMENT TOPICAL ONCE
Status: CANCELLED | OUTPATIENT
Start: 2023-03-23 | End: 2023-03-23

## 2023-03-23 RX ORDER — LIDOCAINE 50 MG/G
OINTMENT TOPICAL ONCE
Status: CANCELLED | OUTPATIENT
Start: 2023-03-23 | End: 2023-03-23

## 2023-03-23 RX ORDER — GENTAMICIN SULFATE 1 MG/G
OINTMENT TOPICAL ONCE
Status: CANCELLED | OUTPATIENT
Start: 2023-03-23 | End: 2023-03-23

## 2023-03-23 RX ORDER — LIDOCAINE 40 MG/G
CREAM TOPICAL ONCE
Status: CANCELLED | OUTPATIENT
Start: 2023-03-23 | End: 2023-03-23

## 2023-03-23 RX ORDER — LIDOCAINE HYDROCHLORIDE 40 MG/ML
SOLUTION TOPICAL ONCE
Status: CANCELLED | OUTPATIENT
Start: 2023-03-23 | End: 2023-03-23

## 2023-03-23 RX ORDER — GINSENG 100 MG
CAPSULE ORAL ONCE
Status: CANCELLED | OUTPATIENT
Start: 2023-03-23 | End: 2023-03-23

## 2023-03-23 RX ORDER — CLOBETASOL PROPIONATE 0.5 MG/G
OINTMENT TOPICAL ONCE
Status: CANCELLED | OUTPATIENT
Start: 2023-03-23 | End: 2023-03-23

## 2023-03-23 NOTE — PROGRESS NOTES
separately)    Objective:    Ht 5' 8\" (1.727 m)   Wt 200 lb (90.7 kg)   BMI 30.41 kg/m²   Wt Readings from Last 3 Encounters:   03/23/23 200 lb (90.7 kg)   03/20/23 202 lb (91.6 kg)   03/10/23 209 lb (94.8 kg)       PHYSICAL EXAM  General: Alert and in no acute distress. Normal appearing  Skin: Warm and dry, no rash  Head: Normocephalic and atraumatic  Eyes: Extraocular eye movements intact, conjunctivae normal, and sclera anicteric  ENT: Hearing grossly normal bilaterally. Normal appearance  Respiratory: no chest wall tenderness. no respiratory distress  GI: Abdomen non-tender and benign  Musculoskeletal: Baseline range of motion in joints. Nontender calves. No cyanosis. Edema 1+. Neurologic: Speech normal. At baseline without new focal deficits. Mental status normal or at baseline    PAST MEDICAL HISTORY        Diagnosis Date    Arthritis     Chronic back pain 2016/17    After Laminectomy L1-L4    Chronic pain     back Left>right buttock    Colon polyps 07/2019 7-2019 pathology report tubulovillous adenoma    Compression deformity of vertebra 02/2016    Compression C5/6    Encounter for screening colonoscopy 07/03/2019    Erectile dysfunction 2007    After Radical Prostatectomy    Fecal incontinence 10/09/2019    After prostate surgery - followed by colorectal surgery Dr. Rodriguez Nones    GERD (gastroesophageal reflux disease)     controlled by nexium    Hypercholesterolemia     Hyperlipidemia 05/22/2017    Hypertension     Hypothyroidism 2001    Malignant neoplasm of prostate ([address]     Memory loss     Peripheral vascular disease ([address] ?2020?     Spondylosis of lumbar region without myelopathy or radiculopathy 12/10/2019    S/p surgery 1-2016 with Dr. Edis Dasilva incontinence, male     now with artificial sphinctor    Thyroid disease     hypothyroidism       PAST SURGICAL HISTORY    Past Surgical History:   Procedure Laterality Date    CATARACT REMOVAL Bilateral     COLONOSCOPY  07/03/2019 7-2019 colonoscopy-

## 2023-03-23 NOTE — FLOWSHEET NOTE
03/23/23 1401   Wound 01/30/23 Toe (Comment  which one) Anterior;Right #1 right medial 5th toe   Date First Assessed/Time First Assessed: 01/30/23 1405   Present on Hospital Admission: Yes  Wound Approximate Age at First Assessment (Weeks): 4 weeks  Primary Wound Type: Neuropathic  Location: Toe (Comment  which one)  Wound Location Orientation: A... Wound Image    Wound Etiology Other   Wound Cleansed Cleansed with saline; Soap and water;Vashe   Wound Length (cm) 0 cm   Wound Width (cm) 0 cm   Wound Depth (cm) 0 cm   Wound Surface Area (cm^2) 0 cm^2   Change in Wound Size % (l*w) 100   Wound Volume (cm^3) 0 cm^3   Wound Healing % 100   Wound Assessment Epithelialization   Drainage Amount None   Odor None

## 2023-03-23 NOTE — WOUND CARE
Discharge Instructions for  Elizabeth Trejo  [address] [place] [address]  [address], [address]   Phone [phone]   Fax [phone]      NAME:  Marianela Mijares  YOB: 1950  MEDICAL RECORD NUMBER:  [id number]  DATE:  3/23/2023    Return Appointment:   Discharge with Royce Barthel, DO    Instructions: Right 5th toe  Cleanse intact skin with soap & water, wound with normal saline or wound cleanser. Vashe Wound Solution (hypochlorous acid) soak placed on wound bed for a minimum of 15 seconds prior to dressing application. This solution removes pathogens, bioburden, and biofilms from wounds, but is not cytotoxic. Use this solution between the toe at home every day. This can be obtained on [employer] Compression sock or Tubigrip to bilateral lower legs. Elevate legs when sitting to reduce swelling. Swelling interferes with wound healing. Increase your protein levels to at least 60 grams per day. You may supplement with protein shakes with at least 30 grams per day. Should you experience increased redness, swelling, pain, foul odor, size of wound(s), or have a temperature over 101 degrees please contact the [address] [place] [address] Road at [phone] or if after hours contact your primary care physician or go to the hospital emergency department. PLEASE NOTE: IF YOU ARE UNABLE TO OBTAIN WOUND SUPPLIES, CONTINUE TO USE THE SUPPLIES YOU HAVE AVAILABLE UNTIL YOU ARE ABLE TO REACH US. IT IS MOST IMPORTANT TO KEEP THE WOUND COVERED AT ALL TIMES.     Electronically signed Alfredo Cedeno RN on 3/23/2023 at 2:03 PM

## 2023-03-23 NOTE — DISCHARGE INSTRUCTIONS
Discharge Instructions for  Elizabeth Trejo  [address] [place] [address], [address]  Phone [phone]   Fax [phone]        NAME:  Tae Mosley  YOB: 1950  MEDICAL RECORD NUMBER:  [id number]  DATE:  3/23/2023     Return Appointment:   Discharge with Rakesh Gamez,      Instructions: Right 5th toe  Cleanse intact skin with soap & water, wound with normal saline or wound cleanser. Vashe Wound Solution (hypochlorous acid) soak placed on wound bed for a minimum of 15 seconds prior to dressing application. This solution removes pathogens, bioburden, and biofilms from wounds, but is not cytotoxic. Use this solution between the toe at home every day. This can be obtained on [employer] Compression sock or Tubigrip to bilateral lower legs. Elevate legs when sitting to reduce swelling. Swelling interferes with wound healing. Increase your protein levels to at least 60 grams per day. You may supplement with protein shakes with at least 30 grams per day. Should you experience increased redness, swelling, pain, foul odor, size of wound(s), or have a temperature over 101 degrees please contact the [address] [place] [address] Road at [phone] or if after hours contact your primary care physician or go to the hospital emergency department. PLEASE NOTE: IF YOU ARE UNABLE TO OBTAIN WOUND SUPPLIES, CONTINUE TO USE THE SUPPLIES YOU HAVE AVAILABLE UNTIL YOU ARE ABLE TO REACH US. IT IS MOST IMPORTANT TO KEEP THE WOUND COVERED AT ALL TIMES.      Electronically signed Shanell Cm RN on 3/23/2023 at 2:03 PM

## 2023-04-10 DIAGNOSIS — M25.522 ELBOW PAIN, LEFT: ICD-10-CM

## 2023-04-10 LAB
BASOPHILS # BLD: 0.1 K/UL (ref 0–0.2)
BASOPHILS NFR BLD: 1 % (ref 0–2)
DIFFERENTIAL METHOD BLD: ABNORMAL
EOSINOPHIL # BLD: 0.1 K/UL (ref 0–0.8)
EOSINOPHIL NFR BLD: 1 % (ref 0.5–7.8)
ERYTHROCYTE [DISTWIDTH] IN BLOOD BY AUTOMATED COUNT: 15.9 % (ref 11.9–14.6)
HCT VFR BLD AUTO: 43 % (ref 41.1–50.3)
HGB BLD-MCNC: 14.3 G/DL (ref 13.6–17.2)
IMM GRANULOCYTES # BLD AUTO: 0 K/UL (ref 0–0.5)
IMM GRANULOCYTES NFR BLD AUTO: 0 % (ref 0–5)
LYMPHOCYTES # BLD: 1.4 K/UL (ref 0.5–4.6)
LYMPHOCYTES NFR BLD: 17 % (ref 13–44)
MCH RBC QN AUTO: 29.9 PG (ref 26.1–32.9)
MCHC RBC AUTO-ENTMCNC: 33.3 G/DL (ref 31.4–35)
MCV RBC AUTO: 90 FL (ref 82–102)
MONOCYTES # BLD: 0.7 K/UL (ref 0.1–1.3)
MONOCYTES NFR BLD: 8 % (ref 4–12)
NEUTS SEG # BLD: 6.2 K/UL (ref 1.7–8.2)
NEUTS SEG NFR BLD: 73 % (ref 43–78)
NRBC # BLD: 0 K/UL (ref 0–0.2)
PLATELET # BLD AUTO: 301 K/UL (ref 150–450)
PMV BLD AUTO: 11 FL (ref 9.4–12.3)
RBC # BLD AUTO: 4.78 M/UL (ref 4.23–5.6)
WBC # BLD AUTO: 8.4 K/UL (ref 4.3–11.1)

## 2023-04-11 LAB — URATE SERPL-MCNC: 7.3 MG/DL (ref 2.6–6)

## 2023-04-14 PROBLEM — M10.9 GOUTY BURSITIS OF LEFT OLECRANON: Status: ACTIVE | Noted: 2023-04-14

## 2023-05-11 NOTE — PROGRESS NOTES
public place (e.g. a theatre or a meeting) 0   As a passenger in a car for an hour without a break 0   Lying down to rest in the afternoon when circumstances permit 2   Sitting and talking to someone 0   Sitting quietly after a lunch without alcohol 1   In a car, while stopped for a few minutes in traffic 0   Sheldon Sleepiness Score 7          Past Medical History:   Diagnosis Date    Arthritis     Chronic back pain 2016/17    After Laminectomy L1-L4    Chronic pain     back Left>right buttock    Colon polyps 07/2019 7-2019 pathology report tubulovillous adenoma    Compression deformity of vertebra 02/2016    Compression C5/6    Encounter for screening colonoscopy 07/03/2019    Erectile dysfunction 2007    After Radical Prostatectomy    Fecal incontinence 10/09/2019    After prostate surgery - followed by colorectal surgery Dr. Mercedez Richard    GERD (gastroesophageal reflux disease)     controlled by nexium    Hypercholesterolemia     Hyperlipidemia 05/22/2017    Hypertension     Hypothyroidism 2001    Malignant neoplasm of prostate ([address]     Memory loss     Peripheral vascular disease ([address] ?2020?     Spondylosis of lumbar region without myelopathy or radiculopathy 12/10/2019    S/p surgery 1-2016 with Dr. 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

## 2023-05-12 ENCOUNTER — OFFICE VISIT (OUTPATIENT)
Dept: SLEEP MEDICINE | Age: 73
End: 2023-05-12
Payer: MEDICARE

## 2023-05-12 VITALS
BODY MASS INDEX: 30.01 KG/M2 | HEART RATE: 63 BPM | TEMPERATURE: 97.4 F | DIASTOLIC BLOOD PRESSURE: 65 MMHG | SYSTOLIC BLOOD PRESSURE: 126 MMHG | WEIGHT: 198 LBS | OXYGEN SATURATION: 99 % | HEIGHT: 68 IN

## 2023-05-12 DIAGNOSIS — G47.33 OSA (OBSTRUCTIVE SLEEP APNEA): Primary | ICD-10-CM

## 2023-05-12 DIAGNOSIS — R41.3 MEMORY LOSS: ICD-10-CM

## 2023-05-12 PROCEDURE — G8417 CALC BMI ABV UP PARAM F/U: HCPCS | Performed by: NURSE PRACTITIONER

## 2023-05-12 PROCEDURE — 3017F COLORECTAL CA SCREEN DOC REV: CPT | Performed by: NURSE PRACTITIONER

## 2023-05-12 PROCEDURE — G8427 DOCREV CUR MEDS BY ELIG CLIN: HCPCS | Performed by: NURSE PRACTITIONER

## 2023-05-12 PROCEDURE — 99213 OFFICE O/P EST LOW 20 MIN: CPT | Performed by: NURSE PRACTITIONER

## 2023-05-12 PROCEDURE — 1036F TOBACCO NON-USER: CPT | Performed by: NURSE PRACTITIONER

## 2023-05-12 PROCEDURE — 3078F DIAST BP <80 MM HG: CPT | Performed by: NURSE PRACTITIONER

## 2023-05-12 PROCEDURE — 3074F SYST BP LT 130 MM HG: CPT | Performed by: NURSE PRACTITIONER

## 2023-05-12 PROCEDURE — 1123F ACP DISCUSS/DSCN MKR DOCD: CPT | Performed by: NURSE PRACTITIONER

## 2023-05-12 ASSESSMENT — SLEEP AND FATIGUE QUESTIONNAIRES
HOW LIKELY ARE YOU TO NOD OFF OR FALL ASLEEP WHILE SITTING INACTIVE IN A PUBLIC PLACE: 0
HOW LIKELY ARE YOU TO NOD OFF OR FALL ASLEEP WHILE WATCHING TV: 1
ESS TOTAL SCORE: 7
HOW LIKELY ARE YOU TO NOD OFF OR FALL ASLEEP WHEN YOU ARE A PASSENGER IN A CAR FOR AN HOUR WITHOUT A BREAK: 0
HOW LIKELY ARE YOU TO NOD OFF OR FALL ASLEEP WHILE SITTING AND TALKING TO SOMEONE: 0
HOW LIKELY ARE YOU TO NOD OFF OR FALL ASLEEP WHILE SITTING QUIETLY AFTER LUNCH WITHOUT ALCOHOL: 1
HOW LIKELY ARE YOU TO NOD OFF OR FALL ASLEEP WHILE LYING DOWN TO REST IN THE AFTERNOON WHEN CIRCUMSTANCES PERMIT: 2
HOW LIKELY ARE YOU TO NOD OFF OR FALL ASLEEP IN A CAR, WHILE STOPPED FOR A FEW MINUTES IN TRAFFIC: 0
HOW LIKELY ARE YOU TO NOD OFF OR FALL ASLEEP WHILE SITTING AND READING: 3

## 2023-05-12 NOTE — PATIENT INSTRUCTIONS
Continue CPAP at new pressure setting of 15 cm H2O with nightly compliance  Supply order is current  Recommendations as above  Follow-up in 3 months or sooner if needed

## 2023-05-15 ENCOUNTER — OFFICE VISIT (OUTPATIENT)
Dept: ENT CLINIC | Age: 73
End: 2023-05-15
Payer: MEDICARE

## 2023-05-15 VITALS
DIASTOLIC BLOOD PRESSURE: 78 MMHG | HEIGHT: 68 IN | WEIGHT: 202.2 LBS | SYSTOLIC BLOOD PRESSURE: 140 MMHG | BODY MASS INDEX: 30.65 KG/M2

## 2023-05-15 DIAGNOSIS — J34.3 NASAL TURBINATE HYPERTROPHY: ICD-10-CM

## 2023-05-15 DIAGNOSIS — J30.9 CHRONIC ALLERGIC RHINITIS: ICD-10-CM

## 2023-05-15 DIAGNOSIS — J32.9 RECURRENT RHINOSINUSITIS: Primary | ICD-10-CM

## 2023-05-15 DIAGNOSIS — J34.89 NASAL OBSTRUCTION: ICD-10-CM

## 2023-05-15 DIAGNOSIS — J34.2 DEVIATED NASAL SEPTUM: ICD-10-CM

## 2023-05-15 PROCEDURE — 31231 NASAL ENDOSCOPY DX: CPT | Performed by: STUDENT IN AN ORGANIZED HEALTH CARE EDUCATION/TRAINING PROGRAM

## 2023-05-15 PROCEDURE — 3077F SYST BP >= 140 MM HG: CPT | Performed by: STUDENT IN AN ORGANIZED HEALTH CARE EDUCATION/TRAINING PROGRAM

## 2023-05-15 PROCEDURE — G8417 CALC BMI ABV UP PARAM F/U: HCPCS | Performed by: STUDENT IN AN ORGANIZED HEALTH CARE EDUCATION/TRAINING PROGRAM

## 2023-05-15 PROCEDURE — 3078F DIAST BP <80 MM HG: CPT | Performed by: STUDENT IN AN ORGANIZED HEALTH CARE EDUCATION/TRAINING PROGRAM

## 2023-05-15 PROCEDURE — G8427 DOCREV CUR MEDS BY ELIG CLIN: HCPCS | Performed by: STUDENT IN AN ORGANIZED HEALTH CARE EDUCATION/TRAINING PROGRAM

## 2023-05-15 PROCEDURE — 3017F COLORECTAL CA SCREEN DOC REV: CPT | Performed by: STUDENT IN AN ORGANIZED HEALTH CARE EDUCATION/TRAINING PROGRAM

## 2023-05-15 PROCEDURE — 99204 OFFICE O/P NEW MOD 45 MIN: CPT | Performed by: STUDENT IN AN ORGANIZED HEALTH CARE EDUCATION/TRAINING PROGRAM

## 2023-05-15 PROCEDURE — 1036F TOBACCO NON-USER: CPT | Performed by: STUDENT IN AN ORGANIZED HEALTH CARE EDUCATION/TRAINING PROGRAM

## 2023-05-15 PROCEDURE — 1123F ACP DISCUSS/DSCN MKR DOCD: CPT | Performed by: STUDENT IN AN ORGANIZED HEALTH CARE EDUCATION/TRAINING PROGRAM

## 2023-05-15 RX ORDER — AMOXICILLIN AND CLAVULANATE POTASSIUM 875; 125 MG/1; MG/1
1 TABLET, FILM COATED ORAL 2 TIMES DAILY
Qty: 20 TABLET | Refills: 0 | Status: SHIPPED | OUTPATIENT
Start: 2023-05-15 | End: 2023-05-25

## 2023-05-15 RX ORDER — PREDNISONE 20 MG/1
TABLET ORAL
Qty: 16 TABLET | Refills: 0 | Status: SHIPPED | OUTPATIENT
Start: 2023-05-15

## 2023-05-15 ASSESSMENT — ENCOUNTER SYMPTOMS
ALLERGIC/IMMUNOLOGIC NEGATIVE: 1
RESPIRATORY NEGATIVE: 1
EYES NEGATIVE: 1
GASTROINTESTINAL NEGATIVE: 1

## 2023-05-15 NOTE — PROGRESS NOTES
HPI:  Mauricio Henriquez is a 67 y.o. male seen Referral   Chief Complaint   Patient presents with    New Patient     Started Cpap in Feb, had a MRI due to memory loss and incidental finding of sinus issues, congestion, seasonal allergies       65-year-old male seen as a new patient referral evaluation in regards to long-term sinus issues and recent MRI showing left-sided paranasal sinus disease. He has had at minimum spring summer allergy onset sinonasal congestion and annual sinusitis. Over the last couple years this has somewhat increased with long-term nasal airway obstruction. He does daily consistent fluticasone spray and oral antihistamines as needed. He has had minimal use of saline spray to his nose. He has had no recent sinus antibiotics but has had antibiotics for other concerns. He underwent a MRI of the brain a few months ago for cognitive changes which did show an incidental finding of left-sided paranasal sinus opacification to the area of the left [facility]. This is where he endorses the majority of the concerns when they are seasonally more apparent. Past Medical History, Past Surgical History, Family history, Social History, and Medications were all reviewed with the patient today and updated as necessary.      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

## 2023-05-18 ENCOUNTER — TELEPHONE (OUTPATIENT)
Dept: SLEEP MEDICINE | Age: 73
End: 2023-05-18

## 2023-05-18 NOTE — TELEPHONE ENCOUNTER
I did check a download of CPAP therapy on the patient after adjusting his pressure 7 days ago. His AHI is improved to an average of 5.8. I called the patient and he states he has noticed that improvement as well and he is tolerating the new pressures fine.   We will see him at his next follow-up appointment

## 2023-05-18 NOTE — TELEPHONE ENCOUNTER
----- Message from LOLITA Honeycutt CNP sent at 5/12/2023  4:40 PM EDT -----  Regarding: check download and AHI

## 2023-05-19 ENCOUNTER — PATIENT MESSAGE (OUTPATIENT)
Dept: INTERNAL MEDICINE CLINIC | Facility: CLINIC | Age: 73
End: 2023-05-19

## 2023-05-19 RX ORDER — HYDROCHLOROTHIAZIDE 12.5 MG/1
12.5 CAPSULE, GELATIN COATED ORAL DAILY
Qty: 90 CAPSULE | Refills: 3 | Status: SHIPPED | OUTPATIENT
Start: 2023-05-19

## 2023-05-19 NOTE — TELEPHONE ENCOUNTER
From: Annette Nieves  To: Dr. Shayy Schneider: 5/19/2023 12:40 PM EDT  Subject: New 90 Day + 3 Refills Prescription    I would like to request a new presripton for Hydrochlorothiozide 12.5mg from [facility]. I have an appointment with Dr. Devora Akhtar on June 19, but will run out of medicinebefore hat time. Thank you,    Anjali Ralph.  Zach Perez

## 2023-05-30 ENCOUNTER — HOSPITAL ENCOUNTER (OUTPATIENT)
Dept: CT IMAGING | Age: 73
Discharge: HOME | End: 2023-06-02

## 2023-05-30 DIAGNOSIS — J32.9 RECURRENT RHINOSINUSITIS: ICD-10-CM

## 2023-06-15 SDOH — ECONOMIC STABILITY: INCOME INSECURITY: HOW HARD IS IT FOR YOU TO PAY FOR THE VERY BASICS LIKE FOOD, HOUSING, MEDICAL CARE, AND HEATING?: NOT HARD AT ALL

## 2023-06-15 SDOH — ECONOMIC STABILITY: FOOD INSECURITY: WITHIN THE PAST 12 MONTHS, YOU WORRIED THAT YOUR FOOD WOULD RUN OUT BEFORE YOU GOT MONEY TO BUY MORE.: NEVER TRUE

## 2023-06-15 SDOH — ECONOMIC STABILITY: FOOD INSECURITY: WITHIN THE PAST 12 MONTHS, THE FOOD YOU BOUGHT JUST DIDN'T LAST AND YOU DIDN'T HAVE MONEY TO GET MORE.: NEVER TRUE

## 2023-06-15 SDOH — ECONOMIC STABILITY: HOUSING INSECURITY
IN THE LAST 12 MONTHS, WAS THERE A TIME WHEN YOU DID NOT HAVE A STEADY PLACE TO SLEEP OR SLEPT IN A SHELTER (INCLUDING NOW)?: NO

## 2023-06-15 SDOH — ECONOMIC STABILITY: TRANSPORTATION INSECURITY
IN THE PAST 12 MONTHS, HAS LACK OF TRANSPORTATION KEPT YOU FROM MEETINGS, WORK, OR FROM GETTING THINGS NEEDED FOR DAILY LIVING?: NO

## 2023-06-15 SDOH — HEALTH STABILITY: PHYSICAL HEALTH: ON AVERAGE, HOW MANY MINUTES DO YOU ENGAGE IN EXERCISE AT THIS LEVEL?: 60 MIN

## 2023-06-15 SDOH — HEALTH STABILITY: PHYSICAL HEALTH: ON AVERAGE, HOW MANY DAYS PER WEEK DO YOU ENGAGE IN MODERATE TO STRENUOUS EXERCISE (LIKE A BRISK WALK)?: 5 DAYS

## 2023-06-15 ASSESSMENT — LIFESTYLE VARIABLES
HOW OFTEN DURING THE LAST YEAR HAVE YOU FOUND THAT YOU WERE NOT ABLE TO STOP DRINKING ONCE YOU HAD STARTED: NEVER
HAVE YOU OR SOMEONE ELSE BEEN INJURED AS A RESULT OF YOUR DRINKING: NO
HOW OFTEN DURING THE LAST YEAR HAVE YOU BEEN UNABLE TO REMEMBER WHAT HAPPENED THE NIGHT BEFORE BECAUSE YOU HAD BEEN DRINKING: 0
HOW OFTEN DURING THE LAST YEAR HAVE YOU HAD A FEELING OF GUILT OR REMORSE AFTER DRINKING: 0
HOW OFTEN DURING THE LAST YEAR HAVE YOU BEEN UNABLE TO REMEMBER WHAT HAPPENED THE NIGHT BEFORE BECAUSE YOU HAD BEEN DRINKING: NEVER
HOW OFTEN DURING THE LAST YEAR HAVE YOU FAILED TO DO WHAT WAS NORMALLY EXPECTED FROM YOU BECAUSE OF DRINKING: NEVER
HOW OFTEN DO YOU HAVE A DRINK CONTAINING ALCOHOL: 4 OR MORE TIMES A WEEK
HOW OFTEN DO YOU HAVE A DRINK CONTAINING ALCOHOL: 5
HOW OFTEN DO YOU HAVE SIX OR MORE DRINKS ON ONE OCCASION: 1
HOW OFTEN DURING THE LAST YEAR HAVE YOU FAILED TO DO WHAT WAS NORMALLY EXPECTED FROM YOU BECAUSE OF DRINKING: 0
HOW OFTEN DURING THE LAST YEAR HAVE YOU FOUND THAT YOU WERE NOT ABLE TO STOP DRINKING ONCE YOU HAD STARTED: 0
HOW MANY STANDARD DRINKS CONTAINING ALCOHOL DO YOU HAVE ON A TYPICAL DAY: 3 OR 4
HAS A RELATIVE, FRIEND, DOCTOR, OR ANOTHER HEALTH PROFESSIONAL EXPRESSED CONCERN ABOUT YOUR DRINKING OR SUGGESTED YOU CUT DOWN: NO
HAS A RELATIVE, FRIEND, DOCTOR, OR ANOTHER HEALTH PROFESSIONAL EXPRESSED CONCERN ABOUT YOUR DRINKING OR SUGGESTED YOU CUT DOWN: 0
HOW OFTEN DURING THE LAST YEAR HAVE YOU NEEDED AN ALCOHOLIC DRINK FIRST THING IN THE MORNING TO GET YOURSELF GOING AFTER A NIGHT OF HEAVY DRINKING: NEVER
HOW OFTEN DURING THE LAST YEAR HAVE YOU HAD A FEELING OF GUILT OR REMORSE AFTER DRINKING: NEVER
HOW OFTEN DURING THE LAST YEAR HAVE YOU NEEDED AN ALCOHOLIC DRINK FIRST THING IN THE MORNING TO GET YOURSELF GOING AFTER A NIGHT OF HEAVY DRINKING: 0
HOW MANY STANDARD DRINKS CONTAINING ALCOHOL DO YOU HAVE ON A TYPICAL DAY: 2
HAVE YOU OR SOMEONE ELSE BEEN INJURED AS A RESULT OF YOUR DRINKING: 0

## 2023-06-15 ASSESSMENT — PATIENT HEALTH QUESTIONNAIRE - PHQ9
SUM OF ALL RESPONSES TO PHQ QUESTIONS 1-9: 0
2. FEELING DOWN, DEPRESSED OR HOPELESS: 0
1. LITTLE INTEREST OR PLEASURE IN DOING THINGS: 0
SUM OF ALL RESPONSES TO PHQ QUESTIONS 1-9: 0
SUM OF ALL RESPONSES TO PHQ9 QUESTIONS 1 & 2: 0

## 2023-06-19 ENCOUNTER — OFFICE VISIT (OUTPATIENT)
Dept: INTERNAL MEDICINE CLINIC | Facility: CLINIC | Age: 73
End: 2023-06-19
Payer: MEDICARE

## 2023-06-19 VITALS
HEIGHT: 68 IN | SYSTOLIC BLOOD PRESSURE: 126 MMHG | BODY MASS INDEX: 31.07 KG/M2 | DIASTOLIC BLOOD PRESSURE: 72 MMHG | WEIGHT: 205 LBS

## 2023-06-19 DIAGNOSIS — Z00.00 MEDICARE ANNUAL WELLNESS VISIT, SUBSEQUENT: Primary | ICD-10-CM

## 2023-06-19 DIAGNOSIS — E03.9 HYPOTHYROIDISM, UNSPECIFIED TYPE: ICD-10-CM

## 2023-06-19 DIAGNOSIS — Z85.46 H/O PROSTATE CANCER: ICD-10-CM

## 2023-06-19 DIAGNOSIS — J30.1 SEASONAL ALLERGIC RHINITIS DUE TO POLLEN: ICD-10-CM

## 2023-06-19 DIAGNOSIS — K22.70 BARRETT'S ESOPHAGUS WITHOUT DYSPLASIA: ICD-10-CM

## 2023-06-19 DIAGNOSIS — E78.5 HYPERLIPIDEMIA, UNSPECIFIED HYPERLIPIDEMIA TYPE: ICD-10-CM

## 2023-06-19 DIAGNOSIS — I10 ESSENTIAL (PRIMARY) HYPERTENSION: ICD-10-CM

## 2023-06-19 DIAGNOSIS — I10 ESSENTIAL HYPERTENSION: ICD-10-CM

## 2023-06-19 DIAGNOSIS — G60.8 MIXED SENSORY-MOTOR POLYNEUROPATHY: ICD-10-CM

## 2023-06-19 LAB
ALBUMIN SERPL-MCNC: 4.2 G/DL (ref 3.2–4.6)
ALBUMIN/GLOB SERPL: 1.6 (ref 0.4–1.6)
ALP SERPL-CCNC: 86 U/L (ref 50–136)
ALT SERPL-CCNC: 21 U/L (ref 12–65)
ANION GAP SERPL CALC-SCNC: 5 MMOL/L (ref 2–11)
AST SERPL-CCNC: 22 U/L (ref 15–37)
BASOPHILS # BLD: 0 K/UL (ref 0–0.2)
BASOPHILS NFR BLD: 0 % (ref 0–2)
BILIRUB SERPL-MCNC: 1 MG/DL (ref 0.2–1.1)
BUN SERPL-MCNC: 20 MG/DL (ref 8–23)
CALCIUM SERPL-MCNC: 8.8 MG/DL (ref 8.3–10.4)
CHLORIDE SERPL-SCNC: 106 MMOL/L (ref 101–110)
CHOLEST SERPL-MCNC: 176 MG/DL
CO2 SERPL-SCNC: 30 MMOL/L (ref 21–32)
CREAT SERPL-MCNC: 0.9 MG/DL (ref 0.8–1.5)
DIFFERENTIAL METHOD BLD: ABNORMAL
EOSINOPHIL # BLD: 0 K/UL (ref 0–0.8)
EOSINOPHIL NFR BLD: 1 % (ref 0.5–7.8)
ERYTHROCYTE [DISTWIDTH] IN BLOOD BY AUTOMATED COUNT: 16.4 % (ref 11.9–14.6)
GLOBULIN SER CALC-MCNC: 2.7 G/DL (ref 2.8–4.5)
GLUCOSE SERPL-MCNC: 117 MG/DL (ref 65–100)
HCT VFR BLD AUTO: 39.9 % (ref 41.1–50.3)
HDLC SERPL-MCNC: 73 MG/DL (ref 40–60)
HDLC SERPL: 2.4
HGB BLD-MCNC: 13.1 G/DL (ref 13.6–17.2)
IMM GRANULOCYTES # BLD AUTO: 0 K/UL (ref 0–0.5)
IMM GRANULOCYTES NFR BLD AUTO: 0 % (ref 0–5)
LDLC SERPL CALC-MCNC: 89.4 MG/DL
LYMPHOCYTES # BLD: 1 K/UL (ref 0.5–4.6)
LYMPHOCYTES NFR BLD: 27 % (ref 13–44)
MCH RBC QN AUTO: 30.1 PG (ref 26.1–32.9)
MCHC RBC AUTO-ENTMCNC: 32.8 G/DL (ref 31.4–35)
MCV RBC AUTO: 91.7 FL (ref 82–102)
MONOCYTES # BLD: 0.4 K/UL (ref 0.1–1.3)
MONOCYTES NFR BLD: 11 % (ref 4–12)
NEUTS SEG # BLD: 2.3 K/UL (ref 1.7–8.2)
NEUTS SEG NFR BLD: 61 % (ref 43–78)
NRBC # BLD: 0 K/UL (ref 0–0.2)
PLATELET # BLD AUTO: 258 K/UL (ref 150–450)
PMV BLD AUTO: 10.7 FL (ref 9.4–12.3)
POTASSIUM SERPL-SCNC: 3.6 MMOL/L (ref 3.5–5.1)
PROT SERPL-MCNC: 6.9 G/DL (ref 6.3–8.2)
RBC # BLD AUTO: 4.35 M/UL (ref 4.23–5.6)
SODIUM SERPL-SCNC: 141 MMOL/L (ref 133–143)
TRIGL SERPL-MCNC: 68 MG/DL (ref 35–150)
TSH W FREE THYROID IF ABNORMAL: 1.72 UIU/ML (ref 0.36–3.74)
VLDLC SERPL CALC-MCNC: 13.6 MG/DL (ref 6–23)
WBC # BLD AUTO: 3.8 K/UL (ref 4.3–11.1)

## 2023-06-19 PROCEDURE — 3074F SYST BP LT 130 MM HG: CPT | Performed by: INTERNAL MEDICINE

## 2023-06-19 PROCEDURE — G0439 PPPS, SUBSEQ VISIT: HCPCS | Performed by: INTERNAL MEDICINE

## 2023-06-19 PROCEDURE — 1123F ACP DISCUSS/DSCN MKR DOCD: CPT | Performed by: INTERNAL MEDICINE

## 2023-06-19 PROCEDURE — 3078F DIAST BP <80 MM HG: CPT | Performed by: INTERNAL MEDICINE

## 2023-06-19 PROCEDURE — 3017F COLORECTAL CA SCREEN DOC REV: CPT | Performed by: INTERNAL MEDICINE

## 2023-06-19 RX ORDER — LEVOTHYROXINE SODIUM 0.15 MG/1
150 TABLET ORAL
Qty: 90 TABLET | Refills: 3 | Status: SHIPPED | OUTPATIENT
Start: 2023-06-19

## 2023-06-19 RX ORDER — NICOTINE POLACRILEX 2 MG
1 GUM BUCCAL DAILY
COMMUNITY

## 2023-06-19 RX ORDER — AMLODIPINE BESYLATE 10 MG/1
10 TABLET ORAL DAILY
Qty: 90 TABLET | Refills: 3 | Status: SHIPPED | OUTPATIENT
Start: 2023-06-19

## 2023-06-19 RX ORDER — FLUTICASONE PROPIONATE 50 MCG
2 SPRAY, SUSPENSION (ML) NASAL DAILY PRN
Qty: 16 G | Refills: 5 | Status: SHIPPED | OUTPATIENT
Start: 2023-06-19

## 2023-06-19 RX ORDER — ATORVASTATIN CALCIUM 80 MG/1
80 TABLET, FILM COATED ORAL DAILY
Qty: 90 TABLET | Refills: 3 | Status: SHIPPED | OUTPATIENT
Start: 2023-06-19

## 2023-06-19 RX ORDER — TELMISARTAN AND HYDROCHLORTHIAZIDE 80; 25 MG/1; MG/1
1 TABLET ORAL DAILY
Qty: 90 TABLET | Refills: 3 | Status: SHIPPED | OUTPATIENT
Start: 2023-06-19

## 2023-06-19 RX ORDER — PANTOPRAZOLE SODIUM 40 MG/1
40 TABLET, DELAYED RELEASE ORAL DAILY
Qty: 90 TABLET | Refills: 3 | Status: SHIPPED | OUTPATIENT
Start: 2023-06-19

## 2023-06-19 NOTE — PATIENT INSTRUCTIONS
Preventing Falls: Care Instructions    Talk to your doctor about the medicines you take. Ask if any of them increase the risk of falls and whether they can be changed or stopped. Try to exercise regularly. It can help improve your strength and balance. This can help lower your risk of falling. Practice fall safety and prevention. Wear low-heeled shoes that fit well and give your feet good support. Talk to your doctor if you have foot problems that make this hard. Carry a cellphone or wear a medical alert device that you can use to call for help. Use stepladders instead of chairs to reach high objects. Don't climb if you're at risk for falls. Ask for help, if needed. Wear the correct eyeglasses, if you need them. Make your home safer. Remove rugs, cords, clutter, and furniture from walkways. Keep your house well lit. Use night-lights in hallways and bathrooms. Install and use sturdy handrails on stairways. Wear nonskid footwear, even inside. Don't walk barefoot or in socks without shoes. Be safe outside. Use handrails, curb cuts, and ramps whenever possible. Keep your hands free by using a shoulder bag or backpack. Try to walk in well-lit areas. Watch out for uneven ground, changes in pavement, and debris. Be careful in the winter. Walk on the grass or gravel when sidewalks are slippery. Use de-icer on steps and walkways. Add non-slip devices to shoes. Put grab bars and nonskid mats in your shower or tub and near the toilet. Try to use a shower chair or bath bench when bathing. Get into a tub or shower by putting in your weaker leg first. Get out with your strong side first. Have a phone or medical alert device in the bathroom with you. Where can you learn more? Go to http://www.page.com/ and enter G117 to learn more about \"Preventing Falls: Care Instructions. \"  Current as of: November 9, 2022               Content Version: 13.7  © 5015-0079 [employer]wise

## 2023-06-19 NOTE — PROGRESS NOTES
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  -     pantoprazole (PROTONIX) 40 MG tablet; Take 1 tablet by mouth daily, Disp-90 tablet, R-3Normal  Hypothyroidism, unspecified type  -     levothyroxine (SYNTHROID) 150 MCG tablet; Take 1 tablet by mouth every morning (before breakfast) 1/2 tab on Sunday, Disp-90 tablet, R-3Normal  -     TSH with Reflex; Future  Hyperlipidemia, unspecified hyperlipidemia type  -     atorvastatin (LIPITOR) 80 MG tablet; Take 1 tablet by mouth daily, Disp-90 tablet, R-3Normal  -     Lipid Panel; Future  Essential (primary) hypertension  -     telmisartan-hydroCHLOROthiazide (MICARDIS HCT) 80-25 MG per tablet; Take 1 tablet by mouth daily, Disp-90 tablet, R-3Normal  -     CBC with Auto Differential; Future  -     Comprehensive Metabolic Panel; Future  Mixed sensory-motor polyneuropathy  H/O prostate cancer  -     PSA, ultrasensitive; Future  Seasonal allergic rhinitis due to pollen  -     fluticasone (FLONASE) 50 MCG/ACT nasal spray; 2 sprays by Nasal route daily as needed for Rhinitis, Disp-16 g, R-5Normal    Recommendations for Preventive Services Due: see orders and patient instructions/AVS.  Recommended screening schedule for the next 5-10 years is provided to the patient in written form: see Patient Instructions/AVS.     No follow-ups on file. Subjective   The following acute and/or chronic problems were also addressed today:  Has HTN -on meds -12.  Mg HCT added ior-home in 130's; on lipitor; LDL 90 done 6/2022; had prostat ca removed 2007-psa 1/2022 at < 0.006; on thyroid med and last TSH normal    Patient's complete Health Risk Assessment and screening values have been reviewed and are found

## 2023-06-20 DIAGNOSIS — D64.9 ANEMIA, UNSPECIFIED TYPE: Primary | ICD-10-CM

## 2023-06-21 LAB — PSA SERPL DL<=0.01 NG/ML-MCNC: <0.006 NG/ML (ref 0–4)

## 2023-06-30 DIAGNOSIS — D64.9 ANEMIA, UNSPECIFIED TYPE: ICD-10-CM

## 2023-06-30 LAB
BASOPHILS # BLD: 0 K/UL (ref 0–0.2)
BASOPHILS NFR BLD: 1 % (ref 0–2)
DIFFERENTIAL METHOD BLD: ABNORMAL
EOSINOPHIL # BLD: 0 K/UL (ref 0–0.8)
EOSINOPHIL NFR BLD: 1 % (ref 0.5–7.8)
ERYTHROCYTE [DISTWIDTH] IN BLOOD BY AUTOMATED COUNT: 15.6 % (ref 11.9–14.6)
FERRITIN SERPL-MCNC: 254 NG/ML (ref 8–388)
HCT VFR BLD AUTO: 41 % (ref 41.1–50.3)
HGB BLD-MCNC: 13.3 G/DL (ref 13.6–17.2)
IMM GRANULOCYTES # BLD AUTO: 0 K/UL (ref 0–0.5)
IMM GRANULOCYTES NFR BLD AUTO: 0 % (ref 0–5)
IRON SERPL-MCNC: 88 UG/DL (ref 35–150)
LYMPHOCYTES # BLD: 1.3 K/UL (ref 0.5–4.6)
LYMPHOCYTES NFR BLD: 26 % (ref 13–44)
MCH RBC QN AUTO: 30.4 PG (ref 26.1–32.9)
MCHC RBC AUTO-ENTMCNC: 32.4 G/DL (ref 31.4–35)
MCV RBC AUTO: 93.8 FL (ref 82–102)
MONOCYTES # BLD: 0.5 K/UL (ref 0.1–1.3)
MONOCYTES NFR BLD: 9 % (ref 4–12)
NEUTS SEG # BLD: 3.1 K/UL (ref 1.7–8.2)
NEUTS SEG NFR BLD: 63 % (ref 43–78)
NRBC # BLD: 0 K/UL (ref 0–0.2)
PLATELET # BLD AUTO: 230 K/UL (ref 150–450)
PMV BLD AUTO: 10.9 FL (ref 9.4–12.3)
RBC # BLD AUTO: 4.37 M/UL (ref 4.23–5.6)
TIBC SERPL-MCNC: 265 UG/DL (ref 250–450)
WBC # BLD AUTO: 5 K/UL (ref 4.3–11.1)

## 2023-07-11 ENCOUNTER — OFFICE VISIT (OUTPATIENT)
Dept: INTERNAL MEDICINE CLINIC | Facility: CLINIC | Age: 73
End: 2023-07-11
Payer: MEDICARE

## 2023-07-11 VITALS
DIASTOLIC BLOOD PRESSURE: 70 MMHG | BODY MASS INDEX: 30.31 KG/M2 | SYSTOLIC BLOOD PRESSURE: 128 MMHG | HEIGHT: 68 IN | WEIGHT: 200 LBS

## 2023-07-11 DIAGNOSIS — M10.00 IDIOPATHIC GOUT, UNSPECIFIED CHRONICITY, UNSPECIFIED SITE: ICD-10-CM

## 2023-07-11 DIAGNOSIS — M10.00 IDIOPATHIC GOUT, UNSPECIFIED CHRONICITY, UNSPECIFIED SITE: Primary | ICD-10-CM

## 2023-07-11 PROCEDURE — 3074F SYST BP LT 130 MM HG: CPT | Performed by: INTERNAL MEDICINE

## 2023-07-11 PROCEDURE — G8427 DOCREV CUR MEDS BY ELIG CLIN: HCPCS | Performed by: INTERNAL MEDICINE

## 2023-07-11 PROCEDURE — 3078F DIAST BP <80 MM HG: CPT | Performed by: INTERNAL MEDICINE

## 2023-07-11 PROCEDURE — 99213 OFFICE O/P EST LOW 20 MIN: CPT | Performed by: INTERNAL MEDICINE

## 2023-07-11 PROCEDURE — 1036F TOBACCO NON-USER: CPT | Performed by: INTERNAL MEDICINE

## 2023-07-11 PROCEDURE — 1123F ACP DISCUSS/DSCN MKR DOCD: CPT | Performed by: INTERNAL MEDICINE

## 2023-07-11 PROCEDURE — 3017F COLORECTAL CA SCREEN DOC REV: CPT | Performed by: INTERNAL MEDICINE

## 2023-07-11 PROCEDURE — G8417 CALC BMI ABV UP PARAM F/U: HCPCS | Performed by: INTERNAL MEDICINE

## 2023-07-11 ASSESSMENT — PATIENT HEALTH QUESTIONNAIRE - PHQ9
SUM OF ALL RESPONSES TO PHQ QUESTIONS 1-9: 0
1. LITTLE INTEREST OR PLEASURE IN DOING THINGS: 0
SUM OF ALL RESPONSES TO PHQ QUESTIONS 1-9: 0
SUM OF ALL RESPONSES TO PHQ QUESTIONS 1-9: 0
2. FEELING DOWN, DEPRESSED OR HOPELESS: 0
SUM OF ALL RESPONSES TO PHQ QUESTIONS 1-9: 0
SUM OF ALL RESPONSES TO PHQ9 QUESTIONS 1 & 2: 0

## 2023-07-11 ASSESSMENT — ENCOUNTER SYMPTOMS: COLOR CHANGE: 1

## 2023-07-11 NOTE — PROGRESS NOTES
SUBJECTIVE:   Joycelyn Hanna is a 75 y.o. male seen for a visit regarding   Chief Complaint   Patient presents with    Gout     ? Left foot. Just finished prednisone pack from urgent care        Gout  This is a new problem. The current episode started 1 to 4 weeks ago (started foot pain late June-had pain big toe and 2nd toe-seen Doctor's care 7/3-put on prednisone pratik 21 pills-finished today-pain better by day 1 a lot-was red and swollen). The problem occurs constantly (felt was gout clinically). Associated symptoms include arthralgias. Uric acid 7.3 done 4/2023  Past Medical History, Past Surgical History, Family history, Social History, and Medications were all reviewed with the patient today and updated as necessary.        Current Outpatient Medications   Medication Sig Dispense Refill    Biotin 1 MG CAPS Take 1 capsule by mouth daily      NONFORMULARY Take 1 capsule by mouth daily [facility]      CPAP Machine MISC by Does not apply route      amLODIPine (NORVASC) 10 MG tablet Take 1 tablet by mouth daily 90 tablet 3    pantoprazole (PROTONIX) 40 MG tablet Take 1 tablet by mouth daily 90 tablet 3    levothyroxine (SYNTHROID) 150 MCG tablet Take 1 tablet by mouth every morning (before breakfast) 1/2 tab on Sunday 90 tablet 3    atorvastatin (LIPITOR) 80 MG tablet Take 1 tablet by mouth daily 90 tablet 3    telmisartan-hydroCHLOROthiazide (MICARDIS HCT) 80-25 MG per tablet Take 1 tablet by mouth daily 90 tablet 3    fluticasone (FLONASE) 50 MCG/ACT nasal spray 2 sprays by Nasal route daily as needed for Rhinitis 16 g 5    hydroCHLOROthiazide (MICROZIDE) 12.5 MG capsule Take 1 capsule by mouth daily 90 capsule 3    naproxen (NAPROSYN) 500 MG tablet Take 1 tablet by mouth 2 times daily (with meals) (Patient taking differently: Take 1 tablet by mouth 2 times daily as needed) 60 tablet 0    Multiple Vitamins-Minerals (ICAPS AREDS 2 PO) Take 1 tablet by mouth in the morning and at bedtime      hyoscyamine

## 2023-07-12 LAB — URATE SERPL-MCNC: 7.8 MG/DL (ref 2.6–6)

## 2023-07-13 NOTE — PROGRESS NOTES
Physical Medicine & Rehab    Received message from [facility] Insurance MD is requesting a Peer to Peer conversation prior to making a determination for IRP authorization. The Peer to Peer window expires at 13:30 today, at which time the insurance MD will proceed with the decision. The Peer to Peer can be completed by any MD, NP or PA. Peer to Peer Ph #: [phone] opt 5, state calling for a Peer to Peer, have pt name, , and insurance ID ready. ID: [id number]. Ref#: [id number]. Communicated information with team via [id number] [employer]. Awaiting decision if Peer to Peer will be completed and who will complete the call.     Cheryl Huggins  Rehab Referrals Coordinator  [facility]  [phone] or [phone] History    Not on file   Tobacco Use    Smoking status: Former     Packs/day: 1.00     Types: Cigarettes     Quit date: 1998     Years since quittin.5    Smokeless tobacco: Never   Substance and Sexual Activity    Alcohol use:  Yes     Alcohol/week: 21.0 standard drinks    Drug use: No    Sexual activity: Not on file   Other Topics Concern    Not on file   Social History Narrative    Lives with wife  2 children  4 grandchildren one on way    [employer] 25 years     Social Determinants of Health     Financial Resource Strain: Not on file   Food Insecurity: Not on file   Transportation Needs: Not on file   Physical Activity: Insufficiently Active    Days of Exercise per Week: 3 days    Minutes of Exercise per Session: 40 min   Stress: Not on file   Social Connections: Not on file   Intimate Partner Violence: Not on file   Housing Stability: Not on file       Family History:   Family History   Problem Relation Age of Onset    Dementia Paternal Grandfather     Cancer Paternal Grandmother         Throat    Cancer Maternal Grandfather     Heart Disease Maternal Grandmother     Cancer Father 79        Kidney    Alcohol Abuse Brother         Now, recovered; at one time addicted to pain Rx    Diabetes Brother     Heart Disease Mother     Alzheimer's Disease Maternal Aunt        Current Outpatient Medications on File Prior to Visit   Medication Sig Dispense Refill    HYDROcodone-acetaminophen ([facility]) 5-325 MG per tablet       chlorzoxazone (PARAFON FORTE) 500 MG tablet prn      meloxicam (MOBIC) 15 MG tablet Take 1 tablet by mouth daily 30 tablet 0    donepezil (ARICEPT) 5 MG tablet Take 1 tablet by mouth nightly 90 tablet 3    levothyroxine (SYNTHROID) 150 MCG tablet Take 1 tablet by mouth every morning (before breakfast) (Patient taking differently: Take 150 mcg by mouth every morning (before breakfast) 1/2 tab on ) 90 tablet 3    atorvastatin (LIPITOR) 80 MG tablet Take 1 tablet by mouth daily 90 tablet 3 telmisartan-hydroCHLOROthiazide (MICARDIS HCT) 80-25 MG per tablet Take 1 tablet by mouth daily 90 tablet 3    melatonin 3 MG TABS tablet Take 3 mg by mouth at bedtime      Calcium Polycarbophil (FIBERCON PO) Take 1 tablet by mouth in the morning and at bedtime       Probiotic Product (PROBIOTIC-10 PO) Take 1 capsule by mouth daily      amLODIPine (NORVASC) 10 MG tablet Take 10 mg by mouth daily      aspirin 81 MG EC tablet Take 162 mg by mouth daily      Biotin 2.5 MG CAPS Take 2,500 mcg by mouth daily      fexofenadine (ALLEGRA) 180 MG tablet Take 180 mg by mouth daily      fluticasone (FLONASE) 50 MCG/ACT nasal spray 2 sprays by Nasal route daily      pantoprazole (PROTONIX) 40 MG tablet Take 40 mg by mouth daily      ascorbic acid (VITAMIN C) 250 MG tablet Take 250 mg by mouth 2 times daily (Patient not taking: Reported on 10/24/2022)       No current facility-administered medications on file prior to visit. Allergies   Allergen Reactions    Levofloxacin Rash       Review of Systems:  Review of Systems   Constitutional: Negative. HENT: Negative. Eyes: Negative. Respiratory: Negative. Cardiovascular: Negative. Gastrointestinal: Negative. Endocrine: Negative. Genitourinary: Negative. Musculoskeletal: Negative. Skin: Negative. Neurological:         Memory loss   Hematological: Negative. Psychiatric/Behavioral: Negative. No flowsheet data found. No flowsheet data found. Vitals:    10/24/22 0952   BP: (!) 154/90   Pulse: 69   Weight: 198 lb (89.8 kg)   Height: 5' 8\" (1.727 m)        Physical Exam  Constitutional:       Appearance: Normal appearance. HENT:      Head: Normocephalic and atraumatic. Eyes:      Extraocular Movements: Extraocular movements intact and EOM normal.      Pupils: Pupils are equal, round, and reactive to light. Cardiovascular:      Rate and Rhythm: Normal rate and regular rhythm. Pulses: Normal pulses.    Pulmonary:      Effort: Pulmonary effort is normal.   Abdominal:      General: Abdomen is flat. Neurological:      Mental Status: He is alert and oriented to person, place, and time. Gait: Gait is intact. Deep Tendon Reflexes:      Reflex Scores:       Tricep reflexes are 1+ on the right side and 1+ on the left side. Bicep reflexes are 1+ on the right side and 1+ on the left side. Brachioradialis reflexes are 1+ on the right side and 1+ on the left side. Patellar reflexes are 1+ on the right side and 1+ on the left side. Achilles reflexes are 1+ on the right side and 1+ on the left side. Neurologic Exam     Mental Status   Oriented to person, place, and time. Attention: normal. Concentration: normal.   Level of consciousness: alert  Knowledge: good. He knows the year he knows the month he knows the president he knows the day of the week he knows the season the.  I he remained he can recall 3 of 3 objects immediately but I totally forgot to reasked him. He recalls [address] and can perform serial threes. He is to work for jobs in a very accurate description of all jobs. Cranial Nerves     CN II   Visual fields full to confrontation. CN III, IV, VI   Pupils are equal, round, and reactive to light. Extraocular motions are normal.     CN VII   Facial expression full, symmetric. Motor Exam   Right arm tone: normal  Left arm tone: normal  Right leg tone: normal  Left leg tone: normal    Gait, Coordination, and Reflexes     Gait  Gait: normal    Tremor   Resting tremor: absent  Intention tremor: absent  Action tremor: absent    Reflexes   Right brachioradialis: 1+  Left brachioradialis: 1+  Right biceps: 1+  Left biceps: 1+  Right triceps: 1+  Left triceps: 1+  Right patellar: 1+  Left patellar: 1+  Right achilles: 1+  Left achilles: 1+        Assessment   Assessment / Plan:    Dannielle Reyna was seen today for memory loss.     Diagnoses and all orders for this visit:    Memory loss that is predominantly short-term memory loss that is concerning. And is progressively gotten worse over the last 3 years. At this time he has been on Aricept 5 mg a day would not bump it up to 10 mg at night. We will hold off on Namenda. We will get a get a sleep study because we do not know how significant sleep apnea is in this particular situation but it may be Dylan profound. ANTOLIN (obstructive sleep apnea) we are can set him up for sleep study.  -     [address] [facility]    TIA (transient ischemic attack) absolutely out of concern were going to continue the aspirin 81 mg Lipitor 80 mg and then an MRI of the patient's brain.  -     MRI BRAIN W WO CONTRAST; Future    Other orders  -     donepezil (ARICEPT) 10 MG tablet; Take 1 tablet by mouth nightly        The Diagnosis and differential diagnostic considerations, and Rx Tx were reviewed with the patient at length. Orders Placed This Encounter   Procedures    MRI BRAIN W WO CONTRAST     Standing Status:   Future     Standing Expiration Date:   10/24/2023     Order Specific Question:   STAT Creatinine as needed:     Answer:   Yes    [facility] - [address] [address]     Referral Priority:   Routine     Referral Type:   Eval and Treat     Referral Reason:   Specialty Services Required     Number of Visits Requested:   1          I have spent greater than 50% of visit discussing and counseling of patient  for treatment and diagnostic plan review. Total time 45 min     . Notes: Patient is to continue all medications as directed by prescribing physicians. Continuations on today's visit are made based on the patient's report of current medications.              Dr. Pieter Galvan  Consultation Neurology, Neurodiagnostics and Neurotherapeutics  Neuroelectrophysiology, EEG, EMG  [facility]  [address] [place] [address], [address] [facility]  Phone:  [phone]  Fax:   [phone]

## 2023-07-25 ENCOUNTER — PREP FOR PROCEDURE (OUTPATIENT)
Dept: ENT CLINIC | Age: 73
End: 2023-07-25

## 2023-07-25 ENCOUNTER — OFFICE VISIT (OUTPATIENT)
Dept: ENT CLINIC | Age: 73
End: 2023-07-25
Payer: MEDICARE

## 2023-07-25 VITALS
HEIGHT: 68 IN | WEIGHT: 199 LBS | HEART RATE: 64 BPM | BODY MASS INDEX: 30.16 KG/M2 | RESPIRATION RATE: 17 BRPM | OXYGEN SATURATION: 98 %

## 2023-07-25 DIAGNOSIS — G47.33 OSA ON CPAP: ICD-10-CM

## 2023-07-25 DIAGNOSIS — J34.89 NASAL OBSTRUCTION: ICD-10-CM

## 2023-07-25 DIAGNOSIS — J32.9 RECURRENT RHINOSINUSITIS: Primary | ICD-10-CM

## 2023-07-25 DIAGNOSIS — J33.9 NASAL POLYPOSIS: ICD-10-CM

## 2023-07-25 DIAGNOSIS — J34.2 DEVIATED NASAL SEPTUM: ICD-10-CM

## 2023-07-25 DIAGNOSIS — Z99.89 OSA ON CPAP: ICD-10-CM

## 2023-07-25 DIAGNOSIS — J30.9 CHRONIC ALLERGIC RHINITIS: ICD-10-CM

## 2023-07-25 DIAGNOSIS — J34.3 NASAL TURBINATE HYPERTROPHY: ICD-10-CM

## 2023-07-25 PROCEDURE — 99213 OFFICE O/P EST LOW 20 MIN: CPT | Performed by: STUDENT IN AN ORGANIZED HEALTH CARE EDUCATION/TRAINING PROGRAM

## 2023-07-25 PROCEDURE — G8427 DOCREV CUR MEDS BY ELIG CLIN: HCPCS | Performed by: STUDENT IN AN ORGANIZED HEALTH CARE EDUCATION/TRAINING PROGRAM

## 2023-07-25 PROCEDURE — 3017F COLORECTAL CA SCREEN DOC REV: CPT | Performed by: STUDENT IN AN ORGANIZED HEALTH CARE EDUCATION/TRAINING PROGRAM

## 2023-07-25 PROCEDURE — 1123F ACP DISCUSS/DSCN MKR DOCD: CPT | Performed by: STUDENT IN AN ORGANIZED HEALTH CARE EDUCATION/TRAINING PROGRAM

## 2023-07-25 PROCEDURE — G8417 CALC BMI ABV UP PARAM F/U: HCPCS | Performed by: STUDENT IN AN ORGANIZED HEALTH CARE EDUCATION/TRAINING PROGRAM

## 2023-07-25 PROCEDURE — 1036F TOBACCO NON-USER: CPT | Performed by: STUDENT IN AN ORGANIZED HEALTH CARE EDUCATION/TRAINING PROGRAM

## 2023-07-25 ASSESSMENT — ENCOUNTER SYMPTOMS
SINUS PAIN: 0
CHOKING: 0
SHORTNESS OF BREATH: 0
SINUS PRESSURE: 0
EYE ITCHING: 0
WHEEZING: 0
COUGH: 0
EYE PAIN: 0
APNEA: 0
EYE DISCHARGE: 0
STRIDOR: 0
DIARRHEA: 0
CONSTIPATION: 0
NAUSEA: 0
FACIAL SWELLING: 0

## 2023-07-25 NOTE — PROGRESS NOTES
alternatives to surgery and he would like to go forward with surgery as reviewed. I discussed all the risks of septoplasty and/or SMR of turbinates surgery including bleeding, septal hematoma, septal perforation, continued nasal obstruction, change in sense of smell, CSF leak, and numbness/tingling of upper teeth/lips, and they would like to proceed. I discussed all the risks of surgery including bleeding, infection, continued sinonasal symptoms, CSF leak, damage to orbit w/ vision changes, and need for further procedures and they would like to proceed.        Susana Godoy, DO  7/27/2023

## 2023-07-26 PROBLEM — J31.0 CHRONIC RHINOSINUSITIS: Status: ACTIVE | Noted: 2023-07-26

## 2023-07-26 PROBLEM — J32.9 CHRONIC RHINOSINUSITIS: Status: ACTIVE | Noted: 2023-07-26

## 2023-07-26 PROBLEM — J34.3 HYPERTROPHY OF NASAL TURBINATES: Status: ACTIVE | Noted: 2023-07-26

## 2023-07-26 PROBLEM — J34.2 DEVIATED NASAL SEPTUM: Status: ACTIVE | Noted: 2023-07-26

## 2023-07-28 ENCOUNTER — OFFICE VISIT (OUTPATIENT)
Dept: INTERNAL MEDICINE CLINIC | Facility: CLINIC | Age: 73
End: 2023-07-28

## 2023-07-28 VITALS
HEIGHT: 68 IN | WEIGHT: 198 LBS | SYSTOLIC BLOOD PRESSURE: 122 MMHG | BODY MASS INDEX: 30.01 KG/M2 | DIASTOLIC BLOOD PRESSURE: 72 MMHG

## 2023-07-28 DIAGNOSIS — B35.1 ONYCHOMYCOSIS: ICD-10-CM

## 2023-07-28 DIAGNOSIS — M10.00 IDIOPATHIC GOUT, UNSPECIFIED CHRONICITY, UNSPECIFIED SITE: Primary | ICD-10-CM

## 2023-07-28 PROBLEM — G30.9 ALZHEIMER'S DISEASE, UNSPECIFIED (CODE) (HCC): Status: ACTIVE | Noted: 2023-07-28

## 2023-07-28 RX ORDER — NAPROXEN 500 MG/1
500 TABLET ORAL 2 TIMES DAILY PRN
Qty: 60 TABLET | Refills: 0 | Status: SHIPPED | OUTPATIENT
Start: 2023-07-28

## 2023-07-28 ASSESSMENT — ENCOUNTER SYMPTOMS: COLOR CHANGE: 1

## 2023-07-28 NOTE — PROGRESS NOTES
PROGRESS NOTE      Chief Complaint   Patient presents with    Foot Swelling     Pt here for f/u due to left foot swelling Pt may have gout 2 nd toe Pt said the swelling and pain has lessened 2 weeks ago       HPI    Gout: Evaluated by Dr Renny Herrmann with gout flare left foot 7/11. Initially seen at urgent care and given prednisone. Symptoms improved but not resolved. That appears started naproxen and symptoms have continued to improve. Uric acid slightly elevated in March but decided to delay allopurinol therapy. 3 gout flares this year. Also considered decrease dose of hydrochlorothiazide but for now continues Micardis hctz 80-25, HCTZ 12.5 mg, and amlodipine 10 mg with good blood pressure control. Micardis HCT 80-25 daily we, hydrochlorothiazide 12.5 mg daily, and amlodipine 10 mg daily. Blood pressure under 130/80 at home. Past Medical History, Past Surgical History, Family history, Social History, and Medications were all reviewed and updated as necessary.      Current Outpatient Medications   Medication Sig Dispense Refill    naproxen (NAPROSYN) 500 MG tablet Take 1 tablet by mouth 2 times daily as needed for Pain (prn) 60 tablet 0    Biotin 1 MG CAPS Take 1 capsule by mouth daily      NONFORMULARY Take 1 capsule by mouth daily Lions Nick      CPAP Machine MISC by Does not apply route      amLODIPine (NORVASC) 10 MG tablet Take 1 tablet by mouth daily 90 tablet 3    pantoprazole (PROTONIX) 40 MG tablet Take 1 tablet by mouth daily 90 tablet 3    levothyroxine (SYNTHROID) 150 MCG tablet Take 1 tablet by mouth every morning (before breakfast) 1/2 tab on Sunday 90 tablet 3    atorvastatin (LIPITOR) 80 MG tablet Take 1 tablet by mouth daily 90 tablet 3    telmisartan-hydroCHLOROthiazide (MICARDIS HCT) 80-25 MG per tablet Take 1 tablet by mouth daily 90 tablet 3    fluticasone (FLONASE) 50 MCG/ACT nasal spray 2 sprays by Nasal route daily as needed for Rhinitis 16 g 5    hydroCHLOROthiazide (MICROZIDE) 12.5 MG

## 2023-08-16 ENCOUNTER — APPOINTMENT (RX ONLY)
Dept: URBAN - METROPOLITAN AREA CLINIC 329 | Facility: CLINIC | Age: 73
Setting detail: DERMATOLOGY
End: 2023-08-16

## 2023-08-16 DIAGNOSIS — L82.1 OTHER SEBORRHEIC KERATOSIS: ICD-10-CM

## 2023-08-16 DIAGNOSIS — L57.8 OTHER SKIN CHANGES DUE TO CHRONIC EXPOSURE TO NONIONIZING RADIATION: ICD-10-CM

## 2023-08-16 DIAGNOSIS — L259 CONTACT DERMATITIS AND OTHER ECZEMA, UNSPECIFIED CAUSE: ICD-10-CM | Status: RESOLVING

## 2023-08-16 DIAGNOSIS — Z12.83 ENCOUNTER FOR SCREENING FOR MALIGNANT NEOPLASM OF SKIN: ICD-10-CM

## 2023-08-16 PROBLEM — L23.9 ALLERGIC CONTACT DERMATITIS, UNSPECIFIED CAUSE: Status: ACTIVE | Noted: 2023-08-16

## 2023-08-16 PROCEDURE — ? SUNSCREEN RECOMMENDATIONS

## 2023-08-16 PROCEDURE — ? FULL BODY SKIN EXAM

## 2023-08-16 PROCEDURE — ? OTHER

## 2023-08-16 PROCEDURE — ? COUNSELING

## 2023-08-16 PROCEDURE — 99213 OFFICE O/P EST LOW 20 MIN: CPT

## 2023-08-16 ASSESSMENT — LOCATION ZONE DERM
LOCATION ZONE: LEG
LOCATION ZONE: FACE
LOCATION ZONE: TRUNK
LOCATION ZONE: FACE
LOCATION ZONE: TRUNK

## 2023-08-16 ASSESSMENT — LOCATION SIMPLE DESCRIPTION DERM
LOCATION SIMPLE: ABDOMEN
LOCATION SIMPLE: LEFT FOREHEAD
LOCATION SIMPLE: RIGHT CHEEK
LOCATION SIMPLE: LEFT UPPER BACK
LOCATION SIMPLE: ABDOMEN
LOCATION SIMPLE: LEFT LOWER BACK
LOCATION SIMPLE: CHEST
LOCATION SIMPLE: RIGHT UPPER BACK
LOCATION SIMPLE: RIGHT CHEEK
LOCATION SIMPLE: LEFT FOREHEAD
LOCATION SIMPLE: RIGHT POPLITEAL SKIN
LOCATION SIMPLE: LEFT POSTERIOR THIGH
LOCATION SIMPLE: CHEST

## 2023-08-16 ASSESSMENT — LOCATION DETAILED DESCRIPTION DERM
LOCATION DETAILED: RIGHT POPLITEAL SKIN
LOCATION DETAILED: EPIGASTRIC SKIN
LOCATION DETAILED: LEFT LATERAL UPPER BACK
LOCATION DETAILED: LEFT MEDIAL FOREHEAD
LOCATION DETAILED: LEFT INFERIOR MEDIAL MIDBACK
LOCATION DETAILED: LEFT MEDIAL SUPERIOR CHEST
LOCATION DETAILED: LEFT MEDIAL INFERIOR CHEST
LOCATION DETAILED: LEFT DISTAL POSTERIOR THIGH
LOCATION DETAILED: LEFT MEDIAL FOREHEAD
LOCATION DETAILED: RIGHT CENTRAL MALAR CHEEK
LOCATION DETAILED: RIGHT INFERIOR UPPER BACK
LOCATION DETAILED: RIGHT CENTRAL MALAR CHEEK
LOCATION DETAILED: LEFT SUPERIOR UPPER BACK
LOCATION DETAILED: LEFT RIB CAGE
LOCATION DETAILED: LEFT MEDIAL INFERIOR CHEST

## 2023-08-16 ASSESSMENT — PAIN INTENSITY VAS: HOW INTENSE IS YOUR PAIN 0 BEING NO PAIN, 10 BEING THE MOST SEVERE PAIN POSSIBLE?: NO PAIN

## 2023-08-16 ASSESSMENT — BSA RASH: BSA RASH: 18

## 2023-08-16 ASSESSMENT — ITCH NUMERIC RATING SCALE: HOW SEVERE IS YOUR ITCHING?: 2

## 2023-08-16 NOTE — PROCEDURE: OTHER
Detail Level: Zone
Note Text (......Xxx Chief Complaint.): This diagnosis correlates with the
Other (Free Text): Declined Rx
Render Risk Assessment In Note?: no

## 2023-08-21 NOTE — PROGRESS NOTES
) -Aldnpaeay (1 per 6 mon)            Other Supplies:    (   X  )-Mistpkfi (1 per 6 mon)  (   X  )-Aisfnf Tubing (1 per 3 mon)  (   X  )- Disposable Filter (2 per mon)  (   x  )-Ymrqel Humidifier (1 per year)     ( x    )-Jvxqpvzkk (sometimes used with Full Face Mask) (1 per 6 mos)  (    )-Tubing-without heat (1 per 3 mos)  (     )-Non-Disposable Filter (1 per 6 mos)  (  x   )-Water Chamber (1 per 6 mos)  (     )-Humidifier non-heated (1 per 5 yrs)      Signed Date: 8/22/2023  Electronically Signed By: LOLITA Bah CNP  Electronically Dated:  8/22/2023             Collaborating Physician: Dr. Governor Edmonds    Over 50% of today's office visit was spent in face to face time reviewing test results, prognosis, importance of compliance, education about disease process, benefits of medications, instructions for management of acute flare-ups, and follow up plans. Total face to face time spent with patient was 20 minutes.         LOLITA Bah CNP  Electronically signed

## 2023-08-22 ENCOUNTER — OFFICE VISIT (OUTPATIENT)
Dept: SLEEP MEDICINE | Age: 73
End: 2023-08-22
Payer: MEDICARE

## 2023-08-22 VITALS
SYSTOLIC BLOOD PRESSURE: 140 MMHG | OXYGEN SATURATION: 99 % | HEART RATE: 70 BPM | HEIGHT: 68 IN | WEIGHT: 203 LBS | DIASTOLIC BLOOD PRESSURE: 74 MMHG | BODY MASS INDEX: 30.77 KG/M2 | TEMPERATURE: 98.2 F

## 2023-08-22 DIAGNOSIS — G47.33 OSA (OBSTRUCTIVE SLEEP APNEA): Primary | ICD-10-CM

## 2023-08-22 PROCEDURE — 3078F DIAST BP <80 MM HG: CPT | Performed by: NURSE PRACTITIONER

## 2023-08-22 PROCEDURE — 3077F SYST BP >= 140 MM HG: CPT | Performed by: NURSE PRACTITIONER

## 2023-08-22 PROCEDURE — 3017F COLORECTAL CA SCREEN DOC REV: CPT | Performed by: NURSE PRACTITIONER

## 2023-08-22 PROCEDURE — 1036F TOBACCO NON-USER: CPT | Performed by: NURSE PRACTITIONER

## 2023-08-22 PROCEDURE — 99213 OFFICE O/P EST LOW 20 MIN: CPT | Performed by: NURSE PRACTITIONER

## 2023-08-22 PROCEDURE — G8427 DOCREV CUR MEDS BY ELIG CLIN: HCPCS | Performed by: NURSE PRACTITIONER

## 2023-08-22 PROCEDURE — G8417 CALC BMI ABV UP PARAM F/U: HCPCS | Performed by: NURSE PRACTITIONER

## 2023-08-22 PROCEDURE — 1123F ACP DISCUSS/DSCN MKR DOCD: CPT | Performed by: NURSE PRACTITIONER

## 2023-08-22 ASSESSMENT — SLEEP AND FATIGUE QUESTIONNAIRES
HOW LIKELY ARE YOU TO NOD OFF OR FALL ASLEEP WHILE SITTING AND READING: 2
HOW LIKELY ARE YOU TO NOD OFF OR FALL ASLEEP WHILE WATCHING TV: 1
ESS TOTAL SCORE: 7
HOW LIKELY ARE YOU TO NOD OFF OR FALL ASLEEP WHILE SITTING INACTIVE IN A PUBLIC PLACE: 1
HOW LIKELY ARE YOU TO NOD OFF OR FALL ASLEEP IN A CAR, WHILE STOPPED FOR A FEW MINUTES IN TRAFFIC: 0
HOW LIKELY ARE YOU TO NOD OFF OR FALL ASLEEP WHILE SITTING QUIETLY AFTER LUNCH WITHOUT ALCOHOL: 1
HOW LIKELY ARE YOU TO NOD OFF OR FALL ASLEEP WHEN YOU ARE A PASSENGER IN A CAR FOR AN HOUR WITHOUT A BREAK: 0
HOW LIKELY ARE YOU TO NOD OFF OR FALL ASLEEP WHILE SITTING AND TALKING TO SOMEONE: 0
HOW LIKELY ARE YOU TO NOD OFF OR FALL ASLEEP WHILE LYING DOWN TO REST IN THE AFTERNOON WHEN CIRCUMSTANCES PERMIT: 2

## 2023-08-22 NOTE — PATIENT INSTRUCTIONS
Continue CPAP at new pressure setting of 15 cm H2O with nightly compliance  New supplies ordered  Recommendations as above  Follow-up in 1 year or sooner if needed

## 2023-09-11 ENCOUNTER — TELEPHONE (OUTPATIENT)
Dept: ENT CLINIC | Age: 73
End: 2023-09-11

## 2023-09-11 NOTE — TELEPHONE ENCOUNTER
Patient recently rescheduled surgery out until 04/2024 . Patient would like to know if he should continue compound sinus rinse until then or if when his current supply is finished he could discontinue use. Patient states that it is okay to message him via [facility] with this answer .

## 2023-09-19 ENCOUNTER — OFFICE VISIT (OUTPATIENT)
Dept: NEUROLOGY | Age: 73
End: 2023-09-19
Payer: MEDICARE

## 2023-09-19 VITALS
HEART RATE: 80 BPM | BODY MASS INDEX: 31.54 KG/M2 | DIASTOLIC BLOOD PRESSURE: 78 MMHG | SYSTOLIC BLOOD PRESSURE: 130 MMHG | WEIGHT: 207.4 LBS

## 2023-09-19 DIAGNOSIS — R41.3 MEMORY LOSS: Primary | ICD-10-CM

## 2023-09-19 DIAGNOSIS — G31.84 MCI (MILD COGNITIVE IMPAIRMENT): ICD-10-CM

## 2023-09-19 PROBLEM — G30.9 ALZHEIMER'S DISEASE, UNSPECIFIED (CODE) (HCC): Status: RESOLVED | Noted: 2023-07-28 | Resolved: 2023-09-19

## 2023-09-19 PROCEDURE — 1123F ACP DISCUSS/DSCN MKR DOCD: CPT | Performed by: PSYCHIATRY & NEUROLOGY

## 2023-09-19 PROCEDURE — 3075F SYST BP GE 130 - 139MM HG: CPT | Performed by: PSYCHIATRY & NEUROLOGY

## 2023-09-19 PROCEDURE — 3078F DIAST BP <80 MM HG: CPT | Performed by: PSYCHIATRY & NEUROLOGY

## 2023-09-19 PROCEDURE — G8427 DOCREV CUR MEDS BY ELIG CLIN: HCPCS | Performed by: PSYCHIATRY & NEUROLOGY

## 2023-09-19 PROCEDURE — 1036F TOBACCO NON-USER: CPT | Performed by: PSYCHIATRY & NEUROLOGY

## 2023-09-19 PROCEDURE — G8417 CALC BMI ABV UP PARAM F/U: HCPCS | Performed by: PSYCHIATRY & NEUROLOGY

## 2023-09-19 PROCEDURE — 3017F COLORECTAL CA SCREEN DOC REV: CPT | Performed by: PSYCHIATRY & NEUROLOGY

## 2023-09-19 PROCEDURE — 99214 OFFICE O/P EST MOD 30 MIN: CPT | Performed by: PSYCHIATRY & NEUROLOGY

## 2023-09-19 RX ORDER — DONEPEZIL HYDROCHLORIDE 10 MG/1
10 TABLET, FILM COATED ORAL NIGHTLY
Qty: 90 TABLET | Refills: 3 | Status: SHIPPED | OUTPATIENT
Start: 2023-09-19

## 2023-09-19 ASSESSMENT — PATIENT HEALTH QUESTIONNAIRE - PHQ9
SUM OF ALL RESPONSES TO PHQ QUESTIONS 1-9: 0
SUM OF ALL RESPONSES TO PHQ QUESTIONS 1-9: 0
SUM OF ALL RESPONSES TO PHQ9 QUESTIONS 1 & 2: 0
SUM OF ALL RESPONSES TO PHQ QUESTIONS 1-9: 0
2. FEELING DOWN, DEPRESSED OR HOPELESS: 0
1. LITTLE INTEREST OR PLEASURE IN DOING THINGS: 0
SUM OF ALL RESPONSES TO PHQ QUESTIONS 1-9: 0

## 2023-09-19 ASSESSMENT — ENCOUNTER SYMPTOMS
BACK PAIN: 1
RESPIRATORY NEGATIVE: 1
EYES NEGATIVE: 1

## 2023-09-19 NOTE — PROGRESS NOTES
[facility], [address], [address]  Phone: [phone] Fax [phone]  Dr. Karey Engel      9/19/2023  Nathen Magallanes     Patient is referred by the following provider for consultation regarding as below:       I reviewed the available records and notes and have examined patient with the following findings:     Chief Complaint:  Chief Complaint   Patient presents with    Follow-up          HPI: This is a right handed 75 y.o.  male here with his wife and he and his wife have not noticed any progression. This gentleman had some short-term memory loss was going about 40 years. He repeats questions been about 24-hour forgets plans the next day. He uses notes in his iPhone but often does not recheck his iPhone. 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 His medications he does. He does have severe spinal disease and had a spinal cord stimulator that definitely helped him. He had severe sleep apnea is on CPAP at 12 he he qualified for split study has been doing well with CPAP. We have him on Aricept 10 mg daily and he has been fantastic without any changes cognitively according to the wife and the patient. IMAGING REVIEW:  I REVIEWED PERTINENT  IMAGES AND REPORTS WITH THE PATIENT PERSONALLY, DIRECTLY AND FULLY.      Past Medical History:  Past Medical History:   Diagnosis Date    Arthritis     Cervical disc disorder Stenosis 5/2022    Chronic back pain 2016/17    After Laminectomy L1-L4    Chronic pain     back Left>right buttock    Colon polyps 07/2019 7-2019 pathology report tubulovillous adenoma    Compression deformity of vertebra 02/2016    Compression C5/6    Concussion 1966    Difficulty walking 2021    Encounter for screening colonoscopy 07/03/2019    Erectile dysfunction 2007    After Radical Prostatectomy    Fecal incontinence 10/09/2019    After prostate surgery - followed by colorectal surgery Dr. Crystal Campbell

## 2023-10-06 ENCOUNTER — TELEPHONE (OUTPATIENT)
Dept: UROLOGY | Age: 73
End: 2023-10-06

## 2023-10-06 ENCOUNTER — OFFICE VISIT (OUTPATIENT)
Dept: UROLOGY | Age: 73
End: 2023-10-06
Payer: MEDICARE

## 2023-10-06 DIAGNOSIS — N39.3 STRESS INCONTINENCE (FEMALE) (MALE): Primary | ICD-10-CM

## 2023-10-06 DIAGNOSIS — C61 MALIGNANT NEOPLASM OF PROSTATE (HCC): ICD-10-CM

## 2023-10-06 DIAGNOSIS — N50.0 TESTICULAR ATROPHY: ICD-10-CM

## 2023-10-06 DIAGNOSIS — N52.9 ERECTILE DYSFUNCTION, UNSPECIFIED ERECTILE DYSFUNCTION TYPE: ICD-10-CM

## 2023-10-06 DIAGNOSIS — N50.811 PAIN IN RIGHT TESTICLE: ICD-10-CM

## 2023-10-06 LAB
BILIRUBIN, URINE, POC: NEGATIVE
BLOOD URINE, POC: NORMAL
GLUCOSE URINE, POC: NEGATIVE
KETONES, URINE, POC: NEGATIVE
LEUKOCYTE ESTERASE, URINE, POC: NEGATIVE
NITRITE, URINE, POC: NEGATIVE
PH, URINE, POC: 5.5 (ref 4.6–8)
PROTEIN,URINE, POC: NEGATIVE
SPECIFIC GRAVITY, URINE, POC: 1.01 (ref 1–1.03)
URINALYSIS CLARITY, POC: NORMAL
URINALYSIS COLOR, POC: NORMAL
UROBILINOGEN, POC: NORMAL

## 2023-10-06 PROCEDURE — G8484 FLU IMMUNIZE NO ADMIN: HCPCS | Performed by: UROLOGY

## 2023-10-06 PROCEDURE — 3017F COLORECTAL CA SCREEN DOC REV: CPT | Performed by: UROLOGY

## 2023-10-06 PROCEDURE — 81003 URINALYSIS AUTO W/O SCOPE: CPT | Performed by: UROLOGY

## 2023-10-06 PROCEDURE — 1123F ACP DISCUSS/DSCN MKR DOCD: CPT | Performed by: UROLOGY

## 2023-10-06 PROCEDURE — 99213 OFFICE O/P EST LOW 20 MIN: CPT | Performed by: UROLOGY

## 2023-10-06 PROCEDURE — 1036F TOBACCO NON-USER: CPT | Performed by: UROLOGY

## 2023-10-06 PROCEDURE — G8417 CALC BMI ABV UP PARAM F/U: HCPCS | Performed by: UROLOGY

## 2023-10-06 PROCEDURE — G8427 DOCREV CUR MEDS BY ELIG CLIN: HCPCS | Performed by: UROLOGY

## 2023-10-06 ASSESSMENT — ENCOUNTER SYMPTOMS: NAUSEA: 0

## 2023-10-06 NOTE — TELEPHONE ENCOUNTER
At check out, pt states that he needed to have BD done and that he lived in [facility], so he plans to go to [facility] to get it done.

## 2023-10-06 NOTE — PROGRESS NOTES
[facility]  [address]  [phone]    Rajwinder Sherman  : 1950    Chief Complaint   Patient presents with    Follow-up        HPI     Rajwinder Sherman is a 75 y.o. male  (goes to [employer]) s/p RRP by Dr. Richie Regalado in [facility] in  secondary to [place]. PSA's have been undetectable since. He is also s/p AUS by Dr. Albaro Maharaj at [facility] in . He deactivates the device at night and it works well. He uses a vacuum device in regards to ED and it works well. Recurrent UTI's began in . The first led to dysuria and foul odor. The 2nd was only foul odor. He took an unknown antibiotic with the first and macrobid with the 2nd. He has seen no swelling or change if efficacy of AUS. Cystoscopy 3/6/18 revealed no erosion or bladder neck contracture. He took a month of cipro in the spring of  and has had no more UTI's. He had an interstim placed by Dr. Jeramie Sánchez, formerly [facility], in  due to fecal incontinence and has seen amazing improvements. Issues began after a back surgery. PSA: 10/14 <0.015, 10/15 <0.015,  <0.1,  <0.006, 19 <0.014,  <0.014,  <0.006   he returns today to discuss his incontinence. He is starting to have more urinary incontinence. He had a spinal cord stimulator placed on 23, done in [place]. Now wears 1-2 pads/day. No pain or hematuria. He recently developed right groin and right scrotal pain. It is better now. Incontinence is manageable.      Past Medical History:   Diagnosis Date    Arthritis     Cervical disc disorder Stenosis 2022    Chronic back pain     After Laminectomy L1-L4    Chronic pain     back Left>right buttock    Colon polyps 2019 pathology report tubulovillous adenoma    Compression deformity of vertebra 2016    Compression C5/6    Concussion 1966    Difficulty walking     Encounter for screening colonoscopy

## 2023-10-09 DIAGNOSIS — N50.0 TESTICULAR ATROPHY: ICD-10-CM

## 2023-10-10 ENCOUNTER — CLINICAL DOCUMENTATION (OUTPATIENT)
Dept: NEUROLOGY | Age: 73
End: 2023-10-10

## 2023-10-10 LAB — TESTOST SERPL-MCNC: 584 NG/DL (ref 264–916)

## 2023-10-10 NOTE — PROGRESS NOTES
The patient's [facility] blood studies returned the amyloid 42/40 came back at 0.148 which is in the higher risk of Alzheimer's dementia. This will be attempted to be scanned into the system.

## 2023-10-18 ENCOUNTER — TELEPHONE (OUTPATIENT)
Dept: UROLOGY | Age: 73
End: 2023-10-18

## 2023-10-18 DIAGNOSIS — C61 MALIGNANT NEOPLASM OF PROSTATE (HCC): Primary | ICD-10-CM

## 2023-11-21 ENCOUNTER — TELEMEDICINE (OUTPATIENT)
Dept: INTERNAL MEDICINE CLINIC | Facility: CLINIC | Age: 73
End: 2023-11-21
Payer: MEDICARE

## 2023-11-21 DIAGNOSIS — Z20.818 STREP THROAT EXPOSURE: ICD-10-CM

## 2023-11-21 DIAGNOSIS — J02.9 ACUTE PHARYNGITIS, UNSPECIFIED ETIOLOGY: ICD-10-CM

## 2023-11-21 DIAGNOSIS — J06.9 UPPER RESPIRATORY TRACT INFECTION, UNSPECIFIED TYPE: Primary | ICD-10-CM

## 2023-11-21 PROCEDURE — 99213 OFFICE O/P EST LOW 20 MIN: CPT | Performed by: NURSE PRACTITIONER

## 2023-11-21 PROCEDURE — G8427 DOCREV CUR MEDS BY ELIG CLIN: HCPCS | Performed by: NURSE PRACTITIONER

## 2023-11-21 PROCEDURE — 3017F COLORECTAL CA SCREEN DOC REV: CPT | Performed by: NURSE PRACTITIONER

## 2023-11-21 PROCEDURE — 1123F ACP DISCUSS/DSCN MKR DOCD: CPT | Performed by: NURSE PRACTITIONER

## 2023-11-21 RX ORDER — AMOXICILLIN 500 MG/1
500 CAPSULE ORAL 2 TIMES DAILY
Qty: 20 CAPSULE | Refills: 0 | Status: SHIPPED | OUTPATIENT
Start: 2023-11-21 | End: 2023-12-01

## 2023-11-21 ASSESSMENT — ENCOUNTER SYMPTOMS
SINUS PAIN: 1
WHEEZING: 0
COUGH: 0
SORE THROAT: 1

## 2023-11-21 NOTE — PROGRESS NOTES
PROGRESS NOTE      Chief Complaint   Patient presents with    Sore Throat       HPI    Sore throat: Yesterday. Right side with swelling, redness and white patch. No fever, chills, body aches. [address] child with strep. Some postnasal drainage for 1 week. Sinus congestion and pressure with postnasal drip        Past Medical History, Past Surgical History, Family history, Social History, and Medications were all reviewed and updated as necessary.      Current Outpatient Medications   Medication Sig Dispense Refill    amoxicillin (AMOXIL) 500 MG capsule Take 1 capsule by mouth 2 times daily for 10 days 20 capsule 0    donepezil (ARICEPT) 10 MG tablet Take 1 tablet by mouth nightly 90 tablet 3    naproxen (NAPROSYN) 500 MG tablet Take 1 tablet by mouth 2 times daily as needed for Pain (prn) 60 tablet 0    Biotin 1 MG CAPS Take 1 capsule by mouth daily      NONFORMULARY Take 1 capsule by mouth daily THEVA      CPAP Machine MISC by Does not apply route      amLODIPine (NORVASC) 10 MG tablet Take 1 tablet by mouth daily 90 tablet 3    pantoprazole (PROTONIX) 40 MG tablet Take 1 tablet by mouth daily 90 tablet 3    levothyroxine (SYNTHROID) 150 MCG tablet Take 1 tablet by mouth every morning (before breakfast) 1/2 tab on Sunday 90 tablet 3    atorvastatin (LIPITOR) 80 MG tablet Take 1 tablet by mouth daily 90 tablet 3    telmisartan-hydroCHLOROthiazide (MICARDIS HCT) 80-25 MG per tablet Take 1 tablet by mouth daily 90 tablet 3    fluticasone (FLONASE) 50 MCG/ACT nasal spray 2 sprays by Nasal route daily as needed for Rhinitis 16 g 5    hydroCHLOROthiazide (MICROZIDE) 12.5 MG capsule Take 1 capsule by mouth daily 90 capsule 3    Multiple Vitamins-Minerals (ICAPS AREDS 2 PO) Take 1 tablet by mouth in the morning and at bedtime      hyoscyamine (LEVBID) 375 MCG extended release tablet Take 1 tablet by mouth every 12 hours as needed      NONFORMULARY Take 1 capsule by mouth daily OTC Immune C      melatonin 3 MG TABS tablet

## 2023-11-27 ENCOUNTER — PATIENT MESSAGE (OUTPATIENT)
Dept: INTERNAL MEDICINE CLINIC | Facility: CLINIC | Age: 73
End: 2023-11-27

## 2023-11-27 DIAGNOSIS — M10.00 IDIOPATHIC GOUT, UNSPECIFIED CHRONICITY, UNSPECIFIED SITE: ICD-10-CM

## 2023-11-27 RX ORDER — NAPROXEN 500 MG/1
500 TABLET ORAL 2 TIMES DAILY PRN
Qty: 180 TABLET | Refills: 1 | Status: SHIPPED | OUTPATIENT
Start: 2023-11-27

## 2023-11-27 NOTE — TELEPHONE ENCOUNTER
From: Wing Marrufo  To: Dr. Stacey Anguiano: 11/27/2023 10:07 AM EST  Subject: New Naproxen 500 mg Prescription    I would like to request a new 80 Day wit 3 Refills from [facility]. 2 per day as needed for Lower back and arthritis (hand an spinal stenosis).   Thank you, Beatris Barcenas

## 2024-01-18 ASSESSMENT — PATIENT HEALTH QUESTIONNAIRE - PHQ9
SUM OF ALL RESPONSES TO PHQ9 QUESTIONS 1 & 2: 0
2. FEELING DOWN, DEPRESSED OR HOPELESS: 0
SUM OF ALL RESPONSES TO PHQ QUESTIONS 1-9: 0
1. LITTLE INTEREST OR PLEASURE IN DOING THINGS: 0
SUM OF ALL RESPONSES TO PHQ QUESTIONS 1-9: 0
2. FEELING DOWN, DEPRESSED OR HOPELESS: NOT AT ALL
1. LITTLE INTEREST OR PLEASURE IN DOING THINGS: NOT AT ALL
SUM OF ALL RESPONSES TO PHQ QUESTIONS 1-9: 0
SUM OF ALL RESPONSES TO PHQ QUESTIONS 1-9: 0
SUM OF ALL RESPONSES TO PHQ9 QUESTIONS 1 & 2: 0

## 2024-01-19 ENCOUNTER — OFFICE VISIT (OUTPATIENT)
Dept: INTERNAL MEDICINE CLINIC | Facility: CLINIC | Age: 74
End: 2024-01-19

## 2024-01-19 VITALS
DIASTOLIC BLOOD PRESSURE: 78 MMHG | WEIGHT: 203.8 LBS | OXYGEN SATURATION: 98 % | SYSTOLIC BLOOD PRESSURE: 160 MMHG | HEART RATE: 70 BPM | BODY MASS INDEX: 30.99 KG/M2

## 2024-01-19 DIAGNOSIS — G62.9 NEUROPATHY: ICD-10-CM

## 2024-01-19 DIAGNOSIS — I10 ESSENTIAL HYPERTENSION: ICD-10-CM

## 2024-01-19 DIAGNOSIS — E03.9 HYPOTHYROIDISM, UNSPECIFIED TYPE: ICD-10-CM

## 2024-01-19 DIAGNOSIS — G30.9 ALZHEIMER'S DISEASE, UNSPECIFIED (CODE) (HCC): ICD-10-CM

## 2024-01-19 DIAGNOSIS — D64.9 ANEMIA, UNSPECIFIED TYPE: ICD-10-CM

## 2024-01-19 DIAGNOSIS — D64.9 ANEMIA, UNSPECIFIED TYPE: Primary | ICD-10-CM

## 2024-01-19 LAB
ALBUMIN SERPL-MCNC: 4.7 G/DL (ref 3.2–4.6)
ALBUMIN/GLOB SERPL: 1.6 (ref 0.4–1.6)
ALP SERPL-CCNC: 77 U/L (ref 50–136)
ALT SERPL-CCNC: 25 U/L (ref 12–65)
ANION GAP SERPL CALC-SCNC: 7 MMOL/L (ref 2–11)
AST SERPL-CCNC: 26 U/L (ref 15–37)
BASOPHILS # BLD: 0 K/UL (ref 0–0.2)
BASOPHILS NFR BLD: 1 % (ref 0–2)
BILIRUB SERPL-MCNC: 1.3 MG/DL (ref 0.2–1.1)
BUN SERPL-MCNC: 21 MG/DL (ref 8–23)
CALCIUM SERPL-MCNC: 9.5 MG/DL (ref 8.3–10.4)
CHLORIDE SERPL-SCNC: 105 MMOL/L (ref 103–113)
CHOLEST SERPL-MCNC: 194 MG/DL
CO2 SERPL-SCNC: 29 MMOL/L (ref 21–32)
CREAT SERPL-MCNC: 1 MG/DL (ref 0.8–1.5)
DIFFERENTIAL METHOD BLD: ABNORMAL
EOSINOPHIL # BLD: 0.1 K/UL (ref 0–0.8)
EOSINOPHIL NFR BLD: 1 % (ref 0.5–7.8)
ERYTHROCYTE [DISTWIDTH] IN BLOOD BY AUTOMATED COUNT: 15.7 % (ref 11.9–14.6)
FOLATE SERPL-MCNC: 19.7 NG/ML (ref 3.1–17.5)
GLOBULIN SER CALC-MCNC: 2.9 G/DL (ref 2.8–4.5)
GLUCOSE SERPL-MCNC: 96 MG/DL (ref 65–100)
HCT VFR BLD AUTO: 43.2 % (ref 41.1–50.3)
HDLC SERPL-MCNC: 72 MG/DL (ref 40–60)
HDLC SERPL: 2.7
HGB BLD-MCNC: 14.1 G/DL (ref 13.6–17.2)
IMM GRANULOCYTES # BLD AUTO: 0 K/UL (ref 0–0.5)
IMM GRANULOCYTES NFR BLD AUTO: 0 % (ref 0–5)
IRON SERPL-MCNC: 150 UG/DL (ref 35–150)
LDLC SERPL CALC-MCNC: 101.4 MG/DL
LYMPHOCYTES # BLD: 1.4 K/UL (ref 0.5–4.6)
LYMPHOCYTES NFR BLD: 31 % (ref 13–44)
MCH RBC QN AUTO: 30.3 PG (ref 26.1–32.9)
MCHC RBC AUTO-ENTMCNC: 32.6 G/DL (ref 31.4–35)
MCV RBC AUTO: 92.9 FL (ref 82–102)
MONOCYTES # BLD: 0.4 K/UL (ref 0.1–1.3)
MONOCYTES NFR BLD: 9 % (ref 4–12)
NEUTS SEG # BLD: 2.5 K/UL (ref 1.7–8.2)
NEUTS SEG NFR BLD: 58 % (ref 43–78)
NRBC # BLD: 0 K/UL (ref 0–0.2)
PLATELET # BLD AUTO: 185 K/UL (ref 150–450)
PMV BLD AUTO: 11.2 FL (ref 9.4–12.3)
POTASSIUM SERPL-SCNC: 3.5 MMOL/L (ref 3.5–5.1)
PROT SERPL-MCNC: 7.6 G/DL (ref 6.3–8.2)
RBC # BLD AUTO: 4.65 M/UL (ref 4.23–5.6)
SODIUM SERPL-SCNC: 141 MMOL/L (ref 136–146)
TRIGL SERPL-MCNC: 103 MG/DL (ref 35–150)
TSH, 3RD GENERATION: 0.92 UIU/ML (ref 0.36–3.74)
VIT B12 SERPL-MCNC: 438 PG/ML (ref 193–986)
VLDLC SERPL CALC-MCNC: 20.6 MG/DL (ref 6–23)
WBC # BLD AUTO: 4.3 K/UL (ref 4.3–11.1)

## 2024-01-19 RX ORDER — METOPROLOL SUCCINATE 25 MG/1
25 TABLET, EXTENDED RELEASE ORAL DAILY
Qty: 90 TABLET | Refills: 1 | Status: SHIPPED | OUTPATIENT
Start: 2024-01-19

## 2024-01-19 ASSESSMENT — ENCOUNTER SYMPTOMS
SHORTNESS OF BREATH: 0
ABDOMINAL PAIN: 0
COUGH: 0

## 2024-01-19 NOTE — PROGRESS NOTES
SUBJECTIVE:   Dayday Berkowitz is a 73 y.o. male seen for a visit regarding   Chief Complaint   Patient presents with    Other     Neuropathy of feet,toes and lower legs        HPI  Htn - no Headache, Microzide, HCTZ?  Hyperlipidemia - on statin  Hypothyroidism - on Levothyroxine  Nestor's Esophagus - on PPI, seeing GI  Mild Cognitive impairment - on Aricept  Sleep apnea - on CPAP  H/o low back pain with Neuropathic symptoms - feet, toes and lower right leg - seeing Dr. Mak - Ortho    Past Medical History, Past Surgical History, Family history, Social History, and Medications were all reviewed with the patient today and updated as necessary.       Current Outpatient Medications   Medication Sig Dispense Refill    metoprolol succinate (TOPROL XL) 25 MG extended release tablet Take 1 tablet by mouth daily 90 tablet 1    naproxen (NAPROSYN) 500 MG tablet Take 1 tablet by mouth 2 times daily as needed for Pain (prn) 180 tablet 1    donepezil (ARICEPT) 10 MG tablet Take 1 tablet by mouth nightly 90 tablet 3    Biotin 1 MG CAPS Take 1 capsule by mouth daily      CPAP Machine MISC by Does not apply route      amLODIPine (NORVASC) 10 MG tablet Take 1 tablet by mouth daily 90 tablet 3    pantoprazole (PROTONIX) 40 MG tablet Take 1 tablet by mouth daily 90 tablet 3    levothyroxine (SYNTHROID) 150 MCG tablet Take 1 tablet by mouth every morning (before breakfast) 1/2 tab on Sunday 90 tablet 3    atorvastatin (LIPITOR) 80 MG tablet Take 1 tablet by mouth daily 90 tablet 3    telmisartan-hydroCHLOROthiazide (MICARDIS HCT) 80-25 MG per tablet Take 1 tablet by mouth daily 90 tablet 3    fluticasone (FLONASE) 50 MCG/ACT nasal spray 2 sprays by Nasal route daily as needed for Rhinitis 16 g 5    Multiple Vitamins-Minerals (ICAPS AREDS 2 PO) Take 1 tablet by mouth in the morning and at bedtime      hyoscyamine (LEVBID) 375 MCG extended release tablet Take 1 tablet by mouth every 12 hours as needed      melatonin 3 MG TABS tablet

## 2024-01-23 ENCOUNTER — OFFICE VISIT (OUTPATIENT)
Dept: ORTHOPEDIC SURGERY | Age: 74
End: 2024-01-23
Payer: MEDICARE

## 2024-01-23 VITALS — BODY MASS INDEX: 30.62 KG/M2 | HEIGHT: 68 IN | WEIGHT: 202 LBS

## 2024-01-23 DIAGNOSIS — M54.12 CERVICAL RADICULOPATHY: ICD-10-CM

## 2024-01-23 DIAGNOSIS — M54.2 NECK PAIN: Primary | ICD-10-CM

## 2024-01-23 DIAGNOSIS — M48.02 CERVICAL STENOSIS OF SPINAL CANAL: ICD-10-CM

## 2024-01-23 DIAGNOSIS — Z76.89 ENCOUNTER TO ESTABLISH CARE: ICD-10-CM

## 2024-01-23 LAB — PATH REV BLD -IMP: NORMAL

## 2024-01-23 PROCEDURE — 3017F COLORECTAL CA SCREEN DOC REV: CPT | Performed by: NURSE PRACTITIONER

## 2024-01-23 PROCEDURE — 1123F ACP DISCUSS/DSCN MKR DOCD: CPT | Performed by: NURSE PRACTITIONER

## 2024-01-23 PROCEDURE — G8428 CUR MEDS NOT DOCUMENT: HCPCS | Performed by: NURSE PRACTITIONER

## 2024-01-23 PROCEDURE — G8484 FLU IMMUNIZE NO ADMIN: HCPCS | Performed by: NURSE PRACTITIONER

## 2024-01-23 PROCEDURE — G8417 CALC BMI ABV UP PARAM F/U: HCPCS | Performed by: NURSE PRACTITIONER

## 2024-01-23 PROCEDURE — 99204 OFFICE O/P NEW MOD 45 MIN: CPT | Performed by: NURSE PRACTITIONER

## 2024-01-23 PROCEDURE — 1036F TOBACCO NON-USER: CPT | Performed by: NURSE PRACTITIONER

## 2024-01-23 RX ORDER — PREDNISONE 10 MG/1
10 TABLET ORAL SEE ADMIN INSTRUCTIONS
Qty: 48 EACH | Refills: 0 | Status: SHIPPED | OUTPATIENT
Start: 2024-01-23 | End: 2024-02-04

## 2024-01-23 NOTE — PROGRESS NOTES
indicating a advanced cervical stenosis.  We will go ahead and obtain a CT myelogram.  I will give him steroids for acute inflammation.    I discussed the natural history of this condition with the patient in that often these patients will experience diminishing of their symptoms within several weeks of conservative care.  We also discussed that the typical conservative treatments available include rest, NSAIDs, pain medication, and physical therapy as symptoms allow.  Oral and/or epidural steroids are other options to consider.   I discussed potential surgical options including a discectomy and fusion if the symptoms fail to improve or there is a progressive neurologic deficit and conservative management has been exhausted.      -Home PT: Patient was prescribed a course of home exercises.   -Steroids: A short oral steroid taper was prescribed to try to bring the more acute symptoms under control. The patient understands that there are risks associated with steroids including elevated blood sugar, hypertension, increased risk of infections, and thinning of the bones if taken repeatedly or for prolonged periods. The taper can be followed by NSAIDs once completed  - Myelo-CT: A Myelogram CT scan was ordered to delineate anatomy, confirm the diagnosis and assess the severity.        Orders Placed This Encounter   Medications    predniSONE 10 MG (48) TBPK     Sig: Take 10 mg by mouth See Admin Instructions for 12 days     Dispense:  48 each     Refill:  0        Orders Placed This Encounter   Procedures    XR CERVICAL SPINE (2-3 VIEWS)    CT MYELOGRAM CERVICAL    IR MYELOGRAM CERVICAL    [facility] - [address]        4 This is a undiagnosed new problem with uncertain prognosis      Return for after CT Myelogram .     LOLITA Serrano - CNP  01/29/24      Elements of this note were created using speech recognition software.  As such, errors of speech recognition may be present.

## 2024-01-31 ENCOUNTER — HOSPITAL ENCOUNTER (OUTPATIENT)
Dept: CT IMAGING | Age: 74
Discharge: HOME | End: 2024-02-03
Payer: MEDICARE

## 2024-01-31 ENCOUNTER — HOSPITAL ENCOUNTER (OUTPATIENT)
Dept: INTERVENTIONAL RADIOLOGY/VASCULAR | Age: 74
Discharge: HOME | End: 2024-02-03
Payer: MEDICARE

## 2024-01-31 VITALS
WEIGHT: 202 LBS | DIASTOLIC BLOOD PRESSURE: 77 MMHG | SYSTOLIC BLOOD PRESSURE: 163 MMHG | BODY MASS INDEX: 30.62 KG/M2 | RESPIRATION RATE: 16 BRPM | HEIGHT: 68 IN | OXYGEN SATURATION: 96 % | TEMPERATURE: 98 F | HEART RATE: 72 BPM

## 2024-01-31 DIAGNOSIS — Z76.89 ENCOUNTER TO ESTABLISH CARE: ICD-10-CM

## 2024-01-31 DIAGNOSIS — M48.02 CERVICAL STENOSIS OF SPINAL CANAL: ICD-10-CM

## 2024-01-31 DIAGNOSIS — M54.2 NECK PAIN: ICD-10-CM

## 2024-01-31 DIAGNOSIS — M54.12 CERVICAL RADICULOPATHY: ICD-10-CM

## 2024-01-31 PROCEDURE — 2709999900 IR MYELOGRAM CERVICAL

## 2024-01-31 PROCEDURE — 2500000003 HC RX 250 WO HCPCS: Performed by: PHYSICIAN ASSISTANT

## 2024-01-31 PROCEDURE — 6360000004 HC RX CONTRAST MEDICATION: Performed by: PHYSICIAN ASSISTANT

## 2024-01-31 PROCEDURE — 72126 CT NECK SPINE W/DYE: CPT

## 2024-01-31 RX ORDER — LIDOCAINE HYDROCHLORIDE 10 MG/ML
INJECTION, SOLUTION INFILTRATION; PERINEURAL PRN
Status: DISCONTINUED | OUTPATIENT
Start: 2024-01-31 | End: 2024-02-04 | Stop reason: HOSPADM

## 2024-01-31 RX ORDER — IOPAMIDOL 612 MG/ML
INJECTION, SOLUTION INTRATHECAL PRN
Status: DISCONTINUED | OUTPATIENT
Start: 2024-01-31 | End: 2024-02-04 | Stop reason: HOSPADM

## 2024-01-31 RX ADMIN — LIDOCAINE HYDROCHLORIDE 5 ML: 10 INJECTION, SOLUTION INFILTRATION; PERINEURAL at 14:07

## 2024-01-31 RX ADMIN — IOPAMIDOL 10 ML: 612 INJECTION, SOLUTION INTRATHECAL at 14:10

## 2024-01-31 NOTE — DISCHARGE INSTRUCTIONS
If you have any questions about your procedure, please call the Interventional Radiology department at [phone].     After business hours (5pm) and weekends, call the answering service at [phone] and ask for the Radiologist on call to be paged.         Si tiene Preguntas acerca del procedimiento, por favor llame al departamento de Radiología Intervencional al [phone].     Después de horas de oficina (5 pm) y los fines de semana, llamar al servicio de llamadas al [phone] y pregunte por el Radiologo de nanda.

## 2024-02-21 ENCOUNTER — OFFICE VISIT (OUTPATIENT)
Dept: INTERNAL MEDICINE CLINIC | Facility: CLINIC | Age: 74
End: 2024-02-21
Payer: MEDICARE

## 2024-02-21 VITALS
HEART RATE: 80 BPM | OXYGEN SATURATION: 96 % | BODY MASS INDEX: 30.42 KG/M2 | SYSTOLIC BLOOD PRESSURE: 118 MMHG | DIASTOLIC BLOOD PRESSURE: 79 MMHG | WEIGHT: 200 LBS

## 2024-02-21 DIAGNOSIS — R17 ELEVATED BILIRUBIN: Primary | ICD-10-CM

## 2024-02-21 PROCEDURE — 3074F SYST BP LT 130 MM HG: CPT | Performed by: INTERNAL MEDICINE

## 2024-02-21 PROCEDURE — G8484 FLU IMMUNIZE NO ADMIN: HCPCS | Performed by: INTERNAL MEDICINE

## 2024-02-21 PROCEDURE — G8417 CALC BMI ABV UP PARAM F/U: HCPCS | Performed by: INTERNAL MEDICINE

## 2024-02-21 PROCEDURE — 99213 OFFICE O/P EST LOW 20 MIN: CPT | Performed by: INTERNAL MEDICINE

## 2024-02-21 PROCEDURE — 1123F ACP DISCUSS/DSCN MKR DOCD: CPT | Performed by: INTERNAL MEDICINE

## 2024-02-21 PROCEDURE — 1036F TOBACCO NON-USER: CPT | Performed by: INTERNAL MEDICINE

## 2024-02-21 PROCEDURE — G8427 DOCREV CUR MEDS BY ELIG CLIN: HCPCS | Performed by: INTERNAL MEDICINE

## 2024-02-21 PROCEDURE — 3017F COLORECTAL CA SCREEN DOC REV: CPT | Performed by: INTERNAL MEDICINE

## 2024-02-21 PROCEDURE — 3078F DIAST BP <80 MM HG: CPT | Performed by: INTERNAL MEDICINE

## 2024-02-21 ASSESSMENT — ENCOUNTER SYMPTOMS
COUGH: 0
SHORTNESS OF BREATH: 0
ABDOMINAL PAIN: 0

## 2024-02-21 NOTE — PROGRESS NOTES
SUBJECTIVE:   Dayday Berkowitz is a 73 y.o. male seen for a visit regarding   Chief Complaint   Patient presents with    Hypertension     1 month follow up    Hyperlipidemia        HPI  Htn - no Headache  Hyperlipidemia - on statin  Hypothyroidism - on Levothyroxine  Nestor's Esophagus - on PPI, seeing GI  Mild Cognitive impairment - on Aricept  Sleep apnea - on CPAP  H/o low back pain with Neuropathic symptoms - feet, toes and lower right leg - seeing Dr. Obdulio Raman; has neurology appt.  Discussed RSV vaccine; patient wants to think about it     Past Medical History, Past Surgical History, Family history, Social History, and Medications were all reviewed with the patient today and updated as necessary.       Current Outpatient Medications   Medication Sig Dispense Refill    metoprolol succinate (TOPROL XL) 25 MG extended release tablet Take 1 tablet by mouth daily 90 tablet 1    naproxen (NAPROSYN) 500 MG tablet Take 1 tablet by mouth 2 times daily as needed for Pain (prn) 180 tablet 1    donepezil (ARICEPT) 10 MG tablet Take 1 tablet by mouth nightly 90 tablet 3    Biotin 1 MG CAPS Take 1 capsule by mouth daily      NONFORMULARY Take 1 capsule by mouth daily Lions Nick      CPAP Machine MISC by Does not apply route      amLODIPine (NORVASC) 10 MG tablet Take 1 tablet by mouth daily 90 tablet 3    pantoprazole (PROTONIX) 40 MG tablet Take 1 tablet by mouth daily 90 tablet 3    levothyroxine (SYNTHROID) 150 MCG tablet Take 1 tablet by mouth every morning (before breakfast) 1/2 tab on Sunday 90 tablet 3    atorvastatin (LIPITOR) 80 MG tablet Take 1 tablet by mouth daily 90 tablet 3    telmisartan-hydroCHLOROthiazide (MICARDIS HCT) 80-25 MG per tablet Take 1 tablet by mouth daily 90 tablet 3    fluticasone (FLONASE) 50 MCG/ACT nasal spray 2 sprays by Nasal route daily as needed for Rhinitis 16 g 5    hyoscyamine (LEVBID) 375 MCG extended release tablet Take 1 tablet by mouth every 12 hours as needed

## 2024-03-28 DIAGNOSIS — R41.3 MEMORY LOSS: ICD-10-CM

## 2024-03-28 DIAGNOSIS — C61 MALIGNANT NEOPLASM OF PROSTATE (HCC): ICD-10-CM

## 2024-03-28 DIAGNOSIS — R17 ELEVATED BILIRUBIN: ICD-10-CM

## 2024-03-28 LAB
ALBUMIN SERPL-MCNC: 3.9 G/DL (ref 3.2–4.6)
ALBUMIN/GLOB SERPL: 1.3 (ref 0.4–1.6)
ALP SERPL-CCNC: 99 U/L (ref 50–136)
ALT SERPL-CCNC: 18 U/L (ref 12–65)
AST SERPL-CCNC: 22 U/L (ref 15–37)
BILIRUB DIRECT SERPL-MCNC: 0.2 MG/DL
BILIRUB SERPL-MCNC: 0.8 MG/DL (ref 0.2–1.1)
GLOBULIN SER CALC-MCNC: 2.9 G/DL (ref 2.8–4.5)
PROT SERPL-MCNC: 6.8 G/DL (ref 6.3–8.2)
PSA SERPL-MCNC: <0.1 NG/ML

## 2024-04-01 ENCOUNTER — OFFICE VISIT (OUTPATIENT)
Dept: ENT CLINIC | Age: 74
End: 2024-04-01
Payer: MEDICARE

## 2024-04-01 VITALS — BODY MASS INDEX: 30.77 KG/M2 | HEIGHT: 68 IN | RESPIRATION RATE: 18 BRPM | WEIGHT: 203 LBS

## 2024-04-01 DIAGNOSIS — J34.89 NASAL OBSTRUCTION: ICD-10-CM

## 2024-04-01 DIAGNOSIS — J34.2 DEVIATED NASAL SEPTUM: ICD-10-CM

## 2024-04-01 DIAGNOSIS — J32.9 CHRONIC RHINOSINUSITIS: Primary | ICD-10-CM

## 2024-04-01 DIAGNOSIS — J33.9 NASAL POLYPOSIS: ICD-10-CM

## 2024-04-01 DIAGNOSIS — J34.3 NASAL TURBINATE HYPERTROPHY: ICD-10-CM

## 2024-04-01 DIAGNOSIS — G47.33 OSA ON CPAP: ICD-10-CM

## 2024-04-01 DIAGNOSIS — G47.33 OBSTRUCTIVE SLEEP APNEA: ICD-10-CM

## 2024-04-01 PROCEDURE — G8427 DOCREV CUR MEDS BY ELIG CLIN: HCPCS | Performed by: STUDENT IN AN ORGANIZED HEALTH CARE EDUCATION/TRAINING PROGRAM

## 2024-04-01 PROCEDURE — G8417 CALC BMI ABV UP PARAM F/U: HCPCS | Performed by: STUDENT IN AN ORGANIZED HEALTH CARE EDUCATION/TRAINING PROGRAM

## 2024-04-01 PROCEDURE — 1123F ACP DISCUSS/DSCN MKR DOCD: CPT | Performed by: STUDENT IN AN ORGANIZED HEALTH CARE EDUCATION/TRAINING PROGRAM

## 2024-04-01 PROCEDURE — 3017F COLORECTAL CA SCREEN DOC REV: CPT | Performed by: STUDENT IN AN ORGANIZED HEALTH CARE EDUCATION/TRAINING PROGRAM

## 2024-04-01 PROCEDURE — 99214 OFFICE O/P EST MOD 30 MIN: CPT | Performed by: STUDENT IN AN ORGANIZED HEALTH CARE EDUCATION/TRAINING PROGRAM

## 2024-04-01 PROCEDURE — 1036F TOBACCO NON-USER: CPT | Performed by: STUDENT IN AN ORGANIZED HEALTH CARE EDUCATION/TRAINING PROGRAM

## 2024-04-01 NOTE — PROGRESS NOTES
CT scan to further evaluate for the need of right-sided surgical intervention.  Based on upcoming imaging we will make final surgical recommendations.      We have discussed all reasonable risk benefits and alternatives to surgery and he would like to go forward with surgery as reviewed. I discussed all the risks of surgery including bleeding, infection, continued sinonasal symptoms, CSF leak, damage to orbit w/ vision changes, and need for further procedures and they would like to proceed.   I discussed all the risks of septoplasty and/or SMR of turbinates surgery including bleeding, septal hematoma, septal perforation, continued nasal obstruction, change in sense of smell, CSF leak, and numbness/tingling of upper teeth/lips, and they would like to proceed.      Errol Rice, DO  4/9/2024

## 2024-04-02 ENCOUNTER — CLINICAL DOCUMENTATION (OUTPATIENT)
Dept: NEUROLOGY | Age: 74
End: 2024-04-02

## 2024-04-02 PROBLEM — G47.33 OBSTRUCTIVE SLEEP APNEA: Status: ACTIVE | Noted: 2023-07-25

## 2024-04-02 LAB
Lab: NORMAL
Lab: NORMAL
REFERENCE LAB: NORMAL

## 2024-04-02 NOTE — PROGRESS NOTES
- This patient's amyloid beta 42/ 40 came back significantly low which is a high risk for Alzheimer's type dementia this was not done at [facility] it was done downstairs

## 2024-04-09 ASSESSMENT — ENCOUNTER SYMPTOMS
EYE PAIN: 0
CONSTIPATION: 0
FACIAL SWELLING: 0
SINUS PRESSURE: 1
CHOKING: 0
COUGH: 0
WHEEZING: 0
SINUS PAIN: 1
RHINORRHEA: 1
APNEA: 0
DIARRHEA: 0
NAUSEA: 0
EYE DISCHARGE: 0
EYE ITCHING: 0
STRIDOR: 0
SHORTNESS OF BREATH: 0

## 2024-04-15 DIAGNOSIS — G89.18 POST-OP PAIN: Primary | ICD-10-CM

## 2024-04-15 RX ORDER — PREDNISONE 20 MG/1
TABLET ORAL
Qty: 10 TABLET | Refills: 0 | Status: SHIPPED | OUTPATIENT
Start: 2024-04-15

## 2024-04-15 RX ORDER — ONDANSETRON 4 MG/1
4 TABLET, ORALLY DISINTEGRATING ORAL 3 TIMES DAILY PRN
Qty: 21 TABLET | Refills: 0 | Status: SHIPPED | OUTPATIENT
Start: 2024-04-15 | End: 2024-04-16

## 2024-04-15 RX ORDER — CEFUROXIME AXETIL 500 MG/1
500 TABLET ORAL 2 TIMES DAILY
Qty: 10 TABLET | Refills: 0 | Status: SHIPPED | OUTPATIENT
Start: 2024-04-15 | End: 2024-04-16

## 2024-04-15 RX ORDER — HYDROCODONE BITARTRATE AND ACETAMINOPHEN 5; 325 MG/1; MG/1
1 TABLET ORAL EVERY 4 HOURS PRN
Qty: 18 TABLET | Refills: 0 | Status: SHIPPED | OUTPATIENT
Start: 2024-04-15 | End: 2024-04-16

## 2024-04-16 ENCOUNTER — PREP FOR PROCEDURE (OUTPATIENT)
Dept: ENT CLINIC | Age: 74
End: 2024-04-16

## 2024-04-16 ENCOUNTER — TELEPHONE (OUTPATIENT)
Dept: ENT CLINIC | Age: 74
End: 2024-04-16

## 2024-04-16 DIAGNOSIS — J33.9 NASAL POLYPOSIS: ICD-10-CM

## 2024-04-16 DIAGNOSIS — J34.3 NASAL TURBINATE HYPERTROPHY: ICD-10-CM

## 2024-04-16 DIAGNOSIS — J34.89 NASAL OBSTRUCTION: ICD-10-CM

## 2024-04-16 DIAGNOSIS — G47.33 OBSTRUCTIVE SLEEP APNEA: ICD-10-CM

## 2024-04-16 DIAGNOSIS — J32.9 CHRONIC RHINOSINUSITIS: Primary | ICD-10-CM

## 2024-04-16 DIAGNOSIS — G47.33 OSA ON CPAP: ICD-10-CM

## 2024-04-16 DIAGNOSIS — J34.2 DEVIATED NASAL SEPTUM: ICD-10-CM

## 2024-04-16 RX ORDER — HYDROCHLOROTHIAZIDE 12.5 MG/1
12.5 CAPSULE, GELATIN COATED ORAL DAILY
COMMUNITY

## 2024-04-16 RX ORDER — CALCIUM POLYCARBOPHIL 625 MG 625 MG/1
625 TABLET ORAL DAILY
COMMUNITY

## 2024-04-16 NOTE — PERIOP NOTE
Patient verified name and .  Order for consent  found in EHR and matches case posting; patient verifies procedure.   Type 1B surgery, PAT phone assessment complete.  Orders  received.  Labs per surgeon: none  Labs per anesthesia protocol: POC K+ DOS  Bring sacral nerve and neurostimulator remotes on day of surgery.  Patient answered medical/surgical history questions at their best of ability. All prior to admission medications documented in EPIC.    Patient instructed to continue taking all prescription medications up to the day of surgery but to take only the following medications the day of surgery according to anesthesia guidelines with a small sip of water: amlodipine, atorvastatin, Allegra, Flonase, hyoscyamine (if needed), Synthroid, metoprolol, Protonix, Prednisone Also, patient is requested to take 2 Tylenol in the morning and then again before bed on the day before surgery. Regular or extra strength may be used.       Patient informed that all vitamins and supplements should be held 7 days prior to surgery and NSAIDS 5 days prior to surgery. Prescription meds to hold:vitamins and supplements, preventative 81 mg aspirin (denies any history of cardiac stents), Naproxen, Aricept    Patient instructed on the following:    > Arrive at [facility] Entrance, time of arrival to be called the day before by 1700  > NPO after midnight, unless otherwise indicated, including gum, mints, and ice chips  > Responsible adult must drive patient to the hospital, stay during surgery, and patient will need supervision 24 hours after anesthesia  > Use non moisturizing soap in shower the night before surgery and on the morning of surgery  > All piercings must be removed prior to arrival.    > Leave all valuables (money and jewelry) at home but bring insurance card and ID on DOS.   > You may be required to pay a deductible or co-pay on the day of your procedure. You can pre-pay by calling [phone] if your surgery is at the [facility]

## 2024-04-16 NOTE — TELEPHONE ENCOUNTER
Spoke to patient regarding Pre-OP Steroid per Dr. Rice, instructions to start immediately given . Patient voiced understanding .     Patient also notified of post op medications .   All of medications sent to local pharmacy .

## 2024-04-18 NOTE — PERIOP NOTE
Preop department called to notify patient of arrival time for scheduled procedure. Instructions given to   - Arrive at [facility] Entrance [address].  - Remain NPO after midnight, unless otherwise indicated, including gum, mints, and ice chips.   - Have a responsible adult to drive patient to the hospital, stay during surgery, and patient will need supervision 24 hours after anesthesia.   - Use antibacterial soap in shower the night before surgery and on the morning of surgery.       Was patient contacted: yes-pt  Voicemail left: n/a  Numbers contacted: [phone]   Arrival time: 0630 time change

## 2024-04-19 ENCOUNTER — ANESTHESIA EVENT (OUTPATIENT)
Dept: SURGERY | Age: 74
End: 2024-04-19
Payer: MEDICARE

## 2024-04-19 ENCOUNTER — ANESTHESIA (OUTPATIENT)
Dept: SURGERY | Age: 74
End: 2024-04-19
Payer: MEDICARE

## 2024-04-19 ENCOUNTER — HOSPITAL ENCOUNTER (OUTPATIENT)
Age: 74
Setting detail: OUTPATIENT SURGERY
Discharge: HOME | End: 2024-04-19
Attending: STUDENT IN AN ORGANIZED HEALTH CARE EDUCATION/TRAINING PROGRAM | Admitting: STUDENT IN AN ORGANIZED HEALTH CARE EDUCATION/TRAINING PROGRAM
Payer: MEDICARE

## 2024-04-19 VITALS
BODY MASS INDEX: 30.31 KG/M2 | RESPIRATION RATE: 15 BRPM | HEART RATE: 71 BPM | SYSTOLIC BLOOD PRESSURE: 152 MMHG | TEMPERATURE: 98 F | DIASTOLIC BLOOD PRESSURE: 77 MMHG | HEIGHT: 68 IN | WEIGHT: 200 LBS | OXYGEN SATURATION: 94 %

## 2024-04-19 DIAGNOSIS — J34.3 NASAL TURBINATE HYPERTROPHY: ICD-10-CM

## 2024-04-19 DIAGNOSIS — J34.3 HYPERTROPHY OF NASAL TURBINATES: ICD-10-CM

## 2024-04-19 DIAGNOSIS — G47.33 OBSTRUCTIVE SLEEP APNEA: ICD-10-CM

## 2024-04-19 DIAGNOSIS — J33.9 NASAL POLYPOSIS: ICD-10-CM

## 2024-04-19 DIAGNOSIS — J32.9 CHRONIC RHINOSINUSITIS: ICD-10-CM

## 2024-04-19 DIAGNOSIS — G47.33 OSA ON CPAP: ICD-10-CM

## 2024-04-19 DIAGNOSIS — J34.2 DEVIATED NASAL SEPTUM: ICD-10-CM

## 2024-04-19 DIAGNOSIS — J34.89 NASAL OBSTRUCTION: ICD-10-CM

## 2024-04-19 LAB — POTASSIUM BLD-SCNC: 4.3 MMOL/L (ref 3.5–5.1)

## 2024-04-19 PROCEDURE — 2580000003 HC RX 258: Performed by: ANESTHESIOLOGY

## 2024-04-19 PROCEDURE — 7100000011 HC PHASE II RECOVERY - ADDTL 15 MIN: Performed by: STUDENT IN AN ORGANIZED HEALTH CARE EDUCATION/TRAINING PROGRAM

## 2024-04-19 PROCEDURE — 30520 REPAIR OF NASAL SEPTUM: CPT | Performed by: STUDENT IN AN ORGANIZED HEALTH CARE EDUCATION/TRAINING PROGRAM

## 2024-04-19 PROCEDURE — C1726 CATH, BAL DIL, NON-VASCULAR: HCPCS | Performed by: STUDENT IN AN ORGANIZED HEALTH CARE EDUCATION/TRAINING PROGRAM

## 2024-04-19 PROCEDURE — 61782 SCAN PROC CRANIAL EXTRA: CPT | Performed by: STUDENT IN AN ORGANIZED HEALTH CARE EDUCATION/TRAINING PROGRAM

## 2024-04-19 PROCEDURE — 7100000000 HC PACU RECOVERY - FIRST 15 MIN: Performed by: STUDENT IN AN ORGANIZED HEALTH CARE EDUCATION/TRAINING PROGRAM

## 2024-04-19 PROCEDURE — 3600000014 HC SURGERY LEVEL 4 ADDTL 15MIN: Performed by: STUDENT IN AN ORGANIZED HEALTH CARE EDUCATION/TRAINING PROGRAM

## 2024-04-19 PROCEDURE — 88304 TISSUE EXAM BY PATHOLOGIST: CPT

## 2024-04-19 PROCEDURE — 2709999900 HC NON-CHARGEABLE SUPPLY: Performed by: STUDENT IN AN ORGANIZED HEALTH CARE EDUCATION/TRAINING PROGRAM

## 2024-04-19 PROCEDURE — 84132 ASSAY OF SERUM POTASSIUM: CPT

## 2024-04-19 PROCEDURE — 6360000002 HC RX W HCPCS: Performed by: NURSE ANESTHETIST, CERTIFIED REGISTERED

## 2024-04-19 PROCEDURE — 31296 NSL/SINS NDSC SURG FRNT SINS: CPT | Performed by: STUDENT IN AN ORGANIZED HEALTH CARE EDUCATION/TRAINING PROGRAM

## 2024-04-19 PROCEDURE — 2720000010 HC SURG SUPPLY STERILE: Performed by: STUDENT IN AN ORGANIZED HEALTH CARE EDUCATION/TRAINING PROGRAM

## 2024-04-19 PROCEDURE — 3600000004 HC SURGERY LEVEL 4 BASE: Performed by: STUDENT IN AN ORGANIZED HEALTH CARE EDUCATION/TRAINING PROGRAM

## 2024-04-19 PROCEDURE — 31259 NSL/SINS NDSC SPHN TISS RMVL: CPT | Performed by: STUDENT IN AN ORGANIZED HEALTH CARE EDUCATION/TRAINING PROGRAM

## 2024-04-19 PROCEDURE — 7100000001 HC PACU RECOVERY - ADDTL 15 MIN: Performed by: STUDENT IN AN ORGANIZED HEALTH CARE EDUCATION/TRAINING PROGRAM

## 2024-04-19 PROCEDURE — 30140 RESECT INFERIOR TURBINATE: CPT | Performed by: STUDENT IN AN ORGANIZED HEALTH CARE EDUCATION/TRAINING PROGRAM

## 2024-04-19 PROCEDURE — 31267 ENDOSCOPY MAXILLARY SINUS: CPT | Performed by: STUDENT IN AN ORGANIZED HEALTH CARE EDUCATION/TRAINING PROGRAM

## 2024-04-19 PROCEDURE — 7100000010 HC PHASE II RECOVERY - FIRST 15 MIN: Performed by: STUDENT IN AN ORGANIZED HEALTH CARE EDUCATION/TRAINING PROGRAM

## 2024-04-19 PROCEDURE — 6370000000 HC RX 637 (ALT 250 FOR IP): Performed by: STUDENT IN AN ORGANIZED HEALTH CARE EDUCATION/TRAINING PROGRAM

## 2024-04-19 PROCEDURE — 3700000000 HC ANESTHESIA ATTENDED CARE: Performed by: STUDENT IN AN ORGANIZED HEALTH CARE EDUCATION/TRAINING PROGRAM

## 2024-04-19 PROCEDURE — 2500000003 HC RX 250 WO HCPCS: Performed by: STUDENT IN AN ORGANIZED HEALTH CARE EDUCATION/TRAINING PROGRAM

## 2024-04-19 PROCEDURE — 2500000003 HC RX 250 WO HCPCS: Performed by: NURSE ANESTHETIST, CERTIFIED REGISTERED

## 2024-04-19 PROCEDURE — 3700000001 HC ADD 15 MINUTES (ANESTHESIA): Performed by: STUDENT IN AN ORGANIZED HEALTH CARE EDUCATION/TRAINING PROGRAM

## 2024-04-19 RX ORDER — LIDOCAINE HYDROCHLORIDE 10 MG/ML
1 INJECTION, SOLUTION INFILTRATION; PERINEURAL
Status: DISCONTINUED | OUTPATIENT
Start: 2024-04-19 | End: 2024-04-19 | Stop reason: HOSPADM

## 2024-04-19 RX ORDER — SODIUM CHLORIDE, SODIUM LACTATE, POTASSIUM CHLORIDE, CALCIUM CHLORIDE 600; 310; 30; 20 MG/100ML; MG/100ML; MG/100ML; MG/100ML
INJECTION, SOLUTION INTRAVENOUS CONTINUOUS
Status: DISCONTINUED | OUTPATIENT
Start: 2024-04-19 | End: 2024-04-19 | Stop reason: HOSPADM

## 2024-04-19 RX ORDER — ESMOLOL HYDROCHLORIDE 10 MG/ML
INJECTION INTRAVENOUS PRN
Status: DISCONTINUED | OUTPATIENT
Start: 2024-04-19 | End: 2024-04-19 | Stop reason: SDUPTHER

## 2024-04-19 RX ORDER — HYDROMORPHONE HYDROCHLORIDE 2 MG/ML
0.5 INJECTION, SOLUTION INTRAMUSCULAR; INTRAVENOUS; SUBCUTANEOUS EVERY 5 MIN PRN
Status: DISCONTINUED | OUTPATIENT
Start: 2024-04-19 | End: 2024-04-19 | Stop reason: HOSPADM

## 2024-04-19 RX ORDER — GLYCOPYRROLATE 0.2 MG/ML
INJECTION INTRAMUSCULAR; INTRAVENOUS PRN
Status: DISCONTINUED | OUTPATIENT
Start: 2024-04-19 | End: 2024-04-19 | Stop reason: SDUPTHER

## 2024-04-19 RX ORDER — PROCHLORPERAZINE EDISYLATE 5 MG/ML
5 INJECTION INTRAMUSCULAR; INTRAVENOUS
Status: DISCONTINUED | OUTPATIENT
Start: 2024-04-19 | End: 2024-04-19 | Stop reason: HOSPADM

## 2024-04-19 RX ORDER — LIDOCAINE HYDROCHLORIDE 20 MG/ML
INJECTION, SOLUTION EPIDURAL; INFILTRATION; INTRACAUDAL; PERINEURAL PRN
Status: DISCONTINUED | OUTPATIENT
Start: 2024-04-19 | End: 2024-04-19 | Stop reason: SDUPTHER

## 2024-04-19 RX ORDER — KETAMINE HYDROCHLORIDE 50 MG/ML
INJECTION, SOLUTION INTRAMUSCULAR; INTRAVENOUS PRN
Status: DISCONTINUED | OUTPATIENT
Start: 2024-04-19 | End: 2024-04-19 | Stop reason: SDUPTHER

## 2024-04-19 RX ORDER — ONDANSETRON 2 MG/ML
INJECTION INTRAMUSCULAR; INTRAVENOUS PRN
Status: DISCONTINUED | OUTPATIENT
Start: 2024-04-19 | End: 2024-04-19 | Stop reason: SDUPTHER

## 2024-04-19 RX ORDER — PROPOFOL 10 MG/ML
INJECTION, EMULSION INTRAVENOUS PRN
Status: DISCONTINUED | OUTPATIENT
Start: 2024-04-19 | End: 2024-04-19 | Stop reason: SDUPTHER

## 2024-04-19 RX ORDER — LIDOCAINE HYDROCHLORIDE AND EPINEPHRINE 10; 10 MG/ML; UG/ML
INJECTION, SOLUTION INFILTRATION; PERINEURAL PRN
Status: DISCONTINUED | OUTPATIENT
Start: 2024-04-19 | End: 2024-04-19 | Stop reason: ALTCHOICE

## 2024-04-19 RX ORDER — ACETAMINOPHEN 500 MG
1000 TABLET ORAL ONCE
Status: DISCONTINUED | OUTPATIENT
Start: 2024-04-19 | End: 2024-04-19 | Stop reason: HOSPADM

## 2024-04-19 RX ORDER — NEOSTIGMINE METHYLSULFATE 1 MG/ML
INJECTION, SOLUTION INTRAVENOUS PRN
Status: DISCONTINUED | OUTPATIENT
Start: 2024-04-19 | End: 2024-04-19 | Stop reason: SDUPTHER

## 2024-04-19 RX ORDER — SODIUM CHLORIDE 0.9 % (FLUSH) 0.9 %
5-40 SYRINGE (ML) INJECTION EVERY 12 HOURS SCHEDULED
Status: DISCONTINUED | OUTPATIENT
Start: 2024-04-19 | End: 2024-04-19 | Stop reason: HOSPADM

## 2024-04-19 RX ORDER — SODIUM CHLORIDE 9 MG/ML
INJECTION, SOLUTION INTRAVENOUS PRN
Status: DISCONTINUED | OUTPATIENT
Start: 2024-04-19 | End: 2024-04-19 | Stop reason: HOSPADM

## 2024-04-19 RX ORDER — SODIUM CHLORIDE 0.9 % (FLUSH) 0.9 %
5-40 SYRINGE (ML) INJECTION PRN
Status: DISCONTINUED | OUTPATIENT
Start: 2024-04-19 | End: 2024-04-19 | Stop reason: HOSPADM

## 2024-04-19 RX ORDER — OXYCODONE HYDROCHLORIDE 5 MG/1
5 TABLET ORAL
Status: DISCONTINUED | OUTPATIENT
Start: 2024-04-19 | End: 2024-04-19 | Stop reason: HOSPADM

## 2024-04-19 RX ORDER — HYDROMORPHONE HYDROCHLORIDE 2 MG/ML
INJECTION, SOLUTION INTRAMUSCULAR; INTRAVENOUS; SUBCUTANEOUS PRN
Status: DISCONTINUED | OUTPATIENT
Start: 2024-04-19 | End: 2024-04-19 | Stop reason: SDUPTHER

## 2024-04-19 RX ORDER — DEXAMETHASONE SODIUM PHOSPHATE 10 MG/ML
INJECTION INTRAMUSCULAR; INTRAVENOUS PRN
Status: DISCONTINUED | OUTPATIENT
Start: 2024-04-19 | End: 2024-04-19 | Stop reason: SDUPTHER

## 2024-04-19 RX ORDER — ONDANSETRON 2 MG/ML
4 INJECTION INTRAMUSCULAR; INTRAVENOUS
Status: DISCONTINUED | OUTPATIENT
Start: 2024-04-19 | End: 2024-04-19 | Stop reason: HOSPADM

## 2024-04-19 RX ORDER — OXYMETAZOLINE HYDROCHLORIDE 0.05 G/100ML
SPRAY NASAL PRN
Status: DISCONTINUED | OUTPATIENT
Start: 2024-04-19 | End: 2024-04-19 | Stop reason: ALTCHOICE

## 2024-04-19 RX ORDER — NALOXONE HYDROCHLORIDE 0.4 MG/ML
INJECTION, SOLUTION INTRAMUSCULAR; INTRAVENOUS; SUBCUTANEOUS PRN
Status: DISCONTINUED | OUTPATIENT
Start: 2024-04-19 | End: 2024-04-19 | Stop reason: HOSPADM

## 2024-04-19 RX ORDER — DEXMEDETOMIDINE HYDROCHLORIDE 100 UG/ML
INJECTION, SOLUTION INTRAVENOUS PRN
Status: DISCONTINUED | OUTPATIENT
Start: 2024-04-19 | End: 2024-04-19 | Stop reason: SDUPTHER

## 2024-04-19 RX ORDER — ROCURONIUM BROMIDE 10 MG/ML
INJECTION, SOLUTION INTRAVENOUS PRN
Status: DISCONTINUED | OUTPATIENT
Start: 2024-04-19 | End: 2024-04-19 | Stop reason: SDUPTHER

## 2024-04-19 RX ADMIN — DEXMEDETOMIDINE 12 MCG: 100 INJECTION, SOLUTION, CONCENTRATE INTRAVENOUS at 07:35

## 2024-04-19 RX ADMIN — SODIUM CHLORIDE, SODIUM LACTATE, POTASSIUM CHLORIDE, AND CALCIUM CHLORIDE: 600; 310; 30; 20 INJECTION, SOLUTION INTRAVENOUS at 07:00

## 2024-04-19 RX ADMIN — ESMOLOL HYDROCHLORIDE 30 MG: 10 INJECTION INTRAVENOUS at 09:25

## 2024-04-19 RX ADMIN — HYDROMORPHONE HYDROCHLORIDE 0.4 MG: 2 INJECTION INTRAMUSCULAR; INTRAVENOUS; SUBCUTANEOUS at 07:18

## 2024-04-19 RX ADMIN — GLYCOPYRROLATE 0.4 MG: 0.2 INJECTION INTRAMUSCULAR; INTRAVENOUS at 09:23

## 2024-04-19 RX ADMIN — DEXMEDETOMIDINE 8 MCG: 100 INJECTION, SOLUTION, CONCENTRATE INTRAVENOUS at 08:33

## 2024-04-19 RX ADMIN — LIDOCAINE HYDROCHLORIDE 60 MG: 20 INJECTION, SOLUTION EPIDURAL; INFILTRATION; INTRACAUDAL; PERINEURAL at 07:28

## 2024-04-19 RX ADMIN — ROCURONIUM BROMIDE 50 MG: 50 INJECTION, SOLUTION INTRAVENOUS at 07:29

## 2024-04-19 RX ADMIN — HYDROMORPHONE HYDROCHLORIDE 0.4 MG: 2 INJECTION INTRAMUSCULAR; INTRAVENOUS; SUBCUTANEOUS at 08:00

## 2024-04-19 RX ADMIN — PROPOFOL 180 MG: 10 INJECTION, EMULSION INTRAVENOUS at 07:28

## 2024-04-19 RX ADMIN — HYDROMORPHONE HYDROCHLORIDE 0.2 MG: 2 INJECTION INTRAMUSCULAR; INTRAVENOUS; SUBCUTANEOUS at 08:35

## 2024-04-19 RX ADMIN — KETAMINE HYDROCHLORIDE 20 MG: 50 INJECTION, SOLUTION INTRAMUSCULAR; INTRAVENOUS at 08:32

## 2024-04-19 RX ADMIN — SODIUM CHLORIDE, SODIUM LACTATE, POTASSIUM CHLORIDE, AND CALCIUM CHLORIDE: 600; 310; 30; 20 INJECTION, SOLUTION INTRAVENOUS at 08:54

## 2024-04-19 RX ADMIN — ESMOLOL HYDROCHLORIDE 20 MG: 10 INJECTION INTRAVENOUS at 09:34

## 2024-04-19 RX ADMIN — Medication 4 MG: at 09:23

## 2024-04-19 RX ADMIN — DEXAMETHASONE SODIUM PHOSPHATE 10 MG: 10 INJECTION INTRAMUSCULAR; INTRAVENOUS at 07:50

## 2024-04-19 RX ADMIN — ONDANSETRON 4 MG: 2 INJECTION INTRAMUSCULAR; INTRAVENOUS at 07:50

## 2024-04-19 RX ADMIN — KETAMINE HYDROCHLORIDE 20 MG: 50 INJECTION, SOLUTION INTRAMUSCULAR; INTRAVENOUS at 07:29

## 2024-04-19 ASSESSMENT — ENCOUNTER SYMPTOMS
CONSTIPATION: 0
CHOKING: 0
RHINORRHEA: 1
EYE ITCHING: 0
SINUS PRESSURE: 1
NAUSEA: 0
EYE PAIN: 0
STRIDOR: 0
SINUS PAIN: 1
DIARRHEA: 0
COUGH: 0
EYE DISCHARGE: 0
APNEA: 0
WHEEZING: 0
FACIAL SWELLING: 0
SHORTNESS OF BREATH: 0

## 2024-04-19 ASSESSMENT — PAIN - FUNCTIONAL ASSESSMENT: PAIN_FUNCTIONAL_ASSESSMENT: 0-10

## 2024-04-19 ASSESSMENT — PAIN SCALES - GENERAL
PAINLEVEL_OUTOF10: 0
PAINLEVEL_OUTOF10: 0

## 2024-04-19 NOTE — ANESTHESIA PRE PROCEDURE
Department of Anesthesiology  Preprocedure Note       Name:  Dayday Berkowitz   Age:  73 y.o.  :  1950                                          MRN:  [id number]         Date:  2024      Surgeon: Surgeon(s):  Errol Rice DO    Procedure: Procedure(s):  SINUS ENDOSCOPY FUNCTIONAL SURGERY IMAGE GUIDED Left maxillary antrostomy with mucosa removal, total ethmoidectomies, right sphenoidotomy with tissue removal, bilateral frontal ballon sinuplasties  SEPTOPLASTY GRAFT  INFERIOR TURBINATE REDUCTION  DRUG INDUCED SLEEP ENDOSCOPY    Medications prior to admission:   Prior to Admission medications    Medication Sig Start Date End Date Taking? Authorizing Provider   hydroCHLOROthiazide 12.5 MG capsule Take 1 capsule by mouth daily   Yes Lyubov العراقي MD   Multiple Vitamins-Minerals (ICAPS AREDS 2 PO) Take by mouth daily   Yes Lyubov العراقي MD   polycarbophil (FIBERCON) 625 MG tablet Take 1 tablet by mouth daily 6 tablet daily   Yes Lyubov العراقي MD   Omega-3 Fatty Acids (FISH OIL PO) Take 2 capsules by mouth daily   Yes Lyubov العراقي MD   NONFORMULARY Balance of Nature   Yes Lyubov العراقي MD   predniSONE (DELTASONE) 20 MG tablet 40 mg (2 tabs) for 3 days; 20 mg (1 tab) for 3 days; 10 mg (1/2 tab) for 2 days 4/15/24   Errol Rice DO   metoprolol succinate (TOPROL XL) 25 MG extended release tablet Take 1 tablet by mouth daily 24   Conner Cleveland MD   naproxen (NAPROSYN) 500 MG tablet Take 1 tablet by mouth 2 times daily as needed for Pain (prn) 23   JAVIER Shea MD   donepezil (ARICEPT) 10 MG tablet Take 1 tablet by mouth nightly 23   Manpreet Small DO   Biotin 1 MG CAPS Take 1 capsule by mouth daily    Lyubov العراقي MD   NONFORMULARY Take 1 capsule by mouth daily Lyubov Marin MD   CPAP Machine MISC by Does not apply route    Lyubov العراقي MD   amLODIPine (NORVASC) 10 MG tablet Take

## 2024-04-19 NOTE — H&P
fall recess. The  ethmoid air cells are clear. There is subtotal opacification of the sphenoid  sinus. Walls of the sphenoid sinus are thickened, suggestive of chronic  sinusitis. The maxillary sinus is well aerated with a patent ostium and  infundibulum. There is pneumatization of the bilateral optic struts.     Bilateral mastoid tip effusions are present. Middle ear complexes are clear. The  nasal septum is situated in the midline.     Left side: The frontal sinus is opacified. There is patchy opacification of  ethmoid air cells. The sphenoid sinus is clear. There is complete opacification  of the maxillary sinus which contains calcified material centrally.     The maxillary sinus walls are thickened. The maxillary ostium and infundibulum  are opacified.     IMPRESSION:     1. Complete opacification of the right sphenoid sinus and left maxillary sinus.  Thickening of the walls of the sinuses suggests chronic inflammation.     2. Bilateral mastoid tip effusions.    CT Result (most recent):  CT SINUS FOR IMAGE GUIDANCE 04/10/2024    Narrative  CT SINUSES    INDICATION: Chronic sinusitis    Multiple high resolution axial images were obtained through the paranasal  sinuses. Coronal reformats were also evaluated.  Radiation dose reduction  techniques were used for this study:  All CT scans performed at this facility  use one or all of the following: Automated exposure control, adjustment of the  mA and/or kVp according to patient's size, iterative reconstruction.    COMPARISON: None    PARANASAL SINUSES:  - FRONTAL: Normal.  - MAXILLARY: Opacification of the left maxillary sinus.  - ETHMOID: Normal.  - SPHENOID: Normal.  - OSTIOMEATAL COMPLEX: Mild opacification of the left mastoid air cells    OTHER FINDINGS:  - POST-SURGICAL CHANGES: None.  - NASAL CAVITY: Normal turbinates.  No significant septal deviation.  - ORBITS: Normal.    Impression  1. Opacification of the left maxillary sinus  2. Mild opacification of the  left mastoid air cells.        ASSESSMENT and PLAN        ICD-10-CM    1. Chronic rhinosinusitis  J32.9 CT SINUS FOR IMAGE GUIDANCE     CANCELED: CT MAXILLOFACIAL WO CONTRAST      2. Nasal polyposis  J33.9 CT SINUS FOR IMAGE GUIDANCE     CANCELED: CT MAXILLOFACIAL WO CONTRAST      3. Obstructive sleep apnea  G47.33       4. Nasal obstruction  J34.89       5. ANTOLIN on CPAP  G47.33       6. Deviated nasal septum  J34.2       7. Nasal turbinate hypertrophy  J34.3           Patient still with ongoing symptomatic chronic sinusitis with nasal polyposis.  Symptomatically he remains worse in the left side.  Following thorough optimize medical therapy trial last year he had initially been recommended for bilateral functional endoscopic sinus surgery with image navigation left-sided maxillary antrostomy with tissue removal bilateral total ethmoidectomy right sphenoidotomy and bilateral frontal balloon sinuplasty septoplasty and turbinate reduction.  I have advised going forward with an updated CT scan to further evaluate for the need of right-sided surgical intervention.  Based on upcoming imaging we will make final surgical recommendations.      We have discussed all reasonable risk benefits and alternatives to surgery and he would like to go forward with surgery as reviewed. I discussed all the risks of surgery including bleeding, infection, continued sinonasal symptoms, CSF leak, damage to orbit w/ vision changes, and need for further procedures and they would like to proceed.   I discussed all the risks of septoplasty and/or SMR of turbinates surgery including bleeding, septal hematoma, septal perforation, continued nasal obstruction, change in sense of smell, CSF leak, and numbness/tingling of upper teeth/lips, and they would like to proceed.      Errol Rice,   4/9/2024

## 2024-04-19 NOTE — DISCHARGE INSTRUCTIONS
No blowing of your nose for 5 to 7 days  No heavy lifting bending over or exercise or exertion for a week  Take Tylenol scheduled every 6 hours and if you need something stronger you can take the prescription pain medication.  You may use an ice bag to the bridge of your nose as needed for pain or swelling.  Take your antibiotic for 5 days and you have nausea medicine in case you get nausea or vomiting.  Use saline mist or saline irrigation to your nose every 2-4 hours while awake  Humidifier at night  Expect old dark blood and debris from your nose over the next couple days that will slowly resolve.    If there is excessive bright red bleeding out of your nose spray Afrin and call the office phone number.    ACTIVITY  As tolerated and as directed by your doctor.   Bathe or shower as directed by your doctor.     DIET  Clear liquids until no nausea or vomiting; then light diet for the first day.  Advance to regular diet on second day, unless your doctor orders otherwise.   If nausea and vomiting continues, call your doctor.     PAIN  Take pain medication as directed by your doctor.   Call your doctor if pain is NOT relieved by medication.   DO NOT take aspirin of blood thinners unless directed by your doctor.     MEDICATION INTERACTION:During your procedure you potentially received a medication or medications which may reduce the effectiveness of oral contraceptives. Please consider other forms of contraception for 1 month following your procedure if you are currently using oral contraceptives as your primary form of birth control. In addition to this, we recommend continuing your oral contraceptive as prescribed, unless otherwise instructed by your physician, during this time      CALL YOUR DOCTOR IF   Excessive bleeding that does not stop after holding pressure over the area  Temperature of 101 degrees F or above  Excessive redness, swelling or bruising, and/ or green or yellow, smelly discharge from  incision    After general anesthesia or intravenous sedation, for 24 hours or while taking prescription Narcotics:  Limit your activities  A responsible adult needs to be with you for the next 24 hours  Do not drive and operate hazardous machinery  Do not make important personal or business decisions  Do not drink alcoholic beverages  If you have not urinated within 8 hours after discharge, and you are experiencing discomfort from urinary retention, please go to the nearest ED.  If you have sleep apnea and have a CPAP machine, please use it for all naps and sleeping.  Please use caution when taking narcotics and any of your home medications that may cause drowsiness.  *  Please give a list of your current medications to your Primary Care Provider.  *  Please update this list whenever your medications are discontinued, doses are      changed, or new medications (including over-the-counter products) are added.  *  Please carry medication information at all times in case of emergency situations.    These are general instructions for a healthy lifestyle:  No smoking/ No tobacco products/ Avoid exposure to second hand smoke  Surgeon General's Warning:  Quitting smoking now greatly reduces serious risk to your health.  Obesity, smoking, and sedentary lifestyle greatly increases your risk for illness  A healthy diet, regular physical exercise & weight monitoring are important for maintaining a healthy lifestyle    You may be retaining fluid if you have a history of heart failure or if you experience any of the following symptoms:  Weight gain of 3 pounds or more overnight or 5 pounds in a week, increased swelling in our hands or feet or shortness of breath while lying flat in bed.  Please call your doctor as soon as you notice any of these symptoms; do not wait until your next office visit.

## 2024-04-19 NOTE — ANESTHESIA POSTPROCEDURE EVALUATION
Department of Anesthesiology  Postprocedure Note    Patient: Dayday Berkowitz  MRN: [id number]  YOB: 1950  Date of evaluation: 4/19/2024    Procedure Summary       Date: 04/19/24 Room / Location: [facility] OP OR 04 / [facility]    Anesthesia Start: 0716 Anesthesia Stop: 0944    Procedures:       SINUS ENDOSCOPY FUNCTIONAL SURGERY IMAGE GUIDED Left maxillary antrostomy with mucosa removal, total ethmoidectomies, right sphenoidotomy with tissue removal, bilateral frontal ballon sinuplasties (Nose)      SEPTOPLASTY GRAFT (Bilateral: Nose)      INFERIOR TURBINATE REDUCTION (Bilateral: Nose) Diagnosis:       Chronic rhinosinusitis      Deviated nasal septum      Hypertrophy of nasal turbinates      Obstructive sleep apnea      (Chronic rhinosinusitis [J32.9])      (Deviated nasal septum [J34.2])      (Hypertrophy of nasal turbinates [J34.3])      (Obstructive sleep apnea [G47.33])    Surgeons: Errol Rice DO Responsible Provider: Dayday Elkins MD    Anesthesia Type: General ASA Status: 3            Anesthesia Type: General    Judith Phase I: Judith Score: 8    Judith Phase II: Judith Score: 10    Anesthesia Post Evaluation    Patient location during evaluation: PACU  Patient participation: complete - patient participated  Level of consciousness: awake and alert  Airway patency: patent  Nausea & Vomiting: no nausea and no vomiting  Cardiovascular status: hemodynamically stable  Respiratory status: acceptable, nonlabored ventilation and spontaneous ventilation  Hydration status: euvolemic  Comments: BP (!) 152/77   Pulse 71   Temp 98 °F (36.7 °C) (Temporal)   Resp 15   Ht 1.727 m (5' 8\")   Wt 90.7 kg (200 lb)   SpO2 94%   BMI 30.41 kg/m²     Multimodal analgesia pain management approach  Pain management: adequate and satisfactory to patient    No notable events documented.

## 2024-04-22 PROBLEM — J34.3 NASAL TURBINATE HYPERTROPHY: Status: ACTIVE | Noted: 2024-04-22

## 2024-04-22 PROBLEM — J34.89 NASAL OBSTRUCTION: Status: ACTIVE | Noted: 2024-04-22

## 2024-04-22 NOTE — OP NOTE
a balloon   sinuplasty was performed, where a transcutaneous light indicator was placed into the frontoethmoid recess and the light was confirmed to be on the superficial surface of the skin, the forehead, within the contents of the frontal sinus.  The sinuses were then each dilated up to 12 atmospheres of pressure and irrigated with several 100 mL of sterile saline.  Afrin was again placed into the bilateral middle meatus with hemostasis was satisfactorily achieved.  Next, the septal incision site was reapproximated with a simple interrupted 4-0 chromic sutures and the septal mucosal flaps were reapproximated with a running 4-0 plain gut suture in a quilting style fashion.  The inferior turbinates were then addressed where a single stab incision was placed to the anterior face of each inferior turbinate and a submucosal plane was established with a Brandi elevator.  Inside the submucosal plane, a 2 mm StraightShot microdebrider was used to remove soft tissue and bone with overall volumetric decrease in size of the inferior turbinates.  They were then outfractured using a Liane elevator.  Gonsales splints coated in antibiotic ointment were placed on either side of the nasal septum and sutured in place with a single transcolumellar 3-0 Prolene suture.  A Novapak dissolvable packing was trimmed and Afrin placed over each bilateral middle meatus and inflated with saline.  At this point in time, the care of the patient was transferred back over to Anesthesia where he awoke from anesthesia with no complications and was extubated successfully.  He was transferred to PACU in stable condition.    LOCATION:  [facility] [place].    DISPOSITION:  Stable to PACU.        LISY LOPEZ DO      BSBIPIN/DINORAS  D:  04/22/2024 02:36:51  T:  04/22/2024 07:06:44  JOB #:  [id number]

## 2024-04-24 ENCOUNTER — OFFICE VISIT (OUTPATIENT)
Dept: ENT CLINIC | Age: 74
End: 2024-04-24
Payer: MEDICARE

## 2024-04-24 DIAGNOSIS — Z98.890 S/P FESS (FUNCTIONAL ENDOSCOPIC SINUS SURGERY): ICD-10-CM

## 2024-04-24 DIAGNOSIS — M85.00 BONE FIBROUS DYSPLASIA: ICD-10-CM

## 2024-04-24 DIAGNOSIS — J32.9 CHRONIC RHINOSINUSITIS: Primary | ICD-10-CM

## 2024-04-24 PROCEDURE — 31237 NSL/SINS NDSC SURG BX POLYPC: CPT | Performed by: STUDENT IN AN ORGANIZED HEALTH CARE EDUCATION/TRAINING PROGRAM

## 2024-04-24 PROCEDURE — 1123F ACP DISCUSS/DSCN MKR DOCD: CPT | Performed by: STUDENT IN AN ORGANIZED HEALTH CARE EDUCATION/TRAINING PROGRAM

## 2024-04-24 PROCEDURE — 1036F TOBACCO NON-USER: CPT | Performed by: STUDENT IN AN ORGANIZED HEALTH CARE EDUCATION/TRAINING PROGRAM

## 2024-04-24 PROCEDURE — 99024 POSTOP FOLLOW-UP VISIT: CPT | Performed by: STUDENT IN AN ORGANIZED HEALTH CARE EDUCATION/TRAINING PROGRAM

## 2024-04-24 PROCEDURE — 3017F COLORECTAL CA SCREEN DOC REV: CPT | Performed by: STUDENT IN AN ORGANIZED HEALTH CARE EDUCATION/TRAINING PROGRAM

## 2024-04-24 PROCEDURE — G8417 CALC BMI ABV UP PARAM F/U: HCPCS | Performed by: STUDENT IN AN ORGANIZED HEALTH CARE EDUCATION/TRAINING PROGRAM

## 2024-04-24 PROCEDURE — G8427 DOCREV CUR MEDS BY ELIG CLIN: HCPCS | Performed by: STUDENT IN AN ORGANIZED HEALTH CARE EDUCATION/TRAINING PROGRAM

## 2024-04-24 NOTE — PROGRESS NOTES
Status: He is alert and oriented to person, place, and time.   Psychiatric:         Mood and Affect: Mood normal.         Behavior: Behavior normal.          Sinonasal debridement:    Gonsales splints in proper location. Single anterior suture cut and removed. Splints removed without complication revealing no active epistaxis.  Nasal septum in the midline without perforation or hematoma.  Rhinocaine spray was applied to the bilateral nasal cavities.  Moderate mucus and crusted debris was present and debrided from the bilateral middle meatus, frontal recesses, and nasopharynx utilizing 0 degree rigid nasal endoscopy with parallel instrumentation.  Patient tolerated the procedure well.        ASSESSMENT and PLAN        ICD-10-CM    1. 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 S/P FESS (functional endoscopic sinus surgery)  Z98.890           Gonsales splints were taken down removed as above.  Septum is healing well without any postoperative concerns.  Moderate sinus packing and crusted debris and old blood clots were debrided from the bilateral nasal cavity.  Significant improvement in to his nasal airway.    We have discussed his intraoperative findings showing chronic osteitis/sinusitis and what what appears to be a high suspicion of fibrous dysplasia of the paranasal sinuses.    He should continue with frequent saline irrigations and he can begin to gently blow his nose for clearance.  He will continue to slowly increase his activity over the next week or so.  I will plan to see him back for a postoperative follow-up sinus debridement in 2 weeks.    Errol Rice,   4/29/2024

## 2024-04-29 ENCOUNTER — PATIENT MESSAGE (OUTPATIENT)
Dept: ORTHOPEDIC SURGERY | Age: 74
End: 2024-04-29

## 2024-04-29 NOTE — TELEPHONE ENCOUNTER
From: Dayday Berkowitz  To: Jaswinder Linda  Sent: 4/27/2024 2:46 PM EDT  Subject: Appointment Request    Appointment Request From: Dayday Berkowitz    With Provider: LOLITA Serrano CNP [[facility]]    Preferred Date Range: 4/29/2024 – 4/30/2024    Preferred Times: Monday Afternoon, Tuesday Afternoon    Reason for visit: Request an Appointment    Comments:  Believe I have injured ligament, Meniscus?, left kneecap area.

## 2024-04-29 NOTE — TELEPHONE ENCOUNTER
Called and spoke to the patient who stated that his knee pain is much better and that he doesn't need an appointment at this time.

## 2024-04-30 ENCOUNTER — TELEPHONE (OUTPATIENT)
Dept: ORTHOPEDIC SURGERY | Age: 74
End: 2024-04-30

## 2024-04-30 NOTE — TELEPHONE ENCOUNTER
I made patient apt for 5/28 LT knee pain, started hurting sat 4/27 don't recall any injury but has not gotten any better and is wanting to know if can come in sooner?   Stable

## 2024-05-03 ENCOUNTER — OFFICE VISIT (OUTPATIENT)
Dept: ORTHOPEDIC SURGERY | Age: 74
End: 2024-05-03
Payer: MEDICARE

## 2024-05-03 DIAGNOSIS — M25.562 LEFT KNEE PAIN, UNSPECIFIED CHRONICITY: Primary | ICD-10-CM

## 2024-05-03 DIAGNOSIS — M17.12 OSTEOARTHRITIS OF LEFT KNEE, UNSPECIFIED OSTEOARTHRITIS TYPE: ICD-10-CM

## 2024-05-03 PROCEDURE — 1123F ACP DISCUSS/DSCN MKR DOCD: CPT | Performed by: PHYSICIAN ASSISTANT

## 2024-05-03 PROCEDURE — 1036F TOBACCO NON-USER: CPT | Performed by: PHYSICIAN ASSISTANT

## 2024-05-03 PROCEDURE — G8428 CUR MEDS NOT DOCUMENT: HCPCS | Performed by: PHYSICIAN ASSISTANT

## 2024-05-03 PROCEDURE — G8417 CALC BMI ABV UP PARAM F/U: HCPCS | Performed by: PHYSICIAN ASSISTANT

## 2024-05-03 PROCEDURE — 99203 OFFICE O/P NEW LOW 30 MIN: CPT | Performed by: PHYSICIAN ASSISTANT

## 2024-05-03 PROCEDURE — 3017F COLORECTAL CA SCREEN DOC REV: CPT | Performed by: PHYSICIAN ASSISTANT

## 2024-05-03 NOTE — PROGRESS NOTES
Name: Dayday Berkowitz  YOB: 1950  Gender: male  MRN: [id number]    CC:   Chief Complaint   Patient presents with    Joint Pain     Left knee pain will xray today          DIAGNOSIS:   Encounter Diagnoses   Name Primary?    Left knee pain, unspecified chronicity Yes    Osteoarthritis of left knee, unspecified osteoarthritis type         HPI:   The patient is a 73-year-old gentleman who presents today complaining of acute onset of left knee pain which occurred approximately 1 week ago while he was in a seated position and pushing down the foot of a recliner.  He localizes pain to the anterior aspect of left knee.  He states that his left knee was initially a little puffy but has since resolved.  He states that pain was moderate in severity at first but have essentially resolved at this point as well.  He was taking Tylenol and ibuprofen initially which helped but he reports not needing this for several days.  Denies any prior injury or surgery to his left knee.  He has history of lumbar spine DJD, status post lumbar spine decompression by Dr. Castillo in 2016, spinal cord stimulator placement, and has seen Dr. Carr for injections in the past.  No other new complaints.      Current Outpatient Medications:     hydroCHLOROthiazide 12.5 MG capsule, Take 1 capsule by mouth daily, Disp: , Rfl:     Multiple Vitamins-Minerals (ICAPS AREDS 2 PO), Take by mouth daily, Disp: , Rfl:     polycarbophil (FIBERCON) 625 MG tablet, Take 1 tablet by mouth daily 6 tablet daily, Disp: , Rfl:     Omega-3 Fatty Acids (FISH OIL PO), Take 2 capsules by mouth daily, Disp: , Rfl:     NONFORMULARY, Balance of Nature, Disp: , Rfl:     predniSONE (DELTASONE) 20 MG tablet, 40 mg (2 tabs) for 3 days; 20 mg (1 tab) for 3 days; 10 mg (1/2 tab) for 2 days, Disp: 10 tablet, Rfl: 0    metoprolol succinate (TOPROL XL) 25 MG extended release tablet, Take 1 tablet by mouth daily, Disp: 90 tablet, Rfl: 1    naproxen (NAPROSYN) 500 MG

## 2024-05-08 ENCOUNTER — OFFICE VISIT (OUTPATIENT)
Dept: ENT CLINIC | Age: 74
End: 2024-05-08
Payer: MEDICARE

## 2024-05-08 DIAGNOSIS — Z98.890 S/P FESS (FUNCTIONAL ENDOSCOPIC SINUS SURGERY): ICD-10-CM

## 2024-05-08 DIAGNOSIS — J32.9 CHRONIC RHINOSINUSITIS: Primary | ICD-10-CM

## 2024-05-08 DIAGNOSIS — M85.00 BONE FIBROUS DYSPLASIA: ICD-10-CM

## 2024-05-08 DIAGNOSIS — J33.9 NASAL POLYPOSIS: ICD-10-CM

## 2024-05-08 PROCEDURE — 1123F ACP DISCUSS/DSCN MKR DOCD: CPT | Performed by: STUDENT IN AN ORGANIZED HEALTH CARE EDUCATION/TRAINING PROGRAM

## 2024-05-08 PROCEDURE — 1036F TOBACCO NON-USER: CPT | Performed by: STUDENT IN AN ORGANIZED HEALTH CARE EDUCATION/TRAINING PROGRAM

## 2024-05-08 PROCEDURE — 31237 NSL/SINS NDSC SURG BX POLYPC: CPT | Performed by: STUDENT IN AN ORGANIZED HEALTH CARE EDUCATION/TRAINING PROGRAM

## 2024-05-08 PROCEDURE — G8427 DOCREV CUR MEDS BY ELIG CLIN: HCPCS | Performed by: STUDENT IN AN ORGANIZED HEALTH CARE EDUCATION/TRAINING PROGRAM

## 2024-05-08 PROCEDURE — 99024 POSTOP FOLLOW-UP VISIT: CPT | Performed by: STUDENT IN AN ORGANIZED HEALTH CARE EDUCATION/TRAINING PROGRAM

## 2024-05-08 PROCEDURE — 3017F COLORECTAL CA SCREEN DOC REV: CPT | Performed by: STUDENT IN AN ORGANIZED HEALTH CARE EDUCATION/TRAINING PROGRAM

## 2024-05-08 PROCEDURE — G8417 CALC BMI ABV UP PARAM F/U: HCPCS | Performed by: STUDENT IN AN ORGANIZED HEALTH CARE EDUCATION/TRAINING PROGRAM

## 2024-05-08 RX ORDER — PREDNISONE 20 MG/1
TABLET ORAL
Qty: 16 TABLET | Refills: 0 | Status: SHIPPED | OUTPATIENT
Start: 2024-05-08

## 2024-05-08 NOTE — PROGRESS NOTES
HPI:  Dayday Berkowitz is a 73 y.o. male seen Established   Chief Complaint   Patient presents with    Post-Op Check     Patient presents today for 2 week f/u of sinus surgery .      73-year-old male seen for a postoperative evaluation status post Bilateral functional endoscopic sinus surgery with image navigation, left-sided maxillary antrostomy with tissue removal, right-sided sphenoidotomy, bilateral total ethmoidectomy, bilateral frontal balloon sinuplasty, septoplasty, and bilateral submucous resection of inferior turbinates on 4/19/2024.  Gonsales splints were removed and sinus debridement performed 4/24/2024.  He is actually doing quite well having significant improvement in regards to his long-term nasal obstruction and having minimal sinonasal congestion.  He continues with frequent saline use although it has been less over the last week or so.      Past Medical History, Past Surgical History, Family history, Social History, and Medications were all reviewed with the patient today and updated as necessary.     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    Essential hypertension    Spondylosis of cervical region without myelopathy or radiculopathy    Reflux esophagitis    Fecal incontinence    Allergic rhinitis    Colon polyps    Rosacea    Hypothyroidism    Hyperlipidemia    H/O prostate cancer    Spondylosis of lumbar region without myelopathy or radiculopathy    Right hand pain    Primary osteoarthritis of first carpometacarpal joint of right hand    Ganglion cyst of wrist, left    Suspected sleep apnea    Memory loss    Physiological insomnia    Open wound of fifth toe, right, initial encounter    Cellulitis of fifth toe, right    Gouty bursitis of left olecranon    Chronic rhinosinusitis    Deviated nasal septum    Hypertrophy of nasal turbinates    Alzheimer's disease,

## 2024-05-20 SDOH — HEALTH STABILITY: PHYSICAL HEALTH: ON AVERAGE, HOW MANY DAYS PER WEEK DO YOU ENGAGE IN MODERATE TO STRENUOUS EXERCISE (LIKE A BRISK WALK)?: 4 DAYS

## 2024-05-20 SDOH — HEALTH STABILITY: PHYSICAL HEALTH: ON AVERAGE, HOW MANY MINUTES DO YOU ENGAGE IN EXERCISE AT THIS LEVEL?: 40 MIN

## 2024-05-23 ENCOUNTER — OFFICE VISIT (OUTPATIENT)
Dept: INTERNAL MEDICINE CLINIC | Facility: CLINIC | Age: 74
End: 2024-05-23

## 2024-05-23 VITALS
OXYGEN SATURATION: 97 % | HEIGHT: 68 IN | WEIGHT: 198 LBS | BODY MASS INDEX: 30.01 KG/M2 | HEART RATE: 67 BPM | SYSTOLIC BLOOD PRESSURE: 154 MMHG | DIASTOLIC BLOOD PRESSURE: 72 MMHG

## 2024-05-23 DIAGNOSIS — M10.00 IDIOPATHIC GOUT, UNSPECIFIED CHRONICITY, UNSPECIFIED SITE: ICD-10-CM

## 2024-05-23 DIAGNOSIS — E78.5 HYPERLIPIDEMIA, UNSPECIFIED HYPERLIPIDEMIA TYPE: ICD-10-CM

## 2024-05-23 DIAGNOSIS — E03.9 HYPOTHYROIDISM, UNSPECIFIED TYPE: ICD-10-CM

## 2024-05-23 DIAGNOSIS — J30.1 SEASONAL ALLERGIC RHINITIS DUE TO POLLEN: ICD-10-CM

## 2024-05-23 DIAGNOSIS — I10 ESSENTIAL (PRIMARY) HYPERTENSION: ICD-10-CM

## 2024-05-23 DIAGNOSIS — K22.70 BARRETT'S ESOPHAGUS WITHOUT DYSPLASIA: ICD-10-CM

## 2024-05-23 DIAGNOSIS — I10 ESSENTIAL HYPERTENSION: ICD-10-CM

## 2024-05-23 RX ORDER — HYDROCHLOROTHIAZIDE 25 MG/1
25 TABLET ORAL EVERY MORNING
Qty: 90 TABLET | Refills: 1 | Status: SHIPPED | OUTPATIENT
Start: 2024-05-23

## 2024-05-23 RX ORDER — METOPROLOL SUCCINATE 25 MG/1
25 TABLET, EXTENDED RELEASE ORAL DAILY
Qty: 90 TABLET | Refills: 1 | Status: SHIPPED | OUTPATIENT
Start: 2024-05-23

## 2024-05-23 RX ORDER — PANTOPRAZOLE SODIUM 40 MG/1
40 TABLET, DELAYED RELEASE ORAL DAILY
Qty: 90 TABLET | Refills: 1 | Status: SHIPPED | OUTPATIENT
Start: 2024-05-23

## 2024-05-23 RX ORDER — LEVOTHYROXINE SODIUM 0.15 MG/1
150 TABLET ORAL
Qty: 90 TABLET | Refills: 1 | Status: SHIPPED | OUTPATIENT
Start: 2024-05-23

## 2024-05-23 RX ORDER — FLUTICASONE PROPIONATE 50 MCG
2 SPRAY, SUSPENSION (ML) NASAL DAILY PRN
Qty: 16 G | Refills: 5 | Status: SHIPPED | OUTPATIENT
Start: 2024-05-23

## 2024-05-23 RX ORDER — NAPROXEN 500 MG/1
500 TABLET ORAL 2 TIMES DAILY PRN
Qty: 180 TABLET | Refills: 1 | Status: SHIPPED | OUTPATIENT
Start: 2024-05-23

## 2024-05-23 RX ORDER — TELMISARTAN AND HYDROCHLORTHIAZIDE 80; 25 MG/1; MG/1
1 TABLET ORAL DAILY
Qty: 90 TABLET | Refills: 1 | Status: CANCELLED | OUTPATIENT
Start: 2024-05-23

## 2024-05-23 RX ORDER — TELMISARTAN AND HYDROCHLORTHIAZIDE 80; 25 MG/1; MG/1
1 TABLET ORAL DAILY
Qty: 90 TABLET | Refills: 3 | Status: SHIPPED | OUTPATIENT
Start: 2024-05-23

## 2024-05-23 RX ORDER — ATORVASTATIN CALCIUM 80 MG/1
80 TABLET, FILM COATED ORAL DAILY
Qty: 90 TABLET | Refills: 1 | Status: SHIPPED | OUTPATIENT
Start: 2024-05-23

## 2024-05-23 SDOH — ECONOMIC STABILITY: FOOD INSECURITY: WITHIN THE PAST 12 MONTHS, YOU WORRIED THAT YOUR FOOD WOULD RUN OUT BEFORE YOU GOT MONEY TO BUY MORE.: NEVER TRUE

## 2024-05-23 SDOH — ECONOMIC STABILITY: FOOD INSECURITY: WITHIN THE PAST 12 MONTHS, THE FOOD YOU BOUGHT JUST DIDN'T LAST AND YOU DIDN'T HAVE MONEY TO GET MORE.: NEVER TRUE

## 2024-05-23 SDOH — ECONOMIC STABILITY: INCOME INSECURITY: HOW HARD IS IT FOR YOU TO PAY FOR THE VERY BASICS LIKE FOOD, HOUSING, MEDICAL CARE, AND HEATING?: NOT HARD AT ALL

## 2024-05-23 ASSESSMENT — PATIENT HEALTH QUESTIONNAIRE - PHQ9
1. LITTLE INTEREST OR PLEASURE IN DOING THINGS: NOT AT ALL
SUM OF ALL RESPONSES TO PHQ QUESTIONS 1-9: 0
SUM OF ALL RESPONSES TO PHQ9 QUESTIONS 1 & 2: 0
SUM OF ALL RESPONSES TO PHQ QUESTIONS 1-9: 0
SUM OF ALL RESPONSES TO PHQ QUESTIONS 1-9: 0
2. FEELING DOWN, DEPRESSED OR HOPELESS: NOT AT ALL
SUM OF ALL RESPONSES TO PHQ QUESTIONS 1-9: 0

## 2024-05-23 ASSESSMENT — ENCOUNTER SYMPTOMS
SHORTNESS OF BREATH: 0
NAUSEA: 0
DIARRHEA: 0
SINUS PAIN: 0
CONSTIPATION: 0
SORE THROAT: 0
SINUS PRESSURE: 0
CHEST TIGHTNESS: 0
COUGH: 0
BACK PAIN: 0
COLOR CHANGE: 0
ABDOMINAL DISTENTION: 0
ABDOMINAL PAIN: 0

## 2024-05-23 NOTE — PROGRESS NOTES
frequency and hematuria.   Musculoskeletal:  Negative for arthralgias, back pain and gait problem.   Skin:  Negative for color change and pallor.   Neurological:  Negative for dizziness, syncope, numbness and headaches.        Neuropathy in feet   Psychiatric/Behavioral:  Negative for behavioral problems, confusion and hallucinations.          OBJECTIVE:  BP (!) 154/72   Pulse 67   Ht 1.727 m (5' 8\")   Wt 89.8 kg (198 lb)   SpO2 97%   BMI 30.11 kg/m²      Physical Exam  Constitutional:       General: He is not in acute distress.     Appearance: Normal appearance. He is not ill-appearing.   HENT:      Head: Normocephalic and atraumatic.      Right Ear: Tympanic membrane and external ear normal.      Left Ear: Tympanic membrane and external ear normal.      Nose: Nose normal. No congestion or rhinorrhea.      Mouth/Throat:      Pharynx: No oropharyngeal exudate or posterior oropharyngeal erythema.   Eyes:      General:         Right eye: No discharge.         Left eye: No discharge.      Extraocular Movements: Extraocular movements intact.      Pupils: Pupils are equal, round, and reactive to light.   Cardiovascular:      Rate and Rhythm: Normal rate and regular rhythm.      Heart sounds: Normal heart sounds. No murmur heard.     No gallop.   Pulmonary:      Effort: Pulmonary effort is normal. No respiratory distress.      Breath sounds: Normal breath sounds. No wheezing, rhonchi or rales.   Abdominal:      General: Abdomen is flat. There is no distension.      Palpations: Abdomen is soft.      Tenderness: There is no abdominal tenderness.   Musculoskeletal:         General: Normal range of motion.      Right lower leg: No edema.      Left lower leg: No edema.   Skin:     General: Skin is warm.      Capillary Refill: Capillary refill takes less than 2 seconds.      Coloration: Skin is not jaundiced or pale.   Neurological:      General: No focal deficit present.      Mental Status: He is alert and oriented to

## 2024-08-05 RX ORDER — DONEPEZIL HYDROCHLORIDE 10 MG/1
10 TABLET, FILM COATED ORAL NIGHTLY
Qty: 90 TABLET | Refills: 3 | OUTPATIENT
Start: 2024-08-05

## 2024-08-22 ENCOUNTER — OFFICE VISIT (OUTPATIENT)
Dept: SLEEP MEDICINE | Age: 74
End: 2024-08-22

## 2024-08-22 VITALS
BODY MASS INDEX: 31.22 KG/M2 | WEIGHT: 206 LBS | OXYGEN SATURATION: 93 % | RESPIRATION RATE: 17 BRPM | HEART RATE: 73 BPM | SYSTOLIC BLOOD PRESSURE: 147 MMHG | HEIGHT: 68 IN | DIASTOLIC BLOOD PRESSURE: 81 MMHG

## 2024-08-22 DIAGNOSIS — G47.33 OSA (OBSTRUCTIVE SLEEP APNEA): Primary | ICD-10-CM

## 2024-08-22 ASSESSMENT — SLEEP AND FATIGUE QUESTIONNAIRES
HOW LIKELY ARE YOU TO NOD OFF OR FALL ASLEEP WHILE LYING DOWN TO REST IN THE AFTERNOON WHEN CIRCUMSTANCES PERMIT: SLIGHT CHANCE OF DOZING
HOW LIKELY ARE YOU TO NOD OFF OR FALL ASLEEP WHILE SITTING INACTIVE IN A PUBLIC PLACE: WOULD NEVER DOZE
HOW LIKELY ARE YOU TO NOD OFF OR FALL ASLEEP WHILE SITTING QUIETLY AFTER LUNCH WITHOUT ALCOHOL: MODERATE CHANCE OF DOZING
ESS TOTAL SCORE: 5
HOW LIKELY ARE YOU TO NOD OFF OR FALL ASLEEP WHILE SITTING AND READING: SLIGHT CHANCE OF DOZING
HOW LIKELY ARE YOU TO NOD OFF OR FALL ASLEEP WHEN YOU ARE A PASSENGER IN A CAR FOR AN HOUR WITHOUT A BREAK: WOULD NEVER DOZE
HOW LIKELY ARE YOU TO NOD OFF OR FALL ASLEEP WHILE WATCHING TV: SLIGHT CHANCE OF DOZING
HOW LIKELY ARE YOU TO NOD OFF OR FALL ASLEEP WHILE SITTING AND TALKING TO SOMEONE: WOULD NEVER DOZE
HOW LIKELY ARE YOU TO NOD OFF OR FALL ASLEEP IN A CAR, WHILE STOPPED FOR A FEW MINUTES IN TRAFFIC: WOULD NEVER DOZE

## 2024-08-22 NOTE — PATIENT INSTRUCTIONS
Continue CPAP 15 cm H2O with nightly compliance  New CPAP supplies ordered  Recommendations as above  Follow-up in 1 year or sooner if needed

## 2024-08-22 NOTE — PROGRESS NOTES
Health    Intimate Partner Violence     Fear of Current or Ex-Partner: No     Emotionally Abused: No     Physically Abused: No     Sexually Abused: No   Housing Stability: Not At Risk (8/5/2024)    Received from [facility]    Housing Stability     Was there a time when you did not have a steady place to sleep: No     Worried that the place you are staying is making you sick: No         Family History   Problem Relation Age of Onset    Dementia Paternal Grandfather     Cancer Paternal Grandmother         Throat    Parkinsonism Paternal Grandmother     Cancer Maternal Grandfather     Heart Disease Maternal Grandmother     Cancer Father 70        1993    Alcohol Abuse Brother         Now, recovered; at one time addicted to pain Rx    Diabetes Brother         1975    Cancer Brother         2021    Heart Disease Mother     Heart Failure Mother         1969    Alzheimer's Disease Maternal Aunt         Not sure if Aunt had this or dementia    Alcohol Abuse Brother     Cancer Brother         2020    Diabetes Brother     Heart Failure Paternal Uncle         2006    Dementia Maternal Aunt         Not sure if Aunt had this or Alzheimer's         Allergies   Allergen Reactions    Seasonal     Levofloxacin Rash         Current Outpatient Medications   Medication Sig    metoprolol succinate (TOPROL XL) 25 MG extended release tablet Take 1 tablet by mouth daily    atorvastatin (LIPITOR) 80 MG tablet Take 1 tablet by mouth daily    fluticasone (FLONASE) 50 MCG/ACT nasal spray 2 sprays by Nasal route daily as needed for Rhinitis    levothyroxine (SYNTHROID) 150 MCG tablet Take 1 tablet by mouth every morning (before breakfast) 1/2 tab on Sunday    naproxen (NAPROSYN) 500 MG tablet Take 1 tablet by mouth 2 times daily as needed for Pain (prn)    pantoprazole (PROTONIX) 40 MG tablet Take 1 tablet by mouth daily    telmisartan-hydroCHLOROthiazide (MICARDIS HCT) 80-25 MG per tablet Take 1 tablet by mouth daily    hydroCHLOROthiazide

## 2024-10-09 ENCOUNTER — APPOINTMENT (RX ONLY)
Dept: URBAN - METROPOLITAN AREA CLINIC 329 | Facility: CLINIC | Age: 74
Setting detail: DERMATOLOGY
End: 2024-10-09

## 2024-10-09 DIAGNOSIS — L81.4 OTHER MELANIN HYPERPIGMENTATION: ICD-10-CM

## 2024-10-09 DIAGNOSIS — D22 MELANOCYTIC NEVI: ICD-10-CM

## 2024-10-09 DIAGNOSIS — D18.0 HEMANGIOMA: ICD-10-CM

## 2024-10-09 DIAGNOSIS — Z12.83 ENCOUNTER FOR SCREENING FOR MALIGNANT NEOPLASM OF SKIN: ICD-10-CM

## 2024-10-09 DIAGNOSIS — L82.1 OTHER SEBORRHEIC KERATOSIS: ICD-10-CM

## 2024-10-09 DIAGNOSIS — L57.8 OTHER SKIN CHANGES DUE TO CHRONIC EXPOSURE TO NONIONIZING RADIATION: ICD-10-CM

## 2024-10-09 DIAGNOSIS — Z71.89 OTHER SPECIFIED COUNSELING: ICD-10-CM

## 2024-10-09 PROBLEM — D22.61 MELANOCYTIC NEVI OF RIGHT UPPER LIMB, INCLUDING SHOULDER: Status: ACTIVE | Noted: 2024-10-09

## 2024-10-09 PROBLEM — D18.01 HEMANGIOMA OF SKIN AND SUBCUTANEOUS TISSUE: Status: ACTIVE | Noted: 2024-10-09

## 2024-10-09 PROBLEM — D22.71 MELANOCYTIC NEVI OF RIGHT LOWER LIMB, INCLUDING HIP: Status: ACTIVE | Noted: 2024-10-09

## 2024-10-09 PROBLEM — D22.5 MELANOCYTIC NEVI OF TRUNK: Status: ACTIVE | Noted: 2024-10-09

## 2024-10-09 PROBLEM — D22.72 MELANOCYTIC NEVI OF LEFT LOWER LIMB, INCLUDING HIP: Status: ACTIVE | Noted: 2024-10-09

## 2024-10-09 PROCEDURE — ? COUNSELING

## 2024-10-09 PROCEDURE — ? FULL BODY SKIN EXAM

## 2024-10-09 PROCEDURE — ? TREATMENT REGIMEN

## 2024-10-09 PROCEDURE — 99213 OFFICE O/P EST LOW 20 MIN: CPT

## 2024-10-09 PROCEDURE — ? SUNSCREEN RECOMMENDATIONS

## 2024-10-09 ASSESSMENT — LOCATION ZONE DERM
LOCATION ZONE: LEG
LOCATION ZONE: ARM
LOCATION ZONE: FACE
LOCATION ZONE: TRUNK
LOCATION ZONE: SCALP

## 2024-10-09 ASSESSMENT — LOCATION DETAILED DESCRIPTION DERM
LOCATION DETAILED: LEFT CENTRAL MALAR CHEEK
LOCATION DETAILED: RIGHT ANTERIOR DISTAL UPPER ARM
LOCATION DETAILED: RIGHT SUPERIOR MEDIAL UPPER BACK
LOCATION DETAILED: SUPERIOR THORACIC SPINE
LOCATION DETAILED: EPIGASTRIC SKIN
LOCATION DETAILED: LEFT INFERIOR UPPER BACK
LOCATION DETAILED: RIGHT PROXIMAL DORSAL FOREARM
LOCATION DETAILED: RIGHT ANTERIOR DISTAL THIGH
LOCATION DETAILED: LEFT DISTAL CALF
LOCATION DETAILED: LEFT ANTERIOR DISTAL UPPER ARM
LOCATION DETAILED: RIGHT VENTRAL DISTAL FOREARM
LOCATION DETAILED: UPPER STERNUM
LOCATION DETAILED: RIGHT CLAVICULAR SKIN
LOCATION DETAILED: LEFT DISTAL POSTERIOR THIGH
LOCATION DETAILED: MID-FRONTAL SCALP
LOCATION DETAILED: LEFT PROXIMAL POSTERIOR THIGH

## 2024-10-09 ASSESSMENT — LOCATION SIMPLE DESCRIPTION DERM
LOCATION SIMPLE: RIGHT THIGH
LOCATION SIMPLE: LEFT UPPER ARM
LOCATION SIMPLE: CHEST
LOCATION SIMPLE: LEFT CHEEK
LOCATION SIMPLE: RIGHT CLAVICULAR SKIN
LOCATION SIMPLE: LEFT UPPER BACK
LOCATION SIMPLE: RIGHT UPPER ARM
LOCATION SIMPLE: ANTERIOR SCALP
LOCATION SIMPLE: RIGHT UPPER BACK
LOCATION SIMPLE: UPPER BACK
LOCATION SIMPLE: RIGHT FOREARM
LOCATION SIMPLE: LEFT POSTERIOR THIGH
LOCATION SIMPLE: LEFT CALF
LOCATION SIMPLE: ABDOMEN

## 2024-10-09 NOTE — PROCEDURE: SUNSCREEN RECOMMENDATIONS
[FreeTextEntry1] : POD # 9 s/p segmental left colecotmy for Scwanomma.\par \par Eating well and having BM's ok daily.\par \par No fever.  \par \par Path pending.\par \par Has had drainage from his main wound which has penrose drains in place.
Detail Level: Detailed
General Sunscreen Counseling: I recommended a broad spectrum sunscreen with a SPF of 30 or higher.  I explained that SPF 30 sunscreens block approximately 97 percent of the sun's harmful rays.  Sunscreens should be applied at least 15 minutes prior to expected sun exposure and then every 2 hours after that as long as sun exposure continues. If swimming or exercising sunscreen should be reapplied every 45 minutes to an hour after getting wet or sweating.  One ounce, or the equivalent of a shot glass full of sunscreen, is adequate to protect the skin not covered by a bathing suit. I also recommended a lip balm with a sunscreen as well. Sun protective clothing can be used in lieu of sunscreen but must be worn the entire time you are exposed to the sun's rays.

## 2024-10-14 ENCOUNTER — OFFICE VISIT (OUTPATIENT)
Dept: NEUROLOGY | Age: 74
End: 2024-10-14
Payer: MEDICARE

## 2024-10-14 VITALS
HEART RATE: 76 BPM | DIASTOLIC BLOOD PRESSURE: 83 MMHG | SYSTOLIC BLOOD PRESSURE: 136 MMHG | OXYGEN SATURATION: 96 % | BODY MASS INDEX: 31.47 KG/M2 | WEIGHT: 207 LBS

## 2024-10-14 DIAGNOSIS — F03.A3 MILD DEMENTIA WITH MOOD DISTURBANCE, UNSPECIFIED DEMENTIA TYPE (HCC): Primary | ICD-10-CM

## 2024-10-14 PROCEDURE — 99214 OFFICE O/P EST MOD 30 MIN: CPT | Performed by: PSYCHIATRY & NEUROLOGY

## 2024-10-14 PROCEDURE — 1123F ACP DISCUSS/DSCN MKR DOCD: CPT | Performed by: PSYCHIATRY & NEUROLOGY

## 2024-10-14 PROCEDURE — G8427 DOCREV CUR MEDS BY ELIG CLIN: HCPCS | Performed by: PSYCHIATRY & NEUROLOGY

## 2024-10-14 PROCEDURE — G8484 FLU IMMUNIZE NO ADMIN: HCPCS | Performed by: PSYCHIATRY & NEUROLOGY

## 2024-10-14 PROCEDURE — 3075F SYST BP GE 130 - 139MM HG: CPT | Performed by: PSYCHIATRY & NEUROLOGY

## 2024-10-14 PROCEDURE — 3017F COLORECTAL CA SCREEN DOC REV: CPT | Performed by: PSYCHIATRY & NEUROLOGY

## 2024-10-14 PROCEDURE — 3079F DIAST BP 80-89 MM HG: CPT | Performed by: PSYCHIATRY & NEUROLOGY

## 2024-10-14 PROCEDURE — G8417 CALC BMI ABV UP PARAM F/U: HCPCS | Performed by: PSYCHIATRY & NEUROLOGY

## 2024-10-14 PROCEDURE — 1036F TOBACCO NON-USER: CPT | Performed by: PSYCHIATRY & NEUROLOGY

## 2024-10-14 RX ORDER — ESCITALOPRAM OXALATE 10 MG/1
10 TABLET ORAL DAILY
Qty: 30 TABLET | Refills: 9 | Status: SHIPPED | OUTPATIENT
Start: 2024-10-14

## 2024-10-14 RX ORDER — DONEPEZIL HYDROCHLORIDE 10 MG/1
10 TABLET, FILM COATED ORAL NIGHTLY
Qty: 90 TABLET | Refills: 3 | Status: SHIPPED | OUTPATIENT
Start: 2024-10-14

## 2024-10-14 ASSESSMENT — VISUAL ACUITY: VA_NORMAL: 1

## 2024-10-14 NOTE — PROGRESS NOTES
[facility]  [address], [address]  [place] [address]  Phone: [phone] Fax [phone]  Dr. Manpreet Small      10/14/2024  Dayday Berkowitz     Patient is referred by the following provider for consultation regarding as below:       I reviewed the available records and notes and have examined patient with the following findings:     Chief Complaint:  Chief Complaint   Patient presents with    Follow-up          HPI: This is a right handed 74 y.o.  male who is very pleasant here with his wife.  This gentleman has had short-term memory loss has been going on for about 40 years according to his wife and the patient.  He does repeat questions within 24 hours forgets plats that they set up that within 24 hours.  He will use notes on his phone but never really looks back that acquired still doing his own medications and finances.  Wife does help.  His last MoCA test was 25 out of 30 and currently 30 out of 30.  Over the last 1 year his wife is noticed that his anger level has definitely increased and it is a very much frustrating anger level especially if they are trying to do something together.  He will wait for her to give direction and then gets upset with the direction.  He gets easily overwhelmed the best he can tell and tends to obviously take it on his wife.  The frustration and the anger that he has towards his wife is always tends to be addressed typically with memory.  He also is planning on possible possibly multilevel spinal surgery for scoliosis and spinal stenosis in [place] in [place].  And yes surgery can cause increase in memory loss.  He has a spinal cord stimulator he has severe spinal cord stenosis obstructive sleep apnea that severe as well and he is on CPAP.  He is on Aricept 10 mg a day and doing well with it with no complaints.  We went ahead with blood studies in the amyloid beta 42/40 came back and a high risk from [facility].    IMAGING REVIEW:  I REVIEWED

## 2025-01-02 ENCOUNTER — HOSPITAL ENCOUNTER (OUTPATIENT)
Dept: PHYSICAL THERAPY | Age: 75
Setting detail: RECURRING SERIES
Discharge: HOME | End: 2025-01-05
Payer: MEDICARE

## 2025-01-02 DIAGNOSIS — R26.81 UNSTEADINESS: ICD-10-CM

## 2025-01-02 DIAGNOSIS — M41.126 ADOLESCENT IDIOPATHIC SCOLIOSIS, LUMBAR REGION: ICD-10-CM

## 2025-01-02 DIAGNOSIS — M54.50 CHRONIC LOW BACK PAIN, UNSPECIFIED BACK PAIN LATERALITY, UNSPECIFIED WHETHER SCIATICA PRESENT: Primary | ICD-10-CM

## 2025-01-02 DIAGNOSIS — G89.29 CHRONIC LOW BACK PAIN, UNSPECIFIED BACK PAIN LATERALITY, UNSPECIFIED WHETHER SCIATICA PRESENT: Primary | ICD-10-CM

## 2025-01-02 PROCEDURE — 97161 PT EVAL LOW COMPLEX 20 MIN: CPT

## 2025-01-02 PROCEDURE — 97110 THERAPEUTIC EXERCISES: CPT

## 2025-01-02 ASSESSMENT — PAIN SCALES - GENERAL: PAINLEVEL_OUTOF10: 2

## 2025-01-02 NOTE — PROGRESS NOTES
Dayday Berkowitz  : 1950  Primary: Medicare Part A And B (Medicare)  Secondary:  FOR LIFE MEDICARE SUPP [facility]  [address]  [address]  [facility] [address]  Phone: [phone]  Fax: [phone] Plan Frequency: 2x/wk for 60 days  Plan of Care/Certification Expiration Date: 25        Plan of Care/Certification Expiration Date:  Plan of Care/Certification Expiration Date: 25    Frequency/Duration: Plan Frequency: 2x/wk for 60 days      Time In/Out:   Time In: 0805  Time Out: 0850      PT Visit Info:    Total # of Visits to Date: 1      Visit Count:  1    OUTPATIENT PHYSICAL THERAPY:   Treatment Note 2025       Episode  (LBP)               Treatment Diagnosis:    Chronic low back pain, unspecified back pain laterality, unspecified whether sciatica present  Unsteadiness  Adolescent idiopathic scoliosis, lumbar region  Medical/Referring Diagnosis:    Adolescent idiopathic scoliosis, site unspecified [M41.129]  Low back pain, unspecified [M54.50]    Referring Physician: Apollo Rm Jr. PA  MD Orders:  PT Eval and Treat, 6-8 weeks, 2-3x/wk, core strengthening, upper body strengthening, balance and posture, consider dry needles if indicated, no ROM or activity restrictions, transition to HEP as indicated   Return MD Appt:  3/6/25, sx on 3/17/25   Date of Onset:  Onset Date:  (chronic)     Allergies:   Levonorgestrel-ethinyl estrad, Seasonal, and Levofloxacin  Restrictions/Precautions:   Implanted stim device      Interventions Planned (Treatment may consist of any combination of the following):     See Assessment Note    Subjective Comments:   Pt reports back pain and inability to stand longer than 20 minutes at a time due to pain. Wants to work on strength prior to 2 day spinal surgery.   Initial Pain Level::     2/10  Post Session Pain Level:       1/10  Medications Last Reviewed: 2025  Updated Objective Findings:  See Evaluation Note from today  Treatment

## 2025-01-02 NOTE — THERAPY EVALUATION
Dayday Douglas Quevedoell  : 1950  Primary: Medicare Part A And B (Medicare)  Secondary:  [facility]  [address]  [address]  [facility] [address]  Phone: [phone]  Fax: [phone] Plan Frequency: 2x/wk for 60 days    Plan of Care/Certification Expiration Date: 25        Plan of Care/Certification Expiration Date:  Plan of Care/Certification Expiration Date: 25    Frequency/Duration: Plan Frequency: 2x/wk for 60 days      Time In/Out:   Time In: 08  Time Out: 0850      PT Visit Info:    Total # of Visits to Date: 1      Visit Count:  1                OUTPATIENT PHYSICAL THERAPY:             Initial Assessment 2025               Episode (LBP)         Treatment Diagnosis:     Chronic low back pain, unspecified back pain laterality, unspecified whether sciatica present  Unsteadiness  Adolescent idiopathic scoliosis, lumbar region  Medical/Referring Diagnosis:    Adolescent idiopathic scoliosis, site unspecified [M41.129]  Low back pain, unspecified [M54.50]    Referring Physician: ELIANA Duncan Jr, MD Orders:  PT Eval and Treat, 6-8 weeks, 2-3x/wk, core strengthening, upper body strengthening, balance and posture, consider dry needles if indicated, no ROM or activity restrictions, transition to HEP as indicated    Return MD Appt:  3/6/25, gets spinal sx on 3/17/25  Date of Onset:  Onset Date:  (chronic)     Allergies:  Levonorgestrel-ethinyl estrad, Seasonal, and Levofloxacin  Restrictions/Precautions:    Has stim unit implanted      Medications Last Reviewed: 2025     SUBJECTIVE   History of Injury/Illness (Reason for Referral):  Pt has a chronic history of back pain. Has done PT in the past for his neck and lower back. Currently has tingling down his leg at times along w/ weakness and reduced mobility. Is scheduled for whole spine CT  and is getting lumbar surgery . MD and pt want PT to help strengthen his

## 2025-01-07 ENCOUNTER — APPOINTMENT (OUTPATIENT)
Dept: PHYSICAL THERAPY | Age: 75
End: 2025-01-07
Payer: MEDICARE

## 2025-01-09 ENCOUNTER — APPOINTMENT (OUTPATIENT)
Dept: PHYSICAL THERAPY | Age: 75
End: 2025-01-09
Payer: MEDICARE

## 2025-01-14 ENCOUNTER — HOSPITAL ENCOUNTER (OUTPATIENT)
Dept: PHYSICAL THERAPY | Age: 75
Setting detail: RECURRING SERIES
Discharge: HOME | End: 2025-01-17
Payer: MEDICARE

## 2025-01-14 PROCEDURE — 97110 THERAPEUTIC EXERCISES: CPT

## 2025-01-14 ASSESSMENT — PAIN SCALES - GENERAL: PAINLEVEL_OUTOF10: 2

## 2025-01-14 NOTE — PROGRESS NOTES
Dayday Douglas Quevedoell  : 1950  Primary: Medicare Part A And B (Medicare)  Secondary:  FOR LIFE MEDICARE SUPP [facility]  [address]  [address]  [facility] [address]  Phone: [phone]  Fax: [phone] Plan Frequency: 2x/wk for 60 days  Plan of Care/Certification Expiration Date: 25        Plan of Care/Certification Expiration Date:  Plan of Care/Certification Expiration Date: 25    Frequency/Duration: Plan Frequency: 2x/wk for 60 days      Time In/Out:   Time In: 1300  Time Out: 1345      PT Visit Info:    Total # of Visits to Date: 2      Visit Count:  2    OUTPATIENT PHYSICAL THERAPY:   Treatment Note 2025       Episode  (LBP)               Treatment Diagnosis:    Chronic low back pain, unspecified back pain laterality, unspecified whether sciatica present  Unsteadiness  Adolescent idiopathic scoliosis, lumbar region  Medical/Referring Diagnosis:    Adolescent idiopathic scoliosis, site unspecified [M41.129]  Low back pain, unspecified [M54.50]    Referring Physician: ELIANA Duncan Jr., MD Orders:  PT Eval and Treat, 6-8 weeks, 2-3x/wk, core strengthening, upper body strengthening, balance and posture, consider dry needles if indicated, no ROM or activity restrictions, transition to HEP as indicated   Return MD Appt:  3/6/25, sx on 3/17/25   Date of Onset:  Onset Date:  (chronic)     Allergies:   Levonorgestrel-ethinyl estrad, Seasonal, and Levofloxacin  Restrictions/Precautions:   Implanted stim device      Interventions Planned (Treatment may consist of any combination of the following):     See Assessment Note    Subjective Comments:   Pt reports back pain has improved w/ his HEP.   Initial Pain Level::     2/10  Post Session Pain Level:       0/10  Medications Last Reviewed: 2025  Updated Objective Findings:  None Today  Treatment   THERAPEUTIC EXERCISE: (40 minutes):    Exercises per grid below to improve mobility, strength, balance, and coordination.

## 2025-01-16 ENCOUNTER — HOSPITAL ENCOUNTER (OUTPATIENT)
Dept: PHYSICAL THERAPY | Age: 75
Setting detail: RECURRING SERIES
Discharge: HOME | End: 2025-01-19
Payer: MEDICARE

## 2025-01-16 PROCEDURE — 97110 THERAPEUTIC EXERCISES: CPT

## 2025-01-16 ASSESSMENT — PAIN SCALES - GENERAL: PAINLEVEL_OUTOF10: 2

## 2025-01-16 NOTE — PROGRESS NOTES
Dayday Douglas Berkowitz  : 1950  Primary: Medicare Part A And B (Medicare)  Secondary:  FOR LIFE MEDICARE SUPP [facility]  [address]  [address]  [facility] [address]  Phone: [phone]  Fax: [phone] Plan Frequency: 2x/wk for 60 days  Plan of Care/Certification Expiration Date: 25        Plan of Care/Certification Expiration Date:  Plan of Care/Certification Expiration Date: 25    Frequency/Duration: Plan Frequency: 2x/wk for 60 days      Time In/Out:   Time In: 1300  Time Out: 1345      PT Visit Info:    Total # of Visits to Date: 3      Visit Count:  3    OUTPATIENT PHYSICAL THERAPY:   Treatment Note 2025       Episode  (LBP)               Treatment Diagnosis:    Chronic low back pain, unspecified back pain laterality, unspecified whether sciatica present  Unsteadiness  Adolescent idiopathic scoliosis, lumbar region  Medical/Referring Diagnosis:    Adolescent idiopathic scoliosis, site unspecified [M41.129]  Low back pain, unspecified [M54.50]    Referring Physician: ELIANA Duncan Jr., MD Orders:  PT Eval and Treat, 6-8 weeks, 2-3x/wk, core strengthening, upper body strengthening, balance and posture, consider dry needles if indicated, no ROM or activity restrictions, transition to HEP as indicated   Return MD Appt:  3/6/25, sx on 3/17/25   Date of Onset:  Onset Date:  (chronic)     Allergies:   Levonorgestrel-ethinyl estrad, Seasonal, and Levofloxacin  Restrictions/Precautions:   Implanted stim device      Interventions Planned (Treatment may consist of any combination of the following):     See Assessment Note    Subjective Comments:   Pt reports back pain is better but does have some soreness.   Initial Pain Level::     2/10  Post Session Pain Level:       3/10  Medications Last Reviewed: 2025  Updated Objective Findings:  None Today  Treatment   THERAPEUTIC EXERCISE: (40 minutes):    Exercises per grid below to improve mobility, strength, balance, and

## 2025-01-21 ENCOUNTER — HOSPITAL ENCOUNTER (OUTPATIENT)
Dept: PHYSICAL THERAPY | Age: 75
Setting detail: RECURRING SERIES
Discharge: HOME | End: 2025-01-24
Payer: MEDICARE

## 2025-01-21 PROCEDURE — 97110 THERAPEUTIC EXERCISES: CPT

## 2025-01-21 ASSESSMENT — PAIN SCALES - GENERAL: PAINLEVEL_OUTOF10: 2

## 2025-01-21 NOTE — PROGRESS NOTES
balance, and coordination.  Required minimal verbal and tactile cues to promote proper body alignment, promote proper body posture, and promote proper body mechanics.  Progressed resistance, range, repetitions, and complexity of movement as indicated.  MANUAL THERAPY: (0 minutes):   Joint mobilization and Soft tissue mobilization was utilized and necessary because of the patient's restricted joint motion, painful spasm, loss of articular motion, and restricted motion of soft tissue.   MODALITIES:             Date:  1/2/25 Date:  1/14/25 Date:  1/16/25 Date:  1/21/25   Activity/Exercise Parameters Parameters Parameters Parameters   NuStep  10 mins, 3.0  10 mins, 3.0 10 mins, 3.5    Pelvic tilt 10x seated 20x seated 20x seated 20x seated   TA  10x seated 20x seated w/ ball squeeze between knees 20x hands on knees 20x hands on knees   Ball press 10x seated w/ ball 20x seated w/ exercise ball 20x seated w/ exercise ball 20x seated w/ exercise ball   Sit to stand 10x from low mat 2x10 from low mat 2x10 from low mat 2x10 from low mat   Paloff press  10x B 5# band 10x B 5# band 10x B 10# band   Anti rotation step out  10x B 5# band 10x B 5# band 10x B 10# band   Rows  2x10 5# band 2x10 5# band 2x10 10# band   Ms  2x10 5# band 2x10 5# band 2x10 10# band   Ts  2x10 5# band 2x10 5# band 2x10 10# band   Ys    2x10 GTB    Step up  10x B 6 inch step 10x B 6 inch step 10x B 8 inch step   Side step up  10x B 6 inch step 10x B 6 inch step 10x B 8 inch step   Lumbar flexion 10x seated  10x seated w/ ball rolls forward and to sides 10x ball rolls  10x seated on mat 10x seated 10x w/ stool    L stretch 10x at counter 10x at // 10x at // 5x in //   QL stretch   10x B seated    Prayer stretch 10x on mat regular and side bend 10x on mat  10x on mat  3x each side 10x on mat  3x side to side   Oblique side bend   10x B 6#  15x B 12#    LAQ    15x B seated   Band walk    Lime band at ankles 2x down and back in //       Treatment/Session

## 2025-01-23 ENCOUNTER — HOSPITAL ENCOUNTER (OUTPATIENT)
Dept: PHYSICAL THERAPY | Age: 75
Setting detail: RECURRING SERIES
Discharge: HOME | End: 2025-01-26
Payer: MEDICARE

## 2025-01-23 PROCEDURE — 97110 THERAPEUTIC EXERCISES: CPT

## 2025-01-23 ASSESSMENT — PAIN SCALES - GENERAL: PAINLEVEL_OUTOF10: 4

## 2025-01-23 NOTE — PROGRESS NOTES
QL stretch   10x B seated     Prayer stretch 10x on mat regular and side bend 10x on mat  10x on mat  3x each side 10x on mat  3x side to side 10x on mat   Oblique side bend   10x B 6#  15x B 12#     LAQ    15x B seated 20x B seated   Hamstring curl     20x B purple band   Band walk    Lime band at ankles 2x down and back in //        Treatment/Session Summary:    Treatment Assessment:   Focused on lower level strengthening per patient request. His soreness dissipated with strengthening and stretching.    Communication/Consultation:  None today  Equipment provided today:  HEP  Recommendations/Intent for next treatment session: Next visit will focus on progression of functional tasks as tolerated.    >Total Treatment Billable Duration:  40 minutes therex  Time In: 1300  Time Out: 1345    Emily Shelton PT         Charge Capture  Events  [employer] Portal  Appt Desk  Attendance Report     Future Appointments   Date Time Provider Department Center   1/28/2025  1:00 PM Emily Shelton, PT [facility]   1/30/2025  1:00 PM Emily Shelton, PT [facility]   2/4/2025  1:00 PM Emily Shelton, PT [facility]   2/6/2025  1:00 PM Emily Shelton, PT [facility]   3/4/2025  1:00 PM Emily Shelton, PT [facility]   4/16/2025 12:00 PM Manpreet Small DO [facility]   8/22/2025  2:15 PM Kevin Ivory, APRN - CNP [facility]   Access Code: [email or web address]  URL: [email or web address]  Date: 01/02/2025  Prepared by: Emily Shelton    Exercises  - Seated Lumbar Flexion Stretch  - 4 x daily - 7 x weekly - 1 sets - 10 reps - 5 hold  - Child's Pose Stretch  - 4 x daily - 7 x weekly - 1 sets - 10 reps - 5 hold  - Standing 'L' Stretch at Counter  - 4 x daily - 7 x weekly - 1 sets - 10 reps - 5 hold  - Seated 3 Way Exercise Ball Roll Out Stretch  - 4 x daily - 7 x weekly - 1 sets - 10 reps - 5 hold  - Child's Pose with Sidebending  - 4 x daily - 7 x weekly - 1 sets - 10 reps - 5 hold  - Seated Pelvic Tilt  - 1

## 2025-01-28 ENCOUNTER — HOSPITAL ENCOUNTER (OUTPATIENT)
Dept: PHYSICAL THERAPY | Age: 75
Setting detail: RECURRING SERIES
Discharge: HOME | End: 2025-01-31
Payer: MEDICARE

## 2025-01-28 PROCEDURE — 97110 THERAPEUTIC EXERCISES: CPT

## 2025-01-28 ASSESSMENT — PAIN SCALES - GENERAL: PAINLEVEL_OUTOF10: 3

## 2025-01-28 NOTE — PROGRESS NOTES
Dayday Douglas Quevedoell  : 1950  Primary: Medicare Part A And B (Medicare)  Secondary:  FOR LIFE MEDICARE SUPP [employer]  [address]  [address]  [facility] [address]  Phone: [phone]  Fax: [phone] Plan Frequency: 2x/wk for 60 days  Plan of Care/Certification Expiration Date: 25        Plan of Care/Certification Expiration Date:  Plan of Care/Certification Expiration Date: 25    Frequency/Duration: Plan Frequency: 2x/wk for 60 days      Time In/Out:   Time In: 1300  Time Out: 1345      PT Visit Info:    Total # of Visits to Date: 6      Visit Count:  6    OUTPATIENT PHYSICAL THERAPY:   Treatment Note 2025       Episode  (LBP)               Treatment Diagnosis:    Chronic low back pain, unspecified back pain laterality, unspecified whether sciatica present  Unsteadiness  Adolescent idiopathic scoliosis, lumbar region  Medical/Referring Diagnosis:    Adolescent idiopathic scoliosis, site unspecified [M41.129]  Low back pain, unspecified [M54.50]    Referring Physician: ELIANA Duncan Jr., MD Orders:  PT Eval and Treat, 6-8 weeks, 2-3x/wk, core strengthening, upper body strengthening, balance and posture, consider dry needles if indicated, no ROM or activity restrictions, transition to HEP as indicated   Return MD Appt:  3/6/25, sx on 3/17/25   Date of Onset:  Onset Date:  (chronic)     Allergies:   Levonorgestrel-ethinyl estrad, Seasonal, and Levofloxacin  Restrictions/Precautions:   Implanted stim device      Interventions Planned (Treatment may consist of any combination of the following):     See Assessment Note    Subjective Comments:   Pt reports pain is slightly elevated due to baby sitting today.   Initial Pain Level::     3/10  Post Session Pain Level:       2/10  Medications Last Reviewed: 2025  Updated Objective Findings:  None Today  Treatment   THERAPEUTIC EXERCISE: (40 minutes):    Exercises per grid below to improve mobility, strength, balance,

## 2025-01-30 ENCOUNTER — HOSPITAL ENCOUNTER (OUTPATIENT)
Dept: PHYSICAL THERAPY | Age: 75
Setting detail: RECURRING SERIES
End: 2025-01-30
Payer: MEDICARE

## 2025-01-30 PROCEDURE — 97110 THERAPEUTIC EXERCISES: CPT

## 2025-01-30 ASSESSMENT — PAIN SCALES - GENERAL: PAINLEVEL_OUTOF10: 1

## 2025-01-30 NOTE — PROGRESS NOTES
Dayday Douglas Quevedoell  : 1950  Primary: Medicare Part A And B (Medicare)  Secondary:  FOR LIFE MEDICARE SUPP [employer]  [address]  [address]  [facility] [address]  Phone: [phone]  Fax: [phone] Plan Frequency: 2x/wk for 60 days  Plan of Care/Certification Expiration Date: 25        Plan of Care/Certification Expiration Date:  Plan of Care/Certification Expiration Date: 25    Frequency/Duration: Plan Frequency: 2x/wk for 60 days      Time In/Out:   Time In: 1300  Time Out: 1345      PT Visit Info:    Total # of Visits to Date: 7      Visit Count:  7    OUTPATIENT PHYSICAL THERAPY:   Treatment Note 2025       Episode  (LBP)               Treatment Diagnosis:    Chronic low back pain, unspecified back pain laterality, unspecified whether sciatica present  Unsteadiness  Adolescent idiopathic scoliosis, lumbar region  Medical/Referring Diagnosis:    Adolescent idiopathic scoliosis, site unspecified [M41.129]  Low back pain, unspecified [M54.50]    Referring Physician: ELIANA Duncan Jr., MD Orders:  PT Eval and Treat, 6-8 weeks, 2-3x/wk, core strengthening, upper body strengthening, balance and posture, consider dry needles if indicated, no ROM or activity restrictions, transition to HEP as indicated   Return MD Appt:  3/6/25, sx on 3/17/25   Date of Onset:  Onset Date:  (chronic)     Allergies:   Levonorgestrel-ethinyl estrad, Seasonal, and Levofloxacin  Restrictions/Precautions:   Implanted stim device      Interventions Planned (Treatment may consist of any combination of the following):     See Assessment Note    Subjective Comments:   Pt reports low pain. Did yard work yesterday and took lighter loads and took breaks but got it done without really increasing his pain.  Initial Pain Level::     10  Post Session Pain Level:       /10  Medications Last Reviewed: 2025  Updated Objective Findings:  None Today  Treatment   THERAPEUTIC EXERCISE: (40 minutes):

## 2025-02-04 ENCOUNTER — HOSPITAL ENCOUNTER (OUTPATIENT)
Dept: PHYSICAL THERAPY | Age: 75
Setting detail: RECURRING SERIES
Discharge: HOME | End: 2025-02-07
Payer: MEDICARE

## 2025-02-04 PROCEDURE — 97110 THERAPEUTIC EXERCISES: CPT

## 2025-02-04 ASSESSMENT — PAIN SCALES - GENERAL: PAINLEVEL_OUTOF10: 0

## 2025-02-04 NOTE — PROGRESS NOTES
Dayday Douglas Berkowitz  : 1950  Primary: Medicare Part A And B (Medicare)  Secondary:  [facility]  [address]  [address]  [facility] [address]  Phone: [phone]  Fax: [phone] Plan Frequency: 2x/wk for 60 days    Plan of Care/Certification Expiration Date: 25        Plan of Care/Certification Expiration Date:  Plan of Care/Certification Expiration Date: 25    Frequency/Duration: Plan Frequency: 2x/wk for 60 days      Time In/Out:   Time In: 1300  Time Out: 1345      PT Visit Info:    Total # of Visits to Date: 8      Visit Count:  8                OUTPATIENT PHYSICAL THERAPY:             Progress Report 2025               Episode (LBP)         Treatment Diagnosis:     Chronic low back pain, unspecified back pain laterality, unspecified whether sciatica present  Unsteadiness  Adolescent idiopathic scoliosis, lumbar region  Medical/Referring Diagnosis:    Adolescent idiopathic scoliosis, site unspecified [M41.129]  Low back pain, unspecified [M54.50]    Referring Physician: ELIANA Duncan Jr, MD Orders:  PT Eval and Treat, 6-8 weeks, 2-3x/wk, core strengthening, upper body strengthening, balance and posture, consider dry needles if indicated, no ROM or activity restrictions, transition to HEP as indicated    Return MD Appt:  3/6/25, gets spinal sx on 3/17/25  Date of Onset:  Onset Date:  (chronic)     Allergies:  Levonorgestrel-ethinyl estrad, Seasonal, and Levofloxacin  Restrictions/Precautions:    Has stim unit implanted      Medications Last Reviewed: 2025     SUBJECTIVE   History of Injury/Illness (Reason for Referral):  Pt has a chronic history of back pain. Has done PT in the past for his neck and lower back. Currently has tingling down his leg at times along w/ weakness and reduced mobility. Is scheduled for whole spine CT  and is getting lumbar surgery . MD and pt want PT to help strengthen his core

## 2025-02-04 NOTE — PROGRESS NOTES
Dayday Berkowitz  : 1950  Primary: Medicare Part A And B (Medicare)  Secondary:  FOR LIFE MEDICARE SUPP [facility]  [address]  [address]  [facility] [address]  Phone: [phone]  Fax: [phone] Plan Frequency: 2x/wk for 60 days  Plan of Care/Certification Expiration Date: 25        Plan of Care/Certification Expiration Date:  Plan of Care/Certification Expiration Date: 25    Frequency/Duration: Plan Frequency: 2x/wk for 60 days      Time In/Out:   Time In: 1300  Time Out: 1345      PT Visit Info:    Total # of Visits to Date: 8      Visit Count:  8    OUTPATIENT PHYSICAL THERAPY:   Treatment Note 2025       Episode  (LBP)               Treatment Diagnosis:    Chronic low back pain, unspecified back pain laterality, unspecified whether sciatica present  Unsteadiness  Adolescent idiopathic scoliosis, lumbar region  Medical/Referring Diagnosis:    Adolescent idiopathic scoliosis, site unspecified [M41.129]  Low back pain, unspecified [M54.50]    Referring Physician: ELIANA Duncan Jr., MD Orders:  PT Eval and Treat, 6-8 weeks, 2-3x/wk, core strengthening, upper body strengthening, balance and posture, consider dry needles if indicated, no ROM or activity restrictions, transition to HEP as indicated   Return MD Appt:  3/6/25, sx on 3/17/25   Date of Onset:  Onset Date:  (chronic)     Allergies:   Levonorgestrel-ethinyl estrad, Seasonal, and Levofloxacin  Restrictions/Precautions:   Implanted stim device      Interventions Planned (Treatment may consist of any combination of the following):     See Assessment Note    Subjective Comments:   Pt reports low pain right now. Was a little sore this am while doing activities in his house.   Initial Pain Level::     0/10  Post Session Pain Level:       0/10  Medications Last Reviewed: 2025  Updated Objective Findings:  None Today  Treatment   THERAPEUTIC EXERCISE: (40 minutes):    Exercises per grid below to improve

## 2025-02-06 ENCOUNTER — APPOINTMENT (OUTPATIENT)
Dept: PHYSICAL THERAPY | Age: 75
End: 2025-02-06
Payer: MEDICARE

## 2025-02-11 ENCOUNTER — HOSPITAL ENCOUNTER (OUTPATIENT)
Dept: PHYSICAL THERAPY | Age: 75
Setting detail: RECURRING SERIES
Discharge: HOME | End: 2025-02-14
Payer: MEDICARE

## 2025-02-11 PROCEDURE — 97110 THERAPEUTIC EXERCISES: CPT

## 2025-02-11 ASSESSMENT — PAIN SCALES - GENERAL: PAINLEVEL_OUTOF10: 3

## 2025-02-11 NOTE — PROGRESS NOTES
Dayday Douglas Berkowitz  : 1950  Primary: Medicare Part A And B (Medicare)  Secondary:  FOR LIFE MEDICARE SUPP [facility]  [address]  [address]  [facility] [address]  Phone: [phone]  Fax: [phone] Plan Frequency: 2x/wk for 60 days  Plan of Care/Certification Expiration Date: 25        Plan of Care/Certification Expiration Date:  Plan of Care/Certification Expiration Date: 25    Frequency/Duration: Plan Frequency: 2x/wk for 60 days      Time In/Out:   Time In: 0815  Time Out: 0900      PT Visit Info:    Total # of Visits to Date: 9      Visit Count:  9    OUTPATIENT PHYSICAL THERAPY:   Treatment Note 2025       Episode  (LBP)               Treatment Diagnosis:    Chronic low back pain, unspecified back pain laterality, unspecified whether sciatica present  Unsteadiness  Adolescent idiopathic scoliosis, lumbar region  Medical/Referring Diagnosis:    Adolescent idiopathic scoliosis, site unspecified [M41.129]  Low back pain, unspecified [M54.50]    Referring Physician: ELIANA Duncan Jr., MD Orders:  PT Eval and Treat, 6-8 weeks, 2-3x/wk, core strengthening, upper body strengthening, balance and posture, consider dry needles if indicated, no ROM or activity restrictions, transition to HEP as indicated   Return MD Appt:  3/6/25, sx on 3/17/25   Date of Onset:  Onset Date:  (chronic)     Allergies:   Levonorgestrel-ethinyl estrad, Seasonal, and Levofloxacin  Restrictions/Precautions:   Implanted stim device      Interventions Planned (Treatment may consist of any combination of the following):     See Assessment Note    Subjective Comments:   Pt reports pain is a little elevated because he has been packing. Notes he finally got the disc so he mailed it to his surgeon.   Initial Pain Level::     3/10  Post Session Pain Level:       10  Medications Last Reviewed: 2025  Updated Objective Findings:  None Today  Treatment   THERAPEUTIC EXERCISE: (40 minutes):

## 2025-03-26 ENCOUNTER — TELEPHONE (OUTPATIENT)
Dept: SLEEP MEDICINE | Age: 75
End: 2025-03-26

## 2025-03-26 NOTE — TELEPHONE ENCOUNTER
Patient has only had his cpap about 2 years but it's not working.  Asking if he needs to contact [facility] or who

## 2025-05-05 RX ORDER — ESCITALOPRAM OXALATE 10 MG/1
10 TABLET ORAL DAILY
Qty: 30 TABLET | Refills: 9 | Status: SHIPPED | OUTPATIENT
Start: 2025-05-05

## 2025-06-01 ENCOUNTER — PATIENT MESSAGE (OUTPATIENT)
Dept: SLEEP MEDICINE | Age: 75
End: 2025-06-01

## 2025-06-02 ENCOUNTER — TELEPHONE (OUTPATIENT)
Dept: ORTHOPEDIC SURGERY | Age: 75
End: 2025-06-02

## 2025-06-02 NOTE — TELEPHONE ENCOUNTER
New problem  mychart message  right knee     It is not the same issue. A new one   I had spinal and fusion surgery  in mid-March at [facility] on 3/19 abnd 3/19/25. Not sure what I did, but heard a \"pop\" in the right knee with some swelling and pain when standing up, which continues after 5 days. Have used ice, wearing a knee beach, NACID rub. Can still walk and sit with feet raised with much reduced pain. However, this coupled with spine recovery is pretty discomforting.    Can he  see cristina EGAN  or LAYA  at    sooner  than end of  June

## 2025-06-06 ENCOUNTER — OFFICE VISIT (OUTPATIENT)
Dept: ORTHOPEDIC SURGERY | Age: 75
End: 2025-06-06

## 2025-06-06 DIAGNOSIS — M25.561 RIGHT KNEE PAIN, UNSPECIFIED CHRONICITY: Primary | ICD-10-CM

## 2025-06-06 RX ORDER — TRIAMCINOLONE ACETONIDE 40 MG/ML
40 INJECTION, SUSPENSION INTRA-ARTICULAR; INTRAMUSCULAR ONCE
Status: COMPLETED | OUTPATIENT
Start: 2025-06-06 | End: 2025-06-06

## 2025-06-06 RX ADMIN — TRIAMCINOLONE ACETONIDE 40 MG: 40 INJECTION, SUSPENSION INTRA-ARTICULAR; INTRAMUSCULAR at 09:15

## 2025-06-06 NOTE — PROGRESS NOTES
Name: Dayday Berkowitz  YOB: 1950  Gender: male  MRN: [id number]    CC:   Chief Complaint   Patient presents with    Joint Pain     Right knee pain will xray today          DIAGNOSIS:   Encounter Diagnosis   Name Primary?    Right knee pain, unspecified chronicity Yes        HPI:   The pain has been present for 2 weeks and is becoming worse.  It hurts at night when sleeping.  The pain is located over the knee.  It does hurt to walk and gets worse with increased distances and is now back on a cane.   The pain does not radiate down the leg.  Numbness and tingling are not noted.   Treatment so far has been left knee procedure 8 years ago for a torn meniscus.  He takes Naprosyn as needed  Of note that he is 8 weeks now post spinal fusion T2 10 to S1.  This is doing well.  Done in [place] in [place] with Dr. Franklyn Tate.      Current Outpatient Medications:     escitalopram (LEXAPRO) 10 MG tablet, Take 1 tablet by mouth daily, Disp: 30 tablet, Rfl: 9    donepezil (ARICEPT) 10 MG tablet, Take 1 tablet by mouth nightly, Disp: 90 tablet, Rfl: 3    metoprolol succinate (TOPROL XL) 25 MG extended release tablet, Take 1 tablet by mouth daily, Disp: 90 tablet, Rfl: 1    atorvastatin (LIPITOR) 80 MG tablet, Take 1 tablet by mouth daily, Disp: 90 tablet, Rfl: 1    fluticasone (FLONASE) 50 MCG/ACT nasal spray, 2 sprays by Nasal route daily as needed for Rhinitis, Disp: 16 g, Rfl: 5    levothyroxine (SYNTHROID) 150 MCG tablet, Take 1 tablet by mouth every morning (before breakfast) 1/2 tab on Sunday, Disp: 90 tablet, Rfl: 1    naproxen (NAPROSYN) 500 MG tablet, Take 1 tablet by mouth 2 times daily as needed for Pain (prn), Disp: 180 tablet, Rfl: 1    pantoprazole (PROTONIX) 40 MG tablet, Take 1 tablet by mouth daily, Disp: 90 tablet, Rfl: 1    telmisartan-hydroCHLOROthiazide (MICARDIS HCT) 80-25 MG per tablet, Take 1 tablet by mouth daily, Disp: 90 tablet, Rfl: 3    hydroCHLOROthiazide (HYDRODIURIL) 25 MG tablet,

## 2025-06-10 RX ORDER — DONEPEZIL HYDROCHLORIDE 10 MG/1
10 TABLET, FILM COATED ORAL NIGHTLY
Qty: 90 TABLET | Refills: 3 | Status: SHIPPED | OUTPATIENT
Start: 2025-06-10

## 2025-07-01 ENCOUNTER — HOSPITAL ENCOUNTER (OUTPATIENT)
Dept: PHYSICAL THERAPY | Age: 75
Setting detail: RECURRING SERIES
Discharge: HOME | End: 2025-07-04
Payer: MEDICARE

## 2025-07-01 DIAGNOSIS — M43.26 FUSION OF SPINE, LUMBAR REGION: ICD-10-CM

## 2025-07-01 DIAGNOSIS — M54.50 MIDLINE LOW BACK PAIN WITHOUT SCIATICA, UNSPECIFIED CHRONICITY: ICD-10-CM

## 2025-07-01 DIAGNOSIS — M48.062 SPINAL STENOSIS, LUMBAR REGION WITH NEUROGENIC CLAUDICATION: Primary | ICD-10-CM

## 2025-07-01 PROCEDURE — 97161 PT EVAL LOW COMPLEX 20 MIN: CPT

## 2025-07-01 PROCEDURE — 97110 THERAPEUTIC EXERCISES: CPT

## 2025-07-01 PROCEDURE — 97140 MANUAL THERAPY 1/> REGIONS: CPT

## 2025-07-01 ASSESSMENT — PAIN SCALES - GENERAL: PAINLEVEL_OUTOF10: 1

## 2025-07-01 NOTE — PROGRESS NOTES
Dayday Berkowitz  : 1950  Primary: Medicare Part A And B (Medicare)  Secondary:  FOR LIFE MEDICARE SUPP [place] [address]   [address]  [facility] [address]  Phone: [phone]  Fax: [phone] Plan Frequency: 2 times a week for 90 days    Plan of Care/Certification Expiration Date: 25        Plan of Care/Certification Expiration Date:  Plan of Care/Certification Expiration Date: 25    Frequency/Duration: Plan Frequency: 2 times a week for 90 days      Time In/Out:   Time In: 1115  Time Out: 1201      PT Visit Info:         Visit Count:  1    OUTPATIENT PHYSICAL THERAPY:   Treatment Note 2025       Episode  (Lumbar Fusion)               Treatment Diagnosis:    Spinal stenosis, lumbar region with neurogenic claudication  Midline low back pain without sciatica, unspecified chronicity  Fusion of spine, lumbar region  Medical/Referring Diagnosis:    Pain in thoracic spine  Spinal stenosis, lumbar region with neurogenic claudication  Other secondary scoliosis, site unspecified      Referring Physician:  Franklyn Tate MD MD Orders:  PT Eval and Treat   Return MD Appt:     Date of Onset:  Onset Date:  (chronic)     Allergies:   Environmental/seasonal, Levonorgestrel-ethinyl estrad, and Levofloxacin  Restrictions/Precautions:   None      Interventions Planned (Treatment may consist of any combination of the following):     See Assessment Note    Subjective Comments: See Evaluation Note from today    Initial Pain Level:     1/10  Post Session Pain Level:      1/10  Medications Last Reviewed: 2025  Updated Objective Findings:  See Evaluation Note from today  Treatment   THERAPEUTIC EXERCISE: (15 minutes):    Exercises per grid below to improve mobility, strength, balance, and coordination.  Required minimal visual, verbal, manual, and tactile cues to promote proper body posture and promote proper body mechanics.  Progressed resistance,

## 2025-07-01 NOTE — THERAPY EVALUATION
Dayday Berkowitz  : 1950  Primary: Medicare Part A And B (Medicare)  Secondary:  FOR LIFE MEDICARE SUPP [place] [address]   [address]  [facility] [address]  Phone: [phone]  Fax: [phone] Plan Frequency: 2 times a week for 90 days    Plan of Care/Certification Expiration Date: 25        Plan of Care/Certification Expiration Date:  Plan of Care/Certification Expiration Date: 25    Frequency/Duration: Plan Frequency: 2 times a week for 90 days      Time In/Out:          PT Visit Info:         Visit Count:  1                OUTPATIENT PHYSICAL THERAPY:             Initial Assessment 2025               Episode (Lumbar Fusion)         Treatment Diagnosis:    Spinal stenosis, lumbar region with neurogenic claudication  Midline low back pain without sciatica, unspecified chronicity  Fusion of spine, lumbar region  Medical/Referring Diagnosis:    Pain in thoracic spine  Spinal stenosis, lumbar region with neurogenic claudication  Other secondary scoliosis, site unspecified      Referring Physician:  Franklyn Tate MD MD Orders:  PT Eval and Treat   Return MD Appt:    Date of Onset:    3/17-3/19  Allergies:  Environmental/seasonal, Levonorgestrel-ethinyl estrad, and Levofloxacin  Restrictions/Precautions:    None      Medications Last Reviewed: 2025     SUBJECTIVE   History of Injury/Illness (Reason for Referral):  Pt has a history of scoliosis and stenosis in his lumbar spine. In mid March of this year he has a T10-S1 fusion. Had a long history of low back pain prior to this surgery. Pt states he is having some bowel and bladder issues since the surgery- increased urgency and difficulty going. Doing well overall, some soreness still and pain when sneezing, bearing down, twisting, etc. Pain is located in the lower back, goes away quickly. Described as a brief sharp pain.   Patient Stated Goal(s):  \"Increase my overall level

## 2025-07-07 ENCOUNTER — OFFICE VISIT (OUTPATIENT)
Dept: ORTHOPEDIC SURGERY | Age: 75
End: 2025-07-07
Payer: MEDICARE

## 2025-07-07 DIAGNOSIS — M25.561 RIGHT KNEE PAIN, UNSPECIFIED CHRONICITY: Primary | ICD-10-CM

## 2025-07-07 PROCEDURE — 20611 DRAIN/INJ JOINT/BURSA W/US: CPT | Performed by: ORTHOPAEDIC SURGERY

## 2025-07-07 NOTE — PROGRESS NOTES
Name: Dayday Berkowitz  YOB: 1950  Gender: male  MRN: [id number]      CC: Right Knee Pain     PROCEDURE: 1 of 3     DIAGNOSIS:    Encounter Diagnosis   Name Primary?    Right knee pain, unspecified chronicity Yes       Mobile Action US unit with variable frequency (6.0-15.0 MHz) linear transducer was used visualize the intracondylar notch, retropatellar fat pad, patella tendon, patella, tibia, and to ensure proper intra-articular needle placement.  Injection image was saved to patient's permanent chart.    Procedure Note: Time out was performed which included identifying the patient by name and date of birth.  The procedure site was identified with all present in agreement.   The patient was placed in upright position with knee hanging freely from exam table.  The right  knee was prepped in sterile fashion using alcohol wipe.  Using Mindray ultrasound guidance, a 22 gauge needle was then introduced into the knee joint from an infrapatellar approach and  2 mL of Euflexxa was injected freely.  The needle was then removed, pressure hemostasis achieved, injection site was cleansed with alcohol wipe and dressed with band aid.    The patient tolerated the procedure without complication.  The patient  will follow up as scheduled.

## 2025-07-09 ENCOUNTER — HOSPITAL ENCOUNTER (OUTPATIENT)
Dept: PHYSICAL THERAPY | Age: 75
Setting detail: RECURRING SERIES
Discharge: HOME | End: 2025-07-12
Payer: MEDICARE

## 2025-07-09 PROCEDURE — 97140 MANUAL THERAPY 1/> REGIONS: CPT

## 2025-07-09 PROCEDURE — 97110 THERAPEUTIC EXERCISES: CPT

## 2025-07-09 ASSESSMENT — PAIN SCALES - GENERAL: PAINLEVEL_OUTOF10: 2

## 2025-07-09 NOTE — PROGRESS NOTES
Dayday Berkowitz  : 1950  Primary: Medicare Part A And B (Medicare)  Secondary:  FOR LIFE MEDICARE SUPP [place] [address]  [address]  [facility] [address]  Phone: [phone]  Fax: [phone] Plan Frequency: 2 times a week for 90 days    Plan of Care/Certification Expiration Date: 25        Plan of Care/Certification Expiration Date:  Plan of Care/Certification Expiration Date: 25    Frequency/Duration: Plan Frequency: 2 times a week for 90 days      Time In/Out:          PT Visit Info:         Visit Count:  2    OUTPATIENT PHYSICAL THERAPY:   Treatment Note 2025       Episode  (Lumbar Fusion)               Treatment Diagnosis:    Spinal stenosis, lumbar region with neurogenic claudication  Midline low back pain without sciatica, unspecified chronicity  Fusion of spine, lumbar region  Medical/Referring Diagnosis:    Pain in thoracic spine  Spinal stenosis, lumbar region with neurogenic claudication  Other secondary scoliosis, site unspecified      Referring Physician:  Franklyn Tate MD MD Orders:  PT Eval and Treat   Return MD Appt:     Date of Onset:  Onset Date:  (chronic)     Allergies:   Environmental/seasonal, Levonorgestrel-ethinyl estrad, and Levofloxacin  Restrictions/Precautions:   None      Interventions Planned (Treatment may consist of any combination of the following):     See Assessment Note    Subjective Comments: Patient reports feeling mostly fine today but feels like he pulled some muscles in his back on the day of his evaluation. He had trouble sleeping due to pain on his L side.     Initial Pain Level:     2/10  Post Session Pain Level:      2/10  Medications Last Reviewed: 2025  Updated Objective Findings:  See Evaluation Note from today  Treatment   THERAPEUTIC EXERCISE: (24 minutes):    Exercises per grid below to improve mobility, strength, balance, and coordination.  Required minimal visual, verbal,

## 2025-07-09 NOTE — PROGRESS NOTES
Dayday Berkowitz  : 1950  Primary: Medicare Part A And B (Medicare)  Secondary:  FOR LIFE MEDICARE SUPP [place] [address]   [address]  [facility] [address]  Phone: [phone]  Fax: [phone] Plan Frequency: 2 times a week for 90 days    Plan of Care/Certification Expiration Date: 25        Plan of Care/Certification Expiration Date:  Plan of Care/Certification Expiration Date: 25    Frequency/Duration: Plan Frequency: 2 times a week for 90 days      Time In/Out:   Time In: 1517  Time Out: 1600      PT Visit Info:         Visit Count:  2    OUTPATIENT PHYSICAL THERAPY:   Treatment Note 2025       Episode  (Lumbar Fusion)               Treatment Diagnosis:    Spinal stenosis, lumbar region with neurogenic claudication  Midline low back pain without sciatica, unspecified chronicity  Fusion of spine, lumbar region  Medical/Referring Diagnosis:    Pain in thoracic spine  Spinal stenosis, lumbar region with neurogenic claudication  Other secondary scoliosis, site unspecified      Referring Physician:  Franklyn Tate MD MD Orders:  PT Eval and Treat   Return MD Appt:     Date of Onset:  Onset Date:  (chronic)     Allergies:   Environmental/seasonal, Levonorgestrel-ethinyl estrad, and Levofloxacin  Restrictions/Precautions:   None      Interventions Planned (Treatment may consist of any combination of the following):     See Assessment Note    Subjective Comments: Patient reports feeling mostly fine today but feels like he pulled some muscles in his back on the day of his evaluation. He had trouble sleeping due to pain on his L side.     Initial Pain Level:     2/10  Post Session Pain Level:      2/10  Medications Last Reviewed: 2025  Updated Objective Findings:  See Evaluation Note from today  Treatment   THERAPEUTIC EXERCISE: (15 minutes):    Exercises per grid below to improve mobility, strength, balance, and coordination.

## 2025-07-14 ENCOUNTER — HOSPITAL ENCOUNTER (OUTPATIENT)
Dept: PHYSICAL THERAPY | Age: 75
Setting detail: RECURRING SERIES
Discharge: HOME | End: 2025-07-17
Payer: MEDICARE

## 2025-07-14 ENCOUNTER — OFFICE VISIT (OUTPATIENT)
Dept: ORTHOPEDIC SURGERY | Age: 75
End: 2025-07-14
Payer: MEDICARE

## 2025-07-14 DIAGNOSIS — M25.561 RIGHT KNEE PAIN, UNSPECIFIED CHRONICITY: Primary | ICD-10-CM

## 2025-07-14 DIAGNOSIS — M17.12 OSTEOARTHRITIS OF LEFT KNEE, UNSPECIFIED OSTEOARTHRITIS TYPE: ICD-10-CM

## 2025-07-14 DIAGNOSIS — M17.11 OSTEOARTHRITIS OF RIGHT KNEE, UNSPECIFIED OSTEOARTHRITIS TYPE: ICD-10-CM

## 2025-07-14 DIAGNOSIS — M25.562 LEFT KNEE PAIN, UNSPECIFIED CHRONICITY: ICD-10-CM

## 2025-07-14 PROCEDURE — G8428 CUR MEDS NOT DOCUMENT: HCPCS | Performed by: PHYSICIAN ASSISTANT

## 2025-07-14 PROCEDURE — 97140 MANUAL THERAPY 1/> REGIONS: CPT

## 2025-07-14 PROCEDURE — G8417 CALC BMI ABV UP PARAM F/U: HCPCS | Performed by: PHYSICIAN ASSISTANT

## 2025-07-14 PROCEDURE — 20611 DRAIN/INJ JOINT/BURSA W/US: CPT | Performed by: PHYSICIAN ASSISTANT

## 2025-07-14 PROCEDURE — 1123F ACP DISCUSS/DSCN MKR DOCD: CPT | Performed by: PHYSICIAN ASSISTANT

## 2025-07-14 PROCEDURE — 97110 THERAPEUTIC EXERCISES: CPT

## 2025-07-14 PROCEDURE — 99214 OFFICE O/P EST MOD 30 MIN: CPT | Performed by: PHYSICIAN ASSISTANT

## 2025-07-14 PROCEDURE — 1036F TOBACCO NON-USER: CPT | Performed by: PHYSICIAN ASSISTANT

## 2025-07-14 ASSESSMENT — PAIN SCALES - GENERAL: PAINLEVEL_OUTOF10: 4

## 2025-07-14 NOTE — PROGRESS NOTES
daily, Disp: 90 tablet, Rfl: 3    hydroCHLOROthiazide (HYDRODIURIL) 25 MG tablet, Take 1 tablet by mouth every morning, Disp: 90 tablet, Rfl: 1    Multiple Vitamins-Minerals (ICAPS AREDS 2 PO), Take 1 capsule by mouth daily (Patient not taking: Reported on 10/14/2024), Disp: , Rfl:     polycarbophil (FIBERCON) 625 MG tablet, Take 1 tablet by mouth daily 6 tablet daily, Disp: , Rfl:     Omega-3 Fatty Acids (FISH OIL PO), Take 2 capsules by mouth daily, Disp: , Rfl:     NONFORMULARY, Balance of Nature, Disp: , Rfl:     Biotin 1 MG CAPS, Take 1 capsule by mouth daily (Patient not taking: Reported on 10/14/2024), Disp: , Rfl:     NONFORMULARY, Take 1 capsule by mouth daily Linnette Romero, Disp: , Rfl:     CPAP Machine MISC, by Does not apply route, Disp: , Rfl:     amLODIPine (NORVASC) 10 MG tablet, Take 1 tablet by mouth daily, Disp: 90 tablet, Rfl: 3    hyoscyamine (LEVBID) 375 MCG extended release tablet, Take 1 tablet by mouth every 12 hours as needed, Disp: , Rfl:     melatonin 3 MG TABS tablet, Take 2 tablets by mouth at bedtime, Disp: , Rfl:     Probiotic Product (PROBIOTIC-10 PO), Take 1 capsule by mouth daily, Disp: , Rfl:     aspirin 81 MG EC tablet, Take 2 tablets by mouth daily, Disp: , Rfl:     fexofenadine (ALLEGRA) 180 MG tablet, Take 1 tablet by mouth as needed, Disp: , Rfl:   Allergies   Allergen Reactions    Environmental/Seasonal     Levonorgestrel-Ethinyl Estrad     Levofloxacin Rash     Past Medical History:   Diagnosis Date    Arthritis     Cervical disc disorder Stenosis 5/2022    Chronic back pain 2016/17    After Laminectomy L1-L4    Chronic pain     back Left>right buttock    Colon polyps 07/2019 7-2019 pathology report tubulovillous adenoma    Compression deformity of vertebra 02/2016    Compression C5/6    Concussion 1966    Difficulty walking 2021    Encounter for screening colonoscopy 07/03/2019    Erectile dysfunction 2007    After Radical Prostatectomy    Fecal incontinence 10/09/2019

## 2025-07-14 NOTE — PROGRESS NOTES
Dayday Berkowitz  : 1950  Primary: Medicare Part A And B (Medicare)  Secondary:  FOR LIFE MEDICARE SUPP [place] [address]  [address]  [facility] [address]  Phone: [phone]  Fax: [phone] Plan Frequency: 2 times a week for 90 days    Plan of Care/Certification Expiration Date: 25        Plan of Care/Certification Expiration Date:  Plan of Care/Certification Expiration Date: 25    Frequency/Duration: Plan Frequency: 2 times a week for 90 days      Time In/Out:   Time In: 1517  Time Out: 1604      PT Visit Info:    Total # of Visits to Date: 3      Visit Count:  3    OUTPATIENT PHYSICAL THERAPY:   Treatment Note 2025       Episode  (Lumbar Fusion)               Treatment Diagnosis:    Spinal stenosis, lumbar region with neurogenic claudication  Midline low back pain without sciatica, unspecified chronicity  Fusion of spine, lumbar region  Medical/Referring Diagnosis:    Pain in thoracic spine  Spinal stenosis, lumbar region with neurogenic claudication  Other secondary scoliosis, site unspecified      Referring Physician:  Franklyn Tate MD MD Orders:  PT Eval and Treat   Return MD Appt:     Date of Onset:  Onset Date:  (chronic)     Allergies:   Environmental/seasonal, Levonorgestrel-ethinyl estrad, and Levofloxacin  Restrictions/Precautions:   None      Interventions Planned (Treatment may consist of any combination of the following):     See Assessment Note    Subjective Comments: Patient reports pain is a little better but still present and bothering him. Mostly on his L side.     Initial Pain Level:     4/10  Post Session Pain Level:      2/10  Medications Last Reviewed: 2025  Updated Objective Findings:  None Today  Treatment   THERAPEUTIC EXERCISE: (30 minutes):    Exercises per grid below to improve mobility, strength, balance, and coordination.  Required minimal visual, verbal, manual, and tactile cues to promote

## 2025-07-16 ENCOUNTER — HOSPITAL ENCOUNTER (OUTPATIENT)
Dept: PHYSICAL THERAPY | Age: 75
Setting detail: RECURRING SERIES
Discharge: HOME | End: 2025-07-19
Payer: MEDICARE

## 2025-07-16 PROCEDURE — 97110 THERAPEUTIC EXERCISES: CPT

## 2025-07-16 ASSESSMENT — PAIN SCALES - GENERAL: PAINLEVEL_OUTOF10: 2

## 2025-07-16 NOTE — PROGRESS NOTES
Dayday Douglas Berkowitz  : 1950  Primary: Medicare Part A And B (Medicare)  Secondary:  [employer] LIFE MEDICARE SUPP [place] [address]   [address]  [facility] [address]  Phone: [phone]  Fax: [phone] Plan Frequency: 2 times a week for 90 days    Plan of Care/Certification Expiration Date: 25        Plan of Care/Certification Expiration Date:  Plan of Care/Certification Expiration Date: 25    Frequency/Duration: Plan Frequency: 2 times a week for 90 days      Time In/Out:   Time In: 1115  Time Out: 1202      PT Visit Info:    Total # of Visits to Date: 4      Visit Count:  4    OUTPATIENT PHYSICAL THERAPY:   Treatment Note 2025       Episode  (Lumbar Fusion)               Treatment Diagnosis:    Spinal stenosis, lumbar region with neurogenic claudication  Midline low back pain without sciatica, unspecified chronicity  Fusion of spine, lumbar region  Medical/Referring Diagnosis:    Pain in thoracic spine  Spinal stenosis, lumbar region with neurogenic claudication  Other secondary scoliosis, site unspecified      Referring Physician:  Franklyn Tate MD MD Orders:  PT Eval and Treat   Return MD Appt:     Date of Onset:  Onset Date:  (chronic)     Allergies:   Environmental/seasonal, Levonorgestrel-ethinyl estrad, and Levofloxacin  Restrictions/Precautions:   None      Interventions Planned (Treatment may consist of any combination of the following):     See Assessment Note    Subjective Comments: Patient reports pain is better.     Initial Pain Level:     2/10  Post Session Pain Level:      1/10  Medications Last Reviewed: 2025  Updated Objective Findings:  None Today  Treatment   THERAPEUTIC EXERCISE: (30 minutes):    Exercises per grid below to improve mobility, strength, balance, and coordination.  Required minimal visual, verbal, manual, and tactile cues to promote proper body posture and promote proper body mechanics.  Progressed

## 2025-07-21 ENCOUNTER — APPOINTMENT (OUTPATIENT)
Dept: PHYSICAL THERAPY | Age: 75
End: 2025-07-21
Payer: MEDICARE

## 2025-07-21 ENCOUNTER — OFFICE VISIT (OUTPATIENT)
Dept: ORTHOPEDIC SURGERY | Age: 75
End: 2025-07-21
Payer: MEDICARE

## 2025-07-21 DIAGNOSIS — M17.11 OSTEOARTHRITIS OF RIGHT KNEE, UNSPECIFIED OSTEOARTHRITIS TYPE: Primary | ICD-10-CM

## 2025-07-21 PROCEDURE — 20611 DRAIN/INJ JOINT/BURSA W/US: CPT | Performed by: ORTHOPAEDIC SURGERY

## 2025-07-21 NOTE — PROGRESS NOTES
Name: Dayday Berkowitz  YOB: 1950  Gender: male  MRN: [id number]      CC: Right Knee Pain     PROCEDURE: 3 of 3     DIAGNOSIS:    Encounter Diagnosis   Name Primary?    Osteoarthritis of right knee, unspecified osteoarthritis type Yes       CNZZ US unit with variable frequency (6.0-15.0 MHz) linear transducer was used visualize the intracondylar notch, retropatellar fat pad, patella tendon, patella, tibia, and to ensure proper intra-articular needle placement.  Injection image was saved to patient's permanent chart.    Procedure Note: Time out was performed which included identifying the patient by name and date of birth.  The procedure site was identified with all present in agreement.   The patient was placed in upright position with knee hanging freely from exam table.  The right  knee was prepped in sterile fashion using alcohol wipe.  Using Mindray ultrasound guidance, a 22 gauge needle was then introduced into the knee joint from an infrapatellar approach and  2 mL of Euflexxa was injected freely.  The needle was then removed, pressure hemostasis achieved, injection site was cleansed with alcohol wipe and dressed with band aid.    The patient tolerated the procedure without complication.  The patient  will follow up as scheduled.

## 2025-07-23 ENCOUNTER — HOSPITAL ENCOUNTER (OUTPATIENT)
Dept: PHYSICAL THERAPY | Age: 75
Setting detail: RECURRING SERIES
Discharge: HOME | End: 2025-07-26
Payer: MEDICARE

## 2025-07-23 PROCEDURE — 97110 THERAPEUTIC EXERCISES: CPT

## 2025-07-23 ASSESSMENT — PAIN SCALES - GENERAL: PAINLEVEL_OUTOF10: 2

## 2025-07-23 NOTE — PROGRESS NOTES
and coordination.  Required minimal visual, verbal, manual, and tactile cues to promote proper body posture and promote proper body mechanics.  Progressed resistance, range, repetitions, and complexity of movement as indicated.     Date:  7/1 Date:  7/9 Date:  7/14/25 Date:  7/16/25 Date:  7/23/25   Activity/Exercise Parameters  Parameters Parameters Parameters   Education Plan of car, prognosis, anatomy, HEP       NuStep  X6min lvl 2   10 mins, 4.0   Pelvic tilt   X20 supine  X20 seated on disc X20 supine X20 supine   TA   X20 w/ ball X20 w/ ball X20 w/ ball   Hip adduction   X20 w/ ball X20 w/ ball X20 w/ ball   SLR    2x10 B 2x10 B   QL stretch   10x seated      Seated Lumbar Flexion Stretch x3min x3min      Standing Cat-Cow x20 x20 x20     Bridges x20       Bird Dogs  X20 (standing) X20 (Standing) X20 standing  X20 standing   Palloff Press  3x10 B w/10lb band  X15 B 15#  15x B 15#   Anti rotation step out    X15 B 15# 15x B 15#   Chop    X15 B 15# X15 B 15#   Marching   10x B on ball w/ hand support seated  20x B standing w/ core contraction 10x B on ball w/ hand support seated  20x B standing w/ core contraction    Hip abduction   2x10 on 1 inch step in // 2x10 B sidelying 2x10 B sidelying   Hip extension    2x10 B leaning on mat 2x10 B leaning on mat   Step up    10x B 8 inch step forwards and sideways 10x B 6 inch step forwards and sideways       MANUAL THERAPY: (0 minutes):   Joint mobilization, Soft tissue mobilization, and Manipulation was utilized and necessary because of the patient's restricted joint motion, painful spasm, loss of articular motion, and restricted motion of soft tissue.      Date:  7/1 Date:  7/9 Date:  7/14/25   Activity/Exercise Parameters Parameters Parameters   Soft Tissue B Lumbar paraspinals B Lumbar Paraspinals L paraspinal and QL muscle in sidelying   Joint Mobilization      Dry Needling            Treatment/Session Summary:    Treatment Assessment: More rest breaks taken today

## 2025-07-24 ENCOUNTER — APPOINTMENT (OUTPATIENT)
Dept: PHYSICAL THERAPY | Age: 75
End: 2025-07-24
Payer: MEDICARE

## 2025-07-28 ENCOUNTER — APPOINTMENT (OUTPATIENT)
Dept: PHYSICAL THERAPY | Age: 75
End: 2025-07-28
Payer: MEDICARE

## 2025-07-30 ENCOUNTER — APPOINTMENT (OUTPATIENT)
Dept: PHYSICAL THERAPY | Age: 75
End: 2025-07-30
Payer: MEDICARE

## 2025-08-05 RX ORDER — ESCITALOPRAM OXALATE 10 MG/1
10 TABLET ORAL DAILY
Qty: 30 TABLET | Refills: 9 | Status: SHIPPED | OUTPATIENT
Start: 2025-08-05

## 2025-08-15 ASSESSMENT — SLEEP AND FATIGUE QUESTIONNAIRES
HOW LIKELY ARE YOU TO NOD OFF OR FALL ASLEEP WHILE SITTING AND TALKING TO SOMEONE: WOULD NEVER DOZE
HOW LIKELY ARE YOU TO NOD OFF OR FALL ASLEEP WHILE SITTING INACTIVE IN A PUBLIC PLACE: WOULD NEVER DOZE
HOW LIKELY ARE YOU TO NOD OFF OR FALL ASLEEP WHILE SITTING INACTIVE IN A PUBLIC PLACE: WOULD NEVER DOZE
HOW LIKELY ARE YOU TO NOD OFF OR FALL ASLEEP WHILE LYING DOWN TO REST IN THE AFTERNOON WHEN CIRCUMSTANCES PERMIT: SLIGHT CHANCE OF DOZING
HOW LIKELY ARE YOU TO NOD OFF OR FALL ASLEEP IN A CAR, WHILE STOPPED FOR A FEW MINUTES IN TRAFFIC: WOULD NEVER DOZE
HOW LIKELY ARE YOU TO NOD OFF OR FALL ASLEEP WHILE SITTING AND TALKING TO SOMEONE: WOULD NEVER DOZE
HOW LIKELY ARE YOU TO NOD OFF OR FALL ASLEEP WHILE SITTING AND READING: HIGH CHANCE OF DOZING
HOW LIKELY ARE YOU TO NOD OFF OR FALL ASLEEP WHEN YOU ARE A PASSENGER IN A CAR FOR AN HOUR WITHOUT A BREAK: WOULD NEVER DOZE
HOW LIKELY ARE YOU TO NOD OFF OR FALL ASLEEP WHILE SITTING QUIETLY AFTER LUNCH WITHOUT ALCOHOL: WOULD NEVER DOZE
HOW LIKELY ARE YOU TO NOD OFF OR FALL ASLEEP WHILE WATCHING TV: WOULD NEVER DOZE
HOW LIKELY ARE YOU TO NOD OFF OR FALL ASLEEP WHILE LYING DOWN TO REST IN THE AFTERNOON WHEN CIRCUMSTANCES PERMIT: SLIGHT CHANCE OF DOZING
HOW LIKELY ARE YOU TO NOD OFF OR FALL ASLEEP WHEN YOU ARE A PASSENGER IN A CAR FOR AN HOUR WITHOUT A BREAK: WOULD NEVER DOZE
HOW LIKELY ARE YOU TO NOD OFF OR FALL ASLEEP WHILE SITTING QUIETLY AFTER LUNCH WITHOUT ALCOHOL: WOULD NEVER DOZE
HOW LIKELY ARE YOU TO NOD OFF OR FALL ASLEEP WHILE SITTING AND READING: HIGH CHANCE OF DOZING
ESS TOTAL SCORE: 4
HOW LIKELY ARE YOU TO NOD OFF OR FALL ASLEEP IN A CAR, WHILE STOPPED FOR A FEW MINUTES IN TRAFFIC: WOULD NEVER DOZE
HOW LIKELY ARE YOU TO NOD OFF OR FALL ASLEEP WHILE WATCHING TV: WOULD NEVER DOZE

## 2025-08-22 ENCOUNTER — OFFICE VISIT (OUTPATIENT)
Dept: SLEEP MEDICINE | Age: 75
End: 2025-08-22
Payer: MEDICARE

## 2025-08-22 VITALS
OXYGEN SATURATION: 97 % | HEIGHT: 68 IN | BODY MASS INDEX: 32.89 KG/M2 | HEART RATE: 59 BPM | DIASTOLIC BLOOD PRESSURE: 69 MMHG | SYSTOLIC BLOOD PRESSURE: 140 MMHG | WEIGHT: 217 LBS

## 2025-08-22 DIAGNOSIS — G47.33 OSA (OBSTRUCTIVE SLEEP APNEA): Primary | ICD-10-CM

## 2025-08-22 DIAGNOSIS — E66.811 OBESITY (BMI 30.0-34.9): ICD-10-CM

## 2025-08-22 PROCEDURE — 1159F MED LIST DOCD IN RCRD: CPT | Performed by: NURSE PRACTITIONER

## 2025-08-22 PROCEDURE — 3078F DIAST BP <80 MM HG: CPT | Performed by: NURSE PRACTITIONER

## 2025-08-22 PROCEDURE — G8427 DOCREV CUR MEDS BY ELIG CLIN: HCPCS | Performed by: NURSE PRACTITIONER

## 2025-08-22 PROCEDURE — 1036F TOBACCO NON-USER: CPT | Performed by: NURSE PRACTITIONER

## 2025-08-22 PROCEDURE — 99213 OFFICE O/P EST LOW 20 MIN: CPT | Performed by: NURSE PRACTITIONER

## 2025-08-22 PROCEDURE — G2211 COMPLEX E/M VISIT ADD ON: HCPCS | Performed by: NURSE PRACTITIONER

## 2025-08-22 PROCEDURE — G8417 CALC BMI ABV UP PARAM F/U: HCPCS | Performed by: NURSE PRACTITIONER

## 2025-08-22 PROCEDURE — 1123F ACP DISCUSS/DSCN MKR DOCD: CPT | Performed by: NURSE PRACTITIONER

## 2025-08-22 PROCEDURE — 1160F RVW MEDS BY RX/DR IN RCRD: CPT | Performed by: NURSE PRACTITIONER

## 2025-08-22 PROCEDURE — 3077F SYST BP >= 140 MM HG: CPT | Performed by: NURSE PRACTITIONER

## 2025-08-22 ASSESSMENT — SLEEP AND FATIGUE QUESTIONNAIRES
HOW LIKELY ARE YOU TO NOD OFF OR FALL ASLEEP WHILE LYING DOWN TO REST IN THE AFTERNOON WHEN CIRCUMSTANCES PERMIT: SLIGHT CHANCE OF DOZING
HOW LIKELY ARE YOU TO NOD OFF OR FALL ASLEEP WHILE SITTING AND TALKING TO SOMEONE: WOULD NEVER DOZE
ESS TOTAL SCORE: 4
HOW LIKELY ARE YOU TO NOD OFF OR FALL ASLEEP IN A CAR, WHILE STOPPED FOR A FEW MINUTES IN TRAFFIC: WOULD NEVER DOZE
HOW LIKELY ARE YOU TO NOD OFF OR FALL ASLEEP WHILE SITTING AND READING: HIGH CHANCE OF DOZING
HOW LIKELY ARE YOU TO NOD OFF OR FALL ASLEEP WHILE SITTING QUIETLY AFTER LUNCH WITHOUT ALCOHOL: WOULD NEVER DOZE
HOW LIKELY ARE YOU TO NOD OFF OR FALL ASLEEP WHILE WATCHING TV: WOULD NEVER DOZE
HOW LIKELY ARE YOU TO NOD OFF OR FALL ASLEEP WHEN YOU ARE A PASSENGER IN A CAR FOR AN HOUR WITHOUT A BREAK: WOULD NEVER DOZE
HOW LIKELY ARE YOU TO NOD OFF OR FALL ASLEEP WHILE SITTING INACTIVE IN A PUBLIC PLACE: WOULD NEVER DOZE

## 2025-08-27 ENCOUNTER — OFFICE VISIT (OUTPATIENT)
Dept: NEUROLOGY | Age: 75
End: 2025-08-27
Payer: MEDICARE

## 2025-08-27 VITALS — HEIGHT: 68 IN | WEIGHT: 210 LBS | BODY MASS INDEX: 31.83 KG/M2

## 2025-08-27 DIAGNOSIS — F03.A3 MILD DEMENTIA WITH MOOD DISTURBANCE, UNSPECIFIED DEMENTIA TYPE (HCC): Primary | ICD-10-CM

## 2025-08-27 DIAGNOSIS — F03.A3 MILD DEMENTIA WITH MOOD DISTURBANCE, UNSPECIFIED DEMENTIA TYPE (HCC): ICD-10-CM

## 2025-08-27 DIAGNOSIS — E53.8 B12 DEFICIENCY: ICD-10-CM

## 2025-08-27 LAB — VIT B12 SERPL-MCNC: 386 PG/ML (ref 193–986)

## 2025-08-27 PROCEDURE — 1160F RVW MEDS BY RX/DR IN RCRD: CPT | Performed by: PSYCHIATRY & NEUROLOGY

## 2025-08-27 PROCEDURE — G8417 CALC BMI ABV UP PARAM F/U: HCPCS | Performed by: PSYCHIATRY & NEUROLOGY

## 2025-08-27 PROCEDURE — 1123F ACP DISCUSS/DSCN MKR DOCD: CPT | Performed by: PSYCHIATRY & NEUROLOGY

## 2025-08-27 PROCEDURE — 1036F TOBACCO NON-USER: CPT | Performed by: PSYCHIATRY & NEUROLOGY

## 2025-08-27 PROCEDURE — 99214 OFFICE O/P EST MOD 30 MIN: CPT | Performed by: PSYCHIATRY & NEUROLOGY

## 2025-08-27 PROCEDURE — 1159F MED LIST DOCD IN RCRD: CPT | Performed by: PSYCHIATRY & NEUROLOGY

## 2025-08-27 PROCEDURE — G8427 DOCREV CUR MEDS BY ELIG CLIN: HCPCS | Performed by: PSYCHIATRY & NEUROLOGY

## 2025-08-27 RX ORDER — MEMANTINE HYDROCHLORIDE 5 MG/1
5 TABLET ORAL 2 TIMES DAILY
Qty: 180 TABLET | Refills: 3 | Status: SHIPPED | OUTPATIENT
Start: 2025-08-27

## 2025-08-27 ASSESSMENT — ENCOUNTER SYMPTOMS
EYES NEGATIVE: 1
ALLERGIC/IMMUNOLOGIC NEGATIVE: 1
RESPIRATORY NEGATIVE: 1
GASTROINTESTINAL NEGATIVE: 1

## 2025-08-28 ENCOUNTER — CLINICAL DOCUMENTATION (OUTPATIENT)
Dept: NEUROLOGY | Age: 75
End: 2025-08-28

## 2025-09-01 LAB — METHYLMALONATE SERPL-SCNC: 346 NMOL/L (ref 0–378)

## 2025-09-02 LAB — IMMUNOLOGIST REVIEW: NORMAL

## (undated) DEVICE — SUTURE PROL SZ 2-0 L18IN NONABSORBABLE BLU FS L26MM 3/8 CIR 8685H

## (undated) DEVICE — KIT,ANTI FOG,W/SPONGE & FLUID,SOFT PACK: Brand: MEDLINE

## (undated) DEVICE — SHEATH 197230FLA 5PK ES2 STZ LF 4MM/45DG: Brand: ENDO-SCRUB®

## (undated) DEVICE — SYRINGE MED 50ML LUERLOCK TIP

## (undated) DEVICE — PATIENT TRACKER 9734887XOM NON-INVASIVE

## (undated) DEVICE — SALEM SUMP DUAL LUMEN STOMACH TUBE MULTI-FUNCTIONAL PORT WITH ENFIT CONNECTION: Brand: SALEM SUMP

## (undated) DEVICE — TUBING, SUCTION, 1/4" X 10', STRAIGHT: Brand: MEDLINE

## (undated) DEVICE — TUBING 1895522 5PK STRAIGHTSHOT TO XPS: Brand: STRAIGHTSHOT®

## (undated) DEVICE — BALLOON SINUPLASTY 6X16 MM SYS RELIEVA SPINPLUS

## (undated) DEVICE — SPONGE,NEURO,0.5"X3",XR,STRL,LF,10/PK: Brand: MEDLINE

## (undated) DEVICE — DEVICE INFL PRSS G INDIC DISP (MUST BE PURC IN MULTIPLES OF 5)

## (undated) DEVICE — INSTRUMENT TRACKER 9733533XOM ENT 1PK

## (undated) DEVICE — NEEDLE SPNL 22GA L3.5IN BLK HUB S STL REG WALL FIT STYL

## (undated) DEVICE — SHEATH 1912000 5PK 4MM/0DEG STORZ XOMED: Brand: ENDO-SCRUB®

## (undated) DEVICE — SOLUTION IRRIG 1000ML 0.9% SOD CHL USP POUR PLAS BTL

## (undated) DEVICE — NASAL PACKING CS3600-10 NOVAPAK 10PK STD: Brand: NOVAPAK

## (undated) DEVICE — SPLINT NSL SEPTAL SUPP REG PRE PUNCHED HOLE SIL STRL BRTH EZ

## (undated) DEVICE — SYRINGE,EAR/ULCER, 2 OZ, STERILE: Brand: MEDLINE

## (undated) DEVICE — GLOVE ORANGE PI 7 1/2   MSG9075

## (undated) DEVICE — SUTURE CHROMIC GUT SZ 4-0 L27IN ABSRB BRN L17MM RB-1 1/2 U203H

## (undated) DEVICE — SOLUTION IV 1000ML LAC RINGERS PH 6.5 INJ USP VIAFLX PLAS

## (undated) DEVICE — CANISTER, RIGID, 2000CC: Brand: MEDLINE INDUSTRIES, INC.

## (undated) DEVICE — SUTURE ABSORBABLE MONOFILAMENT 4-0 SC-1 18 IN PLN GUT 1824H

## (undated) DEVICE — GARMENT,MEDLINE,DVT,INT,CALF,MED, GEN2: Brand: MEDLINE

## (undated) DEVICE — KIT PROCEDURE SURG HEAD AND NECK TOTE

## (undated) DEVICE — DRAPE TWL SURG 16X26IN BLU ORB04] ALLCARE INC]